# Patient Record
Sex: FEMALE | Race: BLACK OR AFRICAN AMERICAN | NOT HISPANIC OR LATINO | Employment: OTHER | URBAN - METROPOLITAN AREA
[De-identification: names, ages, dates, MRNs, and addresses within clinical notes are randomized per-mention and may not be internally consistent; named-entity substitution may affect disease eponyms.]

---

## 2018-04-06 ENCOUNTER — TELEPHONE (OUTPATIENT)
Dept: OTOLARYNGOLOGY | Facility: CLINIC | Age: 68
End: 2018-04-06

## 2018-04-06 NOTE — TELEPHONE ENCOUNTER
----- Message from Zev Gregg sent at 4/5/2018  2:34 PM CDT -----  Please contact the pt regarding scheduling a PET scan or the body and CT scan of the brain prior to appt with provider on 4/16. Pt is currently set to be seen for adenocarcinoma of the lung, sent from [facility]. Orders for the scans and the records are being faxed to office this week.

## 2018-04-11 ENCOUNTER — INITIAL CONSULT (OUTPATIENT)
Dept: RADIATION ONCOLOGY | Facility: CLINIC | Age: 68
End: 2018-04-11
Payer: MEDICARE

## 2018-04-11 ENCOUNTER — TELEPHONE (OUTPATIENT)
Dept: HEMATOLOGY/ONCOLOGY | Facility: CLINIC | Age: 68
End: 2018-04-11

## 2018-04-11 VITALS
WEIGHT: 132.69 LBS | BODY MASS INDEX: 22.11 KG/M2 | HEART RATE: 101 BPM | HEIGHT: 65 IN | TEMPERATURE: 98 F | DIASTOLIC BLOOD PRESSURE: 91 MMHG | SYSTOLIC BLOOD PRESSURE: 165 MMHG | RESPIRATION RATE: 18 BRPM

## 2018-04-11 DIAGNOSIS — C34.12 PRIMARY ADENOCARCINOMA OF UPPER LOBE OF LEFT LUNG: ICD-10-CM

## 2018-04-11 PROCEDURE — 99205 OFFICE O/P NEW HI 60 MIN: CPT | Mod: S$GLB,,, | Performed by: RADIOLOGY

## 2018-04-11 PROCEDURE — 99999 PR PBB SHADOW E&M-EST. PATIENT-LVL III: CPT | Mod: PBBFAC,,, | Performed by: RADIOLOGY

## 2018-04-11 RX ORDER — AMLODIPINE BESYLATE 10 MG/1
10 TABLET ORAL DAILY
COMMUNITY
End: 2019-01-10

## 2018-04-11 RX ORDER — SPIRONOLACTONE 25 MG/1
25 TABLET ORAL DAILY
COMMUNITY

## 2018-04-11 RX ORDER — LANOLIN ALCOHOL/MO/W.PET/CERES
400 CREAM (GRAM) TOPICAL DAILY
COMMUNITY
End: 2018-11-26

## 2018-04-11 RX ORDER — SODIUM BICARBONATE 650 MG/1
650 TABLET ORAL 2 TIMES DAILY
COMMUNITY
Start: 2018-03-06 | End: 2018-09-27

## 2018-04-11 NOTE — LETTER
April 11, 2018      Daysi Murcia MD  [address] [place] [address]           [facility] - Radiation Oncology  [address]  [place] [address]  Phone: [phone]          Patient: Verónica Baker   MR Number: [id number]   YOB: 1950   Date of Visit: 4/11/2018       Dear Dr. Daysi Murcia:    Thank you for referring Verónica Baker to me for evaluation. Attached you will find relevant portions of my assessment and plan of care.    If you have questions, please do not hesitate to call me. I look forward to following Verónica Baker along with you.    Sincerely,    Tuan Hopson MD    Enclosure  CC:  No Recipients    If you would like to receive this communication electronically, please contact [email or web address] or [phone] to request more information on [employer] access.    For providers and/or their staff who would like to refer a patient to [facility], please contact us through our one-stop-shop provider referral line, [facility], at [phone].    If you feel you have received this communication in error or would no longer like to receive these types of communications, please e-mail [email or web address]

## 2018-04-11 NOTE — TELEPHONE ENCOUNTER
----- Message from Yuko Lloyd RN sent at 4/11/2018 11:01 AM CDT -----  Regarding: Consult with [facility]  Dr. Hopson put in a consult for this pt to [facility]. Dr. Hopson ordered a MRI and PET scan that is scheduled for Friday.

## 2018-04-11 NOTE — PROGRESS NOTES
REFERRING PHYSICIAN: Dr. Murcia     DIAGNOSIS: Lung adenocarcinoma, staging in progress    HISTORY OF PRESENT ILLNESS:   Ms. Baker is a 67 yr old female nonsmoker who presented to Dr. Murcia in pulmonology in [place] for new onset hemoptysis (blood tinged sputum for about a week).  noncontrast CT chest (due to stage 4 CKD) reportedly showed a 4cm spiculated medial LUPIS mass and possible mediastinal LAD.  She has hepatomegaly due to polycystic liver (and kidneys).  There were small lucent areas in the sternum and T9 vertebral bodies concerning for mets.  She underwent biopsy of the lesion and pathology reveals adenocarcinoma.  I do not have any evidence that molecular studies were performed.  She wanted to come to [facility] for treatment as she has family she can stay with here.  She feels well.  She has not coughed up any blood in at least 3 days.  No SOB, except when she lies on her right side.  Energy, appetite are good and weight is stable.  She was diagnosed with polycystic kidney/liver at 18 years old.  She is followed by Dr. Nagel in [place].  She has chronic infrequent headaches that are not worsening in severity or frequency.  She has chronic abdominal distension that is not worsening.   She has chronic LBP that is unchanged.  She has not had any scans other than the initial CT C/A/P. She does not have an appt with med onc.    ECO  ECOG SCORE         REVIEW OF SYSTEMS:   As above.  In addition, patient denies visual problems, dizziness, chest pain, nausea, vomiting, diarrhea, or any new bony pains.  Patient also denies easy bruising, skin rashes, or numbness or tingling.    PAST MEDICAL HISTORY:  Polycystic kidney/liver  CKD 4  HTN    PAST SURGICAL HISTORY:  No past surgical history on file.    ALLERGIES:   Review of patient's allergies indicates:  Codeine causes nausea    MEDICATIONS:  Current Outpatient Prescriptions   Medication Sig    furosemide (LASIX) 20 MG tablet      No current  "facility-administered medications for this visit.        SOCIAL HISTORY:  Social History     Social History    Marital status: Unknown     Spouse name: N/A    Number of children: N/A    Years of education: N/A     Occupational History    Not on file.     Social History Main Topics    Smoking status: Not on file    Smokeless tobacco: Not on file    Alcohol use Not on file    Drug use: Unknown    Sexual activity: Not on file     Other Topics Concern    Not on file     Social History Narrative    No narrative on file   Single. [place] the country outside of [place], [place].  Staying with sister in [facility] while here.  3 grown children, 8 grandchildren.  Smoked marijuana socially in her 20s and 30s.    FAMILY HISTORY:  Mom had leukemia.  Sister had myeloma.    PHYSICAL EXAMINATION:  BP (!) 165/91 (BP Location: Right arm, Patient Position: Sitting)   Pulse 101   Temp 97.7 °F (36.5 °C) (Oral)   Resp 18   Ht 5' 5" (1.651 m)   Wt 60.2 kg (132 lb 11.2 oz)   BMI 22.08 kg/m²   GENERAL: Patient is alert and oriented, in no acute distress.  HEENT:Extraocular muscles are intact.  Oropharynx is clear without lesions.    LYMPH: There is no cervical or supraclavicular lymphadenopathy palpated.  No axillary LAD.  HEART: Regular rate and rhythm.  LUNGS: Clear to auscultation bilaterally.  ABDOMEN:Soft, nontender, nondistended, without hepatosplenomegaly.  Normoactive bowel sounds.  EXTREMITIES: No clubbing, cyanosis, or edema.  MSK: spine is nontender.  No sternal tenderness.  NEUROLOGICAL: Cranial nerve II through XII grossly intact.  Sensation is intact.  Strength is 5 out of 5 in the upper and lower extremities bilaterally.  Gait is normal.    ASSESSMENT:  68 yo female never smoker with adenocarcinoma of the left upper lung, possible mediastinal LAD.  Staging incomplete.    PLAN:  I will order a brain MRI (without contrast unfortunately as her GFR <20) and a PET/CT for staging.  Once these studies are available I will " present her case at thoracic conference.  Will refer to med onc as well.  Initially she was not interested in surgery but when I pressed her on it she says she would consider it should she be a candidate.  Depending on PET results she may need an EBUS with biopsies.    I will see her back in follow up in about a week to discuss test results and further recommendations.     I spent approximately 60 minutes reviewing the available records and evaluating the patient, out of which over 50% of the time was spent face to face with the patient in counseling and coordinating this patient's care.  Distress Screening Results: Psychosocial Distress screening score of Distress Score: 0 noted and reviewed. No intervention indicated.

## 2018-04-13 ENCOUNTER — HOSPITAL ENCOUNTER (OUTPATIENT)
Dept: RADIOLOGY | Facility: HOSPITAL | Age: 68
Discharge: HOME | End: 2018-04-13
Attending: RADIOLOGY
Payer: MEDICARE

## 2018-04-13 DIAGNOSIS — C34.12 PRIMARY ADENOCARCINOMA OF UPPER LOBE OF LEFT LUNG: ICD-10-CM

## 2018-04-13 LAB — POCT GLUCOSE: 82 MG/DL (ref 70–110)

## 2018-04-13 PROCEDURE — 70551 MRI BRAIN STEM W/O DYE: CPT | Mod: 26,,, | Performed by: RADIOLOGY

## 2018-04-13 PROCEDURE — 78815 PET IMAGE W/CT SKULL-THIGH: CPT | Mod: 26,PI,, | Performed by: RADIOLOGY

## 2018-04-13 PROCEDURE — 78815 PET IMAGE W/CT SKULL-THIGH: CPT | Mod: TC

## 2018-04-13 PROCEDURE — A9552 F18 FDG: HCPCS

## 2018-04-13 PROCEDURE — 70551 MRI BRAIN STEM W/O DYE: CPT | Mod: TC

## 2018-04-16 NOTE — PROGRESS NOTES
CC: Lung cancer, initial visit    HPI:Ms. Baker is a 67 yr old lady here for oncology consultation.  She had a noncontrast CT chest (due to stage 4 CKD) for hemoptysis, which showed a 4cm spiculated medial LUPIS mass and possible mediastinal LAD.  She has hepatomegaly due to polycystic liver (and kidneys).  There were small lucent areas in the sternum and T9 vertebral bodies concerning for mets.  She underwent biopsy of the lesion on 4/4/18 in [place] and pathology reveals adenocarcinoma. She wanted to come to [facility] for treatment as she has family she can stay with here.  She feels well.  She has not coughed up any blood in at least 3 days.  No SOB, except when she lies on her right side.  She was diagnosed with polycystic kidney/liver when she was 18.  She is followed by Dr. Nagel in [place].  She has chronic infrequent headaches that are not worsening in severity or frequency.  She was evaluated by radiation oncology here ( ). She had PET CT here and non-contrast brain MRI     Medical Problems:    1. Polycystic kidney/liver disease  2. CKD stage 4  3. Hypertension  4. Uterine fibroids    Surgeries:    1. Biopsy  2. Hysterectomy    Family History:    History reviewed. No pertinent family history. Her mother had leukemia at age 77. Her sister has multiple myeloma,diagnosed at age 62  .        Social History:    Social History     Social History    Marital status: Unknown     Spouse name: N/A    Number of children: N/A    Years of education: N/A     Occupational History    Not on file.     Social History Main Topics    Smoking status: Never Smoker    Smokeless tobacco: Never Used    Alcohol use No    Drug use: Unknown    Sexual activity: Not on file     Other Topics Concern    Not on file     Social History Narrative    No narrative on file     Review of Systems   Constitutional: Positive for malaise/fatigue. Negative for chills, fever and weight loss.   HENT: Negative.    Eyes: Negative.     Respiratory: Positive for shortness of breath.    Cardiovascular: Positive for leg swelling.   Gastrointestinal: Negative for abdominal pain, diarrhea, heartburn, nausea and vomiting.   Genitourinary: Negative for dysuria, flank pain, frequency, hematuria and urgency.   Musculoskeletal: Negative for back pain, myalgias and neck pain.   Skin: Negative.    Neurological: Negative for dizziness, tremors, sensory change, speech change, weakness and headaches.   Psychiatric/Behavioral: Negative for depression.          Current Outpatient Prescriptions   Medication Sig    amLODIPine (NORVASC) 10 MG tablet Take 10 mg by mouth once daily.    magnesium oxide (MAG-OX) 400 mg tablet Take 400 mg by mouth once daily.    sodium bicarbonate 650 MG tablet Take 650 mg by mouth 2 (two) times daily.    spironolactone (ALDACTONE) 25 MG tablet Take 25 mg by mouth.    ferrous sulfate 325 mg (65 mg iron) Tab tablet Take 325 mg by mouth.    furosemide (LASIX) 20 MG tablet      No current facility-administered medications for this visit.          4/13/18 PET CT      EXAMINATION:  NM PET CT ROUTINE    CLINICAL HISTORY:  Malignant neoplasm of upper lobe, left bronchus or lung.    Outside imaging demonstrated 4 cm spiculated medial left upper lobe mass (biopsy proven adenocarcinoma) with possible mediastinal lymphadenopathy as well as lucent areas involving the sternum and T9.    TECHNIQUE:  13.12 mCi of F18-FDG was administered intravenously in the right antecubital fossa.  After an approximately 60 min distribution time, PET/CT images were acquired from the skull base to the mid thigh. Transmission images were acquired to correct for attenuation using a whole body low-dose CT scan without contrast with the arms positioned above the head. Glycemia at the time of injection was 82 mg/dL.    COMPARISON:  CT abdomen pelvis 07/21/2017, MRI brain 04/13/2018    FINDINGS:  Quality of the study: Adequate.    Abnormal foci of increased uptake  are present within the left upper lobe as well as a few prevascular mediastinal lymph nodes.  Lung mass measures approximately 4.6 x 2.9 cm.    No appreciable lucent lesion in the sternum or T9 vertebral body.  Mild degenerative metabolic activity is present at the sternomanubrial junction.    Index lesions:    - Left upper lobe mass: SUV max 11.1    - Lateral pre-vascular lymph node: SUV max 5.8    Physiologic uptake of the tracer is present within the brain, salivary glands, myocardium, GI and  tracts.    Incidental CT findings: Liver and kidneys are enlarged with numerous intraparenchymal cyst and associated mass effect on the adjacent abdominal organs, unchanged.  Colonic diverticulosis without diverticulitis.  Bandlike opacification within the right lower lobe likely represents subsegmental atelectasis.  Calcific atherosclerosis involves the lower extremity vasculature bilaterally.   Impression       Known malignancy of the left upper lobe with mediastinal trini metastases.    No appreciable lucent lesion in the sternum or T9 vertebral body.  Correlation with prior outside imaging is recommended.         4/13/18 MRI brain w/o cont      FINDINGS:  Intracranial compartment:    Ventricles and sulci are normal in size for age without evidence of hydrocephalus. No extra-axial blood or fluid collections.    Nonspecific small foci of T2/FLAIR high signal intensity in the supratentorial white matter, favored to represent chronic microvascular ischemic changes.  Remote lacunar type infarct in the left putamen.  Small punctate focus of susceptibility signal artifact in the left occipital lobe, suggestive of an old microhemorrhage or calcification.  No mass lesion, acute hemorrhage, edema or acute infarct.    Normal vascular flow voids are preserved.    Skull/extracranial contents (limited evaluation): Bone marrow signal intensity is normal.   Impression       No evidence of intracranial metastases within limitation in  the absence of IV contrast which decrease the sensitivity of this exam.    Nonspecific foci of T2/FLAIR high signal intensity in the supratentorial white matter, likely representing chronic microvascular ischemic changes.     Vitals:    04/17/18 0854   BP: (!) 159/89   Pulse: 87   Resp: 18   Temp: 98.4 °F (36.9 °C)     Physical Exam   Constitutional: She is oriented to person, place, and time. She appears well-developed. No distress.   HENT:   Head: Normocephalic and atraumatic.   Mouth/Throat: No oropharyngeal exudate.   Eyes: Pupils are equal, round, and reactive to light. No scleral icterus.   Neck: Normal range of motion.   Cardiovascular: Normal rate, regular rhythm and normal heart sounds.    No murmur heard.  Pulmonary/Chest: Effort normal. No respiratory distress. She has no wheezes.   Abdominal: She exhibits distension. There is no tenderness. There is no rebound.   She has tense ascites   Musculoskeletal: She exhibits edema.   Lymphadenopathy:     She has no cervical adenopathy.   Neurological: She is alert and oriented to person, place, and time. No cranial nerve deficit.   Skin: Skin is warm.   Psychiatric: She has a normal mood and affect.       Assessment:    1. Adenocarcinoma involving lung  2. Polycystic kidney disease  3. Polycystic liver disease  4. Ascites  5. Hypertension,essential  6. Cough  7. Dyspnea on exertion  8. CKD stage IV    Plan:    1. She has a left upper lung mass that was biopsied, as well as hyper metabolic, prevascular mediastinal lymph nodes on PET CT. The diagnosis was based purely on morphology, with no IHC details provided. No other hypermetabolic lesions on PET CT. I personally reviewed the images today. MRI brain (non-contrast) does not show any metastatic lesion. She has polycystic kidney disease and she is certainly at higher risk for renal malignancy, colon CA as well as liver CA.  Slides and block will be requested from [facility] and reviwed by pathology here,  to ensure that this is a primary lung malignancy and not metastatic renal/hepatic malignancy. This case will be discussed at this week's thoracic tumor conference.    4. Chronic, attributed to CKD/ polycystic liver       5. On multiple anti-HT medications    Distress Screening Results: Psychosocial Distress screening score of Distress Score: 0 noted and reviewed. No intervention indicated.

## 2018-04-17 ENCOUNTER — INITIAL CONSULT (OUTPATIENT)
Dept: HEMATOLOGY/ONCOLOGY | Facility: CLINIC | Age: 68
End: 2018-04-17
Payer: MEDICARE

## 2018-04-17 ENCOUNTER — LAB VISIT (OUTPATIENT)
Dept: LAB | Facility: HOSPITAL | Age: 68
End: 2018-04-17
Attending: INTERNAL MEDICINE
Payer: MEDICARE

## 2018-04-17 VITALS
RESPIRATION RATE: 18 BRPM | HEIGHT: 65 IN | SYSTOLIC BLOOD PRESSURE: 159 MMHG | OXYGEN SATURATION: 99 % | DIASTOLIC BLOOD PRESSURE: 89 MMHG | BODY MASS INDEX: 21.89 KG/M2 | HEART RATE: 87 BPM | WEIGHT: 131.38 LBS | TEMPERATURE: 98 F

## 2018-04-17 DIAGNOSIS — C34.12 PRIMARY ADENOCARCINOMA OF UPPER LOBE OF LEFT LUNG: ICD-10-CM

## 2018-04-17 DIAGNOSIS — Q61.3 POLYCYSTIC KIDNEY DISEASE: ICD-10-CM

## 2018-04-17 DIAGNOSIS — C34.12 PRIMARY ADENOCARCINOMA OF UPPER LOBE OF LEFT LUNG: Primary | ICD-10-CM

## 2018-04-17 DIAGNOSIS — T14.8XXA BRUISING: ICD-10-CM

## 2018-04-17 DIAGNOSIS — R18.8 OTHER ASCITES: ICD-10-CM

## 2018-04-17 LAB
ALBUMIN SERPL BCP-MCNC: 4 G/DL
ALP SERPL-CCNC: 153 U/L
ALT SERPL W/O P-5'-P-CCNC: 20 U/L
ANION GAP SERPL CALC-SCNC: 12 MMOL/L
AST SERPL-CCNC: 22 U/L
BASOPHILS # BLD AUTO: 0.06 K/UL
BASOPHILS NFR BLD: 1.4 %
BILIRUB SERPL-MCNC: 0.6 MG/DL
BUN SERPL-MCNC: 34 MG/DL
CALCIUM SERPL-MCNC: 10 MG/DL
CHLORIDE SERPL-SCNC: 106 MMOL/L
CO2 SERPL-SCNC: 22 MMOL/L
CREAT SERPL-MCNC: 3.2 MG/DL
DIFFERENTIAL METHOD: ABNORMAL
EOSINOPHIL # BLD AUTO: 0.1 K/UL
EOSINOPHIL NFR BLD: 1.2 %
ERYTHROCYTE [DISTWIDTH] IN BLOOD BY AUTOMATED COUNT: 14.4 %
EST. GFR  (AFRICAN AMERICAN): 16.6 ML/MIN/1.73 M^2
EST. GFR  (NON AFRICAN AMERICAN): 14.4 ML/MIN/1.73 M^2
GLUCOSE SERPL-MCNC: 80 MG/DL
HCT VFR BLD AUTO: 35.9 %
HGB BLD-MCNC: 11.5 G/DL
IMM GRANULOCYTES # BLD AUTO: 0.01 K/UL
IMM GRANULOCYTES NFR BLD AUTO: 0.2 %
INR PPP: 1.2
LDH SERPL L TO P-CCNC: 217 U/L
LYMPHOCYTES # BLD AUTO: 1 K/UL
LYMPHOCYTES NFR BLD: 23.1 %
MCH RBC QN AUTO: 26.6 PG
MCHC RBC AUTO-ENTMCNC: 32 G/DL
MCV RBC AUTO: 83 FL
MONOCYTES # BLD AUTO: 0.2 K/UL
MONOCYTES NFR BLD: 5.6 %
NEUTROPHILS # BLD AUTO: 3 K/UL
NEUTROPHILS NFR BLD: 68.5 %
NRBC BLD-RTO: 0 /100 WBC
PLATELET # BLD AUTO: 233 K/UL
PMV BLD AUTO: 12.2 FL
POTASSIUM SERPL-SCNC: 4.4 MMOL/L
PROT SERPL-MCNC: 7.9 G/DL
PROTHROMBIN TIME: 12 SEC
RBC # BLD AUTO: 4.33 M/UL
SODIUM SERPL-SCNC: 140 MMOL/L
WBC # BLD AUTO: 4.32 K/UL

## 2018-04-17 PROCEDURE — 83615 LACTATE (LD) (LDH) ENZYME: CPT

## 2018-04-17 PROCEDURE — 99999 PR PBB SHADOW E&M-EST. PATIENT-LVL III: CPT | Mod: PBBFAC,,, | Performed by: INTERNAL MEDICINE

## 2018-04-17 PROCEDURE — 85025 COMPLETE CBC W/AUTO DIFF WBC: CPT

## 2018-04-17 PROCEDURE — 80053 COMPREHEN METABOLIC PANEL: CPT

## 2018-04-17 PROCEDURE — 85610 PROTHROMBIN TIME: CPT

## 2018-04-17 PROCEDURE — 36415 COLL VENOUS BLD VENIPUNCTURE: CPT

## 2018-04-17 PROCEDURE — 99205 OFFICE O/P NEW HI 60 MIN: CPT | Mod: S$GLB,,, | Performed by: INTERNAL MEDICINE

## 2018-04-17 RX ORDER — FERROUS SULFATE 325(65) MG
325 TABLET ORAL
COMMUNITY
End: 2018-05-07

## 2018-04-17 NOTE — Clinical Note
-Labs today -Release of tissue block and slides from [facility] ( form signed by pt)

## 2018-04-17 NOTE — LETTER
April 17, 2018      Tuan Hopson MD  [address] [facility] [address]           [facility] - Hematology Oncology  [address] [facility] [address] [address]  [facility] [address]  Phone: [phone]          Patient: Verónica Baker   MR Number: [id number]   YOB: 1950   Date of Visit: 4/17/2018       Dear Dr. Tuan Hopson:    Thank you for referring Verónica Baker to me for evaluation. Attached you will find relevant portions of my assessment and plan of care.    If you have questions, please do not hesitate to call me. I look forward to following Verónica Baker along with you.    Sincerely,    Patricia Pires MD    Enclosure  CC:  No Recipients    If you would like to receive this communication electronically, please contact [email or web address] or [phone] to request more information on [employer] Link access.    For providers and/or their staff who would like to refer a patient to [facility], please contact us through our one-stop-shop provider referral line, [facility], at [phone].    If you feel you have received this communication in error or would no longer like to receive these types of communications, please e-mail [email or web address]

## 2018-04-18 ENCOUNTER — DOCUMENTATION ONLY (OUTPATIENT)
Dept: CARDIOTHORACIC SURGERY | Facility: CLINIC | Age: 68
End: 2018-04-18

## 2018-04-18 NOTE — PATIENT CARE CONFERENCE
[facility]      THORACIC MULTIDISCIPLINARY TUMOR BOARD  PATIENT REVIEW FORM  ________________________________________________________________________    CLINIC #: [id number]  DATE: 04/18/2018    Diagnosis:   NSCLC    PRESENTER:   Dr. Hopson    PATIENT SUMMARY:   66 yo female nonsmoker with CKD 4, polycystic kidney/liver disease and lung adenocarcinoma diagnosed on w/u for hemoptysis. 4.6 cm LUPIS spiculated mass and possible mediastinal adenopathy. PET with LUPIS avidity and uptake in left prevascular LN. Outside pathology.    BOARD RECOMMENDATIONS:   Recommend definitive chemoRT.     CONSULT NEEDED:     [] Surgery    [] Hem/Onc    [] Rad/Onc    [] Dietary                 [] Social Service    [] Psychology       [] Pulmonology    Clinical Stage: Tumor 2b Node(s) 2        GROUP STAGE:     [] O    [] 1A    [] IB    [] IIA    [] IIB     [x] IIIA     [] IIIB     [] IIIC    [] IV                               [] Local recurrence     [] Regional recurrence     [] Distant recurrence                   [] NSCLC     [] SCLC     Tumor type adenocarcinoma     Unstageable:      [] Yes     [] No  Metastatic site(s):          [x] Ora'l Treatment Guidelines reviewed and care planned is consistent with guidelines.         (i.e., NCCN, NCI, PD, ACO, AUA, etc.)    PRESENTATION AT CANCER CONFERENCE:         [] Prospective    [] Retrospective     [] Follow-Up          [] Eligible for clinical trial

## 2018-04-20 ENCOUNTER — OFFICE VISIT (OUTPATIENT)
Dept: RADIATION ONCOLOGY | Facility: CLINIC | Age: 68
End: 2018-04-20
Payer: MEDICARE

## 2018-04-20 VITALS
RESPIRATION RATE: 18 BRPM | DIASTOLIC BLOOD PRESSURE: 80 MMHG | HEART RATE: 92 BPM | WEIGHT: 132.19 LBS | SYSTOLIC BLOOD PRESSURE: 156 MMHG | TEMPERATURE: 98 F | BODY MASS INDEX: 22 KG/M2

## 2018-04-20 DIAGNOSIS — C34.12 PRIMARY ADENOCARCINOMA OF UPPER LOBE OF LEFT LUNG: Primary | ICD-10-CM

## 2018-04-20 PROCEDURE — 99999 PR PBB SHADOW E&M-EST. PATIENT-LVL III: CPT | Mod: PBBFAC,,, | Performed by: RADIOLOGY

## 2018-04-20 PROCEDURE — 99213 OFFICE O/P EST LOW 20 MIN: CPT | Mod: S$GLB,,, | Performed by: RADIOLOGY

## 2018-04-20 NOTE — PROGRESS NOTES
REFERRING PHYSICIAN: Dr. Murcia      DIAGNOSIS: Lung adenocarcinoma, [id number], stage IIIA    INTERVAL HISTORY: Ms. Baker returns in followup.  Since I saw her she saw Dr. Pires who has requested her path for review and possibly molecular studies to confirm lung primary.  She had a noncontrast brain MRI which was negative for metastases, and a PET/CT which showed a hypermetabolic 4.6cm LUPIS mass and a few hypermetabolic prevascular LNs, without evidence of contralateral nodes or distant mets.  She is therefore stage IIIA.  Her case was discussed at multidisciplinary thoracic conference and concurrent chemoradiotherapy was recommended for her stage IIIA disease.  Mediastinal sampling was not felt to be necessary.  She has been eating well with stable weight.  No pain.    PHYSICAL EXAMINATION:  VITAL SIGNS: BP (!) 156/80 (BP Location: Left arm, Patient Position: Sitting)   Pulse 92   Temp 98 °F (36.7 °C) (Oral)   Resp 18   Wt 60 kg (132 lb 3.2 oz)   BMI 22.00 kg/m²   GENERAL: Patient is alert and oriented, in no acute distress.  HEENT: Extraocular muscles are intact.  Oropharynx is clear without lesions.    LYMPH: There is no cervical or supraclavicular adenopathy palpated.  No axillary LAD.  CHEST: Breath sounds clear bilaterally.  No rales.  No rhonchi.  Unlabored respirations.  CARDIOVASCULAR: Normal S1, S2.  Normal rate.  Regular rhythm.  ABDOMEN: Bowel sounds normal.  No tenderness.  No abdominal distention.  No hepatomegaly.  No splenomegaly.  EXTREMITIES: No clubbing, cyanosis, edema  NEUROLOGIC: Cranial nerves II through XII are grossly intact.  Sensation is intact.  Strength is 5 out of 5 in the upper and lower extremities bilaterally.  Gait is normal.    ASSESSMENT:   68 yo female nonsmoker with a stage IIIA adenocarcinoma of the left upper lobe.      PLAN:   Concurrent chemoradiotherapy.   We discussed the rationale for treatment, logistics, risks vs benefits, side effects and potential complications of  treatment.  Both acute and late side effects were discussed, including but not limited to esophagitis, cough, pneumonitis and dermatitis.  All of her questions were answered and she wishes to proceed as recommended.  Consent form was signed and simulation scheduled.  I will schedule the simulation for late next week to allow sometime for pathology slide review.  I anticipate her chemoradiation beginning around 5/7/18.  20 minutes was spent in follow up, of which >50% was spent in face to face counseling.

## 2018-04-26 DIAGNOSIS — C34.12 PRIMARY ADENOCARCINOMA OF UPPER LOBE OF LEFT LUNG: Primary | ICD-10-CM

## 2018-04-27 ENCOUNTER — HOSPITAL ENCOUNTER (OUTPATIENT)
Dept: RADIATION THERAPY | Facility: HOSPITAL | Age: 68
Discharge: HOME | End: 2018-04-27
Attending: RADIOLOGY
Payer: MEDICARE

## 2018-04-27 PROCEDURE — 77263 THER RADIOLOGY TX PLNG CPLX: CPT | Mod: ,,, | Performed by: RADIOLOGY

## 2018-04-27 PROCEDURE — 77334 RADIATION TREATMENT AID(S): CPT | Mod: 26,,, | Performed by: RADIOLOGY

## 2018-04-27 PROCEDURE — 77014 HC CT GUIDANCE RADIATION THERAPY FLDS PLACEMENT: CPT | Mod: TC | Performed by: RADIOLOGY

## 2018-04-27 PROCEDURE — 77334 RADIATION TREATMENT AID(S): CPT | Mod: TC | Performed by: RADIOLOGY

## 2018-04-27 PROCEDURE — 77290 THER RAD SIMULAJ FIELD CPLX: CPT | Mod: TC | Performed by: RADIOLOGY

## 2018-04-27 PROCEDURE — 77290 THER RAD SIMULAJ FIELD CPLX: CPT | Mod: 26,,, | Performed by: RADIOLOGY

## 2018-04-27 RX ORDER — FAMOTIDINE 10 MG/ML
20 INJECTION INTRAVENOUS
Status: CANCELLED | OUTPATIENT
Start: 2018-05-03

## 2018-04-27 RX ORDER — SODIUM CHLORIDE 0.9 % (FLUSH) 0.9 %
10 SYRINGE (ML) INJECTION
Status: CANCELLED | OUTPATIENT
Start: 2018-05-03

## 2018-04-27 RX ORDER — DIPHENHYDRAMINE HYDROCHLORIDE 50 MG/ML
50 INJECTION INTRAMUSCULAR; INTRAVENOUS ONCE AS NEEDED
Status: CANCELLED | OUTPATIENT
Start: 2018-05-03 | End: 2018-05-03

## 2018-04-27 RX ORDER — EPINEPHRINE 0.3 MG/.3ML
0.3 INJECTION SUBCUTANEOUS ONCE AS NEEDED
Status: CANCELLED | OUTPATIENT
Start: 2018-05-03 | End: 2018-05-03

## 2018-04-27 RX ORDER — HEPARIN 100 UNIT/ML
500 SYRINGE INTRAVENOUS
Status: CANCELLED | OUTPATIENT
Start: 2018-05-03

## 2018-04-30 DIAGNOSIS — C34.12 PRIMARY ADENOCARCINOMA OF UPPER LOBE OF LEFT LUNG: Primary | ICD-10-CM

## 2018-04-30 PROCEDURE — 88342 IMHCHEM/IMCYTCHM 1ST ANTB: CPT | Mod: 59 | Performed by: PATHOLOGY

## 2018-04-30 PROCEDURE — 88341 IMHCHEM/IMCYTCHM EA ADD ANTB: CPT | Mod: 26,59,, | Performed by: PATHOLOGY

## 2018-04-30 PROCEDURE — 88323 CONSLTJ&REPRT MATRL PREP SLD: CPT | Mod: 26,,, | Performed by: PATHOLOGY

## 2018-04-30 PROCEDURE — 88342 IMHCHEM/IMCYTCHM 1ST ANTB: CPT | Mod: 26,59,, | Performed by: PATHOLOGY

## 2018-04-30 PROCEDURE — 88341 IMHCHEM/IMCYTCHM EA ADD ANTB: CPT | Mod: 59 | Performed by: PATHOLOGY

## 2018-05-01 ENCOUNTER — TELEPHONE (OUTPATIENT)
Dept: RADIATION ONCOLOGY | Facility: CLINIC | Age: 68
End: 2018-05-01

## 2018-05-01 ENCOUNTER — HOSPITAL ENCOUNTER (OUTPATIENT)
Dept: RADIATION THERAPY | Facility: HOSPITAL | Age: 68
Discharge: HOME | End: 2018-05-01
Attending: RADIOLOGY
Payer: MEDICARE

## 2018-05-01 NOTE — TELEPHONE ENCOUNTER
Follow up call to patient she will be starting radiation/chemo on 5/7/18  Instructed to come over to radiation after her chemo is finished.  She verbalized understanding

## 2018-05-01 NOTE — TELEPHONE ENCOUNTER
[facility] and explained the rules of the [facility] to her. She expressed understanding and agreement. Completed and faxed the reservation to the [facility] for arrival on 5/7/18 and departure on 6/20/2018. Spoke to Jenny at the [facility] and she confirmed availability - she discussed it with the patient. Notified the patient's nurse in Radiation Oncology, Yuko Lloyd RN.

## 2018-05-03 PROCEDURE — 77295 3-D RADIOTHERAPY PLAN: CPT | Mod: 26,,, | Performed by: RADIOLOGY

## 2018-05-03 PROCEDURE — 77334 RADIATION TREATMENT AID(S): CPT | Mod: TC | Performed by: RADIOLOGY

## 2018-05-03 PROCEDURE — 77334 RADIATION TREATMENT AID(S): CPT | Mod: 26,,, | Performed by: RADIOLOGY

## 2018-05-03 PROCEDURE — 77300 RADIATION THERAPY DOSE PLAN: CPT | Mod: 26,,, | Performed by: RADIOLOGY

## 2018-05-03 PROCEDURE — 77300 RADIATION THERAPY DOSE PLAN: CPT | Mod: TC | Performed by: RADIOLOGY

## 2018-05-03 PROCEDURE — 77295 3-D RADIOTHERAPY PLAN: CPT | Mod: TC | Performed by: RADIOLOGY

## 2018-05-04 PROCEDURE — 77470 SPECIAL RADIATION TREATMENT: CPT | Mod: 59,TC | Performed by: RADIOLOGY

## 2018-05-04 PROCEDURE — 77470 SPECIAL RADIATION TREATMENT: CPT | Mod: 26,59,, | Performed by: RADIOLOGY

## 2018-05-07 ENCOUNTER — INFUSION (OUTPATIENT)
Dept: INFUSION THERAPY | Facility: HOSPITAL | Age: 68
End: 2018-05-07
Attending: INTERNAL MEDICINE
Payer: MEDICARE

## 2018-05-07 ENCOUNTER — OFFICE VISIT (OUTPATIENT)
Dept: HEMATOLOGY/ONCOLOGY | Facility: CLINIC | Age: 68
End: 2018-05-07
Payer: MEDICARE

## 2018-05-07 ENCOUNTER — DOCUMENTATION ONLY (OUTPATIENT)
Dept: RADIATION ONCOLOGY | Facility: CLINIC | Age: 68
End: 2018-05-07

## 2018-05-07 VITALS
RESPIRATION RATE: 18 BRPM | TEMPERATURE: 98 F | DIASTOLIC BLOOD PRESSURE: 74 MMHG | BODY MASS INDEX: 21.67 KG/M2 | HEART RATE: 78 BPM | WEIGHT: 130.06 LBS | SYSTOLIC BLOOD PRESSURE: 165 MMHG | HEIGHT: 65 IN

## 2018-05-07 VITALS
TEMPERATURE: 98 F | WEIGHT: 130.06 LBS | DIASTOLIC BLOOD PRESSURE: 91 MMHG | HEART RATE: 98 BPM | RESPIRATION RATE: 17 BRPM | BODY MASS INDEX: 21.67 KG/M2 | HEIGHT: 65 IN | OXYGEN SATURATION: 99 % | SYSTOLIC BLOOD PRESSURE: 161 MMHG

## 2018-05-07 DIAGNOSIS — C34.12 PRIMARY ADENOCARCINOMA OF UPPER LOBE OF LEFT LUNG: Primary | ICD-10-CM

## 2018-05-07 DIAGNOSIS — R18.8 OTHER ASCITES: ICD-10-CM

## 2018-05-07 DIAGNOSIS — Q61.3 POLYCYSTIC KIDNEY DISEASE: ICD-10-CM

## 2018-05-07 PROCEDURE — 77387 GUIDANCE FOR RADJ TX DLVR: CPT | Mod: TC | Performed by: RADIOLOGY

## 2018-05-07 PROCEDURE — 99215 OFFICE O/P EST HI 40 MIN: CPT | Mod: S$GLB,,, | Performed by: INTERNAL MEDICINE

## 2018-05-07 PROCEDURE — S0028 INJECTION, FAMOTIDINE, 20 MG: HCPCS | Performed by: INTERNAL MEDICINE

## 2018-05-07 PROCEDURE — 63600175 PHARM REV CODE 636 W HCPCS: Performed by: INTERNAL MEDICINE

## 2018-05-07 PROCEDURE — 96413 CHEMO IV INFUSION 1 HR: CPT

## 2018-05-07 PROCEDURE — 25000003 PHARM REV CODE 250: Performed by: INTERNAL MEDICINE

## 2018-05-07 PROCEDURE — G6002 STEREOSCOPIC X-RAY GUIDANCE: HCPCS | Mod: 26,,, | Performed by: RADIOLOGY

## 2018-05-07 PROCEDURE — 96417 CHEMO IV INFUS EACH ADDL SEQ: CPT

## 2018-05-07 PROCEDURE — 96367 TX/PROPH/DG ADDL SEQ IV INF: CPT

## 2018-05-07 PROCEDURE — 99999 PR PBB SHADOW E&M-EST. PATIENT-LVL III: CPT | Mod: PBBFAC,,, | Performed by: INTERNAL MEDICINE

## 2018-05-07 PROCEDURE — 77417 THER RADIOLOGY PORT IMAGE(S): CPT | Performed by: RADIOLOGY

## 2018-05-07 PROCEDURE — 77412 RADIATION TX DELIVERY LVL 3: CPT | Performed by: RADIOLOGY

## 2018-05-07 PROCEDURE — 96375 TX/PRO/DX INJ NEW DRUG ADDON: CPT

## 2018-05-07 RX ORDER — EPINEPHRINE 0.3 MG/.3ML
0.3 INJECTION SUBCUTANEOUS ONCE AS NEEDED
Status: DISCONTINUED | OUTPATIENT
Start: 2018-05-07 | End: 2018-05-07 | Stop reason: HOSPADM

## 2018-05-07 RX ORDER — SODIUM CHLORIDE 0.9 % (FLUSH) 0.9 %
10 SYRINGE (ML) INJECTION
Status: DISCONTINUED | OUTPATIENT
Start: 2018-05-07 | End: 2018-05-07 | Stop reason: HOSPADM

## 2018-05-07 RX ORDER — FAMOTIDINE 10 MG/ML
20 INJECTION INTRAVENOUS
Status: COMPLETED | OUTPATIENT
Start: 2018-05-07 | End: 2018-05-07

## 2018-05-07 RX ORDER — DIPHENHYDRAMINE HYDROCHLORIDE 50 MG/ML
50 INJECTION INTRAMUSCULAR; INTRAVENOUS ONCE AS NEEDED
Status: DISCONTINUED | OUTPATIENT
Start: 2018-05-07 | End: 2018-05-07 | Stop reason: HOSPADM

## 2018-05-07 RX ORDER — HEPARIN 100 UNIT/ML
500 SYRINGE INTRAVENOUS
Status: DISCONTINUED | OUTPATIENT
Start: 2018-05-07 | End: 2018-05-07 | Stop reason: HOSPADM

## 2018-05-07 RX ORDER — ONDANSETRON 4 MG/1
4 TABLET, FILM COATED ORAL EVERY 8 HOURS PRN
Qty: 30 TABLET | Refills: 1 | Status: SHIPPED | OUTPATIENT
Start: 2018-05-07 | End: 2018-08-03

## 2018-05-07 RX ADMIN — CARBOPLATIN 80 MG: 10 INJECTION, SOLUTION INTRAVENOUS at 12:05

## 2018-05-07 RX ADMIN — DIPHENHYDRAMINE HYDROCHLORIDE 50 MG: 50 INJECTION, SOLUTION INTRAMUSCULAR; INTRAVENOUS at 11:05

## 2018-05-07 RX ADMIN — DEXAMETHASONE SODIUM PHOSPHATE 20 MG: 4 INJECTION, SOLUTION INTRAMUSCULAR; INTRAVENOUS at 10:05

## 2018-05-07 RX ADMIN — SODIUM CHLORIDE: 9 INJECTION, SOLUTION INTRAVENOUS at 11:05

## 2018-05-07 RX ADMIN — FAMOTIDINE 20 MG: 10 INJECTION INTRAVENOUS at 11:05

## 2018-05-07 RX ADMIN — PACLITAXEL 72 MG: 30 INJECTION, SOLUTION INTRAVENOUS at 11:05

## 2018-05-07 NOTE — PROGRESS NOTES
CC: Lung cancer, initial visit    HPI:Ms. Baker is a 67 yr old lady here for oncology consultation.  She had a noncontrast CT chest (due to stage 4 CKD) for hemoptysis, which showed a 4cm spiculated medial LUPIS mass and possible mediastinal LAD.  She has hepatomegaly due to polycystic liver (and kidneys).  There were small lucent areas in the sternum and T9 vertebral bodies concerning for mets.  She underwent biopsy of the lesion on 4/4/18 in [place] and pathology reveals adenocarcinoma. She wanted to come to [facility] for treatment as she has family she can stay with here.  She feels well.  She has not coughed up any blood in at least 3 days.  No SOB, except when she lies on her right side.  She was diagnosed with polycystic kidney/liver when she was 18.  She is followed by Dr. Nagel in [place].  She has chronic infrequent headaches that are not worsening in severity or frequency.  She was evaluated by radiation oncology here ( ). She had PET CT here and non-contrast brain MRI     Interval History: Ms. Baker and a relative are here today to begin therapy. She is doing well, with the exception of frequent coughs when she tries to take a deep breath.    Medical Problems:    1. Polycystic kidney/liver disease  2. CKD stage 4  3. Hypertension  4. Uterine fibroids    Surgeries:    1. Biopsy  2. Hysterectomy    Family History:    Family History   Problem Relation Age of Onset    Cancer Mother     Heart attack Father     Polycystic kidney disease Sister     Hypertension Sister     Cancer Sister     Diabetes type II Sister     Hypertension Sister     Her mother had leukemia at age 77. Her sister has multiple myeloma,diagnosed at age 62  .    Social History:    Social History     Social History    Marital status: Unknown     Spouse name: N/A    Number of children: N/A    Years of education: N/A     Occupational History    Not on file.     Social History Main Topics    Smoking status: Never Smoker     Smokeless tobacco: Never Used    Alcohol use No    Drug use: No    Sexual activity: Not Currently     Other Topics Concern    Not on file     Social History Narrative    No narrative on file     Review of Systems   Constitutional: Positive for malaise/fatigue. Negative for chills, fever and weight loss.   HENT: Negative.    Eyes: Negative.    Respiratory: Positive for shortness of breath. Negative for cough.    Cardiovascular: Positive for leg swelling. Negative for chest pain.   Gastrointestinal: Negative for abdominal pain, diarrhea, heartburn, nausea and vomiting.   Genitourinary: Negative for dysuria, flank pain, frequency, hematuria and urgency.   Musculoskeletal: Negative for back pain, myalgias and neck pain.   Skin: Negative.  Negative for rash.   Neurological: Negative for dizziness, tremors, sensory change, speech change, weakness and headaches.   Psychiatric/Behavioral: Negative for depression. The patient is not nervous/anxious.           Current Outpatient Prescriptions   Medication Sig    amLODIPine (NORVASC) 10 MG tablet Take 10 mg by mouth once daily.    magnesium oxide (MAG-OX) 400 mg tablet Take 400 mg by mouth once daily.    sodium bicarbonate 650 MG tablet Take 650 mg by mouth 2 (two) times daily.    spironolactone (ALDACTONE) 25 MG tablet Take 25 mg by mouth.    UNABLE TO FIND Apply topically. medication name: Antifungal cream apply topically to toes tid prn    ferrous sulfate 325 mg (65 mg iron) Tab tablet Take 325 mg by mouth.    furosemide (LASIX) 20 MG tablet      No current facility-administered medications for this visit.          4/13/18 PET CT    Impression       Known malignancy of the left upper lobe with mediastinal trini metastases.    No appreciable lucent lesion in the sternum or T9 vertebral body.  Correlation with prior outside imaging is recommended.       4/13/18 MRI brain w/o cont      FINDINGS:  Intracranial compartment:    Ventricles and sulci are normal in size  for age without evidence of hydrocephalus. No extra-axial blood or fluid collections.  Nonspecific small foci of T2/FLAIR high signal intensity in the supratentorial white matter, favored to represent chronic microvascular ischemic changes.  Remote lacunar type infarct in the left putamen.  Small punctate focus of susceptibility signal artifact in the left occipital lobe, suggestive of an old microhemorrhage or calcification.  No mass lesion, acute hemorrhage, edema or acute infarct.    Normal vascular flow voids are preserved.    Skull/extracranial contents (limited evaluation): Bone marrow signal intensity is normal.       Vitals:    05/07/18 0907   BP: (!) 161/91   Pulse: 98   Resp: 17   Temp: 97.7 °F (36.5 °C)     Physical Exam   Constitutional: She is oriented to person, place, and time. She appears well-developed. No distress.   HENT:   Head: Normocephalic and atraumatic.   Mouth/Throat: No oropharyngeal exudate.   Eyes: Pupils are equal, round, and reactive to light. No scleral icterus.   Neck: Normal range of motion.   Cardiovascular: Normal rate, regular rhythm and normal heart sounds.    No murmur heard.  Pulmonary/Chest: Effort normal. No respiratory distress. She has no wheezes.   Abdominal: She exhibits distension. There is no tenderness. There is no rebound.   She has tense ascites   Musculoskeletal: She exhibits edema.   Lymphadenopathy:     She has no cervical adenopathy.   Neurological: She is alert and oriented to person, place, and time. No cranial nerve deficit.   Skin: Skin is warm.   Psychiatric: She has a normal mood and affect.     Lab Results   Component Value Date    WBC 4.33 05/07/2018    HGB 11.2 (L) 05/07/2018    HCT 36.2 (L) 05/07/2018     05/07/2018    ALT 20 04/17/2018    AST 22 04/17/2018     04/17/2018    K 4.4 04/17/2018     04/17/2018    CREATININE 3.2 (H) 04/17/2018    BUN 34 (H) 04/17/2018    CO2 22 (L) 04/17/2018    INR 1.2 04/17/2018       Assessment:    1.  Adenocarcinoma involving lung  2. Polycystic kidney disease  3. Polycystic liver disease  4. Ascites  5. Hypertension,essential  6. Cough  7. Dyspnea on exertion  8. CKD stage IV    Plan:    1. She has a left upper lung mass that was biopsied, as well as hyper metabolic, prevascular mediastinal lymph nodes on PET CT. The diagnosis was based purely on morphology, with no IHC details provided. No other hypermetabolic lesions on PET CT. I personally reviewed the images today. MRI brain (non-contrast) does not show any metastatic lesion. She has polycystic kidney disease and she is certainly at higher risk for renal malignancy, colon CA as well as liver CA.  Slides and block will be requested from [facility] and reviwed by pathology here, to ensure that this is a primary lung malignancy and not metastatic renal/hepatic malignancy.     --Plan to start Paclitaxel + Carboplatin weekly with radiation today  --Consent has been signed    4. Chronic, attributed to CKD/ polycystic liver       5. On multiple anti-HT medications    Distress Screening Results: Psychosocial Distress screening score of Distress Score: 0 noted and reviewed. No intervention indicated.     30 minutes were spent face to face with the patient to discuss the disease, natural history, treatment options and survival statistics. I have provided the patient with an opportunity to ask questions and have all questions answered to her satisfaction.     She return to clinic in 1 week with Dr. Pires, but knows to call in the interim if symptoms change or should a problem arise.       Valerie Marroquin MD  Hematology and Medical Oncology  Bone Marrow Transplant  [facility]

## 2018-05-07 NOTE — PLAN OF CARE
Problem: Patient Care Overview  Goal: Plan of Care Review  Outcome: Ongoing (interventions implemented as appropriate)  Pt. Tolerated infusion. VSS. PIV Flushed and removed. No questions at this time. AVS Given. Reviewed side effects and management.

## 2018-05-07 NOTE — PLAN OF CARE
Problem: Chemotherapy Effects (Adult)  Goal: Signs and Symptoms of Listed Potential Problems Will be Absent, Minimized or Managed (Chemotherapy Effects)  Signs and symptoms of listed potential problems will be absent, minimized or managed by discharge/transition of care (reference Chemotherapy Effects (Adult) CPG).  Outcome: Ongoing (interventions implemented as appropriate)  Pt here today for day1 cycle 1 taxol/carbo. Dr. Marroquin approved carboplatin with pts kidney functions. PIV started without difficulty. Pt watched new pt chemo class video. Navigator is at chairside reviewed medications to be received. Side effects, reporting and management reviewed. Handouts and new pt binder given. Oriented to unit and infusion center protocols. Plan of care reviewed.

## 2018-05-08 PROCEDURE — 77387 GUIDANCE FOR RADJ TX DLVR: CPT | Mod: TC | Performed by: RADIOLOGY

## 2018-05-08 PROCEDURE — 77412 RADIATION TX DELIVERY LVL 3: CPT | Performed by: RADIOLOGY

## 2018-05-08 PROCEDURE — G6002 STEREOSCOPIC X-RAY GUIDANCE: HCPCS | Mod: 26,,, | Performed by: RADIOLOGY

## 2018-05-09 PROCEDURE — G6002 STEREOSCOPIC X-RAY GUIDANCE: HCPCS | Mod: 26,,, | Performed by: RADIOLOGY

## 2018-05-09 PROCEDURE — 77412 RADIATION TX DELIVERY LVL 3: CPT | Performed by: RADIOLOGY

## 2018-05-09 PROCEDURE — 77387 GUIDANCE FOR RADJ TX DLVR: CPT | Mod: TC | Performed by: RADIOLOGY

## 2018-05-10 DIAGNOSIS — Q61.3 POLYCYSTIC KIDNEY DISEASE: Primary | ICD-10-CM

## 2018-05-10 PROCEDURE — 77387 GUIDANCE FOR RADJ TX DLVR: CPT | Mod: TC | Performed by: RADIOLOGY

## 2018-05-10 PROCEDURE — 77412 RADIATION TX DELIVERY LVL 3: CPT | Performed by: RADIOLOGY

## 2018-05-10 PROCEDURE — G6002 STEREOSCOPIC X-RAY GUIDANCE: HCPCS | Mod: 26,,, | Performed by: RADIOLOGY

## 2018-05-11 PROCEDURE — G6002 STEREOSCOPIC X-RAY GUIDANCE: HCPCS | Mod: 26,,, | Performed by: RADIOLOGY

## 2018-05-11 PROCEDURE — 77387 GUIDANCE FOR RADJ TX DLVR: CPT | Mod: TC | Performed by: RADIOLOGY

## 2018-05-11 PROCEDURE — 77412 RADIATION TX DELIVERY LVL 3: CPT | Performed by: RADIOLOGY

## 2018-05-11 PROCEDURE — 77336 RADIATION PHYSICS CONSULT: CPT | Performed by: RADIOLOGY

## 2018-05-14 ENCOUNTER — OFFICE VISIT (OUTPATIENT)
Dept: HEMATOLOGY/ONCOLOGY | Facility: CLINIC | Age: 68
End: 2018-05-14
Payer: MEDICARE

## 2018-05-14 ENCOUNTER — INFUSION (OUTPATIENT)
Dept: INFUSION THERAPY | Facility: HOSPITAL | Age: 68
End: 2018-05-14
Attending: INTERNAL MEDICINE
Payer: MEDICARE

## 2018-05-14 ENCOUNTER — PATIENT MESSAGE (OUTPATIENT)
Dept: HEMATOLOGY/ONCOLOGY | Facility: CLINIC | Age: 68
End: 2018-05-14

## 2018-05-14 VITALS
BODY MASS INDEX: 21.71 KG/M2 | WEIGHT: 130.31 LBS | HEART RATE: 80 BPM | OXYGEN SATURATION: 99 % | DIASTOLIC BLOOD PRESSURE: 72 MMHG | HEIGHT: 65 IN | SYSTOLIC BLOOD PRESSURE: 146 MMHG | TEMPERATURE: 98 F | RESPIRATION RATE: 18 BRPM

## 2018-05-14 VITALS
BODY MASS INDEX: 21.71 KG/M2 | DIASTOLIC BLOOD PRESSURE: 87 MMHG | SYSTOLIC BLOOD PRESSURE: 155 MMHG | HEIGHT: 65 IN | WEIGHT: 130.31 LBS | OXYGEN SATURATION: 100 % | RESPIRATION RATE: 18 BRPM | TEMPERATURE: 98 F | HEART RATE: 73 BPM

## 2018-05-14 DIAGNOSIS — T45.1X5A CHEMOTHERAPY-INDUCED NEUTROPENIA: ICD-10-CM

## 2018-05-14 DIAGNOSIS — R53.0 NEOPLASTIC MALIGNANT RELATED FATIGUE: ICD-10-CM

## 2018-05-14 DIAGNOSIS — R18.8 OTHER ASCITES: ICD-10-CM

## 2018-05-14 DIAGNOSIS — C34.12 PRIMARY ADENOCARCINOMA OF UPPER LOBE OF LEFT LUNG: Primary | ICD-10-CM

## 2018-05-14 DIAGNOSIS — N18.4 STAGE 4 CHRONIC KIDNEY DISEASE: ICD-10-CM

## 2018-05-14 DIAGNOSIS — D70.1 CHEMOTHERAPY-INDUCED NEUTROPENIA: ICD-10-CM

## 2018-05-14 DIAGNOSIS — Q61.3 POLYCYSTIC KIDNEY DISEASE: ICD-10-CM

## 2018-05-14 DIAGNOSIS — D63.0 ANEMIA IN NEOPLASTIC DISEASE: ICD-10-CM

## 2018-05-14 PROCEDURE — 77387 GUIDANCE FOR RADJ TX DLVR: CPT | Mod: TC | Performed by: RADIOLOGY

## 2018-05-14 PROCEDURE — 77412 RADIATION TX DELIVERY LVL 3: CPT | Performed by: RADIOLOGY

## 2018-05-14 PROCEDURE — 96375 TX/PRO/DX INJ NEW DRUG ADDON: CPT

## 2018-05-14 PROCEDURE — 96417 CHEMO IV INFUS EACH ADDL SEQ: CPT

## 2018-05-14 PROCEDURE — 99214 OFFICE O/P EST MOD 30 MIN: CPT | Mod: S$GLB,,, | Performed by: INTERNAL MEDICINE

## 2018-05-14 PROCEDURE — 99999 PR PBB SHADOW E&M-EST. PATIENT-LVL III: CPT | Mod: PBBFAC,,, | Performed by: INTERNAL MEDICINE

## 2018-05-14 PROCEDURE — 63600175 PHARM REV CODE 636 W HCPCS: Performed by: INTERNAL MEDICINE

## 2018-05-14 PROCEDURE — G6002 STEREOSCOPIC X-RAY GUIDANCE: HCPCS | Mod: 26,,, | Performed by: RADIOLOGY

## 2018-05-14 PROCEDURE — 77417 THER RADIOLOGY PORT IMAGE(S): CPT | Performed by: RADIOLOGY

## 2018-05-14 PROCEDURE — 25000003 PHARM REV CODE 250: Performed by: INTERNAL MEDICINE

## 2018-05-14 PROCEDURE — 96413 CHEMO IV INFUSION 1 HR: CPT

## 2018-05-14 PROCEDURE — S0028 INJECTION, FAMOTIDINE, 20 MG: HCPCS | Performed by: INTERNAL MEDICINE

## 2018-05-14 PROCEDURE — 96367 TX/PROPH/DG ADDL SEQ IV INF: CPT

## 2018-05-14 RX ORDER — EPINEPHRINE 0.3 MG/.3ML
0.3 INJECTION SUBCUTANEOUS ONCE AS NEEDED
Status: DISCONTINUED | OUTPATIENT
Start: 2018-05-14 | End: 2018-05-14 | Stop reason: HOSPADM

## 2018-05-14 RX ORDER — EPINEPHRINE 0.3 MG/.3ML
0.3 INJECTION SUBCUTANEOUS ONCE AS NEEDED
Status: CANCELLED | OUTPATIENT
Start: 2018-05-14 | End: 2018-05-14

## 2018-05-14 RX ORDER — SODIUM CHLORIDE 0.9 % (FLUSH) 0.9 %
10 SYRINGE (ML) INJECTION
Status: CANCELLED | OUTPATIENT
Start: 2018-05-14

## 2018-05-14 RX ORDER — HEPARIN 100 UNIT/ML
500 SYRINGE INTRAVENOUS
Status: CANCELLED | OUTPATIENT
Start: 2018-05-14

## 2018-05-14 RX ORDER — FAMOTIDINE 10 MG/ML
20 INJECTION INTRAVENOUS
Status: CANCELLED | OUTPATIENT
Start: 2018-05-14

## 2018-05-14 RX ORDER — FAMOTIDINE 10 MG/ML
20 INJECTION INTRAVENOUS
Status: COMPLETED | OUTPATIENT
Start: 2018-05-14 | End: 2018-05-14

## 2018-05-14 RX ORDER — DIPHENHYDRAMINE HYDROCHLORIDE 50 MG/ML
50 INJECTION INTRAMUSCULAR; INTRAVENOUS ONCE AS NEEDED
Status: DISCONTINUED | OUTPATIENT
Start: 2018-05-14 | End: 2018-05-14 | Stop reason: HOSPADM

## 2018-05-14 RX ORDER — SODIUM CHLORIDE 0.9 % (FLUSH) 0.9 %
10 SYRINGE (ML) INJECTION
Status: DISCONTINUED | OUTPATIENT
Start: 2018-05-14 | End: 2018-05-14 | Stop reason: HOSPADM

## 2018-05-14 RX ORDER — DIPHENHYDRAMINE HYDROCHLORIDE 50 MG/ML
50 INJECTION INTRAMUSCULAR; INTRAVENOUS ONCE AS NEEDED
Status: CANCELLED | OUTPATIENT
Start: 2018-05-14 | End: 2018-05-14

## 2018-05-14 RX ADMIN — PACLITAXEL 72 MG: 6 INJECTION, SOLUTION INTRAVENOUS at 01:05

## 2018-05-14 RX ADMIN — SODIUM CHLORIDE: 0.9 INJECTION, SOLUTION INTRAVENOUS at 12:05

## 2018-05-14 RX ADMIN — DIPHENHYDRAMINE HYDROCHLORIDE 50 MG: 50 INJECTION, SOLUTION INTRAMUSCULAR; INTRAVENOUS at 12:05

## 2018-05-14 RX ADMIN — DEXAMETHASONE SODIUM PHOSPHATE 20 MG: 4 INJECTION, SOLUTION INTRAMUSCULAR; INTRAVENOUS at 12:05

## 2018-05-14 RX ADMIN — CARBOPLATIN 80 MG: 10 INJECTION, SOLUTION INTRAVENOUS at 02:05

## 2018-05-14 RX ADMIN — FAMOTIDINE 20 MG: 10 INJECTION, SOLUTION INTRAVENOUS at 12:05

## 2018-05-14 NOTE — PLAN OF CARE
Problem: Patient Care Overview  Goal: Plan of Care Review  Outcome: Ongoing (interventions implemented as appropriate)  Plan of care reviewed  with patient. Pt instructed to contact MD with further questions or concerns. She verbalized understanding. Pt discharged home, ambulated off unit unassisted, AVS given to Patient.

## 2018-05-14 NOTE — PROGRESS NOTES
CC: Lung cancer,follow up visit     HPI:Ms. Baker is a 67 yr old lady here for follow up.  She had a noncontrast CT chest (due to stage 4 CKD) for hemoptysis, which showed a 4cm spiculated medial LUPIS mass and possible mediastinal LAD.  She has hepatomegaly due to polycystic liver (and kidneys).  There were small lucent areas in the sternum and T9 vertebral bodies concerning for mets.  She underwent biopsy of the lesion on 4/4/18 in [place] and pathology reveals adenocarcinoma. She wanted to come to [facility] for treatment as she has family she can stay with here.  She feels well.  No SOB, except when she lies on her right side.  She was diagnosed with polycystic kidney/liver when she was 18.  She is followed by Dr. Nagel in [place].  She has chronic infrequent headaches that are not worsening in severity or frequency.  She was evaluated by radiation oncology here ( ).       Review of Systems   Constitutional: Negative for fever.   HENT: Negative.    Eyes: Negative.    Respiratory: Positive for cough, sputum production and shortness of breath.    Cardiovascular: Negative.    Gastrointestinal: Negative.  Negative for abdominal pain, constipation, heartburn and nausea.   Genitourinary: Negative.    Musculoskeletal: Negative.    Skin: Negative.    Neurological: Negative.    Endo/Heme/Allergies: Negative.    Psychiatric/Behavioral: Negative.        Current Outpatient Prescriptions   Medication Sig    amLODIPine (NORVASC) 10 MG tablet Take 10 mg by mouth once daily.    magnesium oxide (MAG-OX) 400 mg tablet Take 400 mg by mouth once daily.    ondansetron (ZOFRAN) 4 MG tablet Take 1 tablet (4 mg total) by mouth every 8 (eight) hours as needed for Nausea.    sodium bicarbonate 650 MG tablet Take 650 mg by mouth 2 (two) times daily.    spironolactone (ALDACTONE) 25 MG tablet Take 25 mg by mouth.    UNABLE TO FIND Apply topically. medication name: Antifungal cream apply topically to toes tid prn     No current  facility-administered medications for this visit.            Vitals:    05/14/18 1038   BP: (!) 155/87   Pulse:    Resp:    Temp:        Physical Exam   Constitutional: She is oriented to person, place, and time. She appears well-developed. No distress.   HENT:   Head: Normocephalic and atraumatic.   Mouth/Throat: No oropharyngeal exudate.   Eyes: Pupils are equal, round, and reactive to light. No scleral icterus.   Neck: Normal range of motion.   Cardiovascular: Normal rate and regular rhythm.    No murmur heard.  Pulmonary/Chest: Effort normal and breath sounds normal. No respiratory distress. She has no wheezes.   Abdominal: She exhibits distension. There is no tenderness. There is no rebound and no guarding.   Musculoskeletal: She exhibits edema.   Lymphadenopathy:     She has no cervical adenopathy.   Neurological: She is alert and oriented to person, place, and time. No cranial nerve deficit.   Skin: Skin is warm. No erythema.   Psychiatric: She has a normal mood and affect.     Component      Latest Ref Rng & Units 5/14/2018   WBC      3.90 - 12.70 K/uL 2.42 (L)   RBC      4.00 - 5.40 M/uL 4.03   Hemoglobin      12.0 - 16.0 g/dL 10.6 (L)   Hematocrit      37.0 - 48.5 % 33.4 (L)   MCV      82 - 98 fL 83   MCH      27.0 - 31.0 pg 26.3 (L)   MCHC      32.0 - 36.0 g/dL 31.7 (L)   RDW      11.5 - 14.5 % 14.7 (H)   Platelets      150 - 350 K/uL 203   MPV      9.2 - 12.9 fL 12.7   Immature Granulocytes      0.0 - 0.5 % 1.7 (H)   Gran # (ANC)      1.8 - 7.7 K/uL 1.6 (L)   Immature Grans (Abs)      0.00 - 0.04 K/uL 0.04   Lymph #      1.0 - 4.8 K/uL 0.6 (L)   Mono #      0.3 - 1.0 K/uL 0.1 (L)   Eos #      0.0 - 0.5 K/uL 0.1   Baso #      0.00 - 0.20 K/uL 0.03   nRBC      0 /100 WBC 0   Gran%      38.0 - 73.0 % 64.9   Lymph%      18.0 - 48.0 % 22.7   Mono%      4.0 - 15.0 % 5.8   Eosinophil%      0.0 - 8.0 % 3.7   Basophil%      0.0 - 1.9 % 1.2   Differential Method       Automated   Sodium      136 - 145 mmol/L 140    Potassium      3.5 - 5.1 mmol/L 4.3   Chloride      95 - 110 mmol/L 111 (H)   CO2      23 - 29 mmol/L 20 (L)   Glucose      70 - 110 mg/dL 120 (H)   BUN, Bld      8 - 23 mg/dL 41 (H)   Creatinine      0.5 - 1.4 mg/dL 3.4 (H)   Calcium      8.7 - 10.5 mg/dL 9.7   Total Protein      6.0 - 8.4 g/dL 7.2   Albumin      3.5 - 5.2 g/dL 3.6   Total Bilirubin      0.1 - 1.0 mg/dL 0.5   Alkaline Phosphatase      55 - 135 U/L 153 (H)   AST      10 - 40 U/L 26   ALT      10 - 44 U/L 27   Anion Gap      8 - 16 mmol/L 9   eGFR if African American      >60 mL/min/1.73 m:2 15.4 (A)   eGFR if non African American      >60 mL/min/1.73 m:2 13.4 (A)       4/13/18 PET CT        EXAMINATION:  NM PET CT ROUTINE    CLINICAL HISTORY:  Malignant neoplasm of upper lobe, left bronchus or lung.    Outside imaging demonstrated 4 cm spiculated medial left upper lobe mass (biopsy proven adenocarcinoma) with possible mediastinal lymphadenopathy as well as lucent areas involving the sternum and T9.    TECHNIQUE:  13.12 mCi of F18-FDG was administered intravenously in the right antecubital fossa.  After an approximately 60 min distribution time, PET/CT images were acquired from the skull base to the mid thigh. Transmission images were acquired to correct for attenuation using a whole body low-dose CT scan without contrast with the arms positioned above the head. Glycemia at the time of injection was 82 mg/dL.    COMPARISON:  CT abdomen pelvis 07/21/2017, MRI brain 04/13/2018    FINDINGS:  Quality of the study: Adequate.    Abnormal foci of increased uptake are present within the left upper lobe as well as a few prevascular mediastinal lymph nodes.  Lung mass measures approximately 4.6 x 2.9 cm.    No appreciable lucent lesion in the sternum or T9 vertebral body.  Mild degenerative metabolic activity is present at the sternomanubrial junction.    Index lesions:    - Left upper lobe mass: SUV max 11.1    - Lateral pre-vascular lymph node: SUV max  5.8    Physiologic uptake of the tracer is present within the brain, salivary glands, myocardium, GI and  tracts.    Incidental CT findings: Liver and kidneys are enlarged with numerous intraparenchymal cyst and associated mass effect on the adjacent abdominal organs, unchanged.  Colonic diverticulosis without diverticulitis.  Bandlike opacification within the right lower lobe likely represents subsegmental atelectasis.  Calcific atherosclerosis involves the lower extremity vasculature bilaterally.   Impression        Known malignancy of the left upper lobe with mediastinal trini metastases.    No appreciable lucent lesion in the sternum or T9 vertebral body.  Correlation with prior outside imaging is recommended.            4/13/18 MRI brain w/o cont        FINDINGS:  Intracranial compartment:    Ventricles and sulci are normal in size for age without evidence of hydrocephalus. No extra-axial blood or fluid collections.    Nonspecific small foci of T2/FLAIR high signal intensity in the supratentorial white matter, favored to represent chronic microvascular ischemic changes.  Remote lacunar type infarct in the left putamen.  Small punctate focus of susceptibility signal artifact in the left occipital lobe, suggestive of an old microhemorrhage or calcification.  No mass lesion, acute hemorrhage, edema or acute infarct.    Normal vascular flow voids are preserved.    Skull/extracranial contents (limited evaluation): Bone marrow signal intensity is normal.   Impression        No evidence of intracranial metastases within limitation in the absence of IV contrast which decrease the sensitivity of this exam.    Nonspecific foci of T2/FLAIR high signal intensity in the supratentorial white matter, likely representing chronic microvascular ischemic changes.     Assessment:     1. Adenocarcinoma lung  2. Polycystic kidney disease  3. Polycystic liver disease  4. Ascites  5. Hypertension,essential  6. Cough  7. Dyspnea on  exertion  8. CKD stage IV  9. Leukopenia  10. Anemia, normocytic, hypochrmomic     Plan:     1. She has a left upper lung mass that was biopsied, as well as hyper metabolic, prevascular mediastinal lymph nodes on PET CT. No other hypermetabolic lesions on PET CT. MRI brain (non-contrast) does not show any metastatic lesion. She has polycystic kidney disease and she is certainly at higher risk for renal malignancy, colon CA as well as liver CA. Slides and block were requested from [facility] and were reviewed by pathology here. It was confirmed that she has adenocarcinoma originating in the lungs.   She has started concurrent chemotherapy with carboplatin/Paclitaxcel ( renally adjusted) as Pemetrexed was not appropriate with the reduced renal function.      4. Chronic, attributed to CKD/ polycystic liver        5. On multiple anti-HT medications    9. Secondary to chemotherapy    10. Secondary to CKD and chemotherapy. Will check iron, TIBC, ferritin       Distress Screening Results: Psychosocial Distress screening score of Distress Score: 0 noted and reviewed. No intervention indicated.  Answers for HPI/ROS submitted by the patient on 5/11/2018   appetite change : No  unexpected weight change: No  visual disturbance: No  cough: No  shortness of breath: No  chest pain: No  abdominal pain: No  diarrhea: No  frequency: No  back pain: No  rash: No  headaches: Yes  adenopathy: No  nervous/ anxious: No

## 2018-05-14 NOTE — PLAN OF CARE
Problem: Chemotherapy Effects (Adult)  Goal: Signs and Symptoms of Listed Potential Problems Will be Absent, Minimized or Managed (Chemotherapy Effects)  Signs and symptoms of listed potential problems will be absent, minimized or managed by discharge/transition of care (reference Chemotherapy Effects (Adult) CPG).   Outcome: Ongoing (interventions implemented as appropriate)  Pt arrived on unit, ambulated unassisted. Side effects of Chemo reveiwed, Pt verbalized understanding

## 2018-05-15 PROCEDURE — 77412 RADIATION TX DELIVERY LVL 3: CPT | Performed by: RADIOLOGY

## 2018-05-15 PROCEDURE — G6002 STEREOSCOPIC X-RAY GUIDANCE: HCPCS | Mod: 26,,, | Performed by: RADIOLOGY

## 2018-05-15 PROCEDURE — 77387 GUIDANCE FOR RADJ TX DLVR: CPT | Mod: TC | Performed by: RADIOLOGY

## 2018-05-16 PROCEDURE — 77387 GUIDANCE FOR RADJ TX DLVR: CPT | Mod: TC | Performed by: RADIOLOGY

## 2018-05-16 PROCEDURE — G6002 STEREOSCOPIC X-RAY GUIDANCE: HCPCS | Mod: 26,,, | Performed by: RADIOLOGY

## 2018-05-16 PROCEDURE — 77412 RADIATION TX DELIVERY LVL 3: CPT | Performed by: RADIOLOGY

## 2018-05-17 ENCOUNTER — DOCUMENTATION ONLY (OUTPATIENT)
Dept: RADIATION ONCOLOGY | Facility: CLINIC | Age: 68
End: 2018-05-17

## 2018-05-17 DIAGNOSIS — C34.12 PRIMARY ADENOCARCINOMA OF UPPER LOBE OF LEFT LUNG: Primary | ICD-10-CM

## 2018-05-17 PROCEDURE — 77412 RADIATION TX DELIVERY LVL 3: CPT | Performed by: RADIOLOGY

## 2018-05-17 PROCEDURE — 77387 GUIDANCE FOR RADJ TX DLVR: CPT | Mod: TC | Performed by: RADIOLOGY

## 2018-05-17 PROCEDURE — G6002 STEREOSCOPIC X-RAY GUIDANCE: HCPCS | Mod: 26,,, | Performed by: RADIOLOGY

## 2018-05-17 RX ORDER — BENZONATATE 100 MG/1
100 CAPSULE ORAL 3 TIMES DAILY PRN
Qty: 90 CAPSULE | Refills: 0 | Status: SHIPPED | OUTPATIENT
Start: 2018-05-17 | End: 2018-06-17

## 2018-05-18 PROCEDURE — 77412 RADIATION TX DELIVERY LVL 3: CPT | Performed by: RADIOLOGY

## 2018-05-18 PROCEDURE — 77336 RADIATION PHYSICS CONSULT: CPT | Performed by: RADIOLOGY

## 2018-05-18 PROCEDURE — 77387 GUIDANCE FOR RADJ TX DLVR: CPT | Mod: TC | Performed by: RADIOLOGY

## 2018-05-18 PROCEDURE — G6002 STEREOSCOPIC X-RAY GUIDANCE: HCPCS | Mod: 26,,, | Performed by: RADIOLOGY

## 2018-05-21 ENCOUNTER — INFUSION (OUTPATIENT)
Dept: INFUSION THERAPY | Facility: HOSPITAL | Age: 68
End: 2018-05-21
Attending: INTERNAL MEDICINE
Payer: MEDICARE

## 2018-05-21 ENCOUNTER — OFFICE VISIT (OUTPATIENT)
Dept: HEMATOLOGY/ONCOLOGY | Facility: CLINIC | Age: 68
End: 2018-05-21
Payer: MEDICARE

## 2018-05-21 VITALS
RESPIRATION RATE: 18 BRPM | SYSTOLIC BLOOD PRESSURE: 138 MMHG | HEART RATE: 82 BPM | DIASTOLIC BLOOD PRESSURE: 81 MMHG | TEMPERATURE: 98 F

## 2018-05-21 VITALS
WEIGHT: 128.31 LBS | TEMPERATURE: 98 F | BODY MASS INDEX: 21.38 KG/M2 | OXYGEN SATURATION: 100 % | SYSTOLIC BLOOD PRESSURE: 147 MMHG | HEIGHT: 65 IN | DIASTOLIC BLOOD PRESSURE: 89 MMHG | RESPIRATION RATE: 18 BRPM | HEART RATE: 82 BPM

## 2018-05-21 DIAGNOSIS — K21.9 GASTROESOPHAGEAL REFLUX DISEASE WITHOUT ESOPHAGITIS: ICD-10-CM

## 2018-05-21 DIAGNOSIS — N18.4 STAGE 4 CHRONIC KIDNEY DISEASE: ICD-10-CM

## 2018-05-21 DIAGNOSIS — C34.12 PRIMARY ADENOCARCINOMA OF UPPER LOBE OF LEFT LUNG: Primary | ICD-10-CM

## 2018-05-21 DIAGNOSIS — D63.0 ANEMIA IN NEOPLASTIC DISEASE: ICD-10-CM

## 2018-05-21 DIAGNOSIS — R18.8 OTHER ASCITES: ICD-10-CM

## 2018-05-21 PROCEDURE — 25000003 PHARM REV CODE 250: Performed by: INTERNAL MEDICINE

## 2018-05-21 PROCEDURE — 96417 CHEMO IV INFUS EACH ADDL SEQ: CPT

## 2018-05-21 PROCEDURE — 96413 CHEMO IV INFUSION 1 HR: CPT

## 2018-05-21 PROCEDURE — 77387 GUIDANCE FOR RADJ TX DLVR: CPT | Mod: TC | Performed by: RADIOLOGY

## 2018-05-21 PROCEDURE — 77417 THER RADIOLOGY PORT IMAGE(S): CPT | Performed by: RADIOLOGY

## 2018-05-21 PROCEDURE — S0028 INJECTION, FAMOTIDINE, 20 MG: HCPCS | Performed by: INTERNAL MEDICINE

## 2018-05-21 PROCEDURE — 63600175 PHARM REV CODE 636 W HCPCS: Performed by: INTERNAL MEDICINE

## 2018-05-21 PROCEDURE — 96375 TX/PRO/DX INJ NEW DRUG ADDON: CPT

## 2018-05-21 PROCEDURE — 96367 TX/PROPH/DG ADDL SEQ IV INF: CPT

## 2018-05-21 PROCEDURE — G6002 STEREOSCOPIC X-RAY GUIDANCE: HCPCS | Mod: 26,,, | Performed by: RADIOLOGY

## 2018-05-21 PROCEDURE — 99214 OFFICE O/P EST MOD 30 MIN: CPT | Mod: S$GLB,,, | Performed by: INTERNAL MEDICINE

## 2018-05-21 PROCEDURE — 77412 RADIATION TX DELIVERY LVL 3: CPT | Performed by: RADIOLOGY

## 2018-05-21 PROCEDURE — 99999 PR PBB SHADOW E&M-EST. PATIENT-LVL III: CPT | Mod: PBBFAC,,, | Performed by: INTERNAL MEDICINE

## 2018-05-21 RX ORDER — FAMOTIDINE 10 MG/ML
20 INJECTION INTRAVENOUS
Status: CANCELLED | OUTPATIENT
Start: 2018-05-21

## 2018-05-21 RX ORDER — DIPHENHYDRAMINE HYDROCHLORIDE 50 MG/ML
50 INJECTION INTRAMUSCULAR; INTRAVENOUS ONCE AS NEEDED
Status: DISCONTINUED | OUTPATIENT
Start: 2018-05-21 | End: 2018-05-21 | Stop reason: HOSPADM

## 2018-05-21 RX ORDER — SODIUM CHLORIDE 0.9 % (FLUSH) 0.9 %
10 SYRINGE (ML) INJECTION
Status: CANCELLED | OUTPATIENT
Start: 2018-05-21

## 2018-05-21 RX ORDER — HEPARIN 100 UNIT/ML
500 SYRINGE INTRAVENOUS
Status: DISCONTINUED | OUTPATIENT
Start: 2018-05-21 | End: 2018-05-21 | Stop reason: HOSPADM

## 2018-05-21 RX ORDER — EPINEPHRINE 0.3 MG/.3ML
0.3 INJECTION SUBCUTANEOUS ONCE AS NEEDED
Status: CANCELLED | OUTPATIENT
Start: 2018-05-21 | End: 2018-05-21

## 2018-05-21 RX ORDER — DIPHENHYDRAMINE HYDROCHLORIDE 50 MG/ML
50 INJECTION INTRAMUSCULAR; INTRAVENOUS ONCE AS NEEDED
Status: CANCELLED | OUTPATIENT
Start: 2018-05-21 | End: 2018-05-21

## 2018-05-21 RX ORDER — SODIUM CHLORIDE 0.9 % (FLUSH) 0.9 %
10 SYRINGE (ML) INJECTION
Status: DISCONTINUED | OUTPATIENT
Start: 2018-05-21 | End: 2018-05-21 | Stop reason: HOSPADM

## 2018-05-21 RX ORDER — FAMOTIDINE 10 MG/ML
20 INJECTION INTRAVENOUS
Status: COMPLETED | OUTPATIENT
Start: 2018-05-21 | End: 2018-05-21

## 2018-05-21 RX ORDER — HEPARIN 100 UNIT/ML
500 SYRINGE INTRAVENOUS
Status: CANCELLED | OUTPATIENT
Start: 2018-05-21

## 2018-05-21 RX ORDER — EPINEPHRINE 0.3 MG/.3ML
0.3 INJECTION SUBCUTANEOUS ONCE AS NEEDED
Status: DISCONTINUED | OUTPATIENT
Start: 2018-05-21 | End: 2018-05-21 | Stop reason: HOSPADM

## 2018-05-21 RX ADMIN — PACLITAXEL 72 MG: 6 INJECTION, SOLUTION INTRAVENOUS at 12:05

## 2018-05-21 RX ADMIN — FAMOTIDINE 20 MG: 10 INJECTION, SOLUTION INTRAVENOUS at 11:05

## 2018-05-21 RX ADMIN — DIPHENHYDRAMINE HYDROCHLORIDE 50 MG: 50 INJECTION, SOLUTION INTRAMUSCULAR; INTRAVENOUS at 11:05

## 2018-05-21 RX ADMIN — DEXAMETHASONE SODIUM PHOSPHATE 20 MG: 4 INJECTION, SOLUTION INTRA-ARTICULAR; INTRALESIONAL; INTRAMUSCULAR; INTRAVENOUS; SOFT TISSUE at 11:05

## 2018-05-21 RX ADMIN — CARBOPLATIN 80 MG: 10 INJECTION, SOLUTION INTRAVENOUS at 02:05

## 2018-05-21 NOTE — PLAN OF CARE
Problem: Patient Care Overview  Goal: Discharge Needs Assessment  Outcome: Ongoing (interventions implemented as appropriate)  Pt tolerated taxol/carbo well, vitals remain stable taxol titrated to max dose, PIV d/c'd cath tip intact, site covered with dry dressing, avs given, leaves clinic ambulatory NAD noted no questions or concerns at this time, instructed to contact MD office with any future concerns.

## 2018-05-21 NOTE — PROGRESS NOTES
CC: Lung cancer,follow up visit     HPI:Ms. Baker is a 67 yr old lady here for follow up.  She had a noncontrast CT chest (due to stage 4 CKD) for hemoptysis, which showed a 4cm spiculated medial LUPIS mass and possible mediastinal LAD.  She has hepatomegaly due to polycystic liver (and kidneys).  There were small lucent areas in the sternum and T9 vertebral bodies concerning for mets.  She underwent biopsy of the lesion on 4/4/18 in [place] and pathology reveals adenocarcinoma. She wanted to come to [facility] for treatment as she has family she can stay with here. No SOB, except when she lies on her right side.  She was diagnosed with polycystic kidney/liver when she was 18.  She is followed by Dr. Nagel in [place].  She has chronic infrequent headaches that are not worsening in severity or frequency.  She was evaluated by radiation oncology here ( ).       Interval history: She is here for a follow up visit. She is on concurrent carboplatin/paclitaxel for stage III adenocarcinoma lung. She has finished 2 weekly treatments.    Review of Systems   Constitutional: Positive for malaise/fatigue. Negative for fever.   HENT: Negative.    Eyes: Negative for blurred vision, double vision and photophobia.   Respiratory: Positive for cough and sputum production. Negative for hemoptysis, shortness of breath and wheezing.    Cardiovascular: Negative for chest pain, palpitations, orthopnea and claudication.   Gastrointestinal: Negative for abdominal pain, blood in stool, constipation, diarrhea, heartburn, nausea and vomiting.        She has dysphagia , mostly to solids   Genitourinary: Negative.    Skin: Negative.    Neurological: Negative for dizziness, tremors, sensory change, speech change and weakness.   Endo/Heme/Allergies: Does not bruise/bleed easily.        Current Outpatient Prescriptions   Medication Sig    amLODIPine (NORVASC) 10 MG tablet Take 10 mg by mouth once daily.    benzonatate (TESSALON) 100 MG  capsule Take 1 capsule (100 mg total) by mouth 3 (three) times daily as needed for Cough.    magnesium oxide (MAG-OX) 400 mg tablet Take 400 mg by mouth once daily.    ondansetron (ZOFRAN) 4 MG tablet Take 1 tablet (4 mg total) by mouth every 8 (eight) hours as needed for Nausea.    sodium bicarbonate 650 MG tablet Take 650 mg by mouth 2 (two) times daily.    spironolactone (ALDACTONE) 25 MG tablet Take 25 mg by mouth.    UNABLE TO FIND Apply topically. medication name: Antifungal cream apply topically to toes tid prn     No current facility-administered medications for this visit.            Vitals:    05/21/18 0946   BP: (!) 147/89   Pulse: 82   Resp: 18   Temp: 98.2 °F (36.8 °C)     Physical Exam   Constitutional: She is oriented to person, place, and time. She appears well-developed. No distress.   HENT:   Head: Normocephalic.   Mouth/Throat: No oropharyngeal exudate.   Eyes: Pupils are equal, round, and reactive to light. No scleral icterus.   Neck: Normal range of motion.   Cardiovascular: Normal rate and regular rhythm.    Murmur heard.  Pulmonary/Chest: Effort normal. No respiratory distress. She has no wheezes. She has no rales.   Abdominal: She exhibits distension. There is no tenderness. There is no rebound and no guarding.   Musculoskeletal: She exhibits edema.   Lymphadenopathy:     She has no cervical adenopathy.   Neurological: She is alert and oriented to person, place, and time. No cranial nerve deficit.   Skin: Skin is warm.   Psychiatric: She has a normal mood and affect.         4/13/18 PET CT        EXAMINATION:  NM PET CT ROUTINE    CLINICAL HISTORY:  Malignant neoplasm of upper lobe, left bronchus or lung.    Outside imaging demonstrated 4 cm spiculated medial left upper lobe mass (biopsy proven adenocarcinoma) with possible mediastinal lymphadenopathy as well as lucent areas involving the sternum and T9.    TECHNIQUE:  13.12 mCi of F18-FDG was administered intravenously in the right  antecubital fossa.  After an approximately 60 min distribution time, PET/CT images were acquired from the skull base to the mid thigh. Transmission images were acquired to correct for attenuation using a whole body low-dose CT scan without contrast with the arms positioned above the head. Glycemia at the time of injection was 82 mg/dL.    COMPARISON:  CT abdomen pelvis 07/21/2017, MRI brain 04/13/2018    FINDINGS:  Quality of the study: Adequate.    Abnormal foci of increased uptake are present within the left upper lobe as well as a few prevascular mediastinal lymph nodes.  Lung mass measures approximately 4.6 x 2.9 cm.    No appreciable lucent lesion in the sternum or T9 vertebral body.  Mild degenerative metabolic activity is present at the sternomanubrial junction.    Index lesions:    - Left upper lobe mass: SUV max 11.1    - Lateral pre-vascular lymph node: SUV max 5.8    Physiologic uptake of the tracer is present within the brain, salivary glands, myocardium, GI and  tracts.    Incidental CT findings: Liver and kidneys are enlarged with numerous intraparenchymal cyst and associated mass effect on the adjacent abdominal organs, unchanged.  Colonic diverticulosis without diverticulitis.  Bandlike opacification within the right lower lobe likely represents subsegmental atelectasis.  Calcific atherosclerosis involves the lower extremity vasculature bilaterally.   Impression        Known malignancy of the left upper lobe with mediastinal trini metastases.    No appreciable lucent lesion in the sternum or T9 vertebral body.  Correlation with prior outside imaging is recommended.            4/13/18 MRI brain w/o cont        FINDINGS:  Intracranial compartment:    Ventricles and sulci are normal in size for age without evidence of hydrocephalus. No extra-axial blood or fluid collections.    Nonspecific small foci of T2/FLAIR high signal intensity in the supratentorial white matter, favored to represent chronic  microvascular ischemic changes.  Remote lacunar type infarct in the left putamen.  Small punctate focus of susceptibility signal artifact in the left occipital lobe, suggestive of an old microhemorrhage or calcification.  No mass lesion, acute hemorrhage, edema or acute infarct.    Normal vascular flow voids are preserved.    Skull/extracranial contents (limited evaluation): Bone marrow signal intensity is normal.   Impression        No evidence of intracranial metastases within limitation in the absence of IV contrast which decrease the sensitivity of this exam.    Nonspecific foci of T2/FLAIR high signal intensity in the supratentorial white matter, likely representing chronic microvascular ischemic changes.        Component      Latest Ref Rng & Units 5/21/2018   WBC      3.90 - 12.70 K/uL 2.62 (L)   RBC      4.00 - 5.40 M/uL 3.79 (L)   Hemoglobin      12.0 - 16.0 g/dL 10.1 (L)   Hematocrit      37.0 - 48.5 % 31.3 (L)   MCV      82 - 98 fL 83   MCH      27.0 - 31.0 pg 26.6 (L)   MCHC      32.0 - 36.0 g/dL 32.3   RDW      11.5 - 14.5 % 14.7 (H)   Platelets      150 - 350 K/uL 211   MPV      9.2 - 12.9 fL 12.2   Immature Granulocytes      0.0 - 0.5 % 0.8 (H)   Gran # (ANC)      1.8 - 7.7 K/uL 1.8   Immature Grans (Abs)      0.00 - 0.04 K/uL 0.02   Lymph #      1.0 - 4.8 K/uL 0.6 (L)   Mono #      0.3 - 1.0 K/uL 0.1 (L)   Eos #      0.0 - 0.5 K/uL 0.0   Baso #      0.00 - 0.20 K/uL 0.03   nRBC      0 /100 WBC 0   Gran%      38.0 - 73.0 % 69.5   Lymph%      18.0 - 48.0 % 22.1   Mono%      4.0 - 15.0 % 5.0   Eosinophil%      0.0 - 8.0 % 1.5   Basophil%      0.0 - 1.9 % 1.1   Differential Method       Automated   Sodium      136 - 145 mmol/L 140   Potassium      3.5 - 5.1 mmol/L 3.8   Chloride      95 - 110 mmol/L 111 (H)   CO2      23 - 29 mmol/L 20 (L)   Glucose      70 - 110 mg/dL 122 (H)   BUN, Bld      8 - 23 mg/dL 45 (H)   Creatinine      0.5 - 1.4 mg/dL 3.4 (H)   Calcium      8.7 - 10.5 mg/dL 9.5   Total  Protein      6.0 - 8.4 g/dL 7.3   Albumin      3.5 - 5.2 g/dL 3.7   Total Bilirubin      0.1 - 1.0 mg/dL 0.6   Alkaline Phosphatase      55 - 135 U/L 187 (H)   AST      10 - 40 U/L 42 (H)   ALT      10 - 44 U/L 41   Anion Gap      8 - 16 mmol/L 9   eGFR if African American      >60 mL/min/1.73 m:2 15.4 (A)   eGFR if non African American      >60 mL/min/1.73 m:2 13.4 (A)   Iron      30 - 160 ug/dL 93   Transferrin      200 - 375 mg/dL 189 (L)   TIBC      250 - 450 ug/dL 280   Saturated Iron      20 - 50 % 33   Retic      0.5 - 2.5 % 0.6   Magnesium      1.6 - 2.6 mg/dL 1.4 (L)       Assessment:     1. Adenocarcinoma lung  2. Polycystic kidney disease  3. Polycystic liver disease  4. Ascites  5. Hypertension,essential  6. Cough  7. Dyspnea on exertion  8. CKD stage IV  9. Leukopenia  10. Anemia, normocytic, hypochromic  11. Dysphagia     Plan:     1. She has a left upper lung mass that was biopsied, as well as hyper metabolic, prevascular mediastinal lymph nodes on PET CT. No other hypermetabolic lesions on PET CT. MRI brain (non-contrast) does not show any metastatic lesion. She has polycystic kidney disease and she is certainly at higher risk for renal malignancy, colon CA as well as liver CA. Slides and block were requested from [facility] and were reviewed by pathology here. It was confirmed that she has adenocarcinoma originating in the lungs.   She has started concurrent chemotherapy with carboplatin/Paclitaxcel ( renally adjusted) as Pemetrexed was not appropriate with the reduced renal function.      4. Chronic, attributed to CKD/ polycystic liver        5. On multiple anti-HT medications    6. Possibly related to malignancy in her lungs. She is on Benzonatate     9. Secondary to chemotherapy     10. Secondary to CKD and chemotherapy. Serum iron saturation 33% on 5/21/18.    11. Possibly secondary to radiation. Suggested eating/drinking more liquid/semi-solid foods. Prescribed Ranitidine once daily.           Answers for HPI/ROS submitted by the patient on 5/15/2018   appetite change : No  unexpected weight change: No  visual disturbance: No  cough: No  shortness of breath: No  chest pain: No  abdominal pain: No  diarrhea: Yes  frequency: Yes  back pain: No  rash: No  headaches: Yes  adenopathy: No  nervous/ anxious: No

## 2018-05-22 PROCEDURE — 77412 RADIATION TX DELIVERY LVL 3: CPT | Performed by: RADIOLOGY

## 2018-05-22 PROCEDURE — 77387 GUIDANCE FOR RADJ TX DLVR: CPT | Mod: TC | Performed by: RADIOLOGY

## 2018-05-22 PROCEDURE — G6002 STEREOSCOPIC X-RAY GUIDANCE: HCPCS | Mod: 26,,, | Performed by: RADIOLOGY

## 2018-05-23 ENCOUNTER — OFFICE VISIT (OUTPATIENT)
Dept: INTERNAL MEDICINE | Facility: CLINIC | Age: 68
End: 2018-05-23
Payer: MEDICARE

## 2018-05-23 ENCOUNTER — DOCUMENTATION ONLY (OUTPATIENT)
Dept: RADIATION ONCOLOGY | Facility: CLINIC | Age: 68
End: 2018-05-23

## 2018-05-23 VITALS
SYSTOLIC BLOOD PRESSURE: 140 MMHG | OXYGEN SATURATION: 98 % | WEIGHT: 132.25 LBS | HEART RATE: 86 BPM | BODY MASS INDEX: 22.03 KG/M2 | DIASTOLIC BLOOD PRESSURE: 86 MMHG | HEIGHT: 65 IN

## 2018-05-23 DIAGNOSIS — D64.9 ANEMIA, UNSPECIFIED TYPE: ICD-10-CM

## 2018-05-23 DIAGNOSIS — C34.12 PRIMARY ADENOCARCINOMA OF UPPER LOBE OF LEFT LUNG: ICD-10-CM

## 2018-05-23 DIAGNOSIS — Z76.89 ESTABLISHING CARE WITH NEW DOCTOR, ENCOUNTER FOR: ICD-10-CM

## 2018-05-23 DIAGNOSIS — I10 HYPERTENSION, UNSPECIFIED TYPE: ICD-10-CM

## 2018-05-23 DIAGNOSIS — Z01.419 WOMEN'S ANNUAL ROUTINE GYNECOLOGICAL EXAMINATION: ICD-10-CM

## 2018-05-23 DIAGNOSIS — R09.A2 GLOBUS SENSATION: ICD-10-CM

## 2018-05-23 DIAGNOSIS — Q61.3 POLYCYSTIC KIDNEY DISEASE: ICD-10-CM

## 2018-05-23 DIAGNOSIS — N18.4 STAGE 4 CHRONIC KIDNEY DISEASE: ICD-10-CM

## 2018-05-23 DIAGNOSIS — Z00.00 INITIAL MEDICARE ANNUAL WELLNESS VISIT: Primary | ICD-10-CM

## 2018-05-23 DIAGNOSIS — Z12.39 SCREENING FOR BREAST CANCER: ICD-10-CM

## 2018-05-23 PROCEDURE — 3077F SYST BP >= 140 MM HG: CPT | Mod: CPTII,GC,S$GLB, | Performed by: INTERNAL MEDICINE

## 2018-05-23 PROCEDURE — 99999 PR PBB SHADOW E&M-EST. PATIENT-LVL III: CPT | Mod: PBBFAC,GC,, | Performed by: INTERNAL MEDICINE

## 2018-05-23 PROCEDURE — 99203 OFFICE O/P NEW LOW 30 MIN: CPT | Mod: GC,S$GLB,, | Performed by: INTERNAL MEDICINE

## 2018-05-23 PROCEDURE — 77387 GUIDANCE FOR RADJ TX DLVR: CPT | Mod: TC | Performed by: RADIOLOGY

## 2018-05-23 PROCEDURE — 77412 RADIATION TX DELIVERY LVL 3: CPT | Performed by: RADIOLOGY

## 2018-05-23 PROCEDURE — 3079F DIAST BP 80-89 MM HG: CPT | Mod: CPTII,GC,S$GLB, | Performed by: INTERNAL MEDICINE

## 2018-05-23 PROCEDURE — G6002 STEREOSCOPIC X-RAY GUIDANCE: HCPCS | Mod: 26,,, | Performed by: RADIOLOGY

## 2018-05-23 NOTE — PLAN OF CARE
Problem: Patient Care Overview  Goal: Plan of Care Review  Outcome: Ongoing (interventions implemented as appropriate)  Day 13 of radiation to the lung tolerating well w/o complaints

## 2018-05-23 NOTE — PROGRESS NOTES
"Subjective:       Patient ID: Verónica Baker is a 67 y.o. female.    Chief Complaint: Annual Exam    Ms. Baker is a 68 y/o F pt with PMHx of polycystic kidney and liver disease (diagnosed at age 18y), CKD 4, HTN and recently diagnosed adenocarcinoma of the Lt lung, currently undergoing chemo/radiation tx, here to establish care. Pt reports tolerating cancer treatment well. Reports intermittent chest discomfort described as a "lump" or globus sensation in the center of her chest that occurs at rest, lasts for less than 1 min, self-resolves and sometimes goes away after drinking water. Denies pain or burning, no diaphoresis, sob, or lightheadedness. Las episode was this AM. Her oncologist prescribed a trial of zantac for possible GERD. No hx of [place].  Also has a hx of HTN for which she takes spironolactone 12.5mg PO daily and norvasc 10mg PO daily. Says BP tends to run high. Used to follow with a nephrologist in [place] who managed her antihypertensives. Is scheduled to see nephrology on 6/6.    Social hx: Single mother of 3. Retired; used to work as a  for the [employer]. Does not smoke cigarettes or drink alcohol. Residing at the [facility].      Health Maintenance: Last mammo 2016 (normal), colonoscopy 2016 (revealed polyps, repeat in 3 yrs- 2019), Pap smear >3 yrs ago, DEXA 2017 (normal per pt), had pneumonia vaccine this yr and screening for Hep C in the past.           Review of Systems   Constitutional: Positive for unexpected weight change. Negative for activity change.   HENT: Negative for hearing loss, rhinorrhea and trouble swallowing.    Eyes: Negative for discharge and visual disturbance.   Respiratory: Negative for chest tightness and wheezing.    Cardiovascular: Positive for chest pain. Negative for palpitations.   Gastrointestinal: Positive for diarrhea and vomiting. Negative for blood in stool and constipation.   Endocrine: Negative for polydipsia and polyuria.   Genitourinary: Negative for " difficulty urinating, dysuria, hematuria and menstrual problem.   Musculoskeletal: Positive for arthralgias and neck pain. Negative for joint swelling.   Neurological: Positive for weakness and headaches.   Psychiatric/Behavioral: Negative for confusion and dysphoric mood.       Objective:      Physical Exam   Constitutional: She is oriented to person, place, and time. She appears well-developed. No distress.   cachectic   HENT:   Head: Normocephalic and atraumatic.   Mouth/Throat: Oropharynx is clear and moist.   Eyes: Conjunctivae and EOM are normal. Pupils are equal, round, and reactive to light.   Neck: Normal range of motion. Neck supple.   Cardiovascular: Normal rate, regular rhythm and normal heart sounds.    Pulmonary/Chest: Effort normal and breath sounds normal. No respiratory distress.   Abdominal: Bowel sounds are normal.   Distended somewhat tense abdomen, non tender.   Musculoskeletal: She exhibits edema (2+ pitting on lower ext bilaterally).   Neurological: She is oriented to person, place, and time. No cranial nerve deficit.   Skin: Skin is warm and dry.   Psychiatric: She has a normal mood and affect. Her behavior is normal.       Assessment:       1. Initial Medicare annual wellness visit    2. Screening for breast cancer    3. Women's annual routine gynecological examination    4. Establishing care with new doctor, encounter for    5. Globus sensation    6. Primary adenocarcinoma of upper lobe of left lung    7. Polycystic kidney disease    8. Stage 4 chronic kidney disease        Plan:     Initial Medicare annual wellness visit  Establishing care with new doctor, encounter for    Screening for breast cancer  -     Mammo Digital Screening Bilateral With CAD; Future; Expected date: 05/23/2018    Women's annual routine gynecological examination  -     Ambulatory Referral to Gynecology    Globus sensation  -not suspecting cardiac etiology  -just started taking zantac for possible  GERD  -monitor    Primary adenocarcinoma of upper lobe of left lung  -undergoing chemo/radiation therapy  -continue to follow-up with Dr. Pires    HTN  BP not at goal today  -continue norvasc 10mg PO daily and spironolactone 12.5mg PO daily  -med changes per nephrology    Polycystic kidney disease  Stage 4 chronic kidney disease  -keep appointment with nephrology    Anemia  -possibly multifactorial 2/2 to chemotherapy and renal disease    RTC in 1 yr or PRN.  Case discussed with Dr. Mary.  Matilde Trinh PGY-2  Internal Medicine

## 2018-05-24 PROCEDURE — 77412 RADIATION TX DELIVERY LVL 3: CPT | Performed by: RADIOLOGY

## 2018-05-24 PROCEDURE — 77387 GUIDANCE FOR RADJ TX DLVR: CPT | Mod: TC | Performed by: RADIOLOGY

## 2018-05-24 PROCEDURE — G6002 STEREOSCOPIC X-RAY GUIDANCE: HCPCS | Mod: 26,,, | Performed by: RADIOLOGY

## 2018-05-25 PROCEDURE — 77387 GUIDANCE FOR RADJ TX DLVR: CPT | Mod: TC | Performed by: RADIOLOGY

## 2018-05-25 PROCEDURE — G6002 STEREOSCOPIC X-RAY GUIDANCE: HCPCS | Mod: 26,,, | Performed by: RADIOLOGY

## 2018-05-25 PROCEDURE — 77336 RADIATION PHYSICS CONSULT: CPT | Performed by: RADIOLOGY

## 2018-05-25 PROCEDURE — 77412 RADIATION TX DELIVERY LVL 3: CPT | Performed by: RADIOLOGY

## 2018-05-28 ENCOUNTER — OFFICE VISIT (OUTPATIENT)
Dept: HEMATOLOGY/ONCOLOGY | Facility: CLINIC | Age: 68
End: 2018-05-28
Payer: MEDICARE

## 2018-05-28 ENCOUNTER — INFUSION (OUTPATIENT)
Dept: INFUSION THERAPY | Facility: HOSPITAL | Age: 68
End: 2018-05-28
Attending: INTERNAL MEDICINE
Payer: MEDICARE

## 2018-05-28 VITALS
RESPIRATION RATE: 18 BRPM | DIASTOLIC BLOOD PRESSURE: 81 MMHG | HEART RATE: 86 BPM | TEMPERATURE: 98 F | SYSTOLIC BLOOD PRESSURE: 157 MMHG | WEIGHT: 129 LBS | HEIGHT: 65 IN | OXYGEN SATURATION: 99 % | BODY MASS INDEX: 21.49 KG/M2

## 2018-05-28 VITALS
DIASTOLIC BLOOD PRESSURE: 79 MMHG | HEART RATE: 84 BPM | TEMPERATURE: 98 F | SYSTOLIC BLOOD PRESSURE: 136 MMHG | RESPIRATION RATE: 16 BRPM

## 2018-05-28 DIAGNOSIS — C34.12 PRIMARY ADENOCARCINOMA OF UPPER LOBE OF LEFT LUNG: Primary | ICD-10-CM

## 2018-05-28 DIAGNOSIS — D63.0 ANEMIA IN NEOPLASTIC DISEASE: ICD-10-CM

## 2018-05-28 DIAGNOSIS — Q61.3 POLYCYSTIC KIDNEY DISEASE: ICD-10-CM

## 2018-05-28 DIAGNOSIS — N18.4 STAGE 4 CHRONIC KIDNEY DISEASE: ICD-10-CM

## 2018-05-28 DIAGNOSIS — K21.9 GASTROESOPHAGEAL REFLUX DISEASE WITHOUT ESOPHAGITIS: ICD-10-CM

## 2018-05-28 DIAGNOSIS — T45.1X5A CHEMOTHERAPY-INDUCED NEUTROPENIA: ICD-10-CM

## 2018-05-28 DIAGNOSIS — R18.8 OTHER ASCITES: ICD-10-CM

## 2018-05-28 DIAGNOSIS — D70.1 CHEMOTHERAPY-INDUCED NEUTROPENIA: ICD-10-CM

## 2018-05-28 DIAGNOSIS — I10 HYPERTENSION, UNSPECIFIED TYPE: ICD-10-CM

## 2018-05-28 PROCEDURE — 99214 OFFICE O/P EST MOD 30 MIN: CPT | Mod: S$GLB,,, | Performed by: INTERNAL MEDICINE

## 2018-05-28 PROCEDURE — 96367 TX/PROPH/DG ADDL SEQ IV INF: CPT

## 2018-05-28 PROCEDURE — 77387 GUIDANCE FOR RADJ TX DLVR: CPT | Mod: TC | Performed by: RADIOLOGY

## 2018-05-28 PROCEDURE — S0028 INJECTION, FAMOTIDINE, 20 MG: HCPCS | Performed by: INTERNAL MEDICINE

## 2018-05-28 PROCEDURE — 63600175 PHARM REV CODE 636 W HCPCS: Performed by: INTERNAL MEDICINE

## 2018-05-28 PROCEDURE — 96417 CHEMO IV INFUS EACH ADDL SEQ: CPT

## 2018-05-28 PROCEDURE — 96375 TX/PRO/DX INJ NEW DRUG ADDON: CPT

## 2018-05-28 PROCEDURE — 3079F DIAST BP 80-89 MM HG: CPT | Mod: CPTII,S$GLB,, | Performed by: INTERNAL MEDICINE

## 2018-05-28 PROCEDURE — 99999 PR PBB SHADOW E&M-EST. PATIENT-LVL IV: CPT | Mod: PBBFAC,,, | Performed by: INTERNAL MEDICINE

## 2018-05-28 PROCEDURE — 25000003 PHARM REV CODE 250: Performed by: INTERNAL MEDICINE

## 2018-05-28 PROCEDURE — 77412 RADIATION TX DELIVERY LVL 3: CPT | Performed by: RADIOLOGY

## 2018-05-28 PROCEDURE — G6002 STEREOSCOPIC X-RAY GUIDANCE: HCPCS | Mod: 26,,, | Performed by: RADIOLOGY

## 2018-05-28 PROCEDURE — 3077F SYST BP >= 140 MM HG: CPT | Mod: CPTII,S$GLB,, | Performed by: INTERNAL MEDICINE

## 2018-05-28 PROCEDURE — 96413 CHEMO IV INFUSION 1 HR: CPT

## 2018-05-28 PROCEDURE — 77417 THER RADIOLOGY PORT IMAGE(S): CPT | Performed by: RADIOLOGY

## 2018-05-28 RX ORDER — FAMOTIDINE 10 MG/ML
20 INJECTION INTRAVENOUS
Status: COMPLETED | OUTPATIENT
Start: 2018-05-28 | End: 2018-05-28

## 2018-05-28 RX ORDER — MAGNESIUM SULFATE HEPTAHYDRATE 40 MG/ML
2 INJECTION, SOLUTION INTRAVENOUS
Status: COMPLETED | OUTPATIENT
Start: 2018-05-28 | End: 2018-05-28

## 2018-05-28 RX ORDER — DIPHENHYDRAMINE HYDROCHLORIDE 50 MG/ML
50 INJECTION INTRAMUSCULAR; INTRAVENOUS ONCE AS NEEDED
Status: DISCONTINUED | OUTPATIENT
Start: 2018-05-28 | End: 2018-05-28 | Stop reason: HOSPADM

## 2018-05-28 RX ORDER — SODIUM CHLORIDE 0.9 % (FLUSH) 0.9 %
10 SYRINGE (ML) INJECTION
Status: DISCONTINUED | OUTPATIENT
Start: 2018-05-28 | End: 2018-05-28 | Stop reason: HOSPADM

## 2018-05-28 RX ORDER — EPINEPHRINE 0.3 MG/.3ML
0.3 INJECTION SUBCUTANEOUS ONCE AS NEEDED
Status: DISCONTINUED | OUTPATIENT
Start: 2018-05-28 | End: 2018-05-28 | Stop reason: HOSPADM

## 2018-05-28 RX ORDER — FAMOTIDINE 10 MG/ML
20 INJECTION INTRAVENOUS
Status: CANCELLED | OUTPATIENT
Start: 2018-05-28

## 2018-05-28 RX ORDER — SODIUM CHLORIDE 0.9 % (FLUSH) 0.9 %
10 SYRINGE (ML) INJECTION
Status: CANCELLED | OUTPATIENT
Start: 2018-05-28

## 2018-05-28 RX ORDER — HEPARIN 100 UNIT/ML
500 SYRINGE INTRAVENOUS
Status: CANCELLED | OUTPATIENT
Start: 2018-05-28

## 2018-05-28 RX ORDER — EPINEPHRINE 0.3 MG/.3ML
0.3 INJECTION SUBCUTANEOUS ONCE AS NEEDED
Status: CANCELLED | OUTPATIENT
Start: 2018-05-28 | End: 2018-05-28

## 2018-05-28 RX ORDER — HEPARIN 100 UNIT/ML
500 SYRINGE INTRAVENOUS
Status: DISCONTINUED | OUTPATIENT
Start: 2018-05-28 | End: 2018-05-28 | Stop reason: HOSPADM

## 2018-05-28 RX ORDER — DIPHENHYDRAMINE HYDROCHLORIDE 50 MG/ML
50 INJECTION INTRAMUSCULAR; INTRAVENOUS ONCE AS NEEDED
Status: CANCELLED | OUTPATIENT
Start: 2018-05-28 | End: 2018-05-28

## 2018-05-28 RX ADMIN — MAGNESIUM SULFATE IN WATER 2 G: 40 INJECTION, SOLUTION INTRAVENOUS at 10:05

## 2018-05-28 RX ADMIN — DEXAMETHASONE SODIUM PHOSPHATE 20 MG: 4 INJECTION, SOLUTION INTRA-ARTICULAR; INTRALESIONAL; INTRAMUSCULAR; INTRAVENOUS; SOFT TISSUE at 11:05

## 2018-05-28 RX ADMIN — DIPHENHYDRAMINE HYDROCHLORIDE 50 MG: 50 INJECTION, SOLUTION INTRAMUSCULAR; INTRAVENOUS at 12:05

## 2018-05-28 RX ADMIN — PACLITAXEL 72 MG: 6 INJECTION, SOLUTION INTRAVENOUS at 12:05

## 2018-05-28 RX ADMIN — FAMOTIDINE 20 MG: 10 INJECTION, SOLUTION INTRAVENOUS at 12:05

## 2018-05-28 RX ADMIN — CARBOPLATIN 80 MG: 10 INJECTION, SOLUTION INTRAVENOUS at 01:05

## 2018-05-28 RX ADMIN — SODIUM CHLORIDE: 9 INJECTION, SOLUTION INTRAVENOUS at 10:05

## 2018-05-28 NOTE — PLAN OF CARE
Problem: Patient Care Overview  Goal: Plan of Care Review  Outcome: Ongoing (interventions implemented as appropriate)  Tolerated Taxol/Carbo treatment well.  Advised to call MD for any problems or concerns.  AVS given.  RTC in 1 week.  NAD noted upon discharge.

## 2018-05-28 NOTE — PLAN OF CARE
Problem: Chemotherapy Effects (Adult)  Goal: Signs and Symptoms of Listed Potential Problems Will be Absent, Minimized or Managed (Chemotherapy Effects)  Signs and symptoms of listed potential problems will be absent, minimized or managed by discharge/transition of care (reference Chemotherapy Effects (Adult) CPG).   Outcome: Ongoing (interventions implemented as appropriate)  Here for Taxol, Carbo, and Magnesium.

## 2018-05-28 NOTE — PROGRESS NOTES
CC: Lung cancer,follow up visit     HPI:Ms. Baker is a 67 yr old lady here for follow up.  She had a noncontrast CT chest (due to stage 4 CKD) for hemoptysis, which showed a 4cm spiculated medial LUPIS mass and possible mediastinal LAD.  She has hepatomegaly due to polycystic liver (and kidneys).  There were small lucent areas in the sternum and T9 vertebral bodies concerning for mets.  She underwent biopsy of the lesion on 4/4/18 in [place] and pathology reveals adenocarcinoma. She wanted to come to [facility] for treatment as she has family she can stay with here. No SOB, except when she lies on her right side.  She was diagnosed with polycystic kidney/liver when she was 18.  She is followed by Dr. Nagel in [place].  She has chronic infrequent headaches that are not worsening in severity or frequency.  She was evaluated by radiation oncology here ( ).         Interval history: She is here for a follow up visit. She is on concurrent carboplatin/paclitaxel for stage III adenocarcinoma lung. She has finished 3 weekly treatments. She is c/o discomfort in her mid-chest, like something stuck in my throat'. Her appetite is good.       Review of Systems   Constitutional: Positive for malaise/fatigue and weight loss. Negative for chills and fever.   HENT: Negative for congestion, hearing loss and nosebleeds.    Eyes: Negative for blurred vision, double vision, photophobia, pain and discharge.   Respiratory: Negative for cough, sputum production and shortness of breath.    Cardiovascular: Positive for chest pain. Negative for claudication, leg swelling and PND.   Gastrointestinal: Negative for abdominal pain, diarrhea, heartburn and nausea.   Genitourinary: Negative for dysuria, frequency and urgency.   Musculoskeletal: Negative for back pain, myalgias and neck pain.   Skin: Negative for rash.   Neurological: Positive for headaches. Negative for sensory change, speech change, focal weakness and weakness.    Psychiatric/Behavioral: Negative for depression. The patient is not nervous/anxious.        Vitals:    05/28/18 0936   BP: (!) 157/81   Pulse: 86   Resp: 18   Temp: 97.8 °F (36.6 °C)     Physical Exam   Constitutional: She is oriented to person, place, and time. No distress.   HENT:   Head: Normocephalic and atraumatic.   Mouth/Throat: No oropharyngeal exudate.   Eyes: Pupils are equal, round, and reactive to light. No scleral icterus.   Neck: Normal range of motion.   Cardiovascular: Normal rate and regular rhythm.    No murmur heard.  Pulmonary/Chest: Effort normal and breath sounds normal. No respiratory distress. She has no wheezes.   Abdominal: She exhibits distension. There is no tenderness. There is no rebound.   Musculoskeletal: She exhibits edema.   Lymphadenopathy:     She has no cervical adenopathy.   Neurological: She is alert and oriented to person, place, and time. No cranial nerve deficit.   Skin: Skin is warm. She is not diaphoretic.   Psychiatric: She has a normal mood and affect.       Component      Latest Ref Rng & Units 5/28/2018   WBC      3.90 - 12.70 K/uL 2.42 (L)   RBC      4.00 - 5.40 M/uL 3.64 (L)   Hemoglobin      12.0 - 16.0 g/dL 9.7 (L)   Hematocrit      37.0 - 48.5 % 30.0 (L)   MCV      82 - 98 fL 82   MCH      27.0 - 31.0 pg 26.6 (L)   MCHC      32.0 - 36.0 g/dL 32.3   RDW      11.5 - 14.5 % 15.4 (H)   Platelets      150 - 350 K/uL 206   MPV      9.2 - 12.9 fL 12.4   Immature Granulocytes      0.0 - 0.5 % 0.8 (H)   Gran # (ANC)      1.8 - 7.7 K/uL 1.6 (L)   Immature Grans (Abs)      0.00 - 0.04 K/uL 0.02   Lymph #      1.0 - 4.8 K/uL 0.5 (L)   Mono #      0.3 - 1.0 K/uL 0.3   Eos #      0.0 - 0.5 K/uL 0.1   Baso #      0.00 - 0.20 K/uL 0.03   nRBC      0 /100 WBC 0   Gran%      38.0 - 73.0 % 64.1   Lymph%      18.0 - 48.0 % 20.7   Mono%      4.0 - 15.0 % 10.7   Eosinophil%      0.0 - 8.0 % 2.5   Basophil%      0.0 - 1.9 % 1.2   Differential Method       Automated   Sodium      136 -  145 mmol/L 143   Potassium      3.5 - 5.1 mmol/L 4.1   Chloride      95 - 110 mmol/L 113 (H)   CO2      23 - 29 mmol/L 19 (L)   Glucose      70 - 110 mg/dL 104   BUN, Bld      8 - 23 mg/dL 43 (H)   Creatinine      0.5 - 1.4 mg/dL 3.4 (H)   Calcium          8.7 - 10.5 mg/dL 9.1   Total Protein      6.0 - 8.4 g/dL 7.2   Albumin      3.5 - 5.2 g/dL 3.5   Total Bilirubin      0.1 - 1.0 mg/dL 0.6   Alkaline Phosphatase      55 - 135 U/L 150 (H)   AST      10 - 40 U/L 16   ALT      10 - 44 U/L 17   Anion Gap      8 - 16 mmol/L 11   eGFR if African American      >60 mL/min/1.73 m:2 15.4 (A)   eGFR if non African American      >60 mL/min/1.73 m:2 13.4 (A)   Magnesium      1.6 - 2.6 mg/dL 1.4 (L)       4/13/18 PET CT        EXAMINATION:  NM PET CT ROUTINE    CLINICAL HISTORY:  Malignant neoplasm of upper lobe, left bronchus or lung.    Outside imaging demonstrated 4 cm spiculated medial left upper lobe mass (biopsy proven adenocarcinoma) with possible mediastinal lymphadenopathy as well as lucent areas involving the sternum and T9.    TECHNIQUE:  13.12 mCi of F18-FDG was administered intravenously in the right antecubital fossa.  After an approximately 60 min distribution time, PET/CT images were acquired from the skull base to the mid thigh. Transmission images were acquired to correct for attenuation using a whole body low-dose CT scan without contrast with the arms positioned above the head. Glycemia at the time of injection was 82 mg/dL.    COMPARISON:  CT abdomen pelvis 07/21/2017, MRI brain 04/13/2018    FINDINGS:  Quality of the study: Adequate.    Abnormal foci of increased uptake are present within the left upper lobe as well as a few prevascular mediastinal lymph nodes.  Lung mass measures approximately 4.6 x 2.9 cm.    No appreciable lucent lesion in the sternum or T9 vertebral body.  Mild degenerative metabolic activity is present at the sternomanubrial junction.    Index lesions:    - Left upper lobe mass: SUV  max 11.1    - Lateral pre-vascular lymph node: SUV max 5.8    Physiologic uptake of the tracer is present within the brain, salivary glands, myocardium, GI and  tracts.    Incidental CT findings: Liver and kidneys are enlarged with numerous intraparenchymal cyst and associated mass effect on the adjacent abdominal organs, unchanged.  Colonic diverticulosis without diverticulitis.  Bandlike opacification within the right lower lobe likely represents subsegmental atelectasis.  Calcific atherosclerosis involves the lower extremity vasculature bilaterally.   Impression        Known malignancy of the left upper lobe with mediastinal trini metastases.    No appreciable lucent lesion in the sternum or T9 vertebral body.  Correlation with prior outside imaging is recommended.            4/13/18 MRI brain w/o cont        FINDINGS:  Intracranial compartment:    Ventricles and sulci are normal in size for age without evidence of hydrocephalus. No extra-axial blood or fluid collections.    Nonspecific small foci of T2/FLAIR high signal intensity in the supratentorial white matter, favored to represent chronic microvascular ischemic changes.  Remote lacunar type infarct in the left putamen.  Small punctate focus of susceptibility signal artifact in the left occipital lobe, suggestive of an old microhemorrhage or calcification.  No mass lesion, acute hemorrhage, edema or acute infarct.    Normal vascular flow voids are preserved.    Skull/extracranial contents (limited evaluation): Bone marrow signal intensity is normal.   Impression        No evidence of intracranial metastases within limitation in the absence of IV contrast which decrease the sensitivity of this exam.    Nonspecific foci of T2/FLAIR high signal intensity in the supratentorial white matter, likely representing chronic microvascular ischemic changes.      Assessment:     1. Adenocarcinoma lung  2. Polycystic kidney disease  3. Polycystic liver disease  4.  Ascites  5. Hypertension,essential  6. Cough  7. Dyspnea on exertion  8. CKD stage IV  9. Leukopenia  10. Anemia, normocytic, hypochromic  11. Dysphagia  12. Hypomagnesemia     Plan:     1. She has a left upper lung mass that was biopsied, as well as hyper metabolic, prevascular mediastinal lymph nodes on PET CT. No other hypermetabolic lesions on PET CT. MRI brain (non-contrast) does not show any metastatic lesion. She has polycystic kidney disease and she is certainly at higher risk for renal malignancy, colon CA as well as liver CA. Slides and block were requested from [facility] and were reviewed by pathology here. It was confirmed that she has adenocarcinoma originating in the lungs.   She has started concurrent chemotherapy with carboplatin/Paclitaxcel ( renally adjusted) as Pemetrexed was not appropriate with the reduced renal function.      4. Chronic, attributed to CKD/ polycystic liver        5. On multiple anti-HT medications     6. Possibly related to malignancy in her lungs. She is on Benzonatate     9. Secondary to chemotherapy     10. Secondary to CKD and chemotherapy. Serum iron saturation 33% on 5/21/18.     11. Possibly secondary to radiation. Suggested eating/drinking more liquid/semi-solid foods. She is on Ranitidine once daily.     12. Will add iv Magnesium today        Answers for HPI/ROS submitted by the patient on 5/22/2018   appetite change : No  unexpected weight change: Yes  visual disturbance: No  adenopathy: No

## 2018-05-29 PROCEDURE — 77334 RADIATION TREATMENT AID(S): CPT | Mod: TC | Performed by: RADIOLOGY

## 2018-05-29 PROCEDURE — 77307 TELETHX ISODOSE PLAN CPLX: CPT | Mod: 26,,, | Performed by: RADIOLOGY

## 2018-05-29 PROCEDURE — 77412 RADIATION TX DELIVERY LVL 3: CPT | Performed by: RADIOLOGY

## 2018-05-29 PROCEDURE — 77334 RADIATION TREATMENT AID(S): CPT | Mod: 26,,, | Performed by: RADIOLOGY

## 2018-05-29 PROCEDURE — 77307 TELETHX ISODOSE PLAN CPLX: CPT | Mod: TC | Performed by: RADIOLOGY

## 2018-05-29 PROCEDURE — 77387 GUIDANCE FOR RADJ TX DLVR: CPT | Mod: TC | Performed by: RADIOLOGY

## 2018-05-29 PROCEDURE — G6002 STEREOSCOPIC X-RAY GUIDANCE: HCPCS | Mod: 26,,, | Performed by: RADIOLOGY

## 2018-05-30 PROCEDURE — G6002 STEREOSCOPIC X-RAY GUIDANCE: HCPCS | Mod: 26,,, | Performed by: RADIOLOGY

## 2018-05-30 PROCEDURE — 77387 GUIDANCE FOR RADJ TX DLVR: CPT | Mod: TC | Performed by: RADIOLOGY

## 2018-05-30 PROCEDURE — 77412 RADIATION TX DELIVERY LVL 3: CPT | Performed by: RADIOLOGY

## 2018-05-31 ENCOUNTER — DOCUMENTATION ONLY (OUTPATIENT)
Dept: RADIATION ONCOLOGY | Facility: CLINIC | Age: 68
End: 2018-05-31

## 2018-05-31 PROCEDURE — G6002 STEREOSCOPIC X-RAY GUIDANCE: HCPCS | Mod: 26,,, | Performed by: RADIOLOGY

## 2018-05-31 PROCEDURE — 77387 GUIDANCE FOR RADJ TX DLVR: CPT | Mod: TC | Performed by: RADIOLOGY

## 2018-05-31 PROCEDURE — 77412 RADIATION TX DELIVERY LVL 3: CPT | Performed by: RADIOLOGY

## 2018-05-31 NOTE — PLAN OF CARE
Problem: Patient Care Overview  Goal: Plan of Care Review  Outcome: Ongoing (interventions implemented as appropriate)  Day 17 of XRT to Left lung/4D. C/o diarrhea, but eating good. Losing some hair but feels good overall. Some pressure to chest all after XRT treatment.

## 2018-06-01 ENCOUNTER — HOSPITAL ENCOUNTER (OUTPATIENT)
Dept: RADIATION THERAPY | Facility: HOSPITAL | Age: 68
Discharge: HOME | End: 2018-06-01
Attending: RADIOLOGY
Payer: MEDICARE

## 2018-06-01 PROCEDURE — 77336 RADIATION PHYSICS CONSULT: CPT | Performed by: RADIOLOGY

## 2018-06-01 PROCEDURE — 77387 GUIDANCE FOR RADJ TX DLVR: CPT | Mod: TC | Performed by: RADIOLOGY

## 2018-06-01 PROCEDURE — G6002 STEREOSCOPIC X-RAY GUIDANCE: HCPCS | Mod: 26,,, | Performed by: RADIOLOGY

## 2018-06-04 ENCOUNTER — OFFICE VISIT (OUTPATIENT)
Dept: HEMATOLOGY/ONCOLOGY | Facility: CLINIC | Age: 68
End: 2018-06-04
Payer: MEDICARE

## 2018-06-04 ENCOUNTER — INFUSION (OUTPATIENT)
Dept: INFUSION THERAPY | Facility: HOSPITAL | Age: 68
End: 2018-06-04
Attending: INTERNAL MEDICINE
Payer: MEDICARE

## 2018-06-04 VITALS
RESPIRATION RATE: 18 BRPM | TEMPERATURE: 98 F | DIASTOLIC BLOOD PRESSURE: 87 MMHG | SYSTOLIC BLOOD PRESSURE: 137 MMHG | HEART RATE: 91 BPM

## 2018-06-04 VITALS
WEIGHT: 129.44 LBS | OXYGEN SATURATION: 100 % | HEIGHT: 65 IN | RESPIRATION RATE: 18 BRPM | SYSTOLIC BLOOD PRESSURE: 164 MMHG | HEART RATE: 97 BPM | BODY MASS INDEX: 21.56 KG/M2 | DIASTOLIC BLOOD PRESSURE: 91 MMHG | TEMPERATURE: 98 F

## 2018-06-04 DIAGNOSIS — K21.9 GASTROESOPHAGEAL REFLUX DISEASE WITHOUT ESOPHAGITIS: ICD-10-CM

## 2018-06-04 DIAGNOSIS — R18.8 OTHER ASCITES: ICD-10-CM

## 2018-06-04 DIAGNOSIS — C34.12 PRIMARY ADENOCARCINOMA OF UPPER LOBE OF LEFT LUNG: Primary | ICD-10-CM

## 2018-06-04 DIAGNOSIS — D63.0 ANEMIA IN NEOPLASTIC DISEASE: ICD-10-CM

## 2018-06-04 DIAGNOSIS — T45.1X5A CHEMOTHERAPY-INDUCED NEUTROPENIA: Primary | ICD-10-CM

## 2018-06-04 DIAGNOSIS — D70.1 CHEMOTHERAPY-INDUCED NEUTROPENIA: Primary | ICD-10-CM

## 2018-06-04 DIAGNOSIS — R53.0 NEOPLASTIC MALIGNANT RELATED FATIGUE: ICD-10-CM

## 2018-06-04 DIAGNOSIS — C34.12 PRIMARY ADENOCARCINOMA OF UPPER LOBE OF LEFT LUNG: ICD-10-CM

## 2018-06-04 DIAGNOSIS — R63.0 ANOREXIA: ICD-10-CM

## 2018-06-04 PROCEDURE — 96417 CHEMO IV INFUS EACH ADDL SEQ: CPT

## 2018-06-04 PROCEDURE — 96367 TX/PROPH/DG ADDL SEQ IV INF: CPT

## 2018-06-04 PROCEDURE — 99999 PR PBB SHADOW E&M-EST. PATIENT-LVL III: CPT | Mod: PBBFAC,,, | Performed by: INTERNAL MEDICINE

## 2018-06-04 PROCEDURE — 96375 TX/PRO/DX INJ NEW DRUG ADDON: CPT

## 2018-06-04 PROCEDURE — 96413 CHEMO IV INFUSION 1 HR: CPT

## 2018-06-04 PROCEDURE — 3080F DIAST BP >= 90 MM HG: CPT | Mod: CPTII,S$GLB,, | Performed by: INTERNAL MEDICINE

## 2018-06-04 PROCEDURE — 99214 OFFICE O/P EST MOD 30 MIN: CPT | Mod: S$GLB,,, | Performed by: INTERNAL MEDICINE

## 2018-06-04 PROCEDURE — 3077F SYST BP >= 140 MM HG: CPT | Mod: CPTII,S$GLB,, | Performed by: INTERNAL MEDICINE

## 2018-06-04 PROCEDURE — 77412 RADIATION TX DELIVERY LVL 3: CPT | Performed by: RADIOLOGY

## 2018-06-04 PROCEDURE — G6002 STEREOSCOPIC X-RAY GUIDANCE: HCPCS | Mod: 26,,, | Performed by: RADIOLOGY

## 2018-06-04 PROCEDURE — 77387 GUIDANCE FOR RADJ TX DLVR: CPT | Mod: TC | Performed by: RADIOLOGY

## 2018-06-04 PROCEDURE — 63600175 PHARM REV CODE 636 W HCPCS: Performed by: INTERNAL MEDICINE

## 2018-06-04 PROCEDURE — 77417 THER RADIOLOGY PORT IMAGE(S): CPT | Performed by: RADIOLOGY

## 2018-06-04 PROCEDURE — S0028 INJECTION, FAMOTIDINE, 20 MG: HCPCS | Performed by: INTERNAL MEDICINE

## 2018-06-04 PROCEDURE — 25000003 PHARM REV CODE 250: Performed by: INTERNAL MEDICINE

## 2018-06-04 RX ORDER — DRONABINOL 2.5 MG/1
2.5 CAPSULE ORAL
Qty: 60 CAPSULE | Refills: 2 | Status: SHIPPED | OUTPATIENT
Start: 2018-06-04 | End: 2018-08-03

## 2018-06-04 RX ORDER — EPINEPHRINE 0.3 MG/.3ML
0.3 INJECTION SUBCUTANEOUS ONCE AS NEEDED
Status: DISCONTINUED | OUTPATIENT
Start: 2018-06-04 | End: 2018-06-04 | Stop reason: HOSPADM

## 2018-06-04 RX ORDER — EPINEPHRINE 0.3 MG/.3ML
0.3 INJECTION SUBCUTANEOUS ONCE AS NEEDED
Status: CANCELLED | OUTPATIENT
Start: 2018-06-04 | End: 2018-06-04

## 2018-06-04 RX ORDER — HEPARIN 100 UNIT/ML
500 SYRINGE INTRAVENOUS
Status: CANCELLED | OUTPATIENT
Start: 2018-06-04

## 2018-06-04 RX ORDER — FAMOTIDINE 10 MG/ML
20 INJECTION INTRAVENOUS
Status: CANCELLED | OUTPATIENT
Start: 2018-06-04

## 2018-06-04 RX ORDER — FAMOTIDINE 10 MG/ML
20 INJECTION INTRAVENOUS
Status: COMPLETED | OUTPATIENT
Start: 2018-06-04 | End: 2018-06-04

## 2018-06-04 RX ORDER — DIPHENHYDRAMINE HYDROCHLORIDE 50 MG/ML
50 INJECTION INTRAMUSCULAR; INTRAVENOUS ONCE AS NEEDED
Status: CANCELLED | OUTPATIENT
Start: 2018-06-04 | End: 2018-06-04

## 2018-06-04 RX ORDER — DIPHENHYDRAMINE HYDROCHLORIDE 50 MG/ML
50 INJECTION INTRAMUSCULAR; INTRAVENOUS ONCE AS NEEDED
Status: DISCONTINUED | OUTPATIENT
Start: 2018-06-04 | End: 2018-06-04 | Stop reason: HOSPADM

## 2018-06-04 RX ORDER — SODIUM CHLORIDE 0.9 % (FLUSH) 0.9 %
10 SYRINGE (ML) INJECTION
Status: CANCELLED | OUTPATIENT
Start: 2018-06-04

## 2018-06-04 RX ADMIN — CARBOPLATIN 80 MG: 10 INJECTION, SOLUTION INTRAVENOUS at 01:06

## 2018-06-04 RX ADMIN — DEXAMETHASONE SODIUM PHOSPHATE 20 MG: 4 INJECTION, SOLUTION INTRA-ARTICULAR; INTRALESIONAL; INTRAMUSCULAR; INTRAVENOUS; SOFT TISSUE at 11:06

## 2018-06-04 RX ADMIN — SODIUM CHLORIDE: 9 INJECTION, SOLUTION INTRAVENOUS at 11:06

## 2018-06-04 RX ADMIN — DIPHENHYDRAMINE HYDROCHLORIDE 50 MG: 50 INJECTION, SOLUTION INTRAMUSCULAR; INTRAVENOUS at 11:06

## 2018-06-04 RX ADMIN — PACLITAXEL 72 MG: 6 INJECTION, SOLUTION INTRAVENOUS at 12:06

## 2018-06-04 RX ADMIN — FAMOTIDINE 20 MG: 10 INJECTION INTRAVENOUS at 11:06

## 2018-06-04 NOTE — PLAN OF CARE
Problem: Patient Care Overview  Goal: Plan of Care Review  Outcome: Ongoing (interventions implemented as appropriate)  Pt tolerated Taxol and Carbo with no complications. VSS. Pt instructed to call MD with any problems. NAD. Pt discharged home independently.

## 2018-06-04 NOTE — PROGRESS NOTES
CC: Lung cancer, follow up            HPI:Ms. Baker is a 67 yr old lady here for follow up.  She had a noncontrast CT chest (due to stage 4 CKD) for hemoptysis, which showed a 4cm spiculated medial LUPIS mass and possible mediastinal LAD.  She has hepatomegaly due to polycystic liver (and kidneys).  There were small lucent areas in the sternum and T9 vertebral bodies concerning for mets.  However, PET CT did not reveal any hypermetabolic bone lesions. She underwent biopsy of the lesion on 4/4/18 in [place] and pathology reveals adenocarcinoma. She wanted to come to [facility] for treatment as she has family she can stay with here.She was diagnosed with polycystic kidney/liver when she was 18.  She is followed by Dr. Nagel in [place].  She has chronic infrequent headaches that are not worsening in severity or frequency.  She was evaluated by radiation oncology here ( ).         Interval history: She is here for a follow up visit. She is on concurrent carboplatin/paclitaxel for stage III adenocarcinoma lung. She has finished 4 weekly treatments. She is c/o discomfort in her mid-chest, like something stuck in my throat. Her appetite is good.     Review of Systems   Constitutional: Positive for malaise/fatigue. Negative for fever and weight loss.   HENT: Positive for nosebleeds.    Eyes: Negative.    Respiratory: Positive for cough and shortness of breath. Negative for hemoptysis and sputum production.    Cardiovascular: Positive for leg swelling. Negative for chest pain and palpitations.   Gastrointestinal: Negative for abdominal pain, constipation, diarrhea, heartburn, nausea and vomiting.   Genitourinary: Negative for dysuria and urgency.   Musculoskeletal: Negative for myalgias.   Skin: Negative for itching and rash.   Neurological: Positive for weakness. Negative for dizziness, tingling, tremors and sensory change.   Endo/Heme/Allergies: Does not bruise/bleed easily.   Psychiatric/Behavioral: Negative for  depression.          Current Outpatient Prescriptions   Medication Sig    amLODIPine (NORVASC) 10 MG tablet Take 10 mg by mouth once daily.    benzonatate (TESSALON) 100 MG capsule Take 1 capsule (100 mg total) by mouth 3 (three) times daily as needed for Cough.    magnesium oxide (MAG-OX) 400 mg tablet Take 400 mg by mouth once daily.    ondansetron (ZOFRAN) 4 MG tablet Take 1 tablet (4 mg total) by mouth every 8 (eight) hours as needed for Nausea.    ranitidine (ZANTAC) 150 MG tablet Take 1 tablet (150 mg total) by mouth once daily.    sodium bicarbonate 650 MG tablet Take 650 mg by mouth 2 (two) times daily.    spironolactone (ALDACTONE) 25 MG tablet Take 25 mg by mouth.    UNABLE TO FIND Apply topically. medication name: Antifungal cream apply topically to toes tid prn     No current facility-administered medications for this visit.            Vitals:    06/04/18 0952   BP: (!) 164/91   Pulse: 97   Resp: 18   Temp: 98.4 °F (36.9 °C)       Physical Exam   Constitutional: She appears well-developed. No distress.   HENT:   Head: Normocephalic and atraumatic.   Mouth/Throat: No oropharyngeal exudate.   Eyes: Pupils are equal, round, and reactive to light. No scleral icterus.   Neck: Normal range of motion.   Cardiovascular: Normal rate and regular rhythm.    No murmur heard.  Pulmonary/Chest: Effort normal. No respiratory distress. She has no wheezes. She has no rales.   Abdominal: She exhibits distension. There is no tenderness. There is no rebound.   Musculoskeletal: She exhibits edema.   Lymphadenopathy:     She has no cervical adenopathy.   Neurological: She is alert.   Skin: Skin is warm.   Psychiatric: She has a normal mood and affect.       Component      Latest Ref Rng & Units 6/4/2018   WBC      3.90 - 12.70 K/uL 2.47 (L)   RBC      4.00 - 5.40 M/uL 3.57 (L)   Hemoglobin      12.0 - 16.0 g/dL 9.4 (L)   Hematocrit      37.0 - 48.5 % 29.4 (L)   MCV      82 - 98 fL 82   MCH      27.0 - 31.0 pg 26.3 (L)    MCHC      32.0 - 36.0 g/dL 32.0   RDW      11.5 - 14.5 % 15.7 (H)   Platelets      150 - 350 K/uL 148 (L)   MPV      9.2 - 12.9 fL 11.6   Immature Granulocytes      0.0 - 0.5 % 0.8 (H)   Gran # (ANC)      1.8 - 7.7 K/uL 1.9   Immature Grans (Abs)      0.00 - 0.04 K/uL 0.02   Lymph #      1.0 - 4.8 K/uL 0.3 (L)   Mono #      0.3 - 1.0 K/uL 0.2 (L)   Eos #      0.0 - 0.5 K/uL 0.0   Baso #      0.00 - 0.20 K/uL 0.02   nRBC      0 /100 WBC 0   Gran%      38.0 - 73.0 % 74.9 (H)   Lymph%      18.0 - 48.0 % 13.8 (L)   Mono%      4.0 - 15.0 % 8.5   Eosinophil%      0.0 - 8.0 % 1.2   Basophil%      0.0 - 1.9 % 0.8   Differential Method       Automated   Sodium      136 - 145 mmol/L 139   Potassium      3.5 - 5.1 mmol/L 3.8   Chloride      95 - 110 mmol/L 111 (H)   CO2      23 - 29 mmol/L 18 (L)   Glucose      70 - 110 mg/dL 133 (H)   BUN, Bld      8 - 23 mg/dL 34 (H)   Creatinine      0.5 - 1.4 mg/dL 3.4 (H)   Calcium      8.7 - 10.5 mg/dL 9.4   Total Protein      6.0 - 8.4 g/dL 7.1   Albumin      3.5 - 5.2 g/dL 3.4 (L)   Total Bilirubin      0.1 - 1.0 mg/dL 0.6   Alkaline Phosphatase      55 - 135 U/L 175 (H)   AST      10 - 40 U/L 24   ALT      10 - 44 U/L 22   Anion Gap      8 - 16 mmol/L 10   eGFR if African American      >60 mL/min/1.73 m:2 15.4 (A)   eGFR if non African American      >60 mL/min/1.73 m:2 13.4 (A)   Magnesium      1.6 - 2.6 mg/dL 1.6     4/13/18 PET CT        EXAMINATION:  NM PET CT ROUTINE    CLINICAL HISTORY:  Malignant neoplasm of upper lobe, left bronchus or lung.    Outside imaging demonstrated 4 cm spiculated medial left upper lobe mass (biopsy proven adenocarcinoma) with possible mediastinal lymphadenopathy as well as lucent areas involving the sternum and T9.    TECHNIQUE:  13.12 mCi of F18-FDG was administered intravenously in the right antecubital fossa.  After an approximately 60 min distribution time, PET/CT images were acquired from the skull base to the mid thigh. Transmission images were  acquired to correct for attenuation using a whole body low-dose CT scan without contrast with the arms positioned above the head. Glycemia at the time of injection was 82 mg/dL.    COMPARISON:  CT abdomen pelvis 07/21/2017, MRI brain 04/13/2018    FINDINGS:  Quality of the study: Adequate.    Abnormal foci of increased uptake are present within the left upper lobe as well as a few prevascular mediastinal lymph nodes.  Lung mass measures approximately 4.6 x 2.9 cm.    No appreciable lucent lesion in the sternum or T9 vertebral body.  Mild degenerative metabolic activity is present at the sternomanubrial junction.    Index lesions:    - Left upper lobe mass: SUV max 11.1    - Lateral pre-vascular lymph node: SUV max 5.8    Physiologic uptake of the tracer is present within the brain, salivary glands, myocardium, GI and  tracts.    Incidental CT findings: Liver and kidneys are enlarged with numerous intraparenchymal cyst and associated mass effect on the adjacent abdominal organs, unchanged.  Colonic diverticulosis without diverticulitis.  Bandlike opacification within the right lower lobe likely represents subsegmental atelectasis.  Calcific atherosclerosis involves the lower extremity vasculature bilaterally.   Impression        Known malignancy of the left upper lobe with mediastinal trini metastases.    No appreciable lucent lesion in the sternum or T9 vertebral body.  Correlation with prior outside imaging is recommended.            4/13/18 MRI brain w/o cont        FINDINGS:  Intracranial compartment:    Ventricles and sulci are normal in size for age without evidence of hydrocephalus. No extra-axial blood or fluid collections.    Nonspecific small foci of T2/FLAIR high signal intensity in the supratentorial white matter, favored to represent chronic microvascular ischemic changes.  Remote lacunar type infarct in the left putamen.  Small punctate focus of susceptibility signal artifact in the left occipital  lobe, suggestive of an old microhemorrhage or calcification.  No mass lesion, acute hemorrhage, edema or acute infarct.    Normal vascular flow voids are preserved.    Skull/extracranial contents (limited evaluation): Bone marrow signal intensity is normal.   Impression        No evidence of intracranial metastases within limitation in the absence of IV contrast which decrease the sensitivity of this exam.    Nonspecific foci of T2/FLAIR high signal intensity in the supratentorial white matter, likely representing chronic microvascular ischemic changes.      Assessment:     1. Adenocarcinoma lung  2. Polycystic kidney disease  3. Polycystic liver disease  4. Ascites  5. Hypertension,essential  6. Cough  7. Dyspnea on exertion  8. CKD stage IV  9. Leukopenia  10. Anemia, normocytic, hypochromic  11. Dysphagia  12. Hypomagnesemia  13. Epistaxis  14. Weight loss  15. ANOREXIA     Plan:     1. She has a left upper lung mass that was biopsied, as well as hyper metabolic, prevascular mediastinal lymph nodes on PET CT. No other hypermetabolic lesions on PET CT. MRI brain (non-contrast) does not show any metastatic lesion. She has polycystic kidney disease and she is certainly at higher risk for renal malignancy, colon CA as well as liver CA. Slides and block were requested from [facility] and were reviewed by pathology here. It was confirmed that she has adenocarcinoma originating in the lungs.   She has started concurrent chemotherapy with Carboplatin/Paclitaxcel ( renally adjusted) as Pemetrexed was not appropriate with the reduced renal function.      4. Chronic, attributed to CKD/ polycystic liver        5. On multiple anti-HT medications     6. Possibly related to malignancy in her lungs. She is on Benzonatate     9. Secondary to chemotherapy     10. Secondary to CKD and chemotherapy. Serum iron saturation 33% on 5/21/18.     11. Possibly secondary to radiation. Suggested eating/drinking more liquid/semi-solid  foods. She is on Ranitidine once daily.      12. Magnesium 1.6 today    13. Spontaneous, self limited. She does cough up blood occasionally. Hemoglobin 9.4 today. Platelets 148k.     14,15. Prescribed Marinol 2.5MG bid. Megace, Periactin not safe in the setting of renal insufficiency              Distress Screening Results: Psychosocial Distress screening score of Distress Score: 0 noted and reviewed. No intervention indicated.  Answers for HPI/ROS submitted by the patient on 5/29/2018   appetite change : No  unexpected weight change: Yes  visual disturbance: No  cough: Yes  shortness of breath: Yes  chest pain: Yes  abdominal pain: Yes  diarrhea: Yes  frequency: No  back pain: No  rash: No  headaches: No  adenopathy: No  nervous/ anxious: No

## 2018-06-05 ENCOUNTER — DOCUMENTATION ONLY (OUTPATIENT)
Dept: RADIATION ONCOLOGY | Facility: CLINIC | Age: 68
End: 2018-06-05

## 2018-06-05 DIAGNOSIS — C34.12 PRIMARY ADENOCARCINOMA OF UPPER LOBE OF LEFT LUNG: Primary | ICD-10-CM

## 2018-06-05 PROCEDURE — 77387 GUIDANCE FOR RADJ TX DLVR: CPT | Mod: TC | Performed by: RADIOLOGY

## 2018-06-05 PROCEDURE — 77412 RADIATION TX DELIVERY LVL 3: CPT | Performed by: RADIOLOGY

## 2018-06-05 PROCEDURE — G6002 STEREOSCOPIC X-RAY GUIDANCE: HCPCS | Mod: 26,,, | Performed by: RADIOLOGY

## 2018-06-05 NOTE — PLAN OF CARE
Problem: Patient Care Overview  Goal: Plan of Care Review  Outcome: Ongoing (interventions implemented as appropriate)  Day 22 of XRT to left lung. Chemo on Monday. Cough with little redish tinge when cough or sneeze. States she has headaches. Nauseated and vomits at times but not constant.

## 2018-06-06 ENCOUNTER — OFFICE VISIT (OUTPATIENT)
Dept: NEPHROLOGY | Facility: CLINIC | Age: 68
End: 2018-06-06
Payer: MEDICARE

## 2018-06-06 VITALS
SYSTOLIC BLOOD PRESSURE: 124 MMHG | OXYGEN SATURATION: 99 % | HEART RATE: 110 BPM | WEIGHT: 129.88 LBS | DIASTOLIC BLOOD PRESSURE: 78 MMHG | BODY MASS INDEX: 21.64 KG/M2 | HEIGHT: 65 IN

## 2018-06-06 DIAGNOSIS — D63.1 ANEMIA OF CHRONIC RENAL FAILURE, UNSPECIFIED CKD STAGE: Primary | ICD-10-CM

## 2018-06-06 DIAGNOSIS — N18.4 STAGE 4 CHRONIC KIDNEY DISEASE: ICD-10-CM

## 2018-06-06 DIAGNOSIS — N18.9 ANEMIA OF CHRONIC RENAL FAILURE, UNSPECIFIED CKD STAGE: Primary | ICD-10-CM

## 2018-06-06 PROCEDURE — G6002 STEREOSCOPIC X-RAY GUIDANCE: HCPCS | Mod: 26,,, | Performed by: RADIOLOGY

## 2018-06-06 PROCEDURE — 3078F DIAST BP <80 MM HG: CPT | Mod: CPTII,GC,S$GLB, | Performed by: INTERNAL MEDICINE

## 2018-06-06 PROCEDURE — 99999 PR PBB SHADOW E&M-EST. PATIENT-LVL IV: CPT | Mod: PBBFAC,GC,, | Performed by: INTERNAL MEDICINE

## 2018-06-06 PROCEDURE — 77412 RADIATION TX DELIVERY LVL 3: CPT | Performed by: RADIOLOGY

## 2018-06-06 PROCEDURE — 77387 GUIDANCE FOR RADJ TX DLVR: CPT | Mod: TC | Performed by: RADIOLOGY

## 2018-06-06 PROCEDURE — 99205 OFFICE O/P NEW HI 60 MIN: CPT | Mod: GC,S$GLB,, | Performed by: INTERNAL MEDICINE

## 2018-06-06 PROCEDURE — 3074F SYST BP LT 130 MM HG: CPT | Mod: CPTII,GC,S$GLB, | Performed by: INTERNAL MEDICINE

## 2018-06-06 NOTE — PROGRESS NOTES
Subjective:       Patient ID: Verónica Baker is a 67 y.o. Black or  female who presents for follow-up evaluation of Chronic Kidney Disease    Patient is here to establish care for CKD, presumptively all secondary polycystic kidney disease.  She was recently diagnosed with lung cancer and undergoing treatment with chemo and radiation, not deemed to be surgical candidate, closely followed by heme/onc, used to be seen by a nephrologist in [place] prior to moving to [place], up until about a year ago she says she used to be a stage III, but then sCr started to abruptly increase over the past year, her ACE-I was stopped.  Creatinine has been 3.2-3.4 since coming to [place] and followed at [facility] (early May).  She has no complaints on today's visit.      PMH  HTN, GERD, PKD, CKD    PSH  Hysterectomy,     FH  Diabetes, CAD (MI in father)    SH  Non-smoker, no significant ETOH    Review of Systems   Constitutional: Negative for chills, fatigue and fever.   Respiratory: Negative for chest tightness and shortness of breath.    Cardiovascular: Negative for chest pain and leg swelling.   Gastrointestinal: Positive for abdominal pain. Negative for abdominal distention, diarrhea and nausea.   Genitourinary: Negative for decreased urine volume, difficulty urinating, flank pain, frequency, hematuria and urgency.   Skin: Negative for rash.   Neurological: Negative for tremors, speech difficulty and weakness.       Objective:      Physical Exam   Constitutional: She is oriented to person, place, and time.   HENT:   Head: Normocephalic and atraumatic.   Eyes: EOM are normal.   Neck: No JVD present.   Cardiovascular: Normal rate and regular rhythm.    Pulmonary/Chest: Effort normal and breath sounds normal.   Abdominal: Soft. She exhibits distension and mass.   Musculoskeletal: She exhibits edema. She exhibits no tenderness.   Neurological: She is alert and oriented to person, place, and time.   Skin:  Skin is warm.   Psychiatric: She has a normal mood and affect. Her behavior is normal.       Assessment & Plan:       Stage 4 chronic kidney disease  Versus early stage V and secondary polycystic kidney disease, she would not be a transplant candidate due to active malignancy and apparently declined to be considered even before current cancer diagnosis, she is not a candidate for PD due to the tremendous organomegaly and +/- nutritional status, will send for TOPS education and vein mapping and in the meantime will continue treating her for CKD, getting baseline labs, we do not have old labs available.    Plan:  1) refer for dialysis education and vein mapping  2) repeat CBC, RFP  3) iron studies  4) PTH, vitamin D  5) UA and UPC ratio  6) follow up on labs and see back in clinic in a month or two

## 2018-06-06 NOTE — PATIENT INSTRUCTIONS
1) no NSAIDs  2) repeat labs and urine studies within the weak  3) referral for dialysis eduaction  4) vein mapping

## 2018-06-06 NOTE — ASSESSMENT & PLAN NOTE
Versus early stage V and secondary polycystic kidney disease, she would not be a transplant candidate due to active malignancy and apparently declined to be considered even before current cancer diagnosis, she is not a candidate for PD due to the tremendous organomegaly and +/- nutritional status, will send for TOPS education and vein mapping and in the meantime will continue treating her for CKD, getting baseline labs, we do not have old labs available.    Plan:  1) refer for dialysis education and vein mapping  2) repeat CBC, RFP  3) iron studies  4) PTH, vitamin D  5) UA and UPC ratio  6) follow up on labs and see back in clinic in a month or two

## 2018-06-07 ENCOUNTER — RESEARCH ENCOUNTER (OUTPATIENT)
Dept: RESEARCH | Facility: HOSPITAL | Age: 68
End: 2018-06-07

## 2018-06-07 PROCEDURE — G6002 STEREOSCOPIC X-RAY GUIDANCE: HCPCS | Mod: 26,,, | Performed by: RADIOLOGY

## 2018-06-07 PROCEDURE — 77412 RADIATION TX DELIVERY LVL 3: CPT | Performed by: RADIOLOGY

## 2018-06-07 PROCEDURE — 77387 GUIDANCE FOR RADJ TX DLVR: CPT | Mod: TC | Performed by: RADIOLOGY

## 2018-06-07 NOTE — PROGRESS NOTES
"Protocol: [employer]/ "an observational study profiling biospecimens from cancer patients to screen for molecular alterations"  Protocol # [id number]  Amendment 3 Date approved May 1, 2018  PI: Dr. Bill Rutherford  Pt: Verónica Baker         Consent waiver and Eligibility Note      Patient meets eligibility this week for study based on the following criteria. Consent waiver in place.       4.1.1 Patient is ? 19 years of age. YOB: 1950, and she is 67 years old     4.1.2 Patient has histologically documented adenocarcinoma per path report on 4/4/2018.      4.1.3.4. Patient has Stage III Lung cancer per clinic note on 5/28/2018.     4.1.4 Pending receipt of adequate tissue from Pathology.     All other eligibility criteria does not apply to this patient.   Specimen requested.  "

## 2018-06-08 ENCOUNTER — OFFICE VISIT (OUTPATIENT)
Dept: OBSTETRICS AND GYNECOLOGY | Facility: CLINIC | Age: 68
End: 2018-06-08
Payer: MEDICARE

## 2018-06-08 VITALS
DIASTOLIC BLOOD PRESSURE: 94 MMHG | HEIGHT: 65 IN | SYSTOLIC BLOOD PRESSURE: 122 MMHG | BODY MASS INDEX: 21.16 KG/M2 | WEIGHT: 127 LBS

## 2018-06-08 DIAGNOSIS — N90.89 VULVAR LESION: ICD-10-CM

## 2018-06-08 DIAGNOSIS — Z12.31 SCREENING MAMMOGRAM, ENCOUNTER FOR: ICD-10-CM

## 2018-06-08 DIAGNOSIS — N76.0 VULVOVAGINITIS: ICD-10-CM

## 2018-06-08 DIAGNOSIS — Z01.411 ENCOUNTER FOR GYNECOLOGICAL EXAMINATION WITH ABNORMAL FINDING: Primary | ICD-10-CM

## 2018-06-08 PROCEDURE — 77336 RADIATION PHYSICS CONSULT: CPT | Performed by: RADIOLOGY

## 2018-06-08 PROCEDURE — 99999 PR PBB SHADOW E&M-EST. PATIENT-LVL III: CPT | Mod: PBBFAC,,, | Performed by: OBSTETRICS & GYNECOLOGY

## 2018-06-08 PROCEDURE — 3080F DIAST BP >= 90 MM HG: CPT | Mod: CPTII,S$GLB,, | Performed by: OBSTETRICS & GYNECOLOGY

## 2018-06-08 PROCEDURE — G6002 STEREOSCOPIC X-RAY GUIDANCE: HCPCS | Mod: 26,,, | Performed by: RADIOLOGY

## 2018-06-08 PROCEDURE — 77412 RADIATION TX DELIVERY LVL 3: CPT | Performed by: RADIOLOGY

## 2018-06-08 PROCEDURE — 99204 OFFICE O/P NEW MOD 45 MIN: CPT | Mod: S$GLB,,, | Performed by: OBSTETRICS & GYNECOLOGY

## 2018-06-08 PROCEDURE — 3074F SYST BP LT 130 MM HG: CPT | Mod: CPTII,S$GLB,, | Performed by: OBSTETRICS & GYNECOLOGY

## 2018-06-08 PROCEDURE — 77387 GUIDANCE FOR RADJ TX DLVR: CPT | Mod: TC | Performed by: RADIOLOGY

## 2018-06-08 RX ORDER — NYSTATIN 100000 [USP'U]/ML
SUSPENSION ORAL
COMMUNITY
Start: 2018-06-05 | End: 2018-08-03

## 2018-06-08 RX ORDER — TRIAMCINOLONE ACETONIDE 0.25 MG/G
OINTMENT TOPICAL 2 TIMES DAILY
Qty: 15 G | Refills: 2 | Status: SHIPPED | OUTPATIENT
Start: 2018-06-08 | End: 2018-08-02

## 2018-06-08 NOTE — PROGRESS NOTES
Subjective:       Patient ID: Verónica Baker is a 67 y.o. female.    Chief Complaint:  Gynecologic Exam (bump on labia)      History of Present Illness  HPI  Verónica Baker is a 67 y.o. female  NEW TO ME here for her annual GYN exam. She was recently diagnosed with lung cancer and is undergoing treatment. She is also concerned about a bump which itches and which grew after the time of her diagnosis of lung cancer. She reports that the left labia was itching for the past 3 months.   reports vaginal itching or irritation.  Denies vaginal discharge.  She is not sexually active.    History of abnormal pap: No  Last Pap: was normal  Last MMG: several years ago  Last Colonoscopy:  colonoscopy 2 years ago with abnormalities.(DIverticulosis and Polyps)  denies domestic violence. She does feel safe at home.     Past Medical History:   Diagnosis Date    Broken wrist     20 years ago    Diverticulitis     Fractured hip     Left hip in     Gout     Hypertension     Lung cancer 2018    Polycystic kidney      Past Surgical History:   Procedure Laterality Date     SECTION      Fibroids removed      2 times    TOTAL ABDOMINAL HYSTERECTOMY      VAGINAL DELIVERY      1 time     Social History     Social History    Marital status: Unknown     Spouse name: N/A    Number of children: N/A    Years of education: N/A     Occupational History    Not on file.     Social History Main Topics    Smoking status: Never Smoker    Smokeless tobacco: Never Used    Alcohol use No    Drug use: No    Sexual activity: Not Currently     Other Topics Concern    Not on file     Social History Narrative    No narrative on file     Family History   Problem Relation Age of Onset    Cancer Mother     Diabetes Mother     Heart attack Father     Diabetes Father     Polycystic kidney disease Sister     Hypertension Sister     Diabetes Sister     Cancer Sister     Diabetes type II Sister     Hypertension Sister  "    Breast cancer Neg Hx     Colon cancer Neg Hx     Ovarian cancer Neg Hx      OB History      Para Term  AB Living    3 3 3     3    SAB TAB Ectopic Multiple Live Births            3          BP (!) 122/94   Ht 5' 5" (1.651 m)   Wt 57.6 kg (126 lb 15.8 oz)   BMI 21.13 kg/m²         GYN & OB History    Date of Last Pap: No result found    OB History    Para Term  AB Living   3 3 3     3   SAB TAB Ectopic Multiple Live Births           3      # Outcome Date GA Lbr Alex/2nd Weight Sex Delivery Anes PTL Lv   3 Term      CS-LTranv   ELMIRA   2 Term      CS-LTranv   ELMIRA   1 Term      Vag-Spont   ELMIRA          Review of Systems  Review of Systems   Constitutional: Positive for activity change and appetite change. Negative for fatigue and unexpected weight change.   HENT: Negative.    Eyes: Negative for visual disturbance.   Respiratory: Negative for shortness of breath and wheezing.    Cardiovascular: Positive for chest pain, palpitations and leg swelling.   Gastrointestinal: Positive for abdominal pain, bloating, diarrhea and nausea. Negative for blood in stool.   Endocrine: Negative for diabetes, hair loss and hot flashes.   Genitourinary: Negative for decreased libido and dyspareunia.   Musculoskeletal: Negative for back pain and joint swelling.   Skin:  Negative for no acne and hair changes.   Neurological: Positive for headaches.   Hematological: Does not bruise/bleed easily.   Psychiatric/Behavioral: Negative for depression and sleep disturbance. The patient is not nervous/anxious.    Breast: Negative for breast pain and nipple discharge          Objective:    Physical Exam:        Pulmonary/Chest:   BREASTS:  no mass, no tenderness, no deformity and no retraction. Right breast exhibits no inverted nipple, no mass, no nipple discharge, no skin change, no tenderness, no bleeding and no swelling. Left breast exhibits no inverted nipple, no mass, no nipple discharge, no skin change, no " tenderness, no bleeding and no swelling. Breasts are symmetrical.                Genitourinary:       Pelvic exam was performed with patient supine.   Genitourinary Comments: PELVIC: Normal external atrophic female genitalia with 2 raised nodules with surrounding condylomatous change on left labia minora; entire abnormal area is approximately 3 cm x 3 cm, no other  lesions. Normal hair distribution. Adequate perineal body, normal urethral meatus. Vagina atrophic without lesions or discharge. No significant cystocele or rectocele. Bimanual exam shows uterus and cervix to be surgically absent. Adnexa without Palpable masses or tenderness.(unable to delineate clearly due to abdominal distention.)  RECTAL: Deferred                          Assessment:        1. Encounter for gynecological examination with abnormal finding    2. Screening mammogram, encounter for    3. Vulvovaginitis    4. Vulvar lesion               Plan:        1. Encounter for gynecological examination with abnormal finding      2. Screening mammogram, encounter for    - Mammo Digital Screening Bilat with Tomosynthesis CAD; Future    3. Vulvovaginitis    - triamcinolone acetonide 0.025% (KENALOG) 0.025 % Oint; Apply topically 2 (two) times daily.  Dispense: 15 g; Refill: 2    4. Vulvar lesion  Will bring back for scheduled vulvar biopsy       Follow-up in about 2 years (around 6/8/2020).

## 2018-06-08 NOTE — LETTER
June 8, 2018      Matilde Trinh MD  [address] [facility] [address]           [facility]  [address] [address]  [facility] [address]  Phone: [phone]          Patient: Verónica Baker   MR Number: [id number]   YOB: 1950   Date of Visit: 6/8/2018       Dear Dr. Matilde Trinh:    Thank you for referring Verónica Baker to me for evaluation. Attached you will find relevant portions of my assessment and plan of care.    If you have questions, please do not hesitate to call me. I look forward to following Verónica Baker along with you.    Sincerely,    Mala Ontiveros MD    Enclosure  CC:  No Recipients    If you would like to receive this communication electronically, please contact [email or web address] or [phone] to request more information on [facility] access.    For providers and/or their staff who would like to refer a patient to [facility], please contact us through our one-stop-shop provider referral line, [facility], at [phone].    If you feel you have received this communication in error or would no longer like to receive these types of communications, please e-mail [email or web address]

## 2018-06-10 NOTE — PROGRESS NOTES
ATTENDING PHYSICIAN ATTESTATION  I have personally interviewed and examined the patient. I thoroughly reviewed the demographic, clinical, laboratorial and imaging information available in medical records. I agree with the assessment and recommendations provided by the subspecialty resident. Dr. Harris was under my supervision.

## 2018-06-11 ENCOUNTER — TELEPHONE (OUTPATIENT)
Dept: HEMATOLOGY/ONCOLOGY | Facility: CLINIC | Age: 68
End: 2018-06-11

## 2018-06-11 ENCOUNTER — OFFICE VISIT (OUTPATIENT)
Dept: HEMATOLOGY/ONCOLOGY | Facility: CLINIC | Age: 68
End: 2018-06-11
Payer: MEDICARE

## 2018-06-11 VITALS
SYSTOLIC BLOOD PRESSURE: 145 MMHG | BODY MASS INDEX: 20.97 KG/M2 | HEART RATE: 96 BPM | HEIGHT: 65 IN | OXYGEN SATURATION: 99 % | RESPIRATION RATE: 18 BRPM | WEIGHT: 125.88 LBS | DIASTOLIC BLOOD PRESSURE: 83 MMHG | TEMPERATURE: 98 F

## 2018-06-11 DIAGNOSIS — C34.12 PRIMARY ADENOCARCINOMA OF UPPER LOBE OF LEFT LUNG: ICD-10-CM

## 2018-06-11 DIAGNOSIS — R18.8 OTHER ASCITES: ICD-10-CM

## 2018-06-11 DIAGNOSIS — D70.1 CHEMOTHERAPY-INDUCED NEUTROPENIA: ICD-10-CM

## 2018-06-11 DIAGNOSIS — T45.1X5A CHEMOTHERAPY-INDUCED NEUTROPENIA: ICD-10-CM

## 2018-06-11 DIAGNOSIS — Q61.3 POLYCYSTIC KIDNEY DISEASE: ICD-10-CM

## 2018-06-11 DIAGNOSIS — N18.4 STAGE 4 CHRONIC KIDNEY DISEASE: ICD-10-CM

## 2018-06-11 DIAGNOSIS — D63.0 ANEMIA IN NEOPLASTIC DISEASE: ICD-10-CM

## 2018-06-11 DIAGNOSIS — R53.0 NEOPLASTIC MALIGNANT RELATED FATIGUE: Primary | ICD-10-CM

## 2018-06-11 PROCEDURE — G6002 STEREOSCOPIC X-RAY GUIDANCE: HCPCS | Mod: 26,,, | Performed by: RADIOLOGY

## 2018-06-11 PROCEDURE — 3077F SYST BP >= 140 MM HG: CPT | Mod: CPTII,,, | Performed by: INTERNAL MEDICINE

## 2018-06-11 PROCEDURE — 99214 OFFICE O/P EST MOD 30 MIN: CPT | Mod: S$PBB,,, | Performed by: INTERNAL MEDICINE

## 2018-06-11 PROCEDURE — 77387 GUIDANCE FOR RADJ TX DLVR: CPT | Mod: TC | Performed by: RADIOLOGY

## 2018-06-11 PROCEDURE — 77417 THER RADIOLOGY PORT IMAGE(S): CPT | Performed by: RADIOLOGY

## 2018-06-11 PROCEDURE — 77412 RADIATION TX DELIVERY LVL 3: CPT | Performed by: RADIOLOGY

## 2018-06-11 PROCEDURE — 99999 PR PBB SHADOW E&M-EST. PATIENT-LVL IV: CPT | Mod: PBBFAC,,, | Performed by: INTERNAL MEDICINE

## 2018-06-11 PROCEDURE — 3079F DIAST BP 80-89 MM HG: CPT | Mod: CPTII,,, | Performed by: INTERNAL MEDICINE

## 2018-06-11 NOTE — PROGRESS NOTES
CC: Lung cancer, follow up          HPI:Ms. Baker is a 67 yr old lady here for follow up.  She had a noncontrast CT chest (due to stage 4 CKD) for hemoptysis, which showed a 4cm spiculated medial LUPIS mass and possible mediastinal LAD.  She has hepatomegaly due to polycystic liver (and kidneys).  There were small lucent areas in the sternum and T9 vertebral bodies concerning for mets.  However, PET CT did not reveal any hypermetabolic bone lesions. She underwent biopsy of the lesion on 4/4/18 in [place] and pathology reveals adenocarcinoma. She wanted to come to [facility] for treatment as she has family she can stay with here.She was diagnosed with polycystic kidney/liver when she was 18.  She is followed by Dr. Nagel in [place].  She has chronic infrequent headaches that are not worsening in severity or frequency.  She was evaluated by radiation oncology here ( ).         Interval history: She is here for a follow up visit. She is on concurrent carboplatin/paclitaxel for stage III adenocarcinoma lung. She has finished 5 weekly treatments. She is c/o discomfort in her mid-chest, like something stuck in my throat. Her appetite is good.   She has occasional epistaxis.          Review of Systems   Constitutional: Positive for malaise/fatigue. Negative for fever and weight loss.   HENT: Negative.    Eyes: Negative.    Respiratory: Positive for shortness of breath. Negative for cough.    Cardiovascular: Negative.  Negative for chest pain.   Gastrointestinal: Positive for heartburn. Negative for abdominal pain and diarrhea.   Genitourinary: Negative for frequency.   Musculoskeletal: Negative for back pain.   Skin: Negative for rash.   Neurological: Positive for weakness and headaches. Negative for sensory change, speech change and focal weakness.   Psychiatric/Behavioral: Negative for depression. The patient is not nervous/anxious.      Vitals:    06/11/18 1009   BP: (!) 145/83   Pulse: 96   Resp: 18   Temp: 98.3  °F (36.8 °C)       Physical Exam   Constitutional: She is oriented to person, place, and time. She appears well-developed. No distress.   HENT:   Head: Normocephalic and atraumatic.   Mouth/Throat: No oropharyngeal exudate.   Eyes: Pupils are equal, round, and reactive to light.   Neck: Normal range of motion.   Cardiovascular: Normal rate and regular rhythm.    Pulmonary/Chest: Effort normal and breath sounds normal. No respiratory distress.   Abdominal: She exhibits distension. She exhibits no mass.   Lymphadenopathy:     She has no cervical adenopathy.   Neurological: She is oriented to person, place, and time. No cranial nerve deficit.   Skin: Skin is warm.   Psychiatric: She has a normal mood and affect.     Component      Latest Ref Rng & Units 6/11/2018   Sodium      136 - 145 mmol/L 140   Potassium      3.5 - 5.1 mmol/L 3.8   Chloride      95 - 110 mmol/L 113 (H)   CO2      23 - 29 mmol/L 16 (L)   Glucose      70 - 110 mg/dL 121 (H)   BUN, Bld      8 - 23 mg/dL 43 (H)   Creatinine      0.5 - 1.4 mg/dL 4.0 (H)   Calcium      8.7 - 10.5 mg/dL 9.3   Total Protein      6.0 - 8.4 g/dL 7.0   Albumin      3.5 - 5.2 g/dL 3.5   Total Bilirubin      0.1 - 1.0 mg/dL 0.6   Alkaline Phosphatase      55 - 135 U/L 234 (H)   AST      10 - 40 U/L 25   ALT      10 - 44 U/L 28   Anion Gap      8 - 16 mmol/L 11   eGFR if African American      >60 mL/min/1.73 m:2 12.7 (A)   eGFR if non African American      >60 mL/min/1.73 m:2 11.0 (A)   WBC      3.90 - 12.70 K/uL 0.94 (LL)   RBC      4.00 - 5.40 M/uL 3.38 (L)   Hemoglobin      12.0 - 16.0 g/dL 9.0 (L)   Hematocrit      37.0 - 48.5 % 28.3 (L)   MCV      82 - 98 fL 84   MCH      27.0 - 31.0 pg 26.6 (L)   MCHC      32.0 - 36.0 g/dL 31.8 (L)   RDW      11.5 - 14.5 % 16.2 (H)   Platelets      150 - 350 K/uL 134 (L)   MPV      9.2 - 12.9 fL 12.3   Magnesium      1.6 - 2.6 mg/dL 1.7 /13/18 PET CT        EXAMINATION:  NM PET CT ROUTINE    CLINICAL HISTORY:  Malignant neoplasm of  upper lobe, left bronchus or lung.    Outside imaging demonstrated 4 cm spiculated medial left upper lobe mass (biopsy proven adenocarcinoma) with possible mediastinal lymphadenopathy as well as lucent areas involving the sternum and T9.    TECHNIQUE:  13.12 mCi of F18-FDG was administered intravenously in the right antecubital fossa.  After an approximately 60 min distribution time, PET/CT images were acquired from the skull base to the mid thigh. Transmission images were acquired to correct for attenuation using a whole body low-dose CT scan without contrast with the arms positioned above the head. Glycemia at the time of injection was 82 mg/dL.    COMPARISON:  CT abdomen pelvis 07/21/2017, MRI brain 04/13/2018    FINDINGS:  Quality of the study: Adequate.    Abnormal foci of increased uptake are present within the left upper lobe as well as a few prevascular mediastinal lymph nodes.  Lung mass measures approximately 4.6 x 2.9 cm.    No appreciable lucent lesion in the sternum or T9 vertebral body.  Mild degenerative metabolic activity is present at the sternomanubrial junction.    Index lesions:    - Left upper lobe mass: SUV max 11.1    - Lateral pre-vascular lymph node: SUV max 5.8    Physiologic uptake of the tracer is present within the brain, salivary glands, myocardium, GI and  tracts.    Incidental CT findings: Liver and kidneys are enlarged with numerous intraparenchymal cyst and associated mass effect on the adjacent abdominal organs, unchanged.  Colonic diverticulosis without diverticulitis.  Bandlike opacification within the right lower lobe likely represents subsegmental atelectasis.  Calcific atherosclerosis involves the lower extremity vasculature bilaterally.   Impression        Known malignancy of the left upper lobe with mediastinal trini metastases.    No appreciable lucent lesion in the sternum or T9 vertebral body.  Correlation with prior outside imaging is recommended.            4/13/18  MRI brain w/o cont        FINDINGS:  Intracranial compartment:    Ventricles and sulci are normal in size for age without evidence of hydrocephalus. No extra-axial blood or fluid collections.    Nonspecific small foci of T2/FLAIR high signal intensity in the supratentorial white matter, favored to represent chronic microvascular ischemic changes.  Remote lacunar type infarct in the left putamen.  Small punctate focus of susceptibility signal artifact in the left occipital lobe, suggestive of an old microhemorrhage or calcification.  No mass lesion, acute hemorrhage, edema or acute infarct.    Normal vascular flow voids are preserved.    Skull/extracranial contents (limited evaluation): Bone marrow signal intensity is normal.   Impression        No evidence of intracranial metastases within limitation in the absence of IV contrast which decrease the sensitivity of this exam.    Nonspecific foci of T2/FLAIR high signal intensity in the supratentorial white matter, likely representing chronic microvascular ischemic changes.      Assessment:     1. Adenocarcinoma lung  2. Polycystic kidney disease  3. Polycystic liver disease  4. Ascites  5. Hypertension,essential  6. Cough  7. Dyspnea on exertion  8. CKD stage IV  9. Leukopenia  10. Anemia, normocytic, hypochromic  11. Dysphagia  12. Hypomagnesemia  13. Epistaxis  14. Weight loss  15. ANOREXIA  16. thrombocytopenia     Plan:     1. She has a left upper lung mass that was biopsied, as well as hyper metabolic, prevascular mediastinal lymph nodes on PET CT. No other hypermetabolic lesions on PET CT. MRI brain (non-contrast) does not show any metastatic lesion. She has polycystic kidney disease and she is certainly at higher risk for renal malignancy, colon CA as well as liver CA. Slides and block were requ[facility] from [facility] and were reviewed by pathology here. It was confirmed that she has adenocarcinoma originating in the lungs.   She has started concurrent  chemotherapy with Carboplatin/Paclitaxcel ( renally adjusted) as Pemetrexed was not appropriate with the reduced renal function.   Chemotherapy on hold today as her Cr is 4 and WBC is 0.94.      4. Chronic, attributed to CKD/ polycystic liver        5. On multiple anti-HT medications     6. Possibly related to malignancy in her lungs. She is on Benzonatate    8. Serum cr 4 today. She follows with nephrology     9. Secondary to chemotherapy. No chemotherapy today     10. Secondary to CKD and chemotherapy. Serum iron saturation 33% on 5/21/18.     11. Possibly secondary to radiation. Suggested eating/drinking more liquid/semi-solid foods. She is on Ranitidine once daily.      12. Magnesium 1.7 today     13. Spontaneous, self limited. She does cough up blood occasionally. Hemoglobin 9.4 today. Platelets 148k.      14,15. Prescribed Marinol 2.5MG bid. Megace, Periactin not safe in the setting of renal insufficiency. She has no started Marinol yet    16. Mild, asymptomatic           Distress Screening Results: Psychosocial Distress screening score of Distress Score: 0 noted and reviewed. No intervention indicated.  Answers for HPI/ROS submitted by the patient on 6/8/2018   appetite change : No  unexpected weight change: No  visual disturbance: No  adenopathy: No

## 2018-06-12 PROCEDURE — G6002 STEREOSCOPIC X-RAY GUIDANCE: HCPCS | Mod: 26,,, | Performed by: RADIOLOGY

## 2018-06-12 PROCEDURE — 77412 RADIATION TX DELIVERY LVL 3: CPT | Performed by: RADIOLOGY

## 2018-06-12 PROCEDURE — 77387 GUIDANCE FOR RADJ TX DLVR: CPT | Mod: TC | Performed by: RADIOLOGY

## 2018-06-13 ENCOUNTER — PROCEDURE VISIT (OUTPATIENT)
Dept: OBSTETRICS AND GYNECOLOGY | Facility: CLINIC | Age: 68
End: 2018-06-13
Payer: MEDICARE

## 2018-06-13 ENCOUNTER — DOCUMENTATION ONLY (OUTPATIENT)
Dept: RADIATION ONCOLOGY | Facility: CLINIC | Age: 68
End: 2018-06-13

## 2018-06-13 VITALS
SYSTOLIC BLOOD PRESSURE: 138 MMHG | WEIGHT: 127.19 LBS | HEIGHT: 65 IN | BODY MASS INDEX: 21.19 KG/M2 | DIASTOLIC BLOOD PRESSURE: 90 MMHG

## 2018-06-13 DIAGNOSIS — K64.9 HEMORRHOIDS, UNSPECIFIED HEMORRHOID TYPE: ICD-10-CM

## 2018-06-13 DIAGNOSIS — N90.89 VULVAR LESION: Primary | ICD-10-CM

## 2018-06-13 PROCEDURE — 77412 RADIATION TX DELIVERY LVL 3: CPT | Performed by: RADIOLOGY

## 2018-06-13 PROCEDURE — G6002 STEREOSCOPIC X-RAY GUIDANCE: HCPCS | Mod: 26,,, | Performed by: RADIOLOGY

## 2018-06-13 PROCEDURE — 88342 IMHCHEM/IMCYTCHM 1ST ANTB: CPT | Mod: 26,,, | Performed by: PATHOLOGY

## 2018-06-13 PROCEDURE — 88305 TISSUE EXAM BY PATHOLOGIST: CPT | Performed by: PATHOLOGY

## 2018-06-13 PROCEDURE — 56605 BIOPSY OF VULVA/PERINEUM: CPT | Mod: S$GLB,,, | Performed by: OBSTETRICS & GYNECOLOGY

## 2018-06-13 PROCEDURE — 77387 GUIDANCE FOR RADJ TX DLVR: CPT | Mod: TC | Performed by: RADIOLOGY

## 2018-06-13 NOTE — PLAN OF CARE
Problem: Patient Care Overview  Goal: Plan of Care Review  Outcome: Ongoing (interventions implemented as appropriate)  Day 28 of XRT to Left lung. Nausea 3 days this week and coughing up phlegm. Eating soft foods with no problems. Denies trouble breathing.

## 2018-06-13 NOTE — PROCEDURES
Biopsy  Date/Time: 2018 9:45 AM  Performed by: ANA CRISTINA PALOMARES  Authorized by: ANA CRISTINA PALOMARES   Preparation: Patient was prepped and draped in the usual sterile fashion.  Local anesthesia used: yes  Anesthesia: local infiltration    Anesthesia:  Local anesthesia used: yes  Local Anesthetic: lidocaine 1% without epinephrine    Sedation:  Patient sedated: no  Patient tolerance: Patient tolerated the procedure well with no immediate complications  Comments:   VULVAR BIOPSY:    Verónica Baker is a 67 y.o. female , presents for a vulva biopsy due to vulvar lesions.    PRE VULVA BIOPSY COUNSELING:  The patient was informed of the risk of bleeding, pain and infection. She was counseled on the alternatives to vulva biopsy and agrees to proceed.     EXAM:  There are several of raised  oval irregular  lesions of the Left labia minora.   PROCEDURE:  A time out was performed  The lesion and surrounding area were prepped with betadine swabs times three.  The base of the lesion was injected with 1 cc of 1% Xylocaine with epinephrine.        Punch Biopsy;  4 mm punch biopsy was done at 2 areas of the raised lesions on the left labia minora  Sterile scissors were used to shave the specimen at the base of the lesion and completely remove it.  The specimen was placed in formalyn and sent to Pathology for Histopathologic diagnosis.    ASSESSMENT:  1. Vulva Lesion / Biopsy.    POST VULVA BIOPSY COUNSELING:  Manage post biopsy pain with NSAIDs, Tylenol or Rx per [employer].  Expect minimal spotting and black discharge from silver nitrite and/or Monsels solution.  Take warm sitz baths TID; dry with hair dryer on cool setting 12 inches away until  healed.  Report foul smelling drainage, heavy bleeding or redness at biopsy site, fever > 101.0 F, worsening pain.    FOLLOW-UP: pending biopsy results.  Discussed with patient the concern that this could be metastatic disease from her cancer,or could be a primary lesion of the  vulva.

## 2018-06-13 NOTE — PATIENT INSTRUCTIONS
POST VULVA BIOPSY COUNSELING:  Manage post biopsy pain with NSAIDs, Tylenol or Rx per [employer].  Expect minimal spotting and black discharge from silver nitrite and/or Monsels solution.  Take warm sitz baths TID; dry with hair dryer on cool setting 12 inches away until  healed.  Report foul smelling drainage, heavy bleeding or redness at biopsy site, fever > 101.0 F, worsening pain.

## 2018-06-14 ENCOUNTER — HOSPITAL ENCOUNTER (OUTPATIENT)
Dept: RADIOLOGY | Facility: HOSPITAL | Age: 68
Discharge: HOME | End: 2018-06-14
Attending: OBSTETRICS & GYNECOLOGY
Payer: MEDICARE

## 2018-06-14 DIAGNOSIS — Z12.31 SCREENING MAMMOGRAM, ENCOUNTER FOR: ICD-10-CM

## 2018-06-14 PROCEDURE — 77412 RADIATION TX DELIVERY LVL 3: CPT | Performed by: RADIOLOGY

## 2018-06-14 PROCEDURE — 77387 GUIDANCE FOR RADJ TX DLVR: CPT | Mod: TC | Performed by: RADIOLOGY

## 2018-06-14 PROCEDURE — G6002 STEREOSCOPIC X-RAY GUIDANCE: HCPCS | Mod: 26,,, | Performed by: RADIOLOGY

## 2018-06-14 PROCEDURE — 77067 SCR MAMMO BI INCL CAD: CPT | Mod: 26,,, | Performed by: RADIOLOGY

## 2018-06-14 PROCEDURE — 77067 SCR MAMMO BI INCL CAD: CPT | Mod: TC

## 2018-06-15 ENCOUNTER — DOCUMENTATION ONLY (OUTPATIENT)
Dept: RADIATION ONCOLOGY | Facility: CLINIC | Age: 68
End: 2018-06-15

## 2018-06-15 PROCEDURE — 77336 RADIATION PHYSICS CONSULT: CPT | Performed by: RADIOLOGY

## 2018-06-15 PROCEDURE — G6002 STEREOSCOPIC X-RAY GUIDANCE: HCPCS | Mod: 26,,, | Performed by: RADIOLOGY

## 2018-06-15 PROCEDURE — 77387 GUIDANCE FOR RADJ TX DLVR: CPT | Mod: TC | Performed by: RADIOLOGY

## 2018-06-15 PROCEDURE — 77412 RADIATION TX DELIVERY LVL 3: CPT | Performed by: RADIOLOGY

## 2018-06-15 NOTE — PLAN OF CARE
Problem: Patient Care Overview  Goal: Plan of Care Review  Outcome: Outcome(s) achieved Date Met: 06/15/18  Last Day of XRT to Lung. Follow up apt to be made. And follow up call in 1 week.

## 2018-06-18 ENCOUNTER — CLINICAL SUPPORT (OUTPATIENT)
Dept: HEMATOLOGY/ONCOLOGY | Facility: CLINIC | Age: 68
End: 2018-06-18
Payer: MEDICARE

## 2018-06-18 VITALS — HEIGHT: 65 IN | WEIGHT: 126.56 LBS | BODY MASS INDEX: 21.09 KG/M2

## 2018-06-18 DIAGNOSIS — C34.12 PRIMARY ADENOCARCINOMA OF UPPER LOBE OF LEFT LUNG: ICD-10-CM

## 2018-06-18 DIAGNOSIS — Z71.3 NUTRITIONAL COUNSELING: Primary | ICD-10-CM

## 2018-06-18 DIAGNOSIS — N18.4 STAGE 4 CHRONIC KIDNEY DISEASE: ICD-10-CM

## 2018-06-18 PROCEDURE — 97802 MEDICAL NUTRITION INDIV IN: CPT | Mod: S$GLB,,, | Performed by: DIETITIAN, REGISTERED

## 2018-06-18 PROCEDURE — 99999 PR PBB SHADOW E&M-EST. PATIENT-LVL II: CPT | Mod: PBBFAC,,,

## 2018-06-18 NOTE — PROGRESS NOTES
"Medical Nutrition Therapy Oncology Progress Note    Name: Verónica Baker MRN: [id number]  : 1950    Age: 67 y.o.  Ethnicity: /Black Language: English    Diagnosis: No diagnosis found.    Chemo Regimen: Carbo/taxol   Referring MD: Dr. Pires Frequency: weekly Radiation: Yes; completed           Goal of Cancer treatment n/a         Nutrition Assessment     Chief Complaint:   Chief Complaint   Patient presents with    Nutrition Counseling    Lung Cancer        Anthropometric Measurements:  Height: 5' 5" (1.651 m)  Current Weight: 57.4 kg (126 lb 8.7 oz)  Ideal Body Weight: 125#  Percent Ideal Body Weight:: 100%  BMI: Body mass index is 21.06 kg/m².     Weight History:   Wt Readings from Last 8 Encounters:   18 57.4 kg (126 lb 8.7 oz)   18 57.7 kg (127 lb 3.3 oz)   18 57.1 kg (125 lb 14.1 oz)   18 57.6 kg (126 lb 15.8 oz)   18 58.9 kg (129 lb 13.6 oz)   18 58.7 kg (129 lb 6.6 oz)   18 58.5 kg (128 lb 15.5 oz)   18 60 kg (132 lb 4.4 oz)       Medical Health History:  Feeding tube placed: No  Pre-treatment: No    Past Surgical History:   Procedure Laterality Date     SECTION      Fibroids removed      2 times    TOTAL ABDOMINAL HYSTERECTOMY  1983    VAGINAL DELIVERY      1 time        Medications:   Current Outpatient Prescriptions:     amLODIPine (NORVASC) 10 MG tablet, Take 10 mg by mouth once daily., Disp: , Rfl:     dronabinol (MARINOL) 2.5 MG capsule, Take 1 capsule (2.5 mg total) by mouth 2 (two) times daily before meals., Disp: 60 capsule, Rfl: 2    magic mouthwash diphen/antac/lidoc/nysta, Take 10ml's by mouth 5 minutes before meals and every night at bedtime, Disp: 480 mL, Rfl: 1    magnesium oxide (MAG-OX) 400 mg tablet, Take 400 mg by mouth once daily., Disp: , Rfl:     nystatin (MYCOSTATIN) 100,000 unit/mL suspension, , Disp: , Rfl:     ondansetron (ZOFRAN) 4 MG tablet, Take 1 tablet (4 mg total) by mouth every 8 (eight) " hours as needed for Nausea., Disp: 30 tablet, Rfl: 1    ranitidine (ZANTAC) 150 MG tablet, Take 1 tablet (150 mg total) by mouth once daily., Disp: 60 tablet, Rfl: 1    sodium bicarbonate 650 MG tablet, Take 650 mg by mouth 2 (two) times daily., Disp: , Rfl:     spironolactone (ALDACTONE) 25 MG tablet, Take 25 mg by mouth., Disp: , Rfl:     triamcinolone acetonide 0.025% (KENALOG) 0.025 % Oint, Apply topically 2 (two) times daily., Disp: 15 g, Rfl: 2    UNABLE TO FIND, Apply topically. medication name: Antifungal cream apply topically to toes tid prn, Disp: , Rfl:     Last Labs:  Last Labs:  Glucose   Date Value Ref Range Status   06/11/2018 121 (H) 70 - 110 mg/dL Final   06/04/2018 133 (H) 70 - 110 mg/dL Final     BUN, Bld   Date Value Ref Range Status   06/11/2018 43 (H) 8 - 23 mg/dL Final   06/04/2018 34 (H) 8 - 23 mg/dL Final     Creatinine   Date Value Ref Range Status   06/11/2018 4.0 (H) 0.5 - 1.4 mg/dL Final   06/04/2018 3.4 (H) 0.5 - 1.4 mg/dL Final     Sodium   Date Value Ref Range Status   06/11/2018 140 136 - 145 mmol/L Final   06/04/2018 139 136 - 145 mmol/L Final     Potassium   Date Value Ref Range Status   06/11/2018 3.8 3.5 - 5.1 mmol/L Final   06/04/2018 3.8 3.5 - 5.1 mmol/L Final     No results found for: PHOS  Calcium   Date Value Ref Range Status   06/11/2018 9.3 8.7 - 10.5 mg/dL Final   06/04/2018 9.4 8.7 - 10.5 mg/dL Final     No results found for: PREALBUMIN  Total Protein   Date Value Ref Range Status   06/11/2018 7.0 6.0 - 8.4 g/dL Final   06/04/2018 7.1 6.0 - 8.4 g/dL Final     No results found for: CHOL  No results found for: HGBA1C  Hemoglobin   Date Value Ref Range Status   06/14/2018 9.0 (L) 12.0 - 16.0 g/dL Final   06/11/2018 9.0 (L) 12.0 - 16.0 g/dL Final     Hematocrit   Date Value Ref Range Status   06/14/2018 27.4 (L) 37.0 - 48.5 % Final   06/11/2018 28.3 (L) 37.0 - 48.5 % Final     Iron   Date Value Ref Range Status   05/21/2018 93 30 - 160 ug/dL Final     No components  "found for: FROLATE  No results found for: [id number]  WBC   Date Value Ref Range Status   06/14/2018 1.76 (LL) 3.90 - 12.70 K/uL Final     Comment:     wbc  Previously reported results for this analyte were similarly   abnormal.  Call not needed.  Documented, 06/14/2018 09:24     06/11/2018 0.94 (LL) 3.90 - 12.70 K/uL Final     Comment:     WBC   critical result(s) called and verbal readback obtained from   Bina Irvin RN, 06/11/2018 09:17           Client History/Food Access:    Living Situation: Lives alone   Who: Shops for Groceries? Patient   Who : Prepares meals? Patient   Who: Fills prescriptions? Patient   Are there financial difficulties purchasing food? No   Personal History (occupation, physical activity level, exercise):      Baseline for Outcomes Monitoring  Food and Nutrition History: Pt here for nutrition counseling. Pt with current weight of 126# which is fairly stable. Of note, pt has ascites. Pt reports good appetite, but goes days without eating due to "feeling full" and feeling like food is "getting stuck". Pt admits that swallowing has been a little more difficult since completing radiation. Encouraged small, frequent meals to help with fullness. Pt eats grits with olive oil and cream cheese, but doesn't eat much else. Pt states she had rice and gravy and lemon pie yesterday and did not feel she tolerated it well. Pt had about 1/4 of an Ensure Plus today, but was told she shouldn't drink those. Explained that pt isn't over-eating anything and it is important that she eats/drinks something with calories. Explained that pt should drink 1 Ensure original daily (preferably vanilla). Also provided pt with list of other soft foods that pt can consume (pudding made with soy milk, soy yogurt, sorbet, cream cheese, croissants, applesauce, blueberries with honey, etc). Stressed importance of reading nutrition labels and practiced looking at potassium, phosphorus, and sodium on nutrition labels. Pt is " not even close to eating too much protein, potassium, phosphorus, or sodium. Liberalized diet as pt not able to eat throughout the day and is not getting adequate nutrients or calories. Pt to eat at least 3 times daily. Pt complains of loose stools 4-6 times/day. Pt taking Imodium, but only 2 pills every other day. Encouraged pt to increase Imodium as directed on label. Note, pt not taking Marinol because she has a good appetite, but unable to eat due to fullness.  24-hour recall:  Breakfast: grits  Lunch: skips  Dinner: skips    Nutritional Needs:  Estimated Needs Method Use    1700 kcal/day [] Predictive Equation: [place]   [x]  30 kcal/kg   Protein 60 g 1.0 gm/kg/day   Fluid 1400 ml 25 ml/kg/day     Food/Nutrient Intake (oral, enteral or parenteral) and Patient Behaviors     Calorie intake: Inadequate   Protein intake: Inadequate     Yes/No    No Uses medical food supplements   Yes Cooking techniques to minimize fatigue   Yes Currently modifying food textures   No Able to maintain usual physical actiivty     Nutrition Diagnosis     Nutrition Diagnosis Related to (Etiology) As Evidenced By (Signs/Symptoms)   Inadequate energy intake Physiological causes Estimated intake less than estimated needs; pt not eating for days at a time                 Nutritional Intervention and Prescription        Nutrition Intervention Schedule of food/fluids   Goals/Expected Outcomes Pt to eat at least 3 times/day, breaking meal into small snacks as tolerated.   Progress Initial     Nutrition Intervention Specific foods/beverages or groups   Goals/Expected Outcomes Pt to liberalize diet and eat food discussed today to help meet estimated needs as well as protect kidneys.   Progress Initial     Nutrition Intervention Medical food supplement: Commercial beverage   Goals/Expected Outcomes Pt to consume 1 Ensure original daily for additional calories.   Progress Initial     Pt needs education? yes (see  intervention)    Coordination of Nutrition Care: Comments:   Collaboration with other providers MD         Monitoring and Evaluation     Monitor: weight    Next Visit: PRN    Materials Provided:  1. RD contact information 2. List of foods appropriate for current diet             Total time: 60 minutes    Nutrition Score:      ©2010 [employer] Oncology Toolkit   Answers for HPI/ROS submitted by the patient on 6/12/2018   appetite change : No  unexpected weight change: No  visual disturbance: No  cough: No  shortness of breath: Yes  chest pain: Yes  abdominal pain: Yes  diarrhea: Yes  frequency: Yes  back pain: No  rash: No  headaches: Yes  adenopathy: No  nervous/ anxious: No

## 2018-07-02 ENCOUNTER — LAB VISIT (OUTPATIENT)
Dept: LAB | Facility: HOSPITAL | Age: 68
End: 2018-07-02
Attending: INTERNAL MEDICINE
Payer: MEDICARE

## 2018-07-02 ENCOUNTER — OFFICE VISIT (OUTPATIENT)
Dept: HEMATOLOGY/ONCOLOGY | Facility: CLINIC | Age: 68
End: 2018-07-02
Payer: MEDICARE

## 2018-07-02 VITALS
BODY MASS INDEX: 22.19 KG/M2 | RESPIRATION RATE: 16 BRPM | HEIGHT: 65 IN | TEMPERATURE: 98 F | SYSTOLIC BLOOD PRESSURE: 181 MMHG | OXYGEN SATURATION: 99 % | WEIGHT: 133.19 LBS | DIASTOLIC BLOOD PRESSURE: 105 MMHG | HEART RATE: 83 BPM

## 2018-07-02 DIAGNOSIS — T45.1X5A CHEMOTHERAPY-INDUCED NEUTROPENIA: ICD-10-CM

## 2018-07-02 DIAGNOSIS — R53.0 NEOPLASTIC MALIGNANT RELATED FATIGUE: Primary | ICD-10-CM

## 2018-07-02 DIAGNOSIS — D63.0 ANEMIA IN NEOPLASTIC DISEASE: ICD-10-CM

## 2018-07-02 DIAGNOSIS — C34.12 PRIMARY ADENOCARCINOMA OF UPPER LOBE OF LEFT LUNG: ICD-10-CM

## 2018-07-02 DIAGNOSIS — D70.1 CHEMOTHERAPY-INDUCED NEUTROPENIA: ICD-10-CM

## 2018-07-02 DIAGNOSIS — R12 HEART BURN: ICD-10-CM

## 2018-07-02 LAB
ALBUMIN SERPL BCP-MCNC: 3.1 G/DL
ALP SERPL-CCNC: 262 U/L
ALT SERPL W/O P-5'-P-CCNC: 20 U/L
ANION GAP SERPL CALC-SCNC: 10 MMOL/L
AST SERPL-CCNC: 22 U/L
BASOPHILS # BLD AUTO: 0.02 K/UL
BASOPHILS NFR BLD: 0.8 %
BILIRUB SERPL-MCNC: 0.4 MG/DL
BUN SERPL-MCNC: 35 MG/DL
CALCIUM SERPL-MCNC: 8.8 MG/DL
CHLORIDE SERPL-SCNC: 114 MMOL/L
CO2 SERPL-SCNC: 20 MMOL/L
CREAT SERPL-MCNC: 2.8 MG/DL
DIFFERENTIAL METHOD: ABNORMAL
EOSINOPHIL # BLD AUTO: 0 K/UL
EOSINOPHIL NFR BLD: 0.4 %
ERYTHROCYTE [DISTWIDTH] IN BLOOD BY AUTOMATED COUNT: 18.8 %
EST. GFR  (AFRICAN AMERICAN): 19.4 ML/MIN/1.73 M^2
EST. GFR  (NON AFRICAN AMERICAN): 16.8 ML/MIN/1.73 M^2
GLUCOSE SERPL-MCNC: 80 MG/DL
HCT VFR BLD AUTO: 27.8 %
HGB BLD-MCNC: 8.7 G/DL
IMM GRANULOCYTES # BLD AUTO: 0.02 K/UL
IMM GRANULOCYTES NFR BLD AUTO: 0.8 %
LYMPHOCYTES # BLD AUTO: 0.6 K/UL
LYMPHOCYTES NFR BLD: 23.4 %
MCH RBC QN AUTO: 27.3 PG
MCHC RBC AUTO-ENTMCNC: 31.3 G/DL
MCV RBC AUTO: 87 FL
MONOCYTES # BLD AUTO: 0.4 K/UL
MONOCYTES NFR BLD: 14.1 %
NEUTROPHILS # BLD AUTO: 1.5 K/UL
NEUTROPHILS NFR BLD: 60.5 %
NRBC BLD-RTO: 0 /100 WBC
PLATELET # BLD AUTO: 227 K/UL
PMV BLD AUTO: 12.5 FL
POTASSIUM SERPL-SCNC: 3.7 MMOL/L
PROT SERPL-MCNC: 6.6 G/DL
RBC # BLD AUTO: 3.19 M/UL
SODIUM SERPL-SCNC: 144 MMOL/L
WBC # BLD AUTO: 2.48 K/UL

## 2018-07-02 PROCEDURE — 80053 COMPREHEN METABOLIC PANEL: CPT

## 2018-07-02 PROCEDURE — 99214 OFFICE O/P EST MOD 30 MIN: CPT | Mod: S$PBB,,, | Performed by: INTERNAL MEDICINE

## 2018-07-02 PROCEDURE — 85025 COMPLETE CBC W/AUTO DIFF WBC: CPT

## 2018-07-02 PROCEDURE — 99999 PR PBB SHADOW E&M-EST. PATIENT-LVL III: CPT | Mod: PBBFAC,,, | Performed by: INTERNAL MEDICINE

## 2018-07-02 PROCEDURE — 3080F DIAST BP >= 90 MM HG: CPT | Mod: CPTII,,, | Performed by: INTERNAL MEDICINE

## 2018-07-02 PROCEDURE — 36415 COLL VENOUS BLD VENIPUNCTURE: CPT

## 2018-07-02 PROCEDURE — 3077F SYST BP >= 140 MM HG: CPT | Mod: CPTII,,, | Performed by: INTERNAL MEDICINE

## 2018-07-02 NOTE — PROGRESS NOTES
CC: Lung cancer, follow up          HPI:Ms. Baker is a 67 yr old lady here for follow up.  She had a noncontrast CT chest (due to stage 4 CKD) for hemoptysis, which showed a 4cm spiculated medial LUPIS mass and possible mediastinal LAD.  She has hepatomegaly due to polycystic liver (and kidneys).  There were small lucent areas in the sternum and T9 vertebral bodies concerning for mets.  However, PET CT did not reveal any hypermetabolic bone lesions. She underwent biopsy of the lesion on 4/4/18 in [place] and pathology reveals adenocarcinoma. She wanted to come to [facility] for treatment as she has family she can stay with here.She was diagnosed with polycystic kidney/liver when she was 18.  She is followed by Dr. Nagel in [place].  She has chronic infrequent headaches that are not worsening in severity or frequency.  She was evaluated by radiation oncology here ( ).         Interval history: She is here for a follow up visit. She has completed concurrent carboplatin/paclitaxel for stage III adenocarcinoma lung. She finished 5 out of the planned 6 weekly treatments.     Review of Systems   Constitutional: Positive for malaise/fatigue. Negative for chills, fever and weight loss.   HENT: Negative.    Eyes: Negative.    Respiratory: Negative.    Cardiovascular: Negative.    Gastrointestinal: Negative for abdominal pain, diarrhea and heartburn.   Genitourinary: Negative.    Musculoskeletal: Negative.    Skin: Negative.    Neurological: Negative for dizziness.   Endo/Heme/Allergies: Does not bruise/bleed easily.   Psychiatric/Behavioral: Negative for depression, hallucinations and suicidal ideas.       Current Outpatient Prescriptions   Medication Sig    ranitidine (ZANTAC) 150 MG tablet Take 1 tablet (150 mg total) by mouth once daily.    amLODIPine (NORVASC) 10 MG tablet Take 10 mg by mouth once daily.    dronabinol (MARINOL) 2.5 MG capsule Take 1 capsule (2.5 mg total) by mouth 2 (two) times daily before  meals.    magic mouthwash diphen/antac/lidoc/nysta Take 10ml's by mouth 5 minutes before meals and every night at bedtime    magnesium oxide (MAG-OX) 400 mg tablet Take 400 mg by mouth once daily.    nystatin (MYCOSTATIN) 100,000 unit/mL suspension     ondansetron (ZOFRAN) 4 MG tablet Take 1 tablet (4 mg total) by mouth every 8 (eight) hours as needed for Nausea.    sodium bicarbonate 650 MG tablet Take 650 mg by mouth 2 (two) times daily.    spironolactone (ALDACTONE) 25 MG tablet Take 25 mg by mouth.    triamcinolone acetonide 0.025% (KENALOG) 0.025 % Oint Apply topically 2 (two) times daily.    UNABLE TO FIND Apply topically. medication name: Antifungal cream apply topically to toes tid prn     No current facility-administered medications for this visit.          Vitals:    07/02/18 1324   BP: (!) 181/105   Pulse: 83   Resp: 16   Temp: 98.1 °F (36.7 °C)     Physical Exam   Constitutional: She is oriented to person, place, and time. She appears well-developed. No distress.   HENT:   Head: Normocephalic.   Mouth/Throat: No oropharyngeal exudate.   Eyes: Pupils are equal, round, and reactive to light. No scleral icterus.   Cardiovascular: Normal rate and regular rhythm.    Pulmonary/Chest: Effort normal and breath sounds normal. No respiratory distress. She has no wheezes.   Abdominal: Soft. She exhibits distension. There is no tenderness. There is no rebound.   Musculoskeletal: She exhibits edema.   Lymphadenopathy:     She has no cervical adenopathy.   Neurological: She is alert and oriented to person, place, and time. No cranial nerve deficit.   Skin: Skin is warm.     4/13/18 PET CT        EXAMINATION:  NM PET CT ROUTINE    CLINICAL HISTORY:  Malignant neoplasm of upper lobe, left bronchus or lung.    Outside imaging demonstrated 4 cm spiculated medial left upper lobe mass (biopsy proven adenocarcinoma) with possible mediastinal lymphadenopathy as well as lucent areas involving the sternum and  T9.    TECHNIQUE:  13.12 mCi of F18-FDG was administered intravenously in the right antecubital fossa.  After an approximately 60 min distribution time, PET/CT images were acquired from the skull base to the mid thigh. Transmission images were acquired to correct for attenuation using a whole body low-dose CT scan without contrast with the arms positioned above the head. Glycemia at the time of injection was 82 mg/dL.    COMPARISON:  CT abdomen pelvis 07/21/2017, MRI brain 04/13/2018    FINDINGS:  Quality of the study: Adequate.    Abnormal foci of increased uptake are present within the left upper lobe as well as a few prevascular mediastinal lymph nodes.  Lung mass measures approximately 4.6 x 2.9 cm.    No appreciable lucent lesion in the sternum or T9 vertebral body.  Mild degenerative metabolic activity is present at the sternomanubrial junction.    Index lesions:    - Left upper lobe mass: SUV max 11.1    - Lateral pre-vascular lymph node: SUV max 5.8    Physiologic uptake of the tracer is present within the brain, salivary glands, myocardium, GI and  tracts.    Incidental CT findings: Liver and kidneys are enlarged with numerous intraparenchymal cyst and associated mass effect on the adjacent abdominal organs, unchanged.  Colonic diverticulosis without diverticulitis.  Bandlike opacification within the right lower lobe likely represents subsegmental atelectasis.  Calcific atherosclerosis involves the lower extremity vasculature bilaterally.   Impression        Known malignancy of the left upper lobe with mediastinal trini metastases.    No appreciable lucent lesion in the sternum or T9 vertebral body.  Correlation with prior outside imaging is recommended.            4/13/18 MRI brain w/o cont        FINDINGS:  Intracranial compartment:    Ventricles and sulci are normal in size for age without evidence of hydrocephalus. No extra-axial blood or fluid collections.    Nonspecific small foci of T2/FLAIR high  signal intensity in the supratentorial white matter, favored to represent chronic microvascular ischemic changes.  Remote lacunar type infarct in the left putamen.  Small punctate focus of susceptibility signal artifact in the left occipital lobe, suggestive of an old microhemorrhage or calcification.  No mass lesion, acute hemorrhage, edema or acute infarct.    Normal vascular flow voids are preserved.    Skull/extracranial contents (limited evaluation): Bone marrow signal intensity is normal.   Impression        No evidence of intracranial metastases within limitation in the absence of IV contrast which decrease the sensitivity of this exam.    Nonspecific foci of T2/FLAIR high signal intensity in the supratentorial white matter, likely representing chronic microvascular ischemic changes.      Component      Latest Ref Rng & Units 7/2/2018   WBC      3.90 - 12.70 K/uL 2.48 (L)   RBC      4.00 - 5.40 M/uL 3.19 (L)   Hemoglobin      12.0 - 16.0 g/dL 8.7 (L)   Hematocrit      37.0 - 48.5 % 27.8 (L)   MCV      82 - 98 fL 87   MCH      27.0 - 31.0 pg 27.3   MCHC      32.0 - 36.0 g/dL 31.3 (L)   RDW      11.5 - 14.5 % 18.8 (H)   Platelets      150 - 350 K/uL 227   MPV      9.2 - 12.9 fL 12.5   Immature Granulocytes      0.0 - 0.5 % 0.8 (H)   Gran # (ANC)      1.8 - 7.7 K/uL 1.5 (L)   Immature Grans (Abs)      0.00 - 0.04 K/uL 0.02   Lymph #      1.0 - 4.8 K/uL 0.6 (L)   Mono #      0.3 - 1.0 K/uL 0.4   Eos #      0.0 - 0.5 K/uL 0.0   Baso #      0.00 - 0.20 K/uL 0.02   nRBC      0 /100 WBC 0   Gran%      38.0 - 73.0 % 60.5   Lymph%      18.0 - 48.0 % 23.4   Mono%      4.0 - 15.0 % 14.1   Eosinophil%      0.0 - 8.0 % 0.4   Basophil%      0.0 - 1.9 % 0.8   Differential Method       Automated   Sodium      136 - 145 mmol/L 144   Potassium      3.5 - 5.1 mmol/L 3.7   Chloride      95 - 110 mmol/L 114 (H)   CO2      23 - 29 mmol/L 20 (L)   Glucose      70 - 110 mg/dL 80   BUN, Bld      8 - 23 mg/dL 35 (H)   Creatinine       0.5 - 1.4 mg/dL 2.8 (H)   Calcium      8.7 - 10.5 mg/dL 8.8   Total Protein      6.0 - 8.4 g/dL 6.6   Albumin      3.5 - 5.2 g/dL 3.1 (L)   Total Bilirubin      0.1 - 1.0 mg/dL 0.4   Alkaline Phosphatase      55 - 135 U/L 262 (H)   AST      10 - 40 U/L 22   ALT      10 - 44 U/L 20   Anion Gap      8 - 16 mmol/L 10   eGFR if African American      >60 mL/min/1.73 m:2 19.4 (A)   eGFR if non African American      >60 mL/min/1.73 m:2 16.8 (A)       Assessment:     1. Adenocarcinoma lung  2. Polycystic kidney disease  3. Polycystic liver disease  4. Ascites  5. Hypertension,essential  6. Cough  7. Dyspnea on exertion  8. CKD stage IV  9. Leukopenia  10. Anemia, normocytic, hypochromic  11. Dysphagia  12. Epistaxis  13. Weight loss  14. ANOREXIA    Plan:     1. She has a left upper lung mass that was biopsied, as well as hyper metabolic, prevascular mediastinal lymph nodes on PET CT. No other hypermetabolic lesions on PET CT. MRI brain (non-contrast) does not show any metastatic lesion. She has polycystic kidney disease and she is certainly at higher risk for renal malignancy, colon CA as well as liver CA. Slides and block were requested from [facility] and were reviewed by pathology here. It was confirmed that she has adenocarcinoma originating in the lungs.   She started concurrent chemotherapy with Carboplatin/Paclitaxcel ( renally adjusted) as Pemetrexed was not appropriate with the reduced renal function.   Chemotherapy cycle 6 was held due to worsening renal function and leukopenia.      4. Chronic, attributed to CKD/ polycystic liver        5. On multiple anti-HT medications. BP very high today. She feels nauseous when she takes her medications.      6. Possibly related to malignancy in her lungs. She is on Benzonatate     8. Serum cr 2.8 today. She follows with nephrology    9. ANC 1.5 today     10. Secondary to CKD and chemotherapy. Serum iron saturation 33% on 5/21/18.     11. Possibly secondary to  radiation. Suggested eating/drinking more liquid/semi-solid foods. She is on Ranitidine once daily. I will recommend EGD as her symptoms are persistent.         12. Spontaneous, self limited. Her BP is consistently high. She does cough up blood occasionally. Hemoglobin 9.4 today. Platelets 148k.     13,14. Prescribed Marinol 2.5MG bid. Megace, Periactin not safe in the setting of renal insufficiency. She has no started Marinol yet       Distress Screening Results: Psychosocial Distress screening score of Distress Score: 0 noted and reviewed. No intervention indicated.  Answers for HPI/ROS submitted by the patient on 6/27/2018   appetite change : No  unexpected weight change: No  visual disturbance: No  cough: No  shortness of breath: No  chest pain: No  abdominal pain: No  diarrhea: No  frequency: No  back pain: No  rash: No  headaches: No  adenopathy: No  nervous/ anxious: No

## 2018-07-06 ENCOUNTER — OFFICE VISIT (OUTPATIENT)
Dept: SURGERY | Facility: CLINIC | Age: 68
End: 2018-07-06
Payer: MEDICARE

## 2018-07-06 VITALS
BODY MASS INDEX: 22.89 KG/M2 | DIASTOLIC BLOOD PRESSURE: 87 MMHG | HEIGHT: 65 IN | HEART RATE: 97 BPM | WEIGHT: 137.38 LBS | SYSTOLIC BLOOD PRESSURE: 153 MMHG

## 2018-07-06 DIAGNOSIS — K64.1 PROLAPSED INTERNAL HEMORRHOIDS, GRADE 2: Primary | ICD-10-CM

## 2018-07-06 PROCEDURE — 3079F DIAST BP 80-89 MM HG: CPT | Mod: CPTII,S$GLB,, | Performed by: SURGERY

## 2018-07-06 PROCEDURE — 99204 OFFICE O/P NEW MOD 45 MIN: CPT | Mod: 25,S$GLB,, | Performed by: SURGERY

## 2018-07-06 PROCEDURE — 3077F SYST BP >= 140 MM HG: CPT | Mod: CPTII,S$GLB,, | Performed by: SURGERY

## 2018-07-06 PROCEDURE — 46600 DIAGNOSTIC ANOSCOPY SPX: CPT | Mod: S$GLB,,, | Performed by: SURGERY

## 2018-07-06 PROCEDURE — 99999 PR PBB SHADOW E&M-EST. PATIENT-LVL III: CPT | Mod: PBBFAC,,, | Performed by: SURGERY

## 2018-07-06 NOTE — LETTER
July 6, 2018      Larissa Andino MD  [address]  [address]  [place] [facility] [address]           [facility]  [address]  [facility] [address]  Phone: [phone]          Patient: Verónica Baker   MR Number: [id number]   YOB: 1950   Date of Visit: 7/6/2018       Dear Dr. Larissa Andino:    Thank you for referring Verónica Baker to me for evaluation. Attached you will find relevant portions of my assessment and plan of care.    If you have questions, please do not hesitate to call me. I look forward to following Verónica Baker along with you.    Sincerely,    Marcos Rey MD    Enclosure  CC:  No Recipients    If you would like to receive this communication electronically, please contact [email or web address] or [phone] to request more information on [facility] Link access.    For providers and/or their staff who would like to refer a patient to [facility], please contact us through our one-stop-shop provider referral line, [facility], at [phone].    If you feel you have received this communication in error or would no longer like to receive these types of communications, please e-mail [email or web address]

## 2018-07-06 NOTE — PROGRESS NOTES
Subjective:       Patient ID: Verónica Baker is a 68 y.o. female.    Chief Complaint: Consult (hemorrhoids)    Referred by Dr. Andino with Hemorrhoids    Patient presents for evaluation of possible hemorrhoids. Onset of symptoms was gradual about six months ago after diarrhea from chemotherapy. Symptoms have been unchanged since that time. Symptoms include: bleeding which only occurs with bowel movements and painless perianal swelling. Patient denies family hx of colorectal CA, history of previous STDs, known or suspected STD exposure, maroon colored stools, melena and receptive anal intercourse. Treatment to date has been preparation H and imodium.    She had a colonoscopy 2016.  Just finished radiation and chemotherapy for lung cancer, and was told her WBC was low but improving.      Review of Systems   Constitutional: Negative for appetite change, chills, diaphoresis, fatigue, fever and unexpected weight change.   HENT: Negative for hearing loss, sore throat, trouble swallowing and voice change.    Eyes: Negative for visual disturbance.   Respiratory: Negative for cough, shortness of breath and wheezing.    Cardiovascular: Negative for chest pain, palpitations and leg swelling.   Gastrointestinal: Positive for blood in stool. Negative for abdominal distention, abdominal pain, anal bleeding, constipation, diarrhea, nausea, rectal pain and vomiting.   Genitourinary: Negative for difficulty urinating, dysuria, flank pain, frequency, hematuria, menstrual problem and urgency.   Musculoskeletal: Negative for arthralgias, back pain, joint swelling, myalgias and neck pain.   Skin: Negative for pallor and rash.   Neurological: Negative for dizziness, syncope, weakness and headaches.   Hematological: Negative for adenopathy. Does not bruise/bleed easily.   Psychiatric/Behavioral: Negative for suicidal ideas. The patient is not nervous/anxious.        Objective:      Physical Exam   Constitutional: She is oriented to person,  place, and time. She appears well-developed and well-nourished. No distress.   HENT:   Head: Normocephalic and atraumatic.   Right Ear: External ear normal.   Left Ear: External ear normal.   Eyes: Conjunctivae are normal. Pupils are equal, round, and reactive to light. Right eye exhibits no discharge. Left eye exhibits no discharge.   Neck: No tracheal deviation present. No thyromegaly present.   Cardiovascular: Normal rate and regular rhythm.    Pulmonary/Chest: Effort normal. No respiratory distress.   Abdominal: Soft. She exhibits no distension. There is no guarding.   Genitourinary: Rectal exam shows external hemorrhoid and internal hemorrhoid (grade 2 prolapseseen on anoscopy). Rectal exam shows no fissure, no mass, no tenderness and anal tone normal.   Musculoskeletal: She exhibits no edema or tenderness.   Lymphadenopathy:     She has no cervical adenopathy.   Neurological: She is alert and oriented to person, place, and time. No cranial nerve deficit.   Skin: Skin is warm and dry. No rash noted. She is not diaphoretic. No pallor.   Psychiatric: She has a normal mood and affect. Her behavior is normal. Judgment and thought content normal.   Nursing note and vitals reviewed.      Assessment:       1. Prolapsed internal hemorrhoids, grade 2        Plan:       PPH discussed with patient, but she will need to be cleared by heme-onc before any procedure considered.  Has had unsuccessful rubber banding in past.  Advised on Sitz with epsom salt in the meantime.  Call with any issues.

## 2018-07-06 NOTE — PATIENT INSTRUCTIONS
Hemorrhoids    Hemorrhoids are swollen and inflamed veins inside the rectum and near the anus. The rectum is the last several inches of the colon. The anus is the passage between the rectum and the outside of the body.  Causes  The veins can become swollen due to increased pressure in them. This is most often caused by:  · Chronic constipation or diarrhea  · Straining when having a bowel movement  · Sitting too long on the toilet  · A low-fiber diet  · Pregnancy  Symptoms  · Bleeding from the rectum (this may be noticeable after bowel movements)  · Lump near the anus  · Itching around the anus  · Pain around the anus  There are different types of hemorrhoids. Depending on the type you have and the severity, you may be able to treat yourself at home. In some cases, a procedure may be the best treatment option. Your healthcare provider can tell you more about this, if needed.  Home care  General care  · To get relief from pain or itching, try:  ¨ Topical products. Your healthcare provider may prescribe or recommend creams, ointments, or pads that can be applied to the hemorrhoid. Use these exactly as directed.  ¨ Medicines. Your healthcare provider may recommend stool softeners, suppositories, or laxatives to help manage constipation. Use these exactly as directed.  ¨ Sitz baths. A sitz bath involves sitting in a few inches of warm bath water. Be careful not to make the water so hot that you burn yourself--test it before sitting in it. Soak for about 10 to 15 minutes a few times a day. This may help relieve pain.  Tips to help prevent hemorrhoids  · Eat more fiber. Fiber adds bulk to stool and absorbs water as it moves through your colon. This makes stool softer and easier to pass.  ¨ Increase the fiber in your diet with more fiber-rich foods. These include fresh fruit, vegetables, and whole grains.  ¨ Take a fiber supplement or bulking agent, if advised to by your provider. These include products such as psyllium  or methylcellulose.  · Drink plenty of water, if directed to by your provider. This can help keep stool soft.  · Be more active. Frequent exercise aids digestion and helps prevent constipation. It may also help make bowel movements more regular.  · Dont strain during bowel movements. This can make hemorrhoids more likely. Also, dont sit on the toilet for long periods of time.  Follow-up care  Follow up with your healthcare provider, or as advised. If a culture or imaging tests were done, you will be notified of the results when they are ready. This may take a few days or longer.  When to seek medical advice  Call your healthcare provider right away if any of these occur:  · Increased bleeding from the rectum  · Increased pain around the rectum or anus  · Weakness or dizziness  Call 911  Call 911 or return to the emergency department right away if any of these occur:  · Trouble breathing or swallowing  · Fainting or loss of consciousness  · Unusually fast heart rate  · Vomiting blood  · Large amounts of blood in stool  Date Last Reviewed: 6/22/2015 © 2000-2017 [employer]. [address] [place] [address]. All rights reserved. This information is not intended as a substitute for professional medical care. Always follow your healthcare professional's instructions.        Treating Hemorrhoids: Self-Care    Follow your healthcare providers advice about caring for your hemorrhoids at home. Some treatments help relieve symptoms right away. Others involve making changes in your diet and exercise habits. These can help ease constipation and prevent hemorrhoid symptoms from coming back.  Relieving symptoms  Your healthcare provider may prescribe anti-inflammatory medicine to help ease your symptoms. The following tips will also help relieve pain and swelling.  · Take sitz baths. Taking a sitz bath means sitting in a few inches of warm bath water. Soaking for 10 minutes twice a day can provide  welcome relief from painful hemorrhoids. It can also help the area stay clean.  · Develop good bowel habits. Use the bathroom when you need to. Dont ignore the urge to move your bowels. This can lead to constipation, hard stools, and straining. Also, dont read while on the toilet. Sit only as long as needed. Wipe gently with soft, unscented toilet tissue or baby wipes.  · Use ice packs. Placing an ice pack on a thrombosed external hemorrhoid can help relieve pain right away. It will also help reduce the blood clot. Use the ice for 15 to 20 minutes at a time. Keep a cloth between the ice and your skin to prevent skin damage.  · Use other measures. Laxatives and enemas can help ease constipation. But use them only on your healthcare providers advice. For symptom relief, try using cotton pads soaked in witch hazel. These are available at most drugstores. Over-the-counter hemorrhoid ointments and petroleum jelly can also provide relief.  Add fiber to your diet  Adding fiber to your diet can help relieve constipation by making stools softer and easier to pass. To increase your fiber intake, your healthcare provider may recommend a bulking agent, such as psyllium. This is a high-fiber supplement available at most grocery stores and drugstores. Eating more fiber-rich foods will also help. There are two types of fiber:  · Insoluble fiber is the main ingredient in bulking agents. Its also found in foods such as wheat bran, whole-grain breads, fresh fruits, and vegetables.  · Soluble fiber is found in foods such as oat bran. Although soluble fiber is good for you, it may not ease constipation as much as foods high in insoluble fiber.  Drink more water  Along with a high-fiber diet, drinking more water can help ease constipation. This is because insoluble fiber absorbs water, making stools soft and bulky. Be sure to drink plenty of water throughout the day. Drinking fruit juices, such as prune juice or apple juice, can  also help prevent constipation.  Get more exercise  Regular exercise aids digestion and helps prevent constipation. Its also great for your health. So talk with your healthcare provider about starting an exercise program. Low-impact activities, such as swimming or walking, are good places to start. Take it easy at first. And remember to drink plenty of water when you exercise.  High-fiber foods  High-fiber foods offer many benefits. By making your stools softer, they help heal and prevent swollen hemorrhoids. They may also help reduce the risk of colon and rectal cancer. Best of all, theyre usually low in calories and taste great. Here are some examples of fiber-rich foods.  · Whole grains, such as wheat bran, corn bran, and brown rice.  · Vegetables, especially carrots, broccoli, cabbage, and peas.  · Fruits, such as apples, bananas, raisins, peaches, and pears.  · Nuts and legumes, especially peanuts, lentils, and kidney beans.  Easy ways to add fiber  The tips below offer some simple ways to add more high-fiber foods to your meals.  · Start your day with a high-fiber breakfast. Eat a wheat bran cereal along with a sliced banana. Or, try peanut butter on whole-wheat toast.  · Eat carrot sticks for snacks. Theyre easy to prepare, taste great, and are low in calories.  · Use whole-grain breads instead of white bread for sandwiches.  · Eat fruits for treats. Try an apple and some raisins instead of a candy bar.   Date Last Reviewed: 7/1/2016  © 1629-5756 [employer]. [address] [place] [address], [place] [address]. All rights reserved. This information is not intended as a substitute for professional medical care. Always follow your healthcare professional's instructions.

## 2018-07-10 ENCOUNTER — PATIENT MESSAGE (OUTPATIENT)
Dept: OBSTETRICS AND GYNECOLOGY | Facility: CLINIC | Age: 68
End: 2018-07-10

## 2018-07-10 NOTE — TELEPHONE ENCOUNTER
Notified patient of atypical nevus, advised to watch for growth or changes, but no treatment needed at this time.

## 2018-08-02 ENCOUNTER — OFFICE VISIT (OUTPATIENT)
Dept: INTERNAL MEDICINE | Facility: CLINIC | Age: 68
End: 2018-08-02
Payer: MEDICARE

## 2018-08-02 VITALS
SYSTOLIC BLOOD PRESSURE: 142 MMHG | HEIGHT: 65 IN | OXYGEN SATURATION: 99 % | HEART RATE: 86 BPM | BODY MASS INDEX: 22.51 KG/M2 | DIASTOLIC BLOOD PRESSURE: 92 MMHG | WEIGHT: 135.13 LBS

## 2018-08-02 DIAGNOSIS — Q61.3 POLYCYSTIC KIDNEY DISEASE: ICD-10-CM

## 2018-08-02 DIAGNOSIS — I10 HYPERTENSION, UNSPECIFIED TYPE: ICD-10-CM

## 2018-08-02 DIAGNOSIS — G62.9 NEUROPATHY: ICD-10-CM

## 2018-08-02 DIAGNOSIS — R53.0 NEOPLASTIC MALIGNANT RELATED FATIGUE: ICD-10-CM

## 2018-08-02 DIAGNOSIS — N18.4 STAGE 4 CHRONIC KIDNEY DISEASE: Primary | ICD-10-CM

## 2018-08-02 DIAGNOSIS — C34.12 PRIMARY ADENOCARCINOMA OF UPPER LOBE OF LEFT LUNG: ICD-10-CM

## 2018-08-02 PROCEDURE — 3080F DIAST BP >= 90 MM HG: CPT | Mod: CPTII,S$GLB,, | Performed by: NURSE PRACTITIONER

## 2018-08-02 PROCEDURE — 99999 PR PBB SHADOW E&M-EST. PATIENT-LVL IV: CPT | Mod: PBBFAC,,, | Performed by: NURSE PRACTITIONER

## 2018-08-02 PROCEDURE — 99214 OFFICE O/P EST MOD 30 MIN: CPT | Mod: S$GLB,,, | Performed by: NURSE PRACTITIONER

## 2018-08-02 PROCEDURE — 3077F SYST BP >= 140 MM HG: CPT | Mod: CPTII,S$GLB,, | Performed by: NURSE PRACTITIONER

## 2018-08-02 RX ORDER — GABAPENTIN 100 MG/1
100 CAPSULE ORAL NIGHTLY
Qty: 90 CAPSULE | Refills: 2 | Status: SHIPPED | OUTPATIENT
Start: 2018-08-02 | End: 2018-08-03

## 2018-08-02 NOTE — PROGRESS NOTES
INTERNAL MEDICINE PROGRESS NOTE    CHIEF COMPLAINT     Chief Complaint   Patient presents with    Establish Care       HPI     Verónica Baker is a 68 y.o. female wwith polycystic kidney and liver disease, CKD stage 4, HTN and adenocarcinoma of the left lung (undergoing chemo/radiationtx) who presents for a follow up visit today.  Established care with Dr Trinh 5/2018.     HTN- taking aldactone 12.5mg daily and amlodipine 10mg daily.     Polycystic kidney disease and CKD stage 4- seen by Dr Harris 6/6/2018  -all secondary polycystic kidney disease  -used to be seen by a nephrologist in [place] prior to moving to [place], up until about a year ago she says she used to be a stage III, but then sCr started to abruptly increase over the past year, her ACE-I was stopped.  Creatinine has been 3.2-3.4 since coming to [place] and followed at [facility] (early May).    -not a transplant candidate 2/2 active malignancy   -referred for vein mapping and dialysis education   - advised to f/u in 2 month     Adenocarcinoma of the lung- followed by Dr Pires last visit 7/2/2018  -noncontrast CT chest (due to stage 4 CKD) for hemoptysis, which showed a 4cm spiculated medial LUPIS mass and possible mediastinal LAD.  -PET CT did not reveal any hypermetabolic bone lesions. She underwent biopsy of the lesion on 4/4/18 in [place] and pathology reveals adenocarcinoma.   -completed concurrent carboplatin/paclitaxel for stage III adenocarcinoma lung. She finished 5 out of the planned 6 weekly treatments.     Here with c/o lower ext swelling and pain. Feels tight.   +feels burning and tingling to the legs.   Denies feeling cold and hot.   Pain with walking.   Treating with icy-hot and alcohol rubs.     Past Medical History:  Past Medical History:   Diagnosis Date    Broken wrist     20 years ago    Diverticulitis     Fractured hip     Left hip in 1998    Gout     Hypertension     Lung cancer 04/2018     Polycystic kidney        Home Medications:  Prior to Admission medications    Medication Sig Start Date End Date Taking? Authorizing Provider   amLODIPine (NORVASC) 10 MG tablet Take 10 mg by mouth once daily.   Yes Historical Provider, MD   magnesium oxide (MAG-OX) 400 mg tablet Take 400 mg by mouth once daily.   Yes Historical Provider, MD   sodium bicarbonate 650 MG tablet Take 650 mg by mouth 2 (two) times daily. 3/6/18  Yes Historical Provider, MD   spironolactone (ALDACTONE) 25 MG tablet Take 25 mg by mouth.   Yes Historical Provider, MD   dronabinol (MARINOL) 2.5 MG capsule Take 1 capsule (2.5 mg total) by mouth 2 (two) times daily before meals. 6/4/18   Patricia Pires MD   magic mouthwash diphen/antac/lidoc/nysta Take 10ml's by mouth 5 minutes before meals and every night at bedtime 6/5/18   Tuan Hopson MD   nystatin (MYCOSTATIN) 100,000 unit/mL suspension  6/5/18   Historical Provider, MD   ondansetron (ZOFRAN) 4 MG tablet Take 1 tablet (4 mg total) by mouth every 8 (eight) hours as needed for Nausea. 5/7/18 5/7/19  Patricia Pires MD   ranitidine (ZANTAC) 150 MG tablet Take 1 tablet (150 mg total) by mouth once daily. 5/21/18 5/21/19  Patricia Pires MD   triamcinolone acetonide 0.025% (KENALOG) 0.025 % Oint Apply topically 2 (two) times daily. 6/8/18 8/2/18  Mala Ontiveros MD   UNABLE TO FIND Apply topically. medication name: Antifungal cream apply topically to toes tid prn  8/2/18  Historical Provider, MD       Review of Systems:  Review of Systems   Constitutional: Positive for activity change. Negative for chills, fatigue, fever and unexpected weight change.   HENT: Negative for hearing loss, rhinorrhea and trouble swallowing.    Eyes: Negative for discharge and visual disturbance.   Respiratory: Negative for chest tightness and wheezing.    Cardiovascular: Positive for leg swelling. Negative for chest pain and palpitations.   Gastrointestinal: Positive for diarrhea. Negative for blood in stool,  "constipation and vomiting.   Endocrine: Negative for polydipsia and polyuria.   Genitourinary: Negative for difficulty urinating, dysuria, hematuria and menstrual problem.   Musculoskeletal: Positive for neck pain. Negative for arthralgias and joint swelling.   Skin: Negative for rash.   Neurological: Positive for weakness and numbness. Negative for dizziness, light-headedness and headaches.   Psychiatric/Behavioral: Negative for confusion and dysphoric mood.       Health Maintainence:   Immunizations:  Health Maintenance       Date Due Completion Date    Lipid Panel 1950 ---    TETANUS VACCINE 06/25/1969 ---    Zoster Vaccine 06/25/2011 ---    Pneumococcal (65+) (1 of 2 - PCV13) 06/25/2016 ---    Influenza Vaccine 08/01/2018 ---    Mammogram 06/14/2019 6/14/2018    Override on 4/4/2016: Done    DEXA SCAN 04/03/2020 4/3/2017 (Done)    Override on 4/3/2017: Done    Colonoscopy 01/11/2026 1/11/2016 (Done)    Override on 1/11/2016: Done           PHYSICAL EXAM     BP (!) 142/92 (BP Location: Left arm, Patient Position: Sitting, BP Method: Small (Manual))   Pulse 86   Ht 5' 5" (1.651 m)   Wt 61.3 kg (135 lb 2.3 oz)   SpO2 99%   BMI 22.49 kg/m²     Physical Exam   Constitutional: She is oriented to person, place, and time. She appears well-developed and well-nourished.   HENT:   Head: Normocephalic.   Right Ear: External ear normal.   Left Ear: External ear normal.   Nose: Nose normal.   Mouth/Throat: Oropharynx is clear and moist. No oropharyngeal exudate.   Eyes: Pupils are equal, round, and reactive to light.   Neck: Neck supple. No JVD present. No tracheal deviation present. No thyromegaly present.   Cardiovascular: Normal rate, regular rhythm, normal heart sounds and intact distal pulses.  Exam reveals no gallop and no friction rub.    No murmur heard.  Pulmonary/Chest: Effort normal and breath sounds normal. No respiratory distress. She has no wheezes. She has no rales.   Abdominal: Soft. Bowel sounds " are normal. She exhibits no distension. There is no tenderness.   Musculoskeletal: Normal range of motion. She exhibits edema (2+ lower ext ). She exhibits no tenderness.   Lymphadenopathy:     She has no cervical adenopathy.   Neurological: She is alert and oriented to person, place, and time. A sensory deficit is present.   Skin: Skin is warm and dry. No rash noted.   Psychiatric: She has a normal mood and affect. Her behavior is normal.   Vitals reviewed.      LABS     No results found for: LABA1C, HGBA1C  CMP  Sodium   Date Value Ref Range Status   07/02/2018 144 136 - 145 mmol/L Final     Potassium   Date Value Ref Range Status   07/02/2018 3.7 3.5 - 5.1 mmol/L Final     Chloride   Date Value Ref Range Status   07/02/2018 114 (H) 95 - 110 mmol/L Final     CO2   Date Value Ref Range Status   07/02/2018 20 (L) 23 - 29 mmol/L Final     Glucose   Date Value Ref Range Status   07/02/2018 80 70 - 110 mg/dL Final     BUN, Bld   Date Value Ref Range Status   07/02/2018 35 (H) 8 - 23 mg/dL Final     Creatinine   Date Value Ref Range Status   07/02/2018 2.8 (H) 0.5 - 1.4 mg/dL Final     Calcium   Date Value Ref Range Status   07/02/2018 8.8 8.7 - 10.5 mg/dL Final     Total Protein   Date Value Ref Range Status   07/02/2018 6.6 6.0 - 8.4 g/dL Final     Albumin   Date Value Ref Range Status   07/02/2018 3.1 (L) 3.5 - 5.2 g/dL Final     Total Bilirubin   Date Value Ref Range Status   07/02/2018 0.4 0.1 - 1.0 mg/dL Final     Comment:     For infants and newborns, interpretation of results should be based  on gestational age, weight and in agreement with clinical  observations.  Premature Infant recommended reference ranges:  Up to 24 hours.............<8.0 mg/dL  Up to 48 hours............<12.0 mg/dL  3-5 days..................<15.0 mg/dL  6-29 days.................<15.0 mg/dL       Alkaline Phosphatase   Date Value Ref Range Status   07/02/2018 262 (H) 55 - 135 U/L Final     AST   Date Value Ref Range Status   07/02/2018 22  10 - 40 U/L Final     ALT   Date Value Ref Range Status   07/02/2018 20 10 - 44 U/L Final     Anion Gap   Date Value Ref Range Status   07/02/2018 10 8 - 16 mmol/L Final     eGFR if    Date Value Ref Range Status   07/02/2018 19.4 (A) >60 mL/min/1.73 m^2 Final     eGFR if non    Date Value Ref Range Status   07/02/2018 16.8 (A) >60 mL/min/1.73 m^2 Final     Comment:     Calculation used to obtain the estimated glomerular filtration  rate (eGFR) is the CKD-EPI equation.        Lab Results   Component Value Date    WBC 2.48 (L) 07/02/2018    HGB 8.7 (L) 07/02/2018    HCT 27.8 (L) 07/02/2018    MCV 87 07/02/2018     07/02/2018     No results found for: CHOL  No results found for: HDL  No results found for: LDLCALC  No results found for: TRIG  No results found for: CHOLHDL  No results found for: TSH, H6GTVJT, R0XKHMK, THYROIDAB    ASSESSMENT/PLAN     Verónica Baker is a 68 y.o. female with  Past Medical History:   Diagnosis Date    Broken wrist     20 years ago    Diverticulitis     Fractured hip     Left hip in 1998    Gout     Hypertension     Lung cancer 04/2018    Polycystic kidney      Stage 4 chronic kidney disease- attended dialysis class. Will cont f/u with nephrology. likely cause of lower ext swelling. Advised elevate legs and use compression stockings.     Polycystic kidney disease- cause of CKD stage 4     Neuropathy- will send to gabapentin 100mg nightly. Will monitor   -     gabapentin (NEURONTIN) 100 MG capsule; Take 1 capsule (100 mg total) by mouth every evening.  Dispense: 90 capsule; Refill: 2    Primary adenocarcinoma of upper lobe of left lung- completed radiation and chemo. F/u with Heme/Onc      Hypertension, unspecified type    Neoplastic malignant related fatigue    Follow up with PCP in November 2018    Patient education provided from [facility]. Patient was counseled on when and how to seek emergent care.       Yuko HOLLY, APRN, FNP-c    Department of Internal Medicine - [facility]  3:21 PM

## 2018-08-03 ENCOUNTER — TELEPHONE (OUTPATIENT)
Dept: NEPHROLOGY | Facility: CLINIC | Age: 68
End: 2018-08-03

## 2018-08-03 ENCOUNTER — LAB VISIT (OUTPATIENT)
Dept: LAB | Facility: HOSPITAL | Age: 68
End: 2018-08-03
Attending: INTERNAL MEDICINE
Payer: MEDICARE

## 2018-08-03 ENCOUNTER — OFFICE VISIT (OUTPATIENT)
Dept: HEMATOLOGY/ONCOLOGY | Facility: CLINIC | Age: 68
End: 2018-08-03
Payer: MEDICARE

## 2018-08-03 VITALS
OXYGEN SATURATION: 98 % | TEMPERATURE: 99 F | SYSTOLIC BLOOD PRESSURE: 157 MMHG | RESPIRATION RATE: 18 BRPM | WEIGHT: 136.88 LBS | BODY MASS INDEX: 22.81 KG/M2 | HEIGHT: 65 IN | HEART RATE: 101 BPM | DIASTOLIC BLOOD PRESSURE: 80 MMHG

## 2018-08-03 DIAGNOSIS — R53.0 NEOPLASTIC MALIGNANT RELATED FATIGUE: ICD-10-CM

## 2018-08-03 DIAGNOSIS — C34.12 PRIMARY ADENOCARCINOMA OF UPPER LOBE OF LEFT LUNG: ICD-10-CM

## 2018-08-03 DIAGNOSIS — K21.9 GASTROESOPHAGEAL REFLUX DISEASE WITHOUT ESOPHAGITIS: Primary | ICD-10-CM

## 2018-08-03 DIAGNOSIS — N18.4 STAGE 4 CHRONIC KIDNEY DISEASE: ICD-10-CM

## 2018-08-03 DIAGNOSIS — I10 HYPERTENSION, UNSPECIFIED TYPE: ICD-10-CM

## 2018-08-03 DIAGNOSIS — Q61.3 POLYCYSTIC KIDNEY DISEASE: ICD-10-CM

## 2018-08-03 DIAGNOSIS — T45.1X5A CHEMOTHERAPY-INDUCED NEUTROPENIA: ICD-10-CM

## 2018-08-03 DIAGNOSIS — D70.1 CHEMOTHERAPY-INDUCED NEUTROPENIA: ICD-10-CM

## 2018-08-03 DIAGNOSIS — D63.0 ANEMIA IN NEOPLASTIC DISEASE: ICD-10-CM

## 2018-08-03 LAB
ALBUMIN SERPL BCP-MCNC: 3.6 G/DL
ALP SERPL-CCNC: 214 U/L
ALT SERPL W/O P-5'-P-CCNC: 22 U/L
ANION GAP SERPL CALC-SCNC: 11 MMOL/L
AST SERPL-CCNC: 23 U/L
BASOPHILS # BLD AUTO: 0.04 K/UL
BASOPHILS NFR BLD: 1.1 %
BILIRUB SERPL-MCNC: 0.4 MG/DL
BUN SERPL-MCNC: 43 MG/DL
CALCIUM SERPL-MCNC: 9.3 MG/DL
CHLORIDE SERPL-SCNC: 109 MMOL/L
CO2 SERPL-SCNC: 21 MMOL/L
CREAT SERPL-MCNC: 3.5 MG/DL
DIFFERENTIAL METHOD: ABNORMAL
EOSINOPHIL # BLD AUTO: 0.1 K/UL
EOSINOPHIL NFR BLD: 3.3 %
ERYTHROCYTE [DISTWIDTH] IN BLOOD BY AUTOMATED COUNT: 15.1 %
EST. GFR  (AFRICAN AMERICAN): 14.8 ML/MIN/1.73 M^2
EST. GFR  (NON AFRICAN AMERICAN): 12.9 ML/MIN/1.73 M^2
GLUCOSE SERPL-MCNC: 119 MG/DL
HCT VFR BLD AUTO: 29.7 %
HGB BLD-MCNC: 9.3 G/DL
IMM GRANULOCYTES # BLD AUTO: 0.01 K/UL
IMM GRANULOCYTES NFR BLD AUTO: 0.3 %
LYMPHOCYTES # BLD AUTO: 0.6 K/UL
LYMPHOCYTES NFR BLD: 15.9 %
MAGNESIUM SERPL-MCNC: 1.8 MG/DL
MCH RBC QN AUTO: 27.8 PG
MCHC RBC AUTO-ENTMCNC: 31.3 G/DL
MCV RBC AUTO: 89 FL
MONOCYTES # BLD AUTO: 0.4 K/UL
MONOCYTES NFR BLD: 11.8 %
NEUTROPHILS # BLD AUTO: 2.5 K/UL
NEUTROPHILS NFR BLD: 67.6 %
NRBC BLD-RTO: 0 /100 WBC
PLATELET # BLD AUTO: 241 K/UL
PMV BLD AUTO: 12 FL
POTASSIUM SERPL-SCNC: 4.3 MMOL/L
PROT SERPL-MCNC: 7.4 G/DL
RBC # BLD AUTO: 3.34 M/UL
SODIUM SERPL-SCNC: 141 MMOL/L
T4 FREE SERPL-MCNC: 0.95 NG/DL
TSH SERPL DL<=0.005 MIU/L-ACNC: 2.25 UIU/ML
WBC # BLD AUTO: 3.65 K/UL

## 2018-08-03 PROCEDURE — 3079F DIAST BP 80-89 MM HG: CPT | Mod: CPTII,S$GLB,, | Performed by: INTERNAL MEDICINE

## 2018-08-03 PROCEDURE — 99214 OFFICE O/P EST MOD 30 MIN: CPT | Mod: S$GLB,,, | Performed by: INTERNAL MEDICINE

## 2018-08-03 PROCEDURE — 85025 COMPLETE CBC W/AUTO DIFF WBC: CPT

## 2018-08-03 PROCEDURE — 99999 PR PBB SHADOW E&M-EST. PATIENT-LVL III: CPT | Mod: PBBFAC,,, | Performed by: INTERNAL MEDICINE

## 2018-08-03 PROCEDURE — 80053 COMPREHEN METABOLIC PANEL: CPT

## 2018-08-03 PROCEDURE — 36415 COLL VENOUS BLD VENIPUNCTURE: CPT

## 2018-08-03 PROCEDURE — 84439 ASSAY OF FREE THYROXINE: CPT

## 2018-08-03 PROCEDURE — 3077F SYST BP >= 140 MM HG: CPT | Mod: CPTII,S$GLB,, | Performed by: INTERNAL MEDICINE

## 2018-08-03 PROCEDURE — 84443 ASSAY THYROID STIM HORMONE: CPT

## 2018-08-03 PROCEDURE — 83735 ASSAY OF MAGNESIUM: CPT

## 2018-08-03 NOTE — PROGRESS NOTES
CC: Lung cancer, follow up          HPI:Ms. Baker is a 68 yr old lady here for follow up.  She had a noncontrast CT chest (due to stage 4 CKD) for hemoptysis, which showed a 4cm spiculated medial LUPIS mass and possible mediastinal LAD.  She has hepatomegaly due to polycystic liver (and kidneys).  There were small lucent areas in the sternum and T9 vertebral bodies concerning for mets.  However, PET CT did not reveal any hypermetabolic bone lesions. She underwent biopsy of the lesion on 4/4/18 in [place] and pathology reveals adenocarcinoma. She wanted to come to [facility] for treatment as she has family she can stay with here.She was diagnosed with polycystic kidney/liver when she was 18.  She is followed by Dr. Nagel in [place].  She has chronic infrequent headaches that are not worsening in severity or frequency.  She was evaluated by radiation oncology here ( ).         Interval history: She is here for a follow up visit. She has completed concurrent carboplatin/paclitaxel for stage III adenocarcinoma lung. She finished 5 out of the planned 6 weekly treatments.    Review of Systems   Constitutional: Positive for malaise/fatigue. Negative for chills, fever and weight loss.   HENT: Negative for congestion, nosebleeds and sinus pain.    Eyes: Negative for blurred vision, double vision and photophobia.   Respiratory: Positive for shortness of breath. Negative for cough, hemoptysis, sputum production and wheezing.    Cardiovascular: Negative for chest pain, palpitations and orthopnea.   Gastrointestinal: Negative for heartburn, nausea and vomiting.   Genitourinary: Negative for dysuria, frequency and urgency.   Musculoskeletal: Negative for back pain, myalgias and neck pain.   Skin: Negative for rash.   Neurological: Negative for dizziness, tingling, tremors, sensory change, weakness and headaches.   Endo/Heme/Allergies: Does not bruise/bleed easily.   Psychiatric/Behavioral: Negative for depression, substance  abuse and suicidal ideas.        Current Outpatient Prescriptions   Medication Sig    amLODIPine (NORVASC) 10 MG tablet Take 10 mg by mouth once daily.    magnesium oxide (MAG-OX) 400 mg tablet Take 400 mg by mouth once daily.    ranitidine (ZANTAC) 150 MG tablet Take 1 tablet (150 mg total) by mouth once daily.    sodium bicarbonate 650 MG tablet Take 650 mg by mouth 2 (two) times daily.    spironolactone (ALDACTONE) 25 MG tablet Take 25 mg by mouth.     No current facility-administered medications for this visit.          Vitals:    08/03/18 1523   BP: (!) 157/80   Pulse: 101   Resp: 18   Temp: 98.7 °F (37.1 °C)       Physical Exam   Constitutional: She is oriented to person, place, and time. She appears well-developed. No distress.   HENT:   Head: Normocephalic and atraumatic.   Mouth/Throat: No oropharyngeal exudate.   Eyes: Pupils are equal, round, and reactive to light. No scleral icterus.   Neck: Normal range of motion.   Cardiovascular: Normal rate, regular rhythm and normal heart sounds.    No murmur heard.  Pulmonary/Chest: Effort normal and breath sounds normal. No respiratory distress. She has no wheezes. She has no rales.   Abdominal: She exhibits distension. There is no tenderness. There is no rebound and no guarding.   Musculoskeletal: She exhibits edema.   Lymphadenopathy:     She has no cervical adenopathy.   Neurological: She is alert and oriented to person, place, and time. No cranial nerve deficit.   Skin: Skin is warm.   Psychiatric: She has a normal mood and affect.       Component      Latest Ref Rng & Units 8/3/2018   WBC      3.90 - 12.70 K/uL 3.65 (L)   RBC      4.00 - 5.40 M/uL 3.34 (L)   Hemoglobin      12.0 - 16.0 g/dL 9.3 (L)   Hematocrit      37.0 - 48.5 % 29.7 (L)   MCV      82 - 98 fL 89   MCH      27.0 - 31.0 pg 27.8   MCHC      32.0 - 36.0 g/dL 31.3 (L)   RDW      11.5 - 14.5 % 15.1 (H)   Platelets      150 - 350 K/uL 241   MPV      9.2 - 12.9 fL 12.0   Immature Granulocytes       0.0 - 0.5 % 0.3   Gran # (ANC)      1.8 - 7.7 K/uL 2.5   Immature Grans (Abs)      0.00 - 0.04 K/uL 0.01   Lymph #      1.0 - 4.8 K/uL 0.6 (L)   Mono #      0.3 - 1.0 K/uL 0.4   Eos #      0.0 - 0.5 K/uL 0.1   Baso #      0.00 - 0.20 K/uL 0.04   nRBC      0 /100 WBC 0   Gran%      38.0 - 73.0 % 67.6   Lymph%      18.0 - 48.0 % 15.9 (L)   Mono%      4.0 - 15.0 % 11.8   Eosinophil%      0.0 - 8.0 % 3.3   Basophil%      0.0 - 1.9 % 1.1   Differential Method       Automated   Sodium      136 - 145 mmol/L 141   Potassium      3.5 - 5.1 mmol/L 4.3   Chloride      95 - 110 mmol/L 109   CO2      23 - 29 mmol/L 21 (L)   Glucose      70 - 110 mg/dL 119 (H)   BUN, Bld      8 - 23 mg/dL 43 (H)   Creatinine      0.5 - 1.4 mg/dL 3.5 (H)   Calcium      8.7 - 10.5 mg/dL 9.3   Total Protein      6.0 - 8.4 g/dL 7.4   Albumin      3.5 - 5.2 g/dL 3.6   Total Bilirubin      0.1 - 1.0 mg/dL 0.4   Alkaline Phosphatase      55 - 135 U/L 214 (H)   AST      10 - 40 U/L 23   ALT      10 - 44 U/L 22   Anion Gap      8 - 16 mmol/L 11   eGFR if African American      >60 mL/min/1.73 m:2 14.8 (A)   eGFR if non African American      >60 mL/min/1.73 m:2 12.9 (A)   Magnesium      1.6 - 2.6 mg/dL 1.8   TSH      0.400 - 4.000 uIU/mL 2.254   Free T4      0.71 - 1.51 ng/dL 0.95     4/13/18 PET CT        EXAMINATION:  NM PET CT ROUTINE    CLINICAL HISTORY:  Malignant neoplasm of upper lobe, left bronchus or lung.    Outside imaging demonstrated 4 cm spiculated medial left upper lobe mass (biopsy proven adenocarcinoma) with possible mediastinal lymphadenopathy as well as lucent areas involving the sternum and T9.    TECHNIQUE:  13.12 mCi of F18-FDG was administered intravenously in the right antecubital fossa.  After an approximately 60 min distribution time, PET/CT images were acquired from the skull base to the mid thigh. Transmission images were acquired to correct for attenuation using a whole body low-dose CT scan without contrast with the arms  positioned above the head. Glycemia at the time of injection was 82 mg/dL.    COMPARISON:  CT abdomen pelvis 07/21/2017, MRI brain 04/13/2018    FINDINGS:  Quality of the study: Adequate.    Abnormal foci of increased uptake are present within the left upper lobe as well as a few prevascular mediastinal lymph nodes.  Lung mass measures approximately 4.6 x 2.9 cm.    No appreciable lucent lesion in the sternum or T9 vertebral body.  Mild degenerative metabolic activity is present at the sternomanubrial junction.    Index lesions:    - Left upper lobe mass: SUV max 11.1    - Lateral pre-vascular lymph node: SUV max 5.8    Physiologic uptake of the tracer is present within the brain, salivary glands, myocardium, GI and  tracts.    Incidental CT findings: Liver and kidneys are enlarged with numerous intraparenchymal cyst and associated mass effect on the adjacent abdominal organs, unchanged.  Colonic diverticulosis without diverticulitis.  Bandlike opacification within the right lower lobe likely represents subsegmental atelectasis.  Calcific atherosclerosis involves the lower extremity vasculature bilaterally.   Impression        Known malignancy of the left upper lobe with mediastinal trini metastases.    No appreciable lucent lesion in the sternum or T9 vertebral body.  Correlation with prior outside imaging is recommended.            4/13/18 MRI brain w/o cont        FINDINGS:  Intracranial compartment:    Ventricles and sulci are normal in size for age without evidence of hydrocephalus. No extra-axial blood or fluid collections.    Nonspecific small foci of T2/FLAIR high signal intensity in the supratentorial white matter, favored to represent chronic microvascular ischemic changes.  Remote lacunar type infarct in the left putamen.  Small punctate focus of susceptibility signal artifact in the left occipital lobe, suggestive of an old microhemorrhage or calcification.  No mass lesion, acute hemorrhage, edema or  acute infarct.    Normal vascular flow voids are preserved.    Skull/extracranial contents (limited evaluation): Bone marrow signal intensity is normal.   Impression        No evidence of intracranial metastases within limitation in the absence of IV contrast which decrease the sensitivity of this exam.    Nonspecific foci of T2/FLAIR high signal intensity in the supratentorial white matter, likely representing chronic microvascular ischemic changes.     Assessment:     1. Adenocarcinoma lung  2. Polycystic kidney disease  3. Polycystic liver disease  4. Ascites  5. Hypertension,essential  6. Cough  7. Dyspnea on exertion  8. CKD stage IV  9. Leukopenia  10. Anemia, normocytic, hypochromic  11. Dysphagia  12. Epistaxis  13. Weight loss  14. ANOREXIA     Plan:     1. She has a left upper lung mass that was biopsied, as well as hyper metabolic, prevascular mediastinal lymph nodes on PET CT. No other hypermetabolic lesions on PET CT. MRI brain (non-contrast) does not show any metastatic lesion. She has polycystic kidney disease and she is certainly at higher risk for renal malignancy, colon CA as well as liver CA. Slides and block were requ[facility] from [facility] and were reviewed by pathology here. It was confirmed that she has adenocarcinoma originating in the lungs.   She started concurrent chemotherapy with Carboplatin/Paclitaxcel ( renally adjusted) as Pemetrexed was not appropriate with the reduced renal function.   Chemotherapy cycle 6 was held due to worsening renal function and leukopenia. Last treatment was on 6/4/18.  She is doing better now. She will  have repeat imaging done in 4 weeks and follow here.      4. Chronic, attributed to CKD/ polycystic liver        5. On multiple anti-HT medications. BP very high today. She feels nauseous when she takes her medications.      6. Possibly related to malignancy in her lungs. It is better now.      8. Serum Cr 3.5 today. She follows with nephrology     9.  ANC 2.5 today     10. Secondary to CKD and chemotherapy. Serum iron saturation 33% on 5/21/18.     11. Possibly secondary to radiation. Suggested eating/drinking more liquid/semi-solid foods. She is on Ranitidine once daily. I will recommend EGD as her symptoms are persistent.       13. Now stable.       Distress Screening Results: Psychosocial Distress screening score of Distress Score: 0 noted and reviewed. No intervention indicated.  Answers for HPI/ROS submitted by the patient on 8/2/2018   appetite change : No  unexpected weight change: No  visual disturbance: No  cough: No  shortness of breath: No  chest pain: No  abdominal pain: No  diarrhea: Yes  frequency: No  back pain: No  rash: No  headaches: No  adenopathy: No  nervous/ anxious: No

## 2018-08-03 NOTE — TELEPHONE ENCOUNTER
----- Message from Ange Negron sent at 8/3/2018  4:11 PM CDT -----  Contact: self [phone]  ..Needs Advice    Reason for call:      Communication Preference:Phone   Additional Information: pt would like a script for something to replace sodium bicarbonate

## 2018-08-14 ENCOUNTER — TELEPHONE (OUTPATIENT)
Dept: SURGERY | Facility: CLINIC | Age: 68
End: 2018-08-14

## 2018-08-14 ENCOUNTER — PATIENT MESSAGE (OUTPATIENT)
Dept: SURGERY | Facility: CLINIC | Age: 68
End: 2018-08-14

## 2018-08-14 DIAGNOSIS — K64.1 GRADE II HEMORRHOIDS: Primary | ICD-10-CM

## 2018-08-14 DIAGNOSIS — K64.1 GRADE II INTERNAL HEMORRHOIDS: ICD-10-CM

## 2018-08-14 RX ORDER — LIDOCAINE HYDROCHLORIDE 10 MG/ML
1 INJECTION, SOLUTION EPIDURAL; INFILTRATION; INTRACAUDAL; PERINEURAL ONCE
Status: CANCELLED | OUTPATIENT
Start: 2018-08-21

## 2018-08-14 RX ORDER — SODIUM CHLORIDE 9 MG/ML
INJECTION, SOLUTION INTRAVENOUS CONTINUOUS
Status: CANCELLED | OUTPATIENT
Start: 2018-08-21

## 2018-08-14 NOTE — TELEPHONE ENCOUNTER
Patient states that she would like to schedule her PPH procedure and her hem-oc doctor cleared her.  I do not see a not in the record but I have sent a message to him and I will book the surgery 8-21-18

## 2018-08-14 NOTE — TELEPHONE ENCOUNTER
----- Message from Nehal Jacome sent at 8/14/2018 10:30 AM CDT -----  Schedule a procedure.  Please call patient at [phone]

## 2018-08-20 ENCOUNTER — TELEPHONE (OUTPATIENT)
Dept: SURGERY | Facility: CLINIC | Age: 68
End: 2018-08-20

## 2018-08-20 ENCOUNTER — ANESTHESIA EVENT (OUTPATIENT)
Dept: SURGERY | Facility: HOSPITAL | Age: 68
End: 2018-08-20
Payer: MEDICARE

## 2018-08-20 RX ORDER — OXYCODONE HYDROCHLORIDE 5 MG/1
5 TABLET ORAL
Status: CANCELLED | OUTPATIENT
Start: 2018-08-20

## 2018-08-20 RX ORDER — ONDANSETRON 2 MG/ML
4 INJECTION INTRAMUSCULAR; INTRAVENOUS ONCE
Status: CANCELLED | OUTPATIENT
Start: 2018-08-20 | End: 2018-08-20

## 2018-08-20 RX ORDER — HYDROMORPHONE HYDROCHLORIDE 1 MG/ML
0.2 INJECTION, SOLUTION INTRAMUSCULAR; INTRAVENOUS; SUBCUTANEOUS EVERY 5 MIN PRN
Status: CANCELLED | OUTPATIENT
Start: 2018-08-20

## 2018-08-20 RX ORDER — SODIUM CHLORIDE 0.9 % (FLUSH) 0.9 %
3 SYRINGE (ML) INJECTION
Status: CANCELLED | OUTPATIENT
Start: 2018-08-20

## 2018-08-20 RX ORDER — FENTANYL CITRATE 50 UG/ML
25 INJECTION, SOLUTION INTRAMUSCULAR; INTRAVENOUS EVERY 5 MIN PRN
Status: CANCELLED | OUTPATIENT
Start: 2018-08-20

## 2018-08-20 RX ORDER — MEPERIDINE HYDROCHLORIDE 50 MG/ML
12.5 INJECTION INTRAMUSCULAR; INTRAVENOUS; SUBCUTANEOUS ONCE AS NEEDED
Status: CANCELLED | OUTPATIENT
Start: 2018-08-20 | End: 2018-08-20

## 2018-08-20 RX ORDER — SODIUM CHLORIDE, SODIUM LACTATE, POTASSIUM CHLORIDE, CALCIUM CHLORIDE 600; 310; 30; 20 MG/100ML; MG/100ML; MG/100ML; MG/100ML
75 INJECTION, SOLUTION INTRAVENOUS CONTINUOUS
Status: CANCELLED | OUTPATIENT
Start: 2018-08-20

## 2018-08-20 RX ORDER — DIPHENHYDRAMINE HYDROCHLORIDE 50 MG/ML
25 INJECTION INTRAMUSCULAR; INTRAVENOUS EVERY 6 HOURS PRN
Status: CANCELLED | OUTPATIENT
Start: 2018-08-20

## 2018-08-21 ENCOUNTER — HOSPITAL ENCOUNTER (OUTPATIENT)
Facility: HOSPITAL | Age: 68
Discharge: HOME | End: 2018-08-21
Attending: SURGERY | Admitting: SURGERY
Payer: MEDICARE

## 2018-08-21 ENCOUNTER — ANESTHESIA (OUTPATIENT)
Dept: SURGERY | Facility: HOSPITAL | Age: 68
End: 2018-08-21
Payer: MEDICARE

## 2018-08-21 DIAGNOSIS — K64.1 GRADE II HEMORRHOIDS: ICD-10-CM

## 2018-08-21 DIAGNOSIS — Z01.818 PRE-OP EXAM: ICD-10-CM

## 2018-08-21 DIAGNOSIS — K64.1 GRADE II INTERNAL HEMORRHOIDS: Primary | ICD-10-CM

## 2018-08-21 DIAGNOSIS — K64.9 HEMORRHOIDS, UNSPECIFIED HEMORRHOID TYPE: ICD-10-CM

## 2018-08-21 PROCEDURE — 63600175 PHARM REV CODE 636 W HCPCS: Performed by: ANESTHESIOLOGY

## 2018-08-21 PROCEDURE — 37000009 HC ANESTHESIA EA ADD 15 MINS: Performed by: SURGERY

## 2018-08-21 PROCEDURE — 36000707: Performed by: SURGERY

## 2018-08-21 PROCEDURE — 25000003 PHARM REV CODE 250: Performed by: ANESTHESIOLOGY

## 2018-08-21 PROCEDURE — 63600175 PHARM REV CODE 636 W HCPCS: Performed by: NURSE ANESTHETIST, CERTIFIED REGISTERED

## 2018-08-21 PROCEDURE — 71000033 HC RECOVERY, INTIAL HOUR: Performed by: SURGERY

## 2018-08-21 PROCEDURE — 99900103 DSU ONLY-NO CHARGE-INITIAL HR (STAT): Performed by: SURGERY

## 2018-08-21 PROCEDURE — 88304 TISSUE EXAM BY PATHOLOGIST: CPT | Performed by: PATHOLOGY

## 2018-08-21 PROCEDURE — 63600175 PHARM REV CODE 636 W HCPCS: Performed by: SURGERY

## 2018-08-21 PROCEDURE — 93010 ELECTROCARDIOGRAM REPORT: CPT | Mod: ,,, | Performed by: INTERNAL MEDICINE

## 2018-08-21 PROCEDURE — 71000039 HC RECOVERY, EACH ADD'L HOUR: Performed by: SURGERY

## 2018-08-21 PROCEDURE — 27201423 OPTIME MED/SURG SUP & DEVICES STERILE SUPPLY: Performed by: SURGERY

## 2018-08-21 PROCEDURE — 99900104 DSU ONLY-NO CHARGE-EA ADD'L HR (STAT): Performed by: SURGERY

## 2018-08-21 PROCEDURE — 37000008 HC ANESTHESIA 1ST 15 MINUTES: Performed by: SURGERY

## 2018-08-21 PROCEDURE — 36000706: Performed by: SURGERY

## 2018-08-21 PROCEDURE — 25000003 PHARM REV CODE 250: Performed by: NURSE ANESTHETIST, CERTIFIED REGISTERED

## 2018-08-21 PROCEDURE — 46947 HEMORRHOIDOPEXY BY STAPLING: CPT | Mod: ,,, | Performed by: SURGERY

## 2018-08-21 PROCEDURE — D9220A PRA ANESTHESIA: Mod: ANES,,, | Performed by: ANESTHESIOLOGY

## 2018-08-21 PROCEDURE — 71000015 HC POSTOP RECOV 1ST HR: Performed by: SURGERY

## 2018-08-21 PROCEDURE — 25000003 PHARM REV CODE 250: Performed by: SURGERY

## 2018-08-21 PROCEDURE — 93005 ELECTROCARDIOGRAM TRACING: CPT | Mod: 59

## 2018-08-21 PROCEDURE — D9220A PRA ANESTHESIA: Mod: CRNA,,, | Performed by: NURSE ANESTHETIST, CERTIFIED REGISTERED

## 2018-08-21 RX ORDER — PROPOFOL 10 MG/ML
VIAL (ML) INTRAVENOUS
Status: DISCONTINUED | OUTPATIENT
Start: 2018-08-21 | End: 2018-08-21

## 2018-08-21 RX ORDER — OXYCODONE HYDROCHLORIDE 5 MG/1
5 TABLET ORAL
Status: DISCONTINUED | OUTPATIENT
Start: 2018-08-21 | End: 2018-08-21 | Stop reason: HOSPADM

## 2018-08-21 RX ORDER — HYDRALAZINE HYDROCHLORIDE 20 MG/ML
10 INJECTION INTRAMUSCULAR; INTRAVENOUS ONCE
Status: COMPLETED | OUTPATIENT
Start: 2018-08-21 | End: 2018-08-21

## 2018-08-21 RX ORDER — LIDOCAINE HYDROCHLORIDE 10 MG/ML
1 INJECTION, SOLUTION EPIDURAL; INFILTRATION; INTRACAUDAL; PERINEURAL ONCE
Status: COMPLETED | OUTPATIENT
Start: 2018-08-21 | End: 2018-08-21

## 2018-08-21 RX ORDER — SODIUM CHLORIDE 9 MG/ML
INJECTION, SOLUTION INTRAVENOUS CONTINUOUS
Status: CANCELLED | OUTPATIENT
Start: 2018-08-21

## 2018-08-21 RX ORDER — SODIUM CHLORIDE 9 MG/ML
INJECTION, SOLUTION INTRAVENOUS CONTINUOUS
Status: DISCONTINUED | OUTPATIENT
Start: 2018-08-21 | End: 2018-08-21 | Stop reason: HOSPADM

## 2018-08-21 RX ORDER — MIDAZOLAM HYDROCHLORIDE 1 MG/ML
INJECTION, SOLUTION INTRAMUSCULAR; INTRAVENOUS
Status: DISCONTINUED | OUTPATIENT
Start: 2018-08-21 | End: 2018-08-21

## 2018-08-21 RX ORDER — DIPHENHYDRAMINE HYDROCHLORIDE 50 MG/ML
25 INJECTION INTRAMUSCULAR; INTRAVENOUS EVERY 6 HOURS PRN
Status: DISCONTINUED | OUTPATIENT
Start: 2018-08-21 | End: 2018-08-21 | Stop reason: HOSPADM

## 2018-08-21 RX ORDER — FENTANYL CITRATE 50 UG/ML
INJECTION, SOLUTION INTRAMUSCULAR; INTRAVENOUS
Status: DISCONTINUED | OUTPATIENT
Start: 2018-08-21 | End: 2018-08-21

## 2018-08-21 RX ORDER — ONDANSETRON 2 MG/ML
4 INJECTION INTRAMUSCULAR; INTRAVENOUS DAILY PRN
Status: DISCONTINUED | OUTPATIENT
Start: 2018-08-21 | End: 2018-08-21 | Stop reason: HOSPADM

## 2018-08-21 RX ORDER — ROCURONIUM BROMIDE 10 MG/ML
INJECTION, SOLUTION INTRAVENOUS
Status: DISCONTINUED | OUTPATIENT
Start: 2018-08-21 | End: 2018-08-21

## 2018-08-21 RX ORDER — SODIUM CHLORIDE 0.9 % (FLUSH) 0.9 %
3 SYRINGE (ML) INJECTION EVERY 8 HOURS
Status: DISCONTINUED | OUTPATIENT
Start: 2018-08-21 | End: 2018-08-21 | Stop reason: HOSPADM

## 2018-08-21 RX ORDER — SODIUM CHLORIDE 0.9 % (FLUSH) 0.9 %
3 SYRINGE (ML) INJECTION
Status: DISCONTINUED | OUTPATIENT
Start: 2018-08-21 | End: 2018-08-21 | Stop reason: HOSPADM

## 2018-08-21 RX ORDER — ONDANSETRON HYDROCHLORIDE 2 MG/ML
INJECTION, SOLUTION INTRAMUSCULAR; INTRAVENOUS
Status: DISCONTINUED | OUTPATIENT
Start: 2018-08-21 | End: 2018-08-21

## 2018-08-21 RX ORDER — LIDOCAINE HCL/PF 100 MG/5ML
SYRINGE (ML) INTRAVENOUS
Status: DISCONTINUED | OUTPATIENT
Start: 2018-08-21 | End: 2018-08-21

## 2018-08-21 RX ORDER — TRAMADOL HYDROCHLORIDE 50 MG/1
50 TABLET ORAL EVERY 6 HOURS PRN
Qty: 15 TABLET | Refills: 0 | Status: SHIPPED | OUTPATIENT
Start: 2018-08-21 | End: 2018-08-31

## 2018-08-21 RX ORDER — MEPERIDINE HYDROCHLORIDE 50 MG/ML
12.5 INJECTION INTRAMUSCULAR; INTRAVENOUS; SUBCUTANEOUS ONCE AS NEEDED
Status: DISCONTINUED | OUTPATIENT
Start: 2018-08-21 | End: 2018-08-21 | Stop reason: HOSPADM

## 2018-08-21 RX ORDER — SUCCINYLCHOLINE CHLORIDE 20 MG/ML
INJECTION INTRAMUSCULAR; INTRAVENOUS
Status: DISCONTINUED | OUTPATIENT
Start: 2018-08-21 | End: 2018-08-21

## 2018-08-21 RX ORDER — HYDROMORPHONE HYDROCHLORIDE 1 MG/ML
1 INJECTION, SOLUTION INTRAMUSCULAR; INTRAVENOUS; SUBCUTANEOUS EVERY 4 HOURS PRN
Status: CANCELLED | OUTPATIENT
Start: 2018-08-21

## 2018-08-21 RX ORDER — LIDOCAINE HYDROCHLORIDE 10 MG/ML
1 INJECTION, SOLUTION EPIDURAL; INFILTRATION; INTRACAUDAL; PERINEURAL ONCE
Status: DISCONTINUED | OUTPATIENT
Start: 2018-08-21 | End: 2018-08-21 | Stop reason: HOSPADM

## 2018-08-21 RX ORDER — BUPIVACAINE HYDROCHLORIDE AND EPINEPHRINE 2.5; 5 MG/ML; UG/ML
INJECTION, SOLUTION EPIDURAL; INFILTRATION; INTRACAUDAL; PERINEURAL
Status: DISCONTINUED | OUTPATIENT
Start: 2018-08-21 | End: 2018-08-21 | Stop reason: HOSPADM

## 2018-08-21 RX ORDER — SODIUM CHLORIDE, SODIUM LACTATE, POTASSIUM CHLORIDE, CALCIUM CHLORIDE 600; 310; 30; 20 MG/100ML; MG/100ML; MG/100ML; MG/100ML
INJECTION, SOLUTION INTRAVENOUS CONTINUOUS
Status: DISCONTINUED | OUTPATIENT
Start: 2018-08-21 | End: 2018-08-21 | Stop reason: HOSPADM

## 2018-08-21 RX ORDER — FENTANYL CITRATE 50 UG/ML
25 INJECTION, SOLUTION INTRAMUSCULAR; INTRAVENOUS EVERY 5 MIN PRN
Status: DISCONTINUED | OUTPATIENT
Start: 2018-08-21 | End: 2018-08-21 | Stop reason: HOSPADM

## 2018-08-21 RX ADMIN — FENTANYL CITRATE 100 MCG: 50 INJECTION, SOLUTION INTRAMUSCULAR; INTRAVENOUS at 10:08

## 2018-08-21 RX ADMIN — MIDAZOLAM 1 MG: 1 INJECTION INTRAMUSCULAR; INTRAVENOUS at 10:08

## 2018-08-21 RX ADMIN — OXYCODONE HYDROCHLORIDE 5 MG: 5 TABLET ORAL at 12:08

## 2018-08-21 RX ADMIN — SODIUM CHLORIDE, SODIUM LACTATE, POTASSIUM CHLORIDE, AND CALCIUM CHLORIDE: .6; .31; .03; .02 INJECTION, SOLUTION INTRAVENOUS at 10:08

## 2018-08-21 RX ADMIN — ONDANSETRON 4 MG: 2 INJECTION, SOLUTION INTRAMUSCULAR; INTRAVENOUS at 10:08

## 2018-08-21 RX ADMIN — PROPOFOL 120 MG: 10 INJECTION, EMULSION INTRAVENOUS at 10:08

## 2018-08-21 RX ADMIN — SODIUM CHLORIDE 3 G: 9 INJECTION, SOLUTION INTRAVENOUS at 10:08

## 2018-08-21 RX ADMIN — SUCCINYLCHOLINE CHLORIDE 100 MG: 20 INJECTION, SOLUTION INTRAMUSCULAR; INTRAVENOUS at 10:08

## 2018-08-21 RX ADMIN — HYDRALAZINE HYDROCHLORIDE 10 MG: 20 INJECTION INTRAMUSCULAR; INTRAVENOUS at 12:08

## 2018-08-21 RX ADMIN — FENTANYL CITRATE 25 MCG: 50 INJECTION INTRAMUSCULAR; INTRAVENOUS at 12:08

## 2018-08-21 RX ADMIN — SODIUM CHLORIDE, SODIUM LACTATE, POTASSIUM CHLORIDE, AND CALCIUM CHLORIDE: .6; .31; .03; .02 INJECTION, SOLUTION INTRAVENOUS at 09:08

## 2018-08-21 RX ADMIN — ESMOLOL HYDROCHLORIDE 30 MG: 20 INJECTION INTRAVENOUS at 11:08

## 2018-08-21 RX ADMIN — LIDOCAINE HYDROCHLORIDE 50 MG: 20 INJECTION, SOLUTION INTRAVENOUS at 10:08

## 2018-08-21 RX ADMIN — LIDOCAINE HYDROCHLORIDE 10 MG: 10 INJECTION, SOLUTION EPIDURAL; INFILTRATION; INTRACAUDAL; PERINEURAL at 09:08

## 2018-08-21 RX ADMIN — ROCURONIUM BROMIDE 5 MG: 10 INJECTION, SOLUTION INTRAVENOUS at 10:08

## 2018-08-21 NOTE — H&P
Subjective:       Patient ID: Verónica Baker is a 68 y.o. female.     Chief Complaint: Consult (hemorrhoids)     Referred by Dr. Andino with Hemorrhoids     Patient presents for evaluation of possible hemorrhoids. Onset of symptoms was gradual about six months ago after diarrhea from chemotherapy. Symptoms have been unchanged since that time. Symptoms include: bleeding which only occurs with bowel movements and painless perianal swelling. Patient denies family hx of colorectal CA, history of previous STDs, known or suspected STD exposure, maroon colored stools, melena and receptive anal intercourse. Treatment to date has been preparation H and imodium.     She had a colonoscopy 2016.  Just finished radiation and chemotherapy for lung cancer, and was told her WBC was low but improving.        Review of Systems   Constitutional: Negative for appetite change, chills, diaphoresis, fatigue, fever and unexpected weight change.   HENT: Negative for hearing loss, sore throat, trouble swallowing and voice change.    Eyes: Negative for visual disturbance.   Respiratory: Negative for cough, shortness of breath and wheezing.    Cardiovascular: Negative for chest pain, palpitations and leg swelling.   Gastrointestinal: Positive for blood in stool. Negative for abdominal distention, abdominal pain, anal bleeding, constipation, diarrhea, nausea, rectal pain and vomiting.   Genitourinary: Negative for difficulty urinating, dysuria, flank pain, frequency, hematuria, menstrual problem and urgency.   Musculoskeletal: Negative for arthralgias, back pain, joint swelling, myalgias and neck pain.   Skin: Negative for pallor and rash.   Neurological: Negative for dizziness, syncope, weakness and headaches.   Hematological: Negative for adenopathy. Does not bruise/bleed easily.   Psychiatric/Behavioral: Negative for suicidal ideas. The patient is not nervous/anxious.        Objective:   Physical Exam   Constitutional: She is oriented to  person, place, and time. She appears well-developed and well-nourished. No distress.   HENT:   Head: Normocephalic and atraumatic.   Right Ear: External ear normal.   Left Ear: External ear normal.   Eyes: Conjunctivae are normal. Pupils are equal, round, and reactive to light. Right eye exhibits no discharge. Left eye exhibits no discharge.   Neck: No tracheal deviation present. No thyromegaly present.   Cardiovascular: Normal rate and regular rhythm.    Pulmonary/Chest: Effort normal. No respiratory distress.   Abdominal: Soft. She exhibits no distension. There is no guarding.   Genitourinary: Rectal exam shows external hemorrhoid and internal hemorrhoid (grade 2 prolapseseen on anoscopy). Rectal exam shows no fissure, no mass, no tenderness and anal tone normal.   Musculoskeletal: She exhibits no edema or tenderness.   Lymphadenopathy:     She has no cervical adenopathy.   Neurological: She is alert and oriented to person, place, and time. No cranial nerve deficit.   Skin: Skin is warm and dry. No rash noted. She is not diaphoretic. No pallor.   Psychiatric: She has a normal mood and affect. Her behavior is normal. Judgment and thought content normal.   Nursing note and vitals reviewed.      Assessment:       1. Prolapsed internal hemorrhoids, grade 2        Plan:       PPH.  All aspects of procedure including risks and possible complications were discussed in detail with the patient who agrees to proceed with procedure.  All questions were answered and consent was obtained. Preop orders were written.

## 2018-08-21 NOTE — DISCHARGE SUMMARY
Discharge Summary  General Surgery      Admit Date: 8/21/2018    Discharge Date :8/21/2018    Attending Physician: Marcos Rey     Discharge Physician: Marcos Rey    Discharged Condition: good    Discharge Diagnosis: Grade II hemorrhoids [K64.1]    Treatments/Procedures: Procedure(s) (LRB):  HEMORRHOIDOPEXY, INTERNAL PROLAPSING, STAPLING (N/A)    Hospital Course: Uneventful; Discharged home from [facility]    Significant Diagnostic Studies: none    Disposition: Home or Self Care    Diet: Regular    Follow up: Office 10-14 days    Activity: No heavy lifting till seen in office.    Patient Instructions:   Current Discharge Medication List      START taking these medications    Details   traMADol (ULTRAM) 50 mg tablet Take 1 tablet (50 mg total) by mouth every 6 (six) hours as needed for Pain.  Qty: 15 tablet, Refills: 0         CONTINUE these medications which have NOT CHANGED    Details   amLODIPine (NORVASC) 10 MG tablet Take 10 mg by mouth once daily.      magnesium oxide (MAG-OX) 400 mg tablet Take 400 mg by mouth once daily.      sodium bicarbonate 650 MG tablet Take 650 mg by mouth 2 (two) times daily.      spironolactone (ALDACTONE) 25 MG tablet Take 25 mg by mouth.             Discharge Procedure Orders   Diet general     Lifting restrictions     Call MD for:  temperature >100.4     Call MD for:  persistent nausea and vomiting     Call MD for:  severe uncontrolled pain     Call MD for:  difficulty breathing, headache or visual disturbances     Call MD for:  redness, tenderness, or signs of infection (pain, swelling, redness, odor or green/yellow discharge around incision site)     Call MD for:  hives     Call MD for:  persistent dizziness or light-headedness     Call MD for:  extreme fatigue     Remove dressing in 48 hours     Shower on day dressing removed (No bath)

## 2018-08-21 NOTE — ANESTHESIA PREPROCEDURE EVALUATION
08/21/2018  Verónica Baker is a 68 y.o., female.    Anesthesia Evaluation    I have reviewed the Patient Summary Reports.    I have reviewed the Nursing Notes.   I have reviewed the Medications.     Review of Systems  Anesthesia Hx:  No problems with previous Anesthesia    Social:  Non-Smoker    Hematology/Oncology:         -- Anemia: Current/Recent Cancer. (Lung CA Left Upper Lung s/p chemo and radiation over the summer) chemotherapy and radiation   Cardiovascular:   Hypertension    Pulmonary:  Pulmonary Normal    Renal/:   Chronic Renal Disease (stage 4, Polycystic Kidney Dz), CRI    Hepatic/GI:   GERD, well controlled    Neurological:  Neurology Normal    Endocrine:  Endocrine Normal        Physical Exam  General:  Well nourished    Airway/Jaw/Neck:  Airway Findings: Mouth Opening: Normal Tongue: Normal  General Airway Assessment: Adult  Oropharynx Findings:  Mallampati: I  TM Distance: Normal, at least 6 cm  Jaw/Neck Findings:  Neck ROM: Normal ROM     Eyes/Ears/Nose:  Eyes/Ears/Nose Findings:    Dental:  Dental Findings: In tact   Chest/Lungs:  Chest/Lungs Findings: Normal Respiratory Rate     Heart/Vascular:  Heart Findings: Rate: Normal  Rhythm: Regular Rhythm        Mental Status:  Mental Status Findings:  Cooperative, Alert and Oriented         Anesthesia Plan  Type of Anesthesia, risks & benefits discussed:  Anesthesia Type:  general  Patient's Preference:   Intra-op Monitoring Plan: standard ASA monitors  Intra-op Monitoring Plan Comments:   Post Op Pain Control Plan: multimodal analgesia  Post Op Pain Control Plan Comments:   Induction:   IV  Beta Blocker:  Patient is not currently on a Beta-Blocker (No further documentation required).       Informed Consent: Patient understands risks and agrees with Anesthesia plan.  Questions answered. Anesthesia consent signed with patient.  ASA Score: 3     Day  of Surgery Review of History & Physical:  There are no significant changes.   H&P completed by Anesthesiologist.       Ready For Surgery From Anesthesia Perspective.

## 2018-08-21 NOTE — DISCHARGE INSTRUCTIONS
Treating Hemorrhoids: Surgery    Your healthcare provider may recommend surgery to remove hemorrhoids that cause severe symptoms. Your healthcare provider can explain the procedure that will be used. Youll also be told how to get ready for surgery, and what to expect while you recover.  Getting ready for surgery  Your surgery will be done at a hospital or outpatient surgical center. Be sure to follow all your healthcare providers guidelines to prepare for surgery.  · Tell your healthcare provider what medicines you take. This includes blood thinners, aspirin, and ibuprofen. Also mention if you take any herbal remedies or supplements. In some cases, you may need to stop taking them a week before surgery.  · Stop smoking.  · Arrange for an adult family member or friend to give you a ride home after the procedure.  · Stop eating and drinking before midnight, the night before your surgery.        Risks and complications  The possible risks and complications of the treatments described on these pages include:  · Infection  · Bleeding  · Trouble urinating  · Narrowing of the anal canal (very rare)  When to call your healthcare provider  After surgery, call your healthcare provider immediately if you have any of the following:  · Increasing pain  · Persistent bleeding  · Fever or chills  · Inability to move your bowels  · Trouble urinating   The day of surgery  Arrive at the hospital or surgery center on time. You will be asked to sign some forms and change into a patient gown. Youll then be given an IV (intravenous line), which supplies fluids and medicine. You may also be given a laxative or enema to clean stool from your rectum. Just before surgery, youll talk with an anesthesiologist. He or she can explain the type of medicine used to prevent pain during surgery.  · Local anesthesia numbs just the surgical area.  · Monitored sedation makes you relaxed and drowsy.  · Regional anesthesia numbs certain areas of your  body.  · General anesthesia lets you sleep during the procedure.  During surgery  Your healthcare provider will insert an anoscope to view the anal canal. Using surgical tools, the swollen hemorrhoids are then removed. In some cases, the incision is closed with sutures. In other cases, you may have a procedure that closes the incision with staples.  Hemorrhoidectomy with sutures  The hemorrhoids are removed using surgical tools, such as a scalpel or cautery (sealing) device. The incision is then closed with sutures. In some cases, the incision may be left partially open. This allows fluid to drain and helps the healing process.  Stapled hemorrhoidopexy  This procedure uses a special device to remove a ring of tissue from the anal canal. Removing the tissue cuts off blood supply to the hemorrhoids, causing them to shrink. The tissue ring is then secured with staples. This helps hold the tissue in place.  After surgery  Youll be taken to a recovery area to rest for a while. You can usually go home the same day. But in some cases you may need to remain in the hospital overnight. For a short time after surgery you may have nausea, minor bleeding, and discharge. Youll also likely have some pain. To help you feel better, your healthcare provider will prescribe pain medicine. You may also be prescribed medicines to help make bowel movements easier.  Date Last Reviewed: 7/1/2016  © 4049-6534 [employer]. [address] [place] [address]. All rights reserved. This information is not intended as a substitute for professional medical care. Always follow your healthcare professional's instructions.        General Information:    1.  Do not drink alcoholic beverages including beer for 24 hours or as long as you are on pain medication..  2.  Do not drive a motor vehicle, operate machinery or power tools, or signs legal papers for 24 hours or as long as you are on pain medication.   3.  You may experience  light-headedness, dizziness, and sleepiness following surgery. Please do not stay alone. A responsible adult should be with you for this 24 hour period.  4.  Go home and rest.    5. Progress slowly to a normal diet unless instructed.  Otherwise, begin with liquids such as soft drinks, then soup and crackers working up to solid foods. Drink plenty of nonalcoholic fluids.  6.  Certain anesthetics and pain medications produce nausea and vomiting in certain       individuals. If nausea becomes a problem at home, call you doctor.    7. A nurse will be calling you sometime after surgery. Do not be alarmed. This is our way of finding out how you are doing.    8. Several times every hour while you are awake, take 2-3 deep breaths and cough. If you had stomach surgery hold a pillow or rolled towel firmly against your stomach before you cough. This will help with any pain the cough might cause.  9. Several times every hour while you are awake, pump and flex your feet 5-6 times and do foot circles. This will help prevent blood clots.    10.Call your doctor for severe pain, bleeding, fever, or signs or symptoms of infection (pain, swelling, redness, foul odor, drainage).    Post op instructions for prevention of DVT  What is deep vein thrombosis?  Deep vein thrombosis (DVT) is the medical term for blood clots in the deep veins of the leg.  These blood clots can be dangerous.  A DVT can block a blood vessel and keep blood from getting where it needs to go.  Another problem is that the clot can travel to other parts of the body such as the lungs.  A clot that travels to the lungs is called a pulmonary embolus (PE) and can cause serious problems with breathing which can lead to death.  Am I at risk for DVT/PE?  If you are not very active, you are at risk of DVT.  Anyone confined to bed, sitting for long periods of time, recovering from surgery, etc. increases the risk of DVT.  Other risk factors are cancer diagnosis, certain  medications, estrogen replacement in any form,older age, obesity, pregnancy, smoking, history of clotting disorders, and dehydration.  How will I know if I have a DVT?   Swelling in the lower leg   Pain   Warmth, redness, hardness or bulging of the vein  If you have any of these symptoms, call your doctors office right away.  Some people will not have any symptoms until the clot moves to the lungs.  What are the symptoms of a PE?   Panting, shortness of breath, or trouble breathing   Sharp, knife-like chest pain when you breathe   Coughing or coughing up blood   Rapid heartbeat  If you have any of these symptoms or get worse quickly, call 911 for emergency treatment.  How can I prevent a DVT?   Avoid long periods of inactivity and dont cross your legs--get up and walk around every hour or so.   Stay active--walking after surgery is highly encouraged.  This means you should get out of the house and walk in the neighborhood.  Going up and down stairs will not impair healing (unless advised against such activity by your doctor).     Drink plenty of noncaffeinated, nonalcoholic fluids each day to prevent dehydration.   Wear special support stockings, if they have been advised by your doctor.   If you travel, stop at least once an hour and walk around.   Avoid smoking (assistance with stopping is available through your healthcare provider)  Always notify your doctor if you are not able to follow the post operative instructions that are given to you at the time of discharge.  It may be necessary to prescribe one of the medications available to prevent DVT.    Discharge Instructions: After Your Surgery/Procedure  Youve just had surgery. During surgery you were given medicine called anesthesia to keep you relaxed and free of pain. After surgery you may have some pain or nausea. This is common. Here are some tips for feeling better and getting well after surgery.     Stay on schedule with your medication.  "  Going home  Your doctor or nurse will show you how to take care of yourself when you go home. He or she will also answer your questions. Have an adult family member or friend drive you home.      For your safety we recommend these precaution for the first 24 hours after your procedure:  · Do not drive or use heavy equipment.  · Do not make important decisions or sign legal papers.  · Do not drink alcohol.  · Have someone stay with you, if needed. He or she can watch for problems and help keep you safe.  · Your concentration, balance, coordination, and judgement may be impaired for many hours after anesthesia.  Use caution when ambulating or standing up.     · You may feel weak and "washed out" after anesthesia and surgery.      Subtle residual effects of general anesthesia or sedation with regional / local anesthesia can last more than 24 hours.  Rest for the remainder of the day or longer if your Doctor/Surgeon has advised you to do so.  Although you may feel normal within the first 24 hours, your reflexes and mental ability may be impaired without you realizing it.  You may feel dizzy, lightheaded or sleepy for 24 hours or longer.      Be sure to go to all follow-up visits with your doctor. And rest after your surgery for as long as your doctor tells you to.  Coping with pain  If you have pain after surgery, pain medicine will help you feel better. Take it as told, before pain becomes severe. Also, ask your doctor or pharmacist about other ways to control pain. This might be with heat, ice, or relaxation. And follow any other instructions your surgeon or nurse gives you.  Tips for taking pain medicine  To get the best relief possible, remember these points:  · Pain medicines can upset your stomach. Taking them with a little food may help.  · Most pain relievers taken by mouth need at least 20 to 30 minutes to start to work.  · Taking medicine on a schedule can help you remember to take it. Try to time your " medicine so that you can take it before starting an activity. This might be before you get dressed, go for a walk, or sit down for dinner.  · Constipation is a common side effect of pain medicines. Call your doctor before taking any medicines such as laxatives or stool softeners to help ease constipation. Also ask if you should skip any foods. Drinking lots of fluids and eating foods such as fruits and vegetables that are high in fiber can also help. Remember, do not take laxatives unless your surgeon has prescribed them.  · Drinking alcohol and taking pain medicine can cause dizziness and slow your breathing. It can even be deadly. Do not drink alcohol while taking pain medicine.  · Pain medicine can make you react more slowly to things. Do not drive or run machinery while taking pain medicine.  Your health care provider may tell you to take acetaminophen to help ease your pain. Ask him or her how much you are supposed to take each day. Acetaminophen or other pain relievers may interact with your prescription medicines or other over-the-counter (OTC) drugs. Some prescription medicines have acetaminophen and other ingredients. Using both prescription and OTC acetaminophen for pain can cause you to overdose. Read the labels on your OTC medicines with care. This will help you to clearly know the list of ingredients, how much to take, and any warnings. It may also help you not take too much acetaminophen. If you have questions or do not understand the information, ask your pharmacist or health care provider to explain it to you before you take the OTC medicine.  Managing nausea  Some people have an upset stomach after surgery. This is often because of anesthesia, pain, or pain medicine, or the stress of surgery. These tips will help you handle nausea and eat healthy foods as you get better. If you were on a special food plan before surgery, ask your doctor if you should follow it while you get better. These tips may  help:  · Do not push yourself to eat. Your body will tell you when to eat and how much.  · Start off with clear liquids and soup. They are easier to digest.  · Next try semi-solid foods, such as mashed potatoes, applesauce, and gelatin, as you feel ready.  · Slowly move to solid foods. Dont eat fatty, rich, or spicy foods at first.  · Do not force yourself to have 3 large meals a day. Instead eat smaller amounts more often.  · Take pain medicines with a small amount of solid food, such as crackers or toast, to avoid nausea.     Call your surgeon if  · You still have pain an hour after taking medicine. The medicine may not be strong enough.  · You feel too sleepy, dizzy, or groggy. The medicine may be too strong.  · You have side effects like nausea, vomiting, or skin changes, such as rash, itching, or hives.       If you have obstructive sleep apnea  You were given anesthesia medicine during surgery to keep you comfortable and free of pain. After surgery, you may have more apnea spells because of this medicine and other medicines you were given. The spells may last longer than usual.   At home:  · Keep using the continuous positive airway pressure (CPAP) device when you sleep. Unless your health care provider tells you not to, use it when you sleep, day or night. CPAP is a common device used to treat obstructive sleep apnea.  · Talk with your provider before taking any pain medicine, muscle relaxants, or sedatives. Your provider will tell you about the possible dangers of taking these medicines.  © 6005-9301 [employer]. [address] [place] [address], [place] [address]. All rights reserved. This information is not intended as a substitute for professional medical care. Always follow your healthcare professional's instructions.    Using Opioids for Pain Management     Your doctor has given instructions for you to take an opioid.  This is a drug for bad pain.  It helps control pain without causing bleeding  and kidney problems.  Common opioid names are morphine, hydromorphone, oxycodone, and methadone. These drugs are called narcotics.    There are several safety concerns you need to know.     · It is against the law to give or sell this drug to another person.  You must keep this medicine safely locked.    · You may have side effects from taking this medication.  These include nausea, itching, sweating, sleepiness, a change in your ability to breathe, and depression.  · Do not take alcohol or sleeping pills opioids.    · Long-term opoid use may no longer giver you relief from pain.  It can cause you stomach pain, mental anxiety, and headaches.  Long-term opoid use can potentially lead to unlawful street drug abuse and reduce your ability to stay employed.    · Your body may become opioid tolerant if you need to take more to get relief.    · You must stop taking opioids if you begin having more pain as a result of the medicine.    · Opioid withdrawal occurs when you have to stop taking the drug.  It can cause you to have nausea, vomiting, diarrhea, stomach pain, anxiety, and dilated pupils in your eyes. This condition means you are opioid dependent.    · Addiction is a drug induced brain disease. It means there are changes in how your brain is working.  Children, teens, and young adults under 25 years old are more likely to get addicted to opioids.      · Addiction can happen with repeated opioid use.  It does not happen with short-term use of two weeks or less.       For more information, please speak with your doctor or pharmacist.

## 2018-08-21 NOTE — TRANSFER OF CARE
"Anesthesia Transfer of Care Note    Patient: Verónica Baker    Procedure(s) Performed: Procedure(s) (LRB):  HEMORRHOIDOPEXY, INTERNAL PROLAPSING, STAPLING (N/A)    Patient location: PACU    Anesthesia Type: general    Transport from OR: Transported from OR on 2-3 L/min O2 by NC with adequate spontaneous ventilation    Post pain: adequate analgesia    Post assessment: tolerated procedure well and no apparent anesthetic complications    Post vital signs: stable    Level of consciousness: responds to stimulation and sedated    Nausea/Vomiting: no nausea/vomiting    Complications: none    Transfer of care protocol was followed      Last vitals:   Visit Vitals  BP (!) 162/98 (BP Location: Right arm, Patient Position: Lying)   Pulse 75   Temp 36.5 °C (97.7 °F) (Temporal)   Resp 16   Ht 5' 5" (1.651 m)   Wt 59 kg (130 lb)   SpO2 100%   Breastfeeding? No   BMI 21.63 kg/m²     "

## 2018-08-21 NOTE — OP NOTE
DATE OF PROCEDURE: 8/21/2018     PREOPERATIVE DIAGNOSIS: Bleeding internal hemorrhoids.     POSTOPERATIVE DIAGNOSIS: Bleeding internal hemorrhoids.     OPERATION: Procedure for prolapse and hemorrhoids.     SURGEON: Marcos Rey M.D.     ANESTHESIA: General.     PROCEDURE AND FINDINGS: With the patient in the prone jackknife position under   satisfactory general endotracheal anesthesia, the perianal area was prepped and   draped in the usual manner for surgery. Digital examination was performed as   well as anoscopic examination. The patient had grade II internal   hemorrhoids with no evidence of active bleeding at this time. They were not   prolapsed. There was no evidence of any fissures or fistulas. There were no   masses. Local anesthetic was performed in a perineal block with 0.25% Marcaine   with epinephrine for postoperative pain relief. The Ethicon  PPH   stapling device was used. The operating anoscope was inserted into the anus and   a 0 Prolene pursestring suture was placed approximately 4 cm above the dentate   line in a circumferential manner. This was incorporating the mucosa and   submucosa. The PPH stapling device was then inserted with the anvil above the   pursestring, which was then tied down and brought out through the openings in   the device. Using gentle traction on the pursestring, the device was then   closed and held in position for 3 minutes, fired and held in position for   another 3 minutes. The device was then removed with the PPH specimen. Staple   rim was hemostatic and intact. There was some retraction of the external   hemorrhoidal tissue into the anal canal. A Surgicel packing was placed. It was   felt that no additional procedures were necessary and anesthesia was   terminated. The patient tolerated the procedure well and was sent to Recovery   in stable condition.     ESTIMATED BLOOD LOSS: Minimal.     COMPLICATIONS: None.     DRAINS: None.     SPECIMEN: PPH ring to  Pathology.

## 2018-08-21 NOTE — PLAN OF CARE
Meets criteria for discharge. Discharged to home with family. Denies nausea. Tolerating fluids. Mild pain. Steady gait. Voiding without difficulty. Discharge instructions reviewed and printed handout given. Verbalized understanding.

## 2018-08-21 NOTE — PLAN OF CARE
Pt states she is going to stay with her daughter in [facility] after the surgery

## 2018-08-21 NOTE — ANESTHESIA POSTPROCEDURE EVALUATION
"Anesthesia Post Evaluation    Patient: Verónica Baker    Procedure(s) Performed: Procedure(s) (LRB):  HEMORRHOIDOPEXY, INTERNAL PROLAPSING, STAPLING (N/A)    Final Anesthesia Type: general  Patient location during evaluation: PACU  Patient participation: Yes- Able to Participate  Level of consciousness: awake and alert and oriented  Post-procedure vital signs: reviewed and stable  Pain management: adequate  Airway patency: patent  PONV status at discharge: No PONV  Anesthetic complications: no      Cardiovascular status: blood pressure returned to baseline and stable  Respiratory status: unassisted and spontaneous ventilation  Hydration status: euvolemic  Follow-up not needed.        Visit Vitals  BP (!) 141/90   Pulse 84   Temp 36.7 °C (98 °F) (Temporal)   Resp (!) 7   Ht 5' 5" (1.651 m)   Wt 59 kg (130 lb)   SpO2 100%   Breastfeeding? No   BMI 21.63 kg/m²       Pain/Billy Score: Pain Assessment Performed: Yes (8/21/2018 12:40 PM)  Presence of Pain: complains of pain/discomfort (8/21/2018 12:40 PM)  Pain Rating Prior to Med Admin: 5 (8/21/2018 12:35 PM)  Billy Score: 10 (8/21/2018 12:40 PM)        "

## 2018-08-21 NOTE — PLAN OF CARE
Labs abnormal Creatinine 3.5   BUN  43    K + 4.3    Pt voided when she came into preop   EKG completed   She will be going to [facility] after the surgery

## 2018-08-22 VITALS
HEART RATE: 79 BPM | HEIGHT: 65 IN | RESPIRATION RATE: 18 BRPM | BODY MASS INDEX: 21.66 KG/M2 | DIASTOLIC BLOOD PRESSURE: 89 MMHG | OXYGEN SATURATION: 99 % | TEMPERATURE: 98 F | SYSTOLIC BLOOD PRESSURE: 147 MMHG | WEIGHT: 130 LBS

## 2018-08-30 ENCOUNTER — LAB VISIT (OUTPATIENT)
Dept: LAB | Facility: HOSPITAL | Age: 68
End: 2018-08-30
Attending: INTERNAL MEDICINE
Payer: MEDICARE

## 2018-08-30 ENCOUNTER — HOSPITAL ENCOUNTER (OUTPATIENT)
Dept: RADIOLOGY | Facility: HOSPITAL | Age: 68
Discharge: HOME | End: 2018-08-30
Attending: INTERNAL MEDICINE
Payer: MEDICARE

## 2018-08-30 DIAGNOSIS — D63.1 ANEMIA OF CHRONIC RENAL FAILURE, UNSPECIFIED CKD STAGE: ICD-10-CM

## 2018-08-30 DIAGNOSIS — N18.9 ANEMIA OF CHRONIC RENAL FAILURE, UNSPECIFIED CKD STAGE: ICD-10-CM

## 2018-08-30 DIAGNOSIS — C34.12 PRIMARY ADENOCARCINOMA OF UPPER LOBE OF LEFT LUNG: ICD-10-CM

## 2018-08-30 DIAGNOSIS — N18.4 STAGE 4 CHRONIC KIDNEY DISEASE: ICD-10-CM

## 2018-08-30 LAB
25(OH)D3+25(OH)D2 SERPL-MCNC: 17 NG/ML
ALBUMIN SERPL BCP-MCNC: 3.5 G/DL
ANION GAP SERPL CALC-SCNC: 11 MMOL/L
BASOPHILS # BLD AUTO: 0.02 K/UL
BASOPHILS NFR BLD: 0.6 %
BUN SERPL-MCNC: 39 MG/DL
CALCIUM SERPL-MCNC: 9.7 MG/DL
CHLORIDE SERPL-SCNC: 112 MMOL/L
CO2 SERPL-SCNC: 18 MMOL/L
CREAT SERPL-MCNC: 3.5 MG/DL
DIFFERENTIAL METHOD: ABNORMAL
EOSINOPHIL # BLD AUTO: 0.1 K/UL
EOSINOPHIL NFR BLD: 2.8 %
ERYTHROCYTE [DISTWIDTH] IN BLOOD BY AUTOMATED COUNT: 13.5 %
EST. GFR  (AFRICAN AMERICAN): 14.8 ML/MIN/1.73 M^2
EST. GFR  (NON AFRICAN AMERICAN): 12.9 ML/MIN/1.73 M^2
FERRITIN SERPL-MCNC: 1131 NG/ML
GLUCOSE SERPL-MCNC: 85 MG/DL
HCT VFR BLD AUTO: 27.6 %
HGB BLD-MCNC: 8.6 G/DL
IMM GRANULOCYTES # BLD AUTO: 0.01 K/UL
IMM GRANULOCYTES NFR BLD AUTO: 0.3 %
IRON SERPL-MCNC: 38 UG/DL
LYMPHOCYTES # BLD AUTO: 0.5 K/UL
LYMPHOCYTES NFR BLD: 14.2 %
MCH RBC QN AUTO: 27.7 PG
MCHC RBC AUTO-ENTMCNC: 31.2 G/DL
MCV RBC AUTO: 89 FL
MONOCYTES # BLD AUTO: 0.4 K/UL
MONOCYTES NFR BLD: 9.9 %
NEUTROPHILS # BLD AUTO: 2.5 K/UL
NEUTROPHILS NFR BLD: 72.2 %
NRBC BLD-RTO: 0 /100 WBC
PHOSPHATE SERPL-MCNC: 4.3 MG/DL
PLATELET # BLD AUTO: 188 K/UL
PMV BLD AUTO: 11.4 FL
POCT GLUCOSE: 83 MG/DL (ref 70–110)
POTASSIUM SERPL-SCNC: 5 MMOL/L
PTH-INTACT SERPL-MCNC: 993 PG/ML
RBC # BLD AUTO: 3.1 M/UL
SATURATED IRON: 13 %
SODIUM SERPL-SCNC: 141 MMOL/L
TOTAL IRON BINDING CAPACITY: 292 UG/DL
TRANSFERRIN SERPL-MCNC: 197 MG/DL
WBC # BLD AUTO: 3.52 K/UL

## 2018-08-30 PROCEDURE — 78815 PET IMAGE W/CT SKULL-THIGH: CPT | Mod: 26,PI,, | Performed by: RADIOLOGY

## 2018-08-30 PROCEDURE — 82728 ASSAY OF FERRITIN: CPT

## 2018-08-30 PROCEDURE — 85025 COMPLETE CBC W/AUTO DIFF WBC: CPT

## 2018-08-30 PROCEDURE — 82306 VITAMIN D 25 HYDROXY: CPT

## 2018-08-30 PROCEDURE — 83540 ASSAY OF IRON: CPT

## 2018-08-30 PROCEDURE — 83970 ASSAY OF PARATHORMONE: CPT

## 2018-08-30 PROCEDURE — A9552 F18 FDG: HCPCS

## 2018-08-30 PROCEDURE — 78815 PET IMAGE W/CT SKULL-THIGH: CPT | Mod: TC,PS

## 2018-08-30 PROCEDURE — 80069 RENAL FUNCTION PANEL: CPT

## 2018-08-30 PROCEDURE — 36415 COLL VENOUS BLD VENIPUNCTURE: CPT

## 2018-08-31 ENCOUNTER — OFFICE VISIT (OUTPATIENT)
Dept: HEMATOLOGY/ONCOLOGY | Facility: CLINIC | Age: 68
End: 2018-08-31
Payer: MEDICARE

## 2018-08-31 VITALS
BODY MASS INDEX: 22.99 KG/M2 | OXYGEN SATURATION: 99 % | DIASTOLIC BLOOD PRESSURE: 79 MMHG | HEIGHT: 65 IN | WEIGHT: 138 LBS | TEMPERATURE: 98 F | HEART RATE: 83 BPM | SYSTOLIC BLOOD PRESSURE: 147 MMHG | RESPIRATION RATE: 18 BRPM

## 2018-08-31 DIAGNOSIS — D70.1 CHEMOTHERAPY-INDUCED NEUTROPENIA: ICD-10-CM

## 2018-08-31 DIAGNOSIS — D63.0 ANEMIA IN NEOPLASTIC DISEASE: ICD-10-CM

## 2018-08-31 DIAGNOSIS — N18.4 STAGE 4 CHRONIC KIDNEY DISEASE: ICD-10-CM

## 2018-08-31 DIAGNOSIS — T45.1X5A CHEMOTHERAPY-INDUCED NEUTROPENIA: ICD-10-CM

## 2018-08-31 DIAGNOSIS — K21.9 GASTROESOPHAGEAL REFLUX DISEASE WITHOUT ESOPHAGITIS: ICD-10-CM

## 2018-08-31 DIAGNOSIS — C34.12 PRIMARY ADENOCARCINOMA OF UPPER LOBE OF LEFT LUNG: Primary | ICD-10-CM

## 2018-08-31 DIAGNOSIS — R53.82 CHRONIC FATIGUE: ICD-10-CM

## 2018-08-31 PROBLEM — R53.0 NEOPLASTIC MALIGNANT RELATED FATIGUE: Status: RESOLVED | Noted: 2018-05-14 | Resolved: 2018-08-31

## 2018-08-31 PROCEDURE — 3078F DIAST BP <80 MM HG: CPT | Mod: CPTII,S$GLB,, | Performed by: INTERNAL MEDICINE

## 2018-08-31 PROCEDURE — 99214 OFFICE O/P EST MOD 30 MIN: CPT | Mod: S$GLB,,, | Performed by: INTERNAL MEDICINE

## 2018-08-31 PROCEDURE — 99999 PR PBB SHADOW E&M-EST. PATIENT-LVL III: CPT | Mod: PBBFAC,,, | Performed by: INTERNAL MEDICINE

## 2018-08-31 PROCEDURE — 3077F SYST BP >= 140 MM HG: CPT | Mod: CPTII,S$GLB,, | Performed by: INTERNAL MEDICINE

## 2018-08-31 RX ORDER — SODIUM CHLORIDE 0.9 % (FLUSH) 0.9 %
10 SYRINGE (ML) INJECTION
Status: CANCELLED | OUTPATIENT
Start: 2018-09-06

## 2018-08-31 RX ORDER — HEPARIN 100 UNIT/ML
500 SYRINGE INTRAVENOUS
Status: CANCELLED | OUTPATIENT
Start: 2018-09-06

## 2018-08-31 RX ORDER — GABAPENTIN 100 MG/1
CAPSULE ORAL DAILY
COMMUNITY
Start: 2018-08-03 | End: 2018-10-26 | Stop reason: CLARIF

## 2018-08-31 NOTE — PROGRESS NOTES
CC: Lung cancer, follow up          HPI:Ms. Baker is a 68 yr old lady here for follow up.  She had a noncontrast CT chest (due to stage 4 CKD) for hemoptysis, which showed a 4cm spiculated medial LUPIS mass and possible mediastinal LAD.  She has hepatomegaly due to polycystic liver (and kidneys).  There were small lucent areas in the sternum and T9 vertebral bodies concerning for mets.  However, PET CT did not reveal any hypermetabolic bone lesions. She underwent biopsy of the lesion on 4/4/18 in [place] and pathology reveals adenocarcinoma. She wanted to come to [facility] for treatment as she has family she can stay with here.She was diagnosed with polycystic kidney/liver when she was 18.  She is followed by Dr. Nagel in [place].  She has chronic infrequent headaches that are not worsening in severity or frequency.  She was evaluated by radiation oncology here ( ).         Interval history: She is here for a follow up visit. She has completed concurrent carboplatin/paclitaxel for stage III adenocarcinoma lung. She finished 5 out of the planned 6 weekly treatments.  She feels better, eating better. Her cough is better. Shortness of breath improved. She had repeat PET CT.      Review of Systems   Constitutional: Positive for malaise/fatigue. Negative for chills and fever.   HENT: Negative for ear discharge, ear pain and nosebleeds.    Eyes: Negative for blurred vision, double vision and photophobia.   Respiratory: Positive for cough. Negative for shortness of breath.    Cardiovascular: Negative for chest pain, claudication and leg swelling.   Gastrointestinal: Positive for abdominal pain. Negative for diarrhea, heartburn and nausea.   Genitourinary: Negative for dysuria and frequency.   Musculoskeletal: Negative for back pain.   Skin: Negative for rash.   Neurological: Negative for tremors, sensory change, speech change, focal weakness, weakness and headaches.   Endo/Heme/Allergies: Does not bruise/bleed  easily.   Psychiatric/Behavioral: Negative for depression. The patient is not nervous/anxious.        Current Outpatient Medications   Medication Sig    amLODIPine (NORVASC) 10 MG tablet Take 10 mg by mouth once daily.    gabapentin (NEURONTIN) 100 MG capsule     magnesium oxide (MAG-OX) 400 mg tablet Take 400 mg by mouth once daily.    sodium bicarbonate 650 MG tablet Take 650 mg by mouth 2 (two) times daily.    spironolactone (ALDACTONE) 25 MG tablet Take 25 mg by mouth.     No current facility-administered medications for this visit.        Vitals:    08/31/18 1341   BP: (!) 147/79   Pulse: 83   Resp: 18   Temp: 98.4 °F (36.9 °C)         Physical Exam   Constitutional: She is oriented to person, place, and time. She appears well-developed. No distress.   HENT:   Head: Normocephalic and atraumatic.   Mouth/Throat: No oropharyngeal exudate.   Eyes: Pupils are equal, round, and reactive to light. No scleral icterus.   Neck: Normal range of motion.   Cardiovascular: Normal rate, regular rhythm and normal heart sounds.   No murmur heard.  Pulmonary/Chest: Effort normal and breath sounds normal. No respiratory distress.   Abdominal: Soft. Bowel sounds are normal. She exhibits distension. There is tenderness. There is no guarding.   Lymphadenopathy:     She has no cervical adenopathy.   Neurological: She is alert and oriented to person, place, and time. No cranial nerve deficit.   Skin: Skin is warm. No erythema.   Psychiatric: She has a normal mood and affect.          Component      Latest Ref Rng & Units 8/31/2018   WBC      3.90 - 12.70 K/uL 3.56 (L)   RBC      4.00 - 5.40 M/uL 3.14 (L)   Hemoglobin      12.0 - 16.0 g/dL 9.0 (L)   Hematocrit      37.0 - 48.5 % 27.8 (L)   MCV      82 - 98 fL 89   MCH      27.0 - 31.0 pg 28.7   MCHC      32.0 - 36.0 g/dL 32.4   RDW      11.5 - 14.5 % 13.5   Platelets      150 - 350 K/uL 215   MPV      9.2 - 12.9 fL 11.8   Immature Granulocytes      0.0 - 0.5 % 0.3   Gran #  (ANC)      1.8 - 7.7 K/uL 2.6   Immature Grans (Abs)      0.00 - 0.04 K/uL 0.01   Lymph #      1.0 - 4.8 K/uL 0.5 (L)   Mono #      0.3 - 1.0 K/uL 0.3   Eos #      0.0 - 0.5 K/uL 0.1   Baso #      0.00 - 0.20 K/uL 0.04   nRBC      0 /100 WBC 0   Gran%      38.0 - 73.0 % 73.3 (H)   Lymph%      18.0 - 48.0 % 13.2 (L)   Mono%      4.0 - 15.0 % 9.0   Eosinophil%      0.0 - 8.0 % 3.1   Basophil%      0.0 - 1.9 % 1.1   Differential Method       Automated   Sodium      136 - 145 mmol/L 141   Potassium      3.5 - 5.1 mmol/L 4.7   Chloride      95 - 110 mmol/L 112 (H)   CO2      23 - 29 mmol/L 20 (L)   Glucose      70 - 110 mg/dL 114 (H)   BUN, Bld      8 - 23 mg/dL 42 (H)   Creatinine      0.5 - 1.4 mg/dL 3.5 (H)   Calcium      8.7 - 10.5 mg/dL 9.4   Total Protein      6.0 - 8.4 g/dL 7.2   Albumin      3.5 - 5.2 g/dL 3.4 (L)   Total Bilirubin      0.1 - 1.0 mg/dL 0.4   Alkaline Phosphatase      55 - 135 U/L 178 (H)   AST      10 - 40 U/L 21   ALT      10 - 44 U/L 18   Anion Gap      8 - 16 mmol/L 9   eGFR if African American      >60 mL/min/1.73 m:2 14.8 (A)   eGFR if non African American      >60 mL/min/1.73 m:2 12.9 (A)       4/13/18 PET CT        EXAMINATION:  NM PET CT ROUTINE    CLINICAL HISTORY:  Malignant neoplasm of upper lobe, left bronchus or lung.    Outside imaging demonstrated 4 cm spiculated medial left upper lobe mass (biopsy proven adenocarcinoma) with possible mediastinal lymphadenopathy as well as lucent areas involving the sternum and T9.    TECHNIQUE:  13.12 mCi of F18-FDG was administered intravenously in the right antecubital fossa.  After an approximately 60 min distribution time, PET/CT images were acquired from the skull base to the mid thigh. Transmission images were acquired to correct for attenuation using a whole body low-dose CT scan without contrast with the arms positioned above the head. Glycemia at the time of injection was 82 mg/dL.    COMPARISON:  CT abdomen pelvis 07/21/2017, MRI brain  04/13/2018    FINDINGS:  Quality of the study: Adequate.    Abnormal foci of increased uptake are present within the left upper lobe as well as a few prevascular mediastinal lymph nodes.  Lung mass measures approximately 4.6 x 2.9 cm.    No appreciable lucent lesion in the sternum or T9 vertebral body.  Mild degenerative metabolic activity is present at the sternomanubrial junction.    Index lesions:    - Left upper lobe mass: SUV max 11.1    - Lateral pre-vascular lymph node: SUV max 5.8    Physiologic uptake of the tracer is present within the brain, salivary glands, myocardium, GI and  tracts.    Incidental CT findings: Liver and kidneys are enlarged with numerous intraparenchymal cyst and associated mass effect on the adjacent abdominal organs, unchanged.  Colonic diverticulosis without diverticulitis.  Bandlike opacification within the right lower lobe likely represents subsegmental atelectasis.  Calcific atherosclerosis involves the lower extremity vasculature bilaterally.   Impression        Known malignancy of the left upper lobe with mediastinal trini metastases.    No appreciable lucent lesion in the sternum or T9 vertebral body.  Correlation with prior outside imaging is recommended.            4/13/18 MRI brain w/o cont        FINDINGS:  Intracranial compartment:    Ventricles and sulci are normal in size for age without evidence of hydrocephalus. No extra-axial blood or fluid collections.    Nonspecific small foci of T2/FLAIR high signal intensity in the supratentorial white matter, favored to represent chronic microvascular ischemic changes.  Remote lacunar type infarct in the left putamen.  Small punctate focus of susceptibility signal artifact in the left occipital lobe, suggestive of an old microhemorrhage or calcification.  No mass lesion, acute hemorrhage, edema or acute infarct.    Normal vascular flow voids are preserved.    Skull/extracranial contents (limited evaluation): Bone marrow signal  intensity is normal.   Impression        No evidence of intracranial metastases within limitation in the absence of IV contrast which decrease the sensitivity of this exam.    Nonspecific foci of T2/FLAIR high signal intensity in the supratentorial white matter, likely representing chronic microvascular ischemic changes.                   8/30/18 PET CT      CLINICAL HISTORY:  lung cancer restaging;  Malignant neoplasm of upper lobe, left bronchus or lung    FINDINGS:  Patient was administered 11.5 millicuries of FDG intravenously.  Comparison 04/13/2018.    Left upper lung lesion SUV max 5.69, previously larger SUV max 11.13.  Mediastinal lymph nodes have returned to baseline.    There is physiologic intracranial, head and neck activity.  Heart mediastinum are normal.  There is polycystic liver and kidney disease.  There is physiologic GI and  activity.  There is diverticulosis.  No bone lesions are seen.  Right lung is clear.   Impression       See above    Improvement left upper lobe lung lesion SUV max 5.69, previously 11.13 and resolution of mediastinal metastatic lymph nodes.    Severe polycystic liver and kidney disease.     Assessment:     1. Adenocarcinoma lung  2. Polycystic kidney disease  3. Polycystic liver disease  4. Ascites  5. Hypertension,essential  6. Cough  7. Dyspnea on exertion  8. CKD stage IV  9. Leukopenia  10. Anemia, normocytic, hypochromic  11. Dysphagia  12. Epistaxis  13. Weight loss  14. ANOREXIA     Plan:     1. She has a left upper lung mass that was biopsied, as well as hyper metabolic, prevascular mediastinal lymph nodes on PET CT. No other hypermetabolic lesions on PET CT. MRI brain (non-contrast) does not show any metastatic lesion. She has polycystic kidney disease and she is certainly at higher risk for renal malignancy, colon CA as well as liver CA. Slides and block were requested from [facility] and were reviewed by pathology here. It was confirmed that she has  adenocarcinoma originating in the lungs.   She started concurrent chemotherapy with Carboplatin/Paclitaxcel ( renally adjusted) as Pemetrexed was not appropriate with the reduced renal function.   Chemotherapy cycle 6 was held due to worsening renal function and leukopenia. Last treatment was on 6/4/18.  She is doing better now. PET CT done on 8/30/18 showed improvement in the left upper lobe lung lesion. SUV max was 5.69, previously 11.13 and there was resolution of mediastinal metastatic lymph nodes. I discussed these findings with her in detail. I discussed starting consolidative immunotherapy with [facility] Duravalumab.  In the randomized trial, patients, in a 2:1 ratio, were assigned to receive durvalumab (at a dose of 10 mg/kg iv) or placebo every 2 weeks for up to 12 months.   The study drug was administered 1 to 42 days after the patients had received chemoradiotherapy. The coprimary end points were PFS  (as assessed by means of blinded independent central review) and OS (unplanned for the interim analysis). Secondary end points included 12-month and 18-month progression-free survival rates, the objective response rate, the duration of response, the time to death or distant metastasis, and safety.      Of 713 patients who underwent randomization, 709 received consolidation therapy (473 received durvalumab and 236 received placebo).   The median PFS from randomization was 16.8 months (95% confidence interval [CI], 13.0 to 18.1) with durvalumab versus 5.6 months (95% CI, 4.6 to 7.8) with placebo (stratified hazard ratio for disease progression or death, 0.52; 95% CI, 0.42 to 0.65; P<0.001); the 12-month PFS was 55.9% versus 35.3%, and the 18-month progression-free survival rate was 44.2% versus 27.0%. The response rate was higher with durvalumab than with placebo (28.4% vs. 16.0%; P<0.001), and the median duration of response was longer (72.8% vs. 46.8% of the patients had an ongoing response at 18 months).   The  median time to death or distant metastasis was longer with durvalumab than with placebo (23.2 months vs. 14.6 months; P<0.001).   Grade 3 or 4 adverse events occurred in 29.9% of the patients who received durvalumab and 26.1% of those who received placebo; the most common adverse event of grade 3 or 4 was pneumonia (4.4% and 3.8%, respectively).   A total of 15.4% of patients in the durvalumab group and 9.8% of those in the placebo group discontinued the study drug because of adverse events.\  Risks and benefits were discussed in detail, consent obtained.      4. Chronic, attributed to CKD/ polycystic liver        5. On multiple anti-HT medications. /79 m Hg today      6. Possibly related to malignancy in her lungs. It is better now.      8. Serum Cr 3.5 today. She follows with nephrology     9. ANC 2.6 today     10. Secondary to CKD and chemotherapy. Serum iron saturation 33% on 5/21/18.     11. Possibly secondary to radiation. Suggested eating/drinking more liquid/semi-solid foods. She is on Ranitidine once daily. I will recommend EGD as her symptoms are persistent.       13. Now stable.        Answers for HPI/ROS submitted by the patient on 8/30/2018   appetite change : No  unexpected weight change: No  visual disturbance: No  adenopathy: No    Distress Screening Results: Psychosocial Distress screening score of Distress Score: 0 noted and reviewed. No intervention indicated.

## 2018-09-06 ENCOUNTER — OFFICE VISIT (OUTPATIENT)
Dept: NEPHROLOGY | Facility: CLINIC | Age: 68
End: 2018-09-06
Payer: MEDICARE

## 2018-09-06 VITALS
HEART RATE: 85 BPM | DIASTOLIC BLOOD PRESSURE: 78 MMHG | SYSTOLIC BLOOD PRESSURE: 130 MMHG | BODY MASS INDEX: 23.18 KG/M2 | WEIGHT: 139.13 LBS | HEIGHT: 65 IN | OXYGEN SATURATION: 100 %

## 2018-09-06 DIAGNOSIS — N18.5 STAGE 5 CHRONIC KIDNEY DISEASE NOT ON CHRONIC DIALYSIS: Primary | ICD-10-CM

## 2018-09-06 DIAGNOSIS — N18.4 STAGE 4 CHRONIC KIDNEY DISEASE: ICD-10-CM

## 2018-09-06 PROCEDURE — 1101F PT FALLS ASSESS-DOCD LE1/YR: CPT | Mod: CPTII,GC,, | Performed by: INTERNAL MEDICINE

## 2018-09-06 PROCEDURE — 3075F SYST BP GE 130 - 139MM HG: CPT | Mod: CPTII,GC,, | Performed by: INTERNAL MEDICINE

## 2018-09-06 PROCEDURE — 99214 OFFICE O/P EST MOD 30 MIN: CPT | Mod: S$PBB,GC,, | Performed by: INTERNAL MEDICINE

## 2018-09-06 PROCEDURE — 99999 PR PBB SHADOW E&M-EST. PATIENT-LVL IV: CPT | Mod: PBBFAC,GC,, | Performed by: INTERNAL MEDICINE

## 2018-09-06 PROCEDURE — 3078F DIAST BP <80 MM HG: CPT | Mod: CPTII,GC,, | Performed by: INTERNAL MEDICINE

## 2018-09-06 PROCEDURE — 99214 OFFICE O/P EST MOD 30 MIN: CPT | Mod: PBBFAC | Performed by: INTERNAL MEDICINE

## 2018-09-06 RX ORDER — FERROUS SULFATE 324(65)MG
325 TABLET, DELAYED RELEASE (ENTERIC COATED) ORAL DAILY
Refills: 0 | COMMUNITY
Start: 2018-09-06 | End: 2019-01-16

## 2018-09-06 RX ORDER — ERGOCALCIFEROL 1.25 MG/1
50000 CAPSULE ORAL
Qty: 26 CAPSULE | Refills: 1 | Status: SHIPPED | OUTPATIENT
Start: 2018-09-06 | End: 2018-10-26 | Stop reason: CLARIF

## 2018-09-06 RX ORDER — FUROSEMIDE 40 MG/1
40 TABLET ORAL 2 TIMES DAILY
Qty: 60 TABLET | Refills: 11 | Status: SHIPPED | OUTPATIENT
Start: 2018-09-06 | End: 2019-09-06

## 2018-09-06 RX ORDER — CALCITRIOL 0.25 UG/1
0.25 CAPSULE ORAL DAILY
Qty: 30 CAPSULE | Refills: 6 | Status: SHIPPED | OUTPATIENT
Start: 2018-09-06 | End: 2019-01-14

## 2018-09-06 NOTE — PROGRESS NOTES
Subjective:       Patient ID: Verónica Baker is a 68 y.o. Black or  female who presents for follow-up evaluation of Chronic Renal Failure    Here for follow up for stage V CKD secondary ADPKD, her sCr has remained more or less stable since April of this year. Since we saw her last she is complaining of weight gain and increased leg swelling.  Otherwise no complaints.      Review of Systems   Constitutional: Negative for chills, fatigue and fever.   Respiratory: Negative for chest tightness and shortness of breath.    Cardiovascular: Positive for leg swelling. Negative for chest pain.   Gastrointestinal: Negative for abdominal distention, abdominal pain, diarrhea and nausea.   Genitourinary: Negative for decreased urine volume, difficulty urinating, flank pain, frequency, hematuria and urgency.   Skin: Negative for rash.   Neurological: Negative for tremors, speech difficulty and weakness.       Objective:      Physical Exam   Constitutional: She is oriented to person, place, and time.   HENT:   Head: Atraumatic.   Eyes: EOM are normal.   Neck: No JVD present.   Cardiovascular: Normal rate and regular rhythm.   Pulmonary/Chest: Effort normal and breath sounds normal.   Abdominal: Soft. She exhibits distension.   Musculoskeletal: She exhibits edema. She exhibits no tenderness.   Neurological: She is alert and oriented to person, place, and time.   Skin: Skin is warm.   Psychiatric: She has a normal mood and affect. Her behavior is normal.       Assessment & Plan:       Stage 4 chronic kidney disease  1. CKD:  Stage V, etiology ADPKD, has been stable over past few months     Lab Results   Component Value Date    CREATININE 3.5 (H) 08/31/2018     Protein Creatinine Ratios: spilling protein, but at this time we don't think an ACE-I would do much in terms of renal protection and the risks of taking likely would outweigh benefits    Prot/Creat Ratio, Ur   Date Value Ref Range Status   08/30/2018 2.35 (H) 0.00  - 0.20 Final     ·   ·   Acid-Base: could not afford sodium bicarb tablets, will recommend half a tea spoon of baking soda daily and closely monitor labs  Lab Results   Component Value Date     08/31/2018    K 4.7 08/31/2018    CO2 20 (L) 08/31/2018     2. HTN: Blood pressures controlled in the office today    3. Renal osteodystrophy: last PTH markedly elevated, vitamin D 17, will prescribe calcitriol and weekly ergocalciferol  Lab Results   Component Value Date    .0 (H) 08/30/2018    CALCIUM 9.4 08/31/2018    PHOS 4.3 08/30/2018       4. Anemia: noted with iron deficiency, recommend she goes back on iron tables, ferritin noted at 1100, but TSAT 18%, likely the former so elevated due to h/o lung CA and active tx  Lab Results   Component Value Date    HGB 9.0 (L) 08/31/2018        5. DM:  Patient not a diabetic    7. ESRD planing: patient has been to [facility] education, she likely will need within the next year or so, we're referring to vascular surgery for fistula placement    Follow up in 1-2 months with blood work as ordered    Patient seen with Dr Castorena

## 2018-09-06 NOTE — ASSESSMENT & PLAN NOTE
1. CKD:  Stage V, etiology ADPKD, has been stable over past few months     Lab Results   Component Value Date    CREATININE 3.5 (H) 08/31/2018     Protein Creatinine Ratios: spilling protein, but at this time we don't think an ACE-I would do much in terms of renal protection and the risks of taking likely would outweigh benefits    Prot/Creat Ratio, Ur   Date Value Ref Range Status   08/30/2018 2.35 (H) 0.00 - 0.20 Final     ·   ·   Acid-Base: could not afford sodium bicarb tablets, will recommend half a tea spoon of baking soda daily and closely monitor labs  Lab Results   Component Value Date     08/31/2018    K 4.7 08/31/2018    CO2 20 (L) 08/31/2018     2. HTN: Blood pressures controlled in the office today    3. Renal osteodystrophy: last PTH markedly elevated, vitamin D 17, will prescribe calcitriol and weekly ergocalciferol  Lab Results   Component Value Date    .0 (H) 08/30/2018    CALCIUM 9.4 08/31/2018    PHOS 4.3 08/30/2018       4. Anemia: noted with iron deficiency, recommend she goes back on iron tables, ferritin noted at 1100, but TSAT 18%, likely the former so elevated due to h/o lung CA and active tx  Lab Results   Component Value Date    HGB 9.0 (L) 08/31/2018        5. DM:  Patient not a diabetic    7. ESRD planing: patient has been to [facility] education, she likely will need within the next year or so, we're referring to vascular surgery for fistula placement    Follow up in 1-2 months with blood work as ordered    Patient seen with Dr Castorena

## 2018-09-07 DIAGNOSIS — Z01.818 PREOP EXAMINATION: ICD-10-CM

## 2018-09-07 DIAGNOSIS — N18.4 STAGE 4 CHRONIC KIDNEY DISEASE: Primary | ICD-10-CM

## 2018-09-09 ENCOUNTER — HOSPITAL ENCOUNTER (EMERGENCY)
Facility: HOSPITAL | Age: 68
Discharge: HOME | End: 2018-09-10
Attending: EMERGENCY MEDICINE
Payer: MEDICARE

## 2018-09-09 DIAGNOSIS — Q61.3 POLYCYSTIC KIDNEY DISEASE: ICD-10-CM

## 2018-09-09 DIAGNOSIS — Q44.6 CYSTIC DISEASE OF LIVER: Primary | ICD-10-CM

## 2018-09-09 PROCEDURE — 99284 EMERGENCY DEPT VISIT MOD MDM: CPT | Mod: ,,, | Performed by: EMERGENCY MEDICINE

## 2018-09-09 PROCEDURE — 99284 EMERGENCY DEPT VISIT MOD MDM: CPT | Mod: 25

## 2018-09-10 VITALS
TEMPERATURE: 98 F | HEIGHT: 65 IN | BODY MASS INDEX: 23.15 KG/M2 | OXYGEN SATURATION: 97 % | DIASTOLIC BLOOD PRESSURE: 87 MMHG | RESPIRATION RATE: 20 BRPM | HEART RATE: 61 BPM | SYSTOLIC BLOOD PRESSURE: 174 MMHG

## 2018-09-10 DIAGNOSIS — C51.9 VULVAR CANCER: Primary | ICD-10-CM

## 2018-09-10 LAB
ALBUMIN SERPL BCP-MCNC: 3.5 G/DL
ALP SERPL-CCNC: 165 U/L
ALT SERPL W/O P-5'-P-CCNC: 12 U/L
ANION GAP SERPL CALC-SCNC: 9 MMOL/L
AST SERPL-CCNC: 15 U/L
BACTERIA #/AREA URNS AUTO: ABNORMAL /HPF
BASOPHILS # BLD AUTO: 0.02 K/UL
BASOPHILS NFR BLD: 0.5 %
BILIRUB SERPL-MCNC: 0.4 MG/DL
BILIRUB UR QL STRIP: NEGATIVE
BUN SERPL-MCNC: 39 MG/DL (ref 6–30)
BUN SERPL-MCNC: 40 MG/DL
CALCIUM SERPL-MCNC: 9.7 MG/DL
CHLORIDE SERPL-SCNC: 110 MMOL/L (ref 95–110)
CHLORIDE SERPL-SCNC: 111 MMOL/L
CLARITY UR REFRACT.AUTO: CLEAR
CO2 SERPL-SCNC: 19 MMOL/L
COLOR UR AUTO: YELLOW
CREAT SERPL-MCNC: 3.6 MG/DL
CREAT SERPL-MCNC: 3.9 MG/DL (ref 0.5–1.4)
DIFFERENTIAL METHOD: ABNORMAL
EOSINOPHIL # BLD AUTO: 0 K/UL
EOSINOPHIL NFR BLD: 0.7 %
ERYTHROCYTE [DISTWIDTH] IN BLOOD BY AUTOMATED COUNT: 13.1 %
EST. GFR  (AFRICAN AMERICAN): 14.3 ML/MIN/1.73 M^2
EST. GFR  (NON AFRICAN AMERICAN): 12.4 ML/MIN/1.73 M^2
GLUCOSE SERPL-MCNC: 105 MG/DL (ref 70–110)
GLUCOSE SERPL-MCNC: 107 MG/DL
GLUCOSE UR QL STRIP: ABNORMAL
HCT VFR BLD AUTO: 28.6 %
HCT VFR BLD CALC: 27 %PCV (ref 36–54)
HGB BLD-MCNC: 8.7 G/DL
HGB UR QL STRIP: NEGATIVE
HYALINE CASTS UR QL AUTO: 0 /LPF
IMM GRANULOCYTES # BLD AUTO: 0.01 K/UL
IMM GRANULOCYTES NFR BLD AUTO: 0.2 %
KETONES UR QL STRIP: NEGATIVE
LEUKOCYTE ESTERASE UR QL STRIP: ABNORMAL
LIPASE SERPL-CCNC: 46 U/L
LYMPHOCYTES # BLD AUTO: 0.5 K/UL
LYMPHOCYTES NFR BLD: 10.3 %
MCH RBC QN AUTO: 27.4 PG
MCHC RBC AUTO-ENTMCNC: 30.4 G/DL
MCV RBC AUTO: 90 FL
MICROSCOPIC COMMENT: ABNORMAL
MONOCYTES # BLD AUTO: 0.4 K/UL
MONOCYTES NFR BLD: 8.2 %
NEUTROPHILS # BLD AUTO: 3.5 K/UL
NEUTROPHILS NFR BLD: 80.1 %
NITRITE UR QL STRIP: NEGATIVE
NRBC BLD-RTO: 0 /100 WBC
PH UR STRIP: 6 [PH] (ref 5–8)
PLATELET # BLD AUTO: 239 K/UL
PMV BLD AUTO: 11.1 FL
POC IONIZED CALCIUM: 1.28 MMOL/L (ref 1.06–1.42)
POC TCO2 (MEASURED): 23 MMOL/L (ref 23–29)
POTASSIUM BLD-SCNC: 4.7 MMOL/L (ref 3.5–5.1)
POTASSIUM SERPL-SCNC: 4.8 MMOL/L
PROT SERPL-MCNC: 7.2 G/DL
PROT UR QL STRIP: ABNORMAL
RBC # BLD AUTO: 3.18 M/UL
RBC #/AREA URNS AUTO: 0 /HPF (ref 0–4)
SAMPLE: ABNORMAL
SODIUM BLD-SCNC: 141 MMOL/L (ref 136–145)
SODIUM SERPL-SCNC: 139 MMOL/L
SP GR UR STRIP: 1.01 (ref 1–1.03)
SQUAMOUS #/AREA URNS AUTO: 3 /HPF
URN SPEC COLLECT METH UR: ABNORMAL
UROBILINOGEN UR STRIP-ACNC: NEGATIVE EU/DL
WBC # BLD AUTO: 4.38 K/UL
WBC #/AREA URNS AUTO: 8 /HPF (ref 0–5)

## 2018-09-10 PROCEDURE — 25000003 PHARM REV CODE 250: Performed by: STUDENT IN AN ORGANIZED HEALTH CARE EDUCATION/TRAINING PROGRAM

## 2018-09-10 PROCEDURE — 96375 TX/PRO/DX INJ NEW DRUG ADDON: CPT

## 2018-09-10 PROCEDURE — 83690 ASSAY OF LIPASE: CPT

## 2018-09-10 PROCEDURE — 96374 THER/PROPH/DIAG INJ IV PUSH: CPT

## 2018-09-10 PROCEDURE — 81001 URINALYSIS AUTO W/SCOPE: CPT

## 2018-09-10 PROCEDURE — 96376 TX/PRO/DX INJ SAME DRUG ADON: CPT

## 2018-09-10 PROCEDURE — 85025 COMPLETE CBC W/AUTO DIFF WBC: CPT

## 2018-09-10 PROCEDURE — 63600175 PHARM REV CODE 636 W HCPCS: Performed by: STUDENT IN AN ORGANIZED HEALTH CARE EDUCATION/TRAINING PROGRAM

## 2018-09-10 PROCEDURE — 80053 COMPREHEN METABOLIC PANEL: CPT

## 2018-09-10 RX ORDER — ONDANSETRON 4 MG/1
4 TABLET, FILM COATED ORAL EVERY 6 HOURS
Qty: 12 TABLET | Refills: 0 | Status: SHIPPED | OUTPATIENT
Start: 2018-09-10 | End: 2018-09-27

## 2018-09-10 RX ORDER — PROMETHAZINE HYDROCHLORIDE 25 MG/1
25 TABLET ORAL
Status: COMPLETED | OUTPATIENT
Start: 2018-09-10 | End: 2018-09-10

## 2018-09-10 RX ORDER — MORPHINE SULFATE 4 MG/ML
4 INJECTION, SOLUTION INTRAMUSCULAR; INTRAVENOUS
Status: COMPLETED | OUTPATIENT
Start: 2018-09-10 | End: 2018-09-10

## 2018-09-10 RX ORDER — ONDANSETRON 2 MG/ML
4 INJECTION INTRAMUSCULAR; INTRAVENOUS
Status: COMPLETED | OUTPATIENT
Start: 2018-09-10 | End: 2018-09-10

## 2018-09-10 RX ORDER — HYDROCODONE BITARTRATE AND ACETAMINOPHEN 10; 325 MG/1; MG/1
1 TABLET ORAL
Status: COMPLETED | OUTPATIENT
Start: 2018-09-10 | End: 2018-09-10

## 2018-09-10 RX ORDER — OXYCODONE HYDROCHLORIDE 5 MG/1
5 TABLET ORAL
Status: COMPLETED | OUTPATIENT
Start: 2018-09-10 | End: 2018-09-10

## 2018-09-10 RX ORDER — OXYCODONE HCL 10 MG/1
10 TABLET, FILM COATED, EXTENDED RELEASE ORAL EVERY 12 HOURS PRN
Qty: 14 TABLET | Refills: 0 | Status: SHIPPED | OUTPATIENT
Start: 2018-09-10 | End: 2018-09-27

## 2018-09-10 RX ADMIN — MORPHINE SULFATE 4 MG: 4 INJECTION, SOLUTION INTRAMUSCULAR; INTRAVENOUS at 04:09

## 2018-09-10 RX ADMIN — PROMETHAZINE HYDROCHLORIDE 25 MG: 25 TABLET ORAL at 07:09

## 2018-09-10 RX ADMIN — ONDANSETRON HYDROCHLORIDE 4 MG: 2 INJECTION, SOLUTION INTRAMUSCULAR; INTRAVENOUS at 12:09

## 2018-09-10 RX ADMIN — HYDROCODONE BITARTRATE AND ACETAMINOPHEN 1 TABLET: 10; 325 TABLET ORAL at 12:09

## 2018-09-10 RX ADMIN — OXYCODONE HYDROCHLORIDE 5 MG: 5 TABLET ORAL at 07:09

## 2018-09-10 RX ADMIN — ONDANSETRON HYDROCHLORIDE 4 MG: 2 INJECTION, SOLUTION INTRAMUSCULAR; INTRAVENOUS at 04:09

## 2018-09-10 NOTE — ED TRIAGE NOTES
Pt presents to the ED c/o of abd pain that started last night. Pt states that she awoke to finding a tender mass to her right upper quadrant  Pt states that its very hard for her to get up when she's sitting laying supine. Pt denies n/v/d. States she always has diarrhea. Noted ascites with no hx of paracentesis. Pt is AAox4. Name and  checked and verified.

## 2018-09-10 NOTE — DISCHARGE INSTRUCTIONS
Follow up with your doctors (hepatologist and nephrologist) as soon as possible  Keep your immunotherapy appointment

## 2018-09-10 NOTE — ED PROVIDER NOTES
Encounter Date: 2018    SCRIBE #1 NOTE: I, Lou Moran, am scribing for, and in the presence of,  Amanda Lentz MD. I have scribed the following portions of the note - the Resident attestation and the EKG reading.       History     Chief Complaint   Patient presents with    Abdominal Pain     RUQ stabbing abd pain that began last night. Pt reports she took gas pills with no releif. Pt reports hardened knot to the RUQ that she first noticed yesterday. Pt report hx of ca and polycystic kidney disease.     HPI     67 yo man woman with PMH of signifcant polycystic liver and kidney disease, lung ca s/p chemoradiation who presents with RUQ mass. Reports that she went to bed yesterday night in her usual state of health and woke up this morning with a painful mass. Denies nausea, vomiting, diarrhea, constipation. Pain is exacerbated by moving. No worsened pain with eating. Denies recent weight loss. Cancer was in remission per her oncologist.     Review of patient's allergies indicates:   Allergen Reactions    Codeine Nausea Only    Sulfa (sulfonamide antibiotics)      Past Medical History:   Diagnosis Date    Broken wrist     20 years ago    Diverticulitis     Fractured hip     Left hip in     Gout     Hypertension     Lung cancer 2018    Neoplastic malignant related fatigue 2018    Polycystic kidney     Stage 4 chronic kidney disease 2018     Past Surgical History:   Procedure Laterality Date     SECTION      Fibroids removed      2 times    HEMORRHOIDOPEXY FOR PROLAPSING INTERNAL HEMORRHOIDS BY STAPLING N/A 2018    Procedure: HEMORRHOIDOPEXY, INTERNAL PROLAPSING, STAPLING;  Surgeon: Marcos Rey MD;  Location: [facility] OR;  Service: General;  Laterality: N/A;    HEMORRHOIDOPEXY, INTERNAL PROLAPSING, STAPLING N/A 2018    Performed by Marcos Rey MD at [facility] OR    TOTAL ABDOMINAL HYSTERECTOMY      VAGINAL DELIVERY      1 time     Family History   Problem  Relation Age of Onset    Cancer Mother     Diabetes Mother     Heart attack Father     Diabetes Father     Polycystic kidney disease Sister     Hypertension Sister     Diabetes Sister     Cancer Sister     Diabetes type II Sister     Hypertension Sister     Breast cancer Neg Hx     Colon cancer Neg Hx     Ovarian cancer Neg Hx      Social History     Tobacco Use    Smoking status: Never Smoker    Smokeless tobacco: Never Used   Substance Use Topics    Alcohol use: No    Drug use: No     Review of Systems   Constitutional: Negative for chills and fever.   HENT: Negative for sore throat.    Eyes: Negative for visual disturbance.   Respiratory: Negative for cough and shortness of breath.    Cardiovascular: Negative for chest pain and leg swelling.   Gastrointestinal: Positive for abdominal pain (Surrounding new RUQ mass). Negative for blood in stool, constipation, diarrhea, nausea and vomiting.   Genitourinary: Negative for dysuria.   Musculoskeletal: Negative for back pain.   Skin: Negative for rash.   Neurological: Negative for dizziness, syncope, light-headedness and headaches.       Physical Exam     Initial Vitals [09/09/18 2318]   BP Pulse Resp Temp SpO2   (!) 173/97 97 20 98.4 °F (36.9 °C) 100 %      MAP       --         Physical Exam    Constitutional: Vital signs are normal. She appears well-developed and well-nourished.   HENT:   Head: Normocephalic and atraumatic.   Eyes: Pupils are equal, round, and reactive to light.   Neck: Normal range of motion. Neck supple. No tracheal deviation present.   Cardiovascular: Normal rate, regular rhythm, normal heart sounds and intact distal pulses.   Pulmonary/Chest:   Decreased breath sounds in L base   Abdominal: Soft. Bowel sounds are normal. She exhibits no mass. There is tenderness. There is no rigidity, no rebound, no guarding and no CVA tenderness.   approx 5cm palpable, tender mass to RUQ overlying liver   Neurological: She is alert and oriented  to person, place, and time. She has normal strength. No sensory deficit.   Skin: Skin is warm and dry.         ED Course   Procedures  Labs Reviewed - No data to display     HO-II MDM:    69 yo man woman with PMH of signifcant polycystic liver and kidney disease, lung ca s/p chemoradiation who presents with RUQ mass. Ddx concerning for hernia vs cyst. Lower suspicion for malignancy or metastasis given sudden onset of mass. Mass is soft, unable to reduce, tender to touch. Pt does not have signs of obstruction. Labs, CTAP ordered. Norco given for pain, zofran for nausea.    Erum Miles MD MPH  PGY2 [employer] [facility]  9/9/2018 11:30 PM    HO-II update:    Spoke to radiology - mass appears to be cyst off liver that is protruding externally. Since this is complication of known cystic disease and pt is hemodynamically stable, will discharge with pain medicine and instruct her to follow up with her outpatient hepatologist and nephrologist.     Erum Miles MD MPH  PGY2 [employer] [facility]  9/10/2018 3:04 AM    HO-II update:    Pt drove 90 miles to get here and will not discharge on pain medication without someone to drive her home. She was observed in ED until sister arrives to pick her up. Oxycodone given for pain. She will follow up with Dr Pires for new liver cyst.    Erum Miles MD MPH  PGY2 [employer] [facility]  9/10/2018 7:46 AM                  Imaging Results    None                     Scribe Attestation:   Scribe #1: I performed the above scribed service and the documentation accurately describes the services I performed. I attest to the accuracy of the note.    Attending Attestation:   Physician Attestation Statement for Resident:  As the supervising MD   Physician Attestation Statement: I have personally seen and examined this patient.   I agree with the above history. -: 67 y/o female with hx of lung CA and polycystic liver and kidney disease. Patient c/o RUQ that began last night after eating wendys chili with onions. Patient also noticed buldge  to upper abdomen.    As the supervising MD I agree with the above PE.   -: On exam patient is obviously uncomfortable, sitting upright on the bed. Patient has abdominal distension.A firm tender mass noted just inferior to right costal margin. Palpation in this area reproduces her pain. No rashes or erythema   As the supervising MD I agree with the above treatment, course, plan, and disposition.   -: Patient's labs show no change from chronic CKD. We did perform CT of abd and pelvis that show similar to previous CTs, numerable cysts to kidneys and liver including a large cyst in RUQ which may contain hemmorhagic material. Patient drove herself from home in [place], [place] which is 90 miles away. We will keep in ED and attempt to control pain until she can call her sister to come get her.                       Clinical Impression:   The primary encounter diagnosis was Cystic disease of liver. A diagnosis of Polycystic kidney disease was also pertinent to this visit.      Disposition:   Disposition: Discharged                        Erum Miles MD  Resident  09/10/18 5577

## 2018-09-10 NOTE — ED NOTES
Bed: [facility] 01  Expected date:   Expected time:   Means of arrival:   Comments:

## 2018-09-11 ENCOUNTER — INFUSION (OUTPATIENT)
Dept: INFUSION THERAPY | Facility: HOSPITAL | Age: 68
End: 2018-09-11
Attending: INTERNAL MEDICINE
Payer: MEDICARE

## 2018-09-11 VITALS
TEMPERATURE: 98 F | HEART RATE: 62 BPM | RESPIRATION RATE: 18 BRPM | HEIGHT: 65 IN | WEIGHT: 139 LBS | SYSTOLIC BLOOD PRESSURE: 160 MMHG | BODY MASS INDEX: 23.16 KG/M2 | DIASTOLIC BLOOD PRESSURE: 84 MMHG

## 2018-09-11 DIAGNOSIS — C34.12 PRIMARY ADENOCARCINOMA OF UPPER LOBE OF LEFT LUNG: Primary | ICD-10-CM

## 2018-09-11 PROCEDURE — 25000003 PHARM REV CODE 250: Performed by: INTERNAL MEDICINE

## 2018-09-11 PROCEDURE — 96413 CHEMO IV INFUSION 1 HR: CPT

## 2018-09-11 PROCEDURE — 63600175 PHARM REV CODE 636 W HCPCS: Mod: TB | Performed by: INTERNAL MEDICINE

## 2018-09-11 PROCEDURE — C9492 INJECTION, DURVALUMAB: HCPCS | Mod: TB | Performed by: INTERNAL MEDICINE

## 2018-09-11 RX ORDER — SODIUM CHLORIDE 0.9 % (FLUSH) 0.9 %
10 SYRINGE (ML) INJECTION
Status: DISCONTINUED | OUTPATIENT
Start: 2018-09-11 | End: 2018-09-11 | Stop reason: HOSPADM

## 2018-09-11 RX ORDER — HEPARIN 100 UNIT/ML
500 SYRINGE INTRAVENOUS
Status: DISCONTINUED | OUTPATIENT
Start: 2018-09-11 | End: 2018-09-11 | Stop reason: HOSPADM

## 2018-09-11 RX ADMIN — SODIUM CHLORIDE: 0.9 INJECTION, SOLUTION INTRAVENOUS at 03:09

## 2018-09-11 RX ADMIN — DURVALUMAB 625 MG: 120 INJECTION, SOLUTION INTRAVENOUS at 03:09

## 2018-09-11 NOTE — PLAN OF CARE
Problem: Chemotherapy Effects (Adult)  Goal: Signs and Symptoms of Listed Potential Problems Will be Absent, Minimized or Managed (Chemotherapy Effects)  Signs and symptoms of listed potential problems will be absent, minimized or managed by discharge/transition of care (reference Chemotherapy Effects (Adult) CPG).  Outcome: Ongoing (interventions implemented as appropriate)  Pt here for imfinzi infusion D1C1, consent signed 8/31/18, labs, hx, meds, allergies reviewed, pt with no complaints at this time, reclined in chair, continue to monitor

## 2018-09-11 NOTE — PLAN OF CARE
Problem: Patient Care Overview  Goal: Plan of Care Review  Outcome: Ongoing (interventions implemented as appropriate)  Pt tolerated imfinzi infusion without issue, pt has no upcoming appts scheduled at this time, no distress noted upon d/c to home

## 2018-09-12 ENCOUNTER — TELEPHONE (OUTPATIENT)
Dept: NEPHROLOGY | Facility: CLINIC | Age: 68
End: 2018-09-12

## 2018-09-12 ENCOUNTER — TELEPHONE (OUTPATIENT)
Dept: GYNECOLOGIC ONCOLOGY | Facility: CLINIC | Age: 68
End: 2018-09-12

## 2018-09-12 NOTE — TELEPHONE ENCOUNTER
----- Message from Guero Escobar MD sent at 9/12/2018  7:51 AM CDT -----  Patient needs appt with me. Please call her. Thank you.

## 2018-09-12 NOTE — TELEPHONE ENCOUNTER
----- Message from Angelina Castaneda sent at 9/12/2018 11:51 AM CDT -----  Contact: [employer]  .Pharmacy Calling    Reason for call: Clarify, Duplicate  Pharmacy Name: [facility]  Prescription Name:    ergocalciferol (ERGOCALCIFEROL) 50,000 unit Cap,calcitRIOL  & (ROCALTROL) 0.25 MCG Cap  Phone Number: [phone]  Additional Information:    Pharmacy had a question should pt be on both medications listed above as per Dr. Harris last note on 9/6/2018 yes pt is to take both meds Vit d and calcitriol

## 2018-09-25 ENCOUNTER — TELEPHONE (OUTPATIENT)
Dept: HEMATOLOGY/ONCOLOGY | Facility: CLINIC | Age: 68
End: 2018-09-25

## 2018-09-26 ENCOUNTER — TELEPHONE (OUTPATIENT)
Dept: GYNECOLOGIC ONCOLOGY | Facility: CLINIC | Age: 68
End: 2018-09-26

## 2018-09-26 PROBLEM — C43.59 MALIGNANT MELANOMA OF TORSO EXCLUDING BREAST: Status: ACTIVE | Noted: 2018-09-26

## 2018-09-27 ENCOUNTER — INITIAL CONSULT (OUTPATIENT)
Dept: GYNECOLOGIC ONCOLOGY | Facility: CLINIC | Age: 68
End: 2018-09-27
Payer: MEDICARE

## 2018-09-27 ENCOUNTER — TELEPHONE (OUTPATIENT)
Dept: GYNECOLOGIC ONCOLOGY | Facility: CLINIC | Age: 68
End: 2018-09-27

## 2018-09-27 VITALS
SYSTOLIC BLOOD PRESSURE: 137 MMHG | HEIGHT: 65 IN | HEART RATE: 76 BPM | DIASTOLIC BLOOD PRESSURE: 78 MMHG | WEIGHT: 135.38 LBS | BODY MASS INDEX: 22.56 KG/M2

## 2018-09-27 DIAGNOSIS — Q61.3 POLYCYSTIC KIDNEY DISEASE: ICD-10-CM

## 2018-09-27 DIAGNOSIS — C43.59 MALIGNANT MELANOMA OF TORSO EXCLUDING BREAST: Primary | ICD-10-CM

## 2018-09-27 DIAGNOSIS — Q44.6 POLYCYSTIC LIVER DISEASE: ICD-10-CM

## 2018-09-27 DIAGNOSIS — C34.12 PRIMARY ADENOCARCINOMA OF UPPER LOBE OF LEFT LUNG: ICD-10-CM

## 2018-09-27 PROCEDURE — 1101F PT FALLS ASSESS-DOCD LE1/YR: CPT | Mod: CPTII,,, | Performed by: OBSTETRICS & GYNECOLOGY

## 2018-09-27 PROCEDURE — 99205 OFFICE O/P NEW HI 60 MIN: CPT | Mod: S$PBB,,, | Performed by: OBSTETRICS & GYNECOLOGY

## 2018-09-27 PROCEDURE — 99213 OFFICE O/P EST LOW 20 MIN: CPT | Mod: PBBFAC | Performed by: OBSTETRICS & GYNECOLOGY

## 2018-09-27 PROCEDURE — 99999 PR PBB SHADOW E&M-EST. PATIENT-LVL III: CPT | Mod: PBBFAC,,, | Performed by: OBSTETRICS & GYNECOLOGY

## 2018-09-27 NOTE — LETTER
September 28, 2018      Mala Ontiveros MD  [address]  [address]  [place] [facility] [place] [address]           [facility] GYN [facility]  [address]  [place] [address]  Phone: [phone]          Patient: Verónica Baker   MR Number: [id number]   YOB: 1950   Date of Visit: 9/27/2018       Dear Dr. Mala Ontiveros:    Thank you for referring Verónica Baker to me for evaluation. Attached you will find relevant portions of my assessment and plan of care.    If you have questions, please do not hesitate to call me. I look forward to following Verónica Baker along with you.    Sincerely,    Guero Escobar MD    Enclosure  CC:  No Recipients    If you would like to receive this communication electronically, please contact [email or web address] or [phone] to request more information on [email or web address] Link access.    For providers and/or their staff who would like to refer a patient to [facility], please contact us through our one-stop-shop provider referral line, [facility] , at [phone].    If you feel you have received this communication in error or would no longer like to receive these types of communications, please e-mail [email or web address]

## 2018-09-27 NOTE — PROGRESS NOTES
Subjective:       Patient ID: Verónica Baker is a 68 y.o. female.    Chief Complaint: Advice Only (Dr. Ontiveros) and Vulvar Cancer    HPI     Patient comes in today for evaluation of malignant melanoma of the vuvla. Initial biopsy in 2018 was originally read as keratinizing hyperplastic squamous epithelium with scattered juctional nests of atypical melanocytes. Pathology was read at [place]. institional review in 2018 was reported a malignant melanoma. Tim's level 3 with Breslow depth 1.6 mm.     Pt reports itching at L vulva site, no VB or other complaints.    Medical history: lung CA, HTN, polycystic kidneys (50yrs) and liver    Surgical history: NEENA BSO (fibroids), two myomectomies, C/S x 2    Family history negative for skin, breast, uterine, ovarian or colon CA. Mother had some type of cancer but patient unsure what kind.  Past Medical History:   Diagnosis Date    Broken wrist     20 years ago    Diverticulitis     Fractured hip     Left hip in     Gout     Hypertension     Lung cancer 2018    Malignant melanoma of torso excluding breast 2018    Neoplastic malignant related fatigue 2018    Polycystic kidney     Polycystic liver disease 2018    Stage 4 chronic kidney disease 2018     Past Surgical History:   Procedure Laterality Date     SECTION      Fibroids removed      2 times    HEMORRHOIDOPEXY FOR PROLAPSING INTERNAL HEMORRHOIDS BY STAPLING N/A 2018    Procedure: HEMORRHOIDOPEXY, INTERNAL PROLAPSING, STAPLING;  Surgeon: Marcos Rey MD;  Location: [facility] OR;  Service: General;  Laterality: N/A;    HEMORRHOIDOPEXY, INTERNAL PROLAPSING, STAPLING N/A 2018    Performed by Marcos Rey MD at [facility] OR    TOTAL ABDOMINAL HYSTERECTOMY      VAGINAL DELIVERY      1 time     Family History   Problem Relation Age of Onset    Cancer Mother     Diabetes Mother     Heart attack Father     Diabetes Father     Polycystic kidney disease Sister      Hypertension Sister     Diabetes Sister     Cancer Sister     Diabetes type II Sister     Hypertension Sister     Breast cancer Neg Hx     Colon cancer Neg Hx     Ovarian cancer Neg Hx      Social History     Tobacco Use    Smoking status: Never Smoker    Smokeless tobacco: Never Used   Substance Use Topics    Alcohol use: No    Drug use: No     Review of patient's allergies indicates:   Allergen Reactions    Codeine Nausea Only    Sulfa (sulfonamide antibiotics)        Current Outpatient Medications:     amLODIPine (NORVASC) 10 MG tablet, Take 10 mg by mouth once daily., Disp: , Rfl:     calcitRIOL (ROCALTROL) 0.25 MCG Cap, Take 1 capsule (0.25 mcg total) by mouth once daily., Disp: 30 capsule, Rfl: 6    ferrous sulfate 324 mg (65 mg iron) TbEC, Take 1 tablet (324 mg total) by mouth once daily., Disp: , Rfl: 0    furosemide (LASIX) 40 MG tablet, Take 1 tablet (40 mg total) by mouth 2 (two) times daily., Disp: 60 tablet, Rfl: 11    gabapentin (NEURONTIN) 100 MG capsule, once daily. , Disp: , Rfl:     magnesium oxide (MAG-OX) 400 mg tablet, Take 400 mg by mouth once daily., Disp: , Rfl:     spironolactone (ALDACTONE) 25 MG tablet, Take 25 mg by mouth., Disp: , Rfl:     ergocalciferol (ERGOCALCIFEROL) 50,000 unit Cap, Take 1 capsule (50,000 Units total) by mouth every 7 days., Disp: 26 capsule, Rfl: 1    Review of Systems   Constitutional: Positive for fatigue (s/p chemo for lung CA/PCKD). Negative for activity change, appetite change, chills, diaphoresis, fever and unexpected weight change.   HENT: Negative for mouth sores and tinnitus.    Respiratory: Positive for shortness of breath (abd distention from PCKD). Negative for cough, chest tightness and wheezing.    Cardiovascular: Positive for leg swelling. Negative for chest pain and palpitations.   Gastrointestinal: Positive for abdominal distention (PCKD) and diarrhea. Negative for abdominal pain, blood in stool, constipation, nausea and  "vomiting.   Genitourinary: Positive for genital sores. Negative for difficulty urinating, dysuria, flank pain, frequency, hematuria, pelvic pain, vaginal bleeding, vaginal discharge and vaginal pain.   Musculoskeletal: Negative for arthralgias and back pain.   Skin: Negative for color change and rash.   Neurological: Negative for dizziness, weakness, numbness and headaches.   Hematological: Negative for adenopathy.   Psychiatric/Behavioral: Negative for confusion and sleep disturbance. The patient is not nervous/anxious.        Objective:   /78   Pulse 76   Ht 5' 5" (1.651 m)   Wt 61.4 kg (135 lb 5.8 oz)   BMI 22.53 kg/m²      Physical Exam   Constitutional: She is oriented to person, place, and time. She appears well-developed. No distress.   HENT:   Head: Normocephalic.   Eyes: Scleral icterus is present.   Neck: Normal range of motion.   Pulmonary/Chest: Effort normal.   Abdominal: Soft. She exhibits distension.   Firm, distended abdomen up to xiphoid   Genitourinary:         Genitourinary Comments: Bimanual exam:  Vulva:   Urethra: Normal size and location. No lesions  Bladder: No masses or tenderness.  Vagina: normal mucosa. No lesion  Cervix: absent.   Uterus: absent.  Adnexa: no masses. Exam limited by significant abdominal distention.  Rectovaginal: deferred   Musculoskeletal: Normal range of motion. She exhibits no edema.   Neurological: She is alert and oriented to person, place, and time.   Skin: Skin is warm and dry. No rash noted. She is not diaphoretic. No pallor.   Psychiatric: She has a normal mood and affect. Her behavior is normal.   Nursing note and vitals reviewed.      Assessment:       1. Malignant melanoma of torso excluding breast    2. Polycystic kidney disease    3. Primary adenocarcinoma of upper lobe of left lung    4. Polycystic liver disease        Plan:   Malignant melanoma of torso excluding breast  -     X-Ray Chest PA And Lateral; Future; Expected date: 09/27/2018  -     " Ambulatory referral to Anesthesiology  -     Vital signs; Standing  -     Insert peripheral IV; Standing  -     lidocaine (PF) 10 mg/ml (1%) injection 10 mg; Inject 1 mL (10 mg total) into the skin once.  -     Verify beta-blocker dose taken within 24 hours if patient is prescribed beta-blocker ; Standing  -     Verify discontinuation of anti thrombotics ; Standing  -     Verify Blood Consent ; Standing  -     Verify consent; Standing  -     Verify surgical site documentation; Standing  -     Void on call to OR; Standing  -     Chlorhexidine (CHG) 2% Wipes; Standing  -     Notify Physician/Vital Signs Parameters; Standing  -     Notify physician ; Standing  -     Diet NPO; Standing  -     Chlorohexidine Gluconate Bath; Standing  -     mupirocin 2 % ointment; by Nasal route On call Procedure (surgery).  -     Pulse Oximetry Q4H; Standing  -     Place in Outpatient; Standing  -     Place SHAKEEL hose; Standing  -     Place sequential compression device; Standing  -     ceFAZolin (ANCEF) 2 g in dextrose 5 % 50 mL IVPB; Inject 2 g into the vein On call Procedure (Surgery).  -     Type And Screen Preop; Future; Expected date: 09/28/2018    Polycystic kidney disease    Primary adenocarcinoma of upper lobe of left lung    Polycystic liver disease    Other orders  -     Bed rest With bathroom privileges; Standing  -     IP VTE LOW RISK PATIENT; Standing    - plan for left partial vulvectomy, bilateral SLN dissection coordinate with Dr. Osei  - anesthesia clearance as above due to medical comorbidities, significant abdominal distention with PC kidney/liver disease, lung cancer  - [facility]  - The risks, benefits, and indications for surgery were discussed with the patient. These included bleeding, infection, damage to surrounding tissues, the possibility of urologic resection and reconstruction, and the possibility of major complications including death. She voiced understanding, all questions were  answered and consents were signed.    Preop orders placed.

## 2018-09-28 ENCOUNTER — INFUSION (OUTPATIENT)
Dept: INFUSION THERAPY | Facility: HOSPITAL | Age: 68
End: 2018-09-28
Attending: INTERNAL MEDICINE
Payer: MEDICARE

## 2018-09-28 ENCOUNTER — OFFICE VISIT (OUTPATIENT)
Dept: HEMATOLOGY/ONCOLOGY | Facility: CLINIC | Age: 68
End: 2018-09-28
Payer: MEDICARE

## 2018-09-28 ENCOUNTER — HOSPITAL ENCOUNTER (OUTPATIENT)
Dept: RADIOLOGY | Facility: HOSPITAL | Age: 68
Discharge: HOME | End: 2018-09-28
Attending: NURSE PRACTITIONER
Payer: MEDICARE

## 2018-09-28 VITALS
TEMPERATURE: 98 F | DIASTOLIC BLOOD PRESSURE: 75 MMHG | SYSTOLIC BLOOD PRESSURE: 130 MMHG | RESPIRATION RATE: 18 BRPM | HEART RATE: 84 BPM

## 2018-09-28 VITALS
SYSTOLIC BLOOD PRESSURE: 137 MMHG | DIASTOLIC BLOOD PRESSURE: 78 MMHG | RESPIRATION RATE: 18 BRPM | HEART RATE: 79 BPM | BODY MASS INDEX: 22.81 KG/M2 | HEIGHT: 65 IN | OXYGEN SATURATION: 99 % | TEMPERATURE: 98 F | WEIGHT: 136.88 LBS

## 2018-09-28 DIAGNOSIS — C43.59 MALIGNANT MELANOMA OF TORSO EXCLUDING BREAST: ICD-10-CM

## 2018-09-28 DIAGNOSIS — Q44.6 POLYCYSTIC LIVER DISEASE: ICD-10-CM

## 2018-09-28 DIAGNOSIS — C34.12 PRIMARY ADENOCARCINOMA OF UPPER LOBE OF LEFT LUNG: ICD-10-CM

## 2018-09-28 DIAGNOSIS — D63.0 ANEMIA IN NEOPLASTIC DISEASE: ICD-10-CM

## 2018-09-28 DIAGNOSIS — K21.9 GASTROESOPHAGEAL REFLUX DISEASE WITHOUT ESOPHAGITIS: Primary | ICD-10-CM

## 2018-09-28 DIAGNOSIS — N18.4 STAGE 4 CHRONIC KIDNEY DISEASE: ICD-10-CM

## 2018-09-28 DIAGNOSIS — R53.82 CHRONIC FATIGUE: ICD-10-CM

## 2018-09-28 DIAGNOSIS — I10 HYPERTENSION, UNSPECIFIED TYPE: ICD-10-CM

## 2018-09-28 DIAGNOSIS — Q61.3 POLYCYSTIC KIDNEY DISEASE: ICD-10-CM

## 2018-09-28 DIAGNOSIS — C34.12 PRIMARY ADENOCARCINOMA OF UPPER LOBE OF LEFT LUNG: Primary | ICD-10-CM

## 2018-09-28 PROCEDURE — 25000003 PHARM REV CODE 250: Performed by: INTERNAL MEDICINE

## 2018-09-28 PROCEDURE — C9492 INJECTION, DURVALUMAB: HCPCS | Mod: TB | Performed by: INTERNAL MEDICINE

## 2018-09-28 PROCEDURE — 71046 X-RAY EXAM CHEST 2 VIEWS: CPT | Mod: TC

## 2018-09-28 PROCEDURE — 3078F DIAST BP <80 MM HG: CPT | Mod: CPTII,,, | Performed by: INTERNAL MEDICINE

## 2018-09-28 PROCEDURE — 1101F PT FALLS ASSESS-DOCD LE1/YR: CPT | Mod: CPTII,,, | Performed by: INTERNAL MEDICINE

## 2018-09-28 PROCEDURE — 63600175 PHARM REV CODE 636 W HCPCS: Mod: TB | Performed by: INTERNAL MEDICINE

## 2018-09-28 PROCEDURE — 96413 CHEMO IV INFUSION 1 HR: CPT

## 2018-09-28 PROCEDURE — 71046 X-RAY EXAM CHEST 2 VIEWS: CPT | Mod: 26,,, | Performed by: RADIOLOGY

## 2018-09-28 PROCEDURE — 3075F SYST BP GE 130 - 139MM HG: CPT | Mod: CPTII,,, | Performed by: INTERNAL MEDICINE

## 2018-09-28 PROCEDURE — 99214 OFFICE O/P EST MOD 30 MIN: CPT | Mod: S$PBB,,, | Performed by: INTERNAL MEDICINE

## 2018-09-28 PROCEDURE — 99214 OFFICE O/P EST MOD 30 MIN: CPT | Mod: PBBFAC,25 | Performed by: INTERNAL MEDICINE

## 2018-09-28 PROCEDURE — 99999 PR PBB SHADOW E&M-EST. PATIENT-LVL IV: CPT | Mod: PBBFAC,,, | Performed by: INTERNAL MEDICINE

## 2018-09-28 RX ORDER — SODIUM CHLORIDE 0.9 % (FLUSH) 0.9 %
10 SYRINGE (ML) INJECTION
Status: DISCONTINUED | OUTPATIENT
Start: 2018-09-28 | End: 2018-09-28 | Stop reason: HOSPADM

## 2018-09-28 RX ORDER — HEPARIN 100 UNIT/ML
500 SYRINGE INTRAVENOUS
Status: CANCELLED | OUTPATIENT
Start: 2018-09-28

## 2018-09-28 RX ORDER — MUPIROCIN 20 MG/G
OINTMENT TOPICAL
Status: CANCELLED | OUTPATIENT
Start: 2018-11-05

## 2018-09-28 RX ORDER — SODIUM CHLORIDE 0.9 % (FLUSH) 0.9 %
10 SYRINGE (ML) INJECTION
Status: CANCELLED | OUTPATIENT
Start: 2018-09-28

## 2018-09-28 RX ORDER — HEPARIN 100 UNIT/ML
500 SYRINGE INTRAVENOUS
Status: DISCONTINUED | OUTPATIENT
Start: 2018-09-28 | End: 2018-09-28 | Stop reason: HOSPADM

## 2018-09-28 RX ORDER — LIDOCAINE HYDROCHLORIDE 10 MG/ML
1 INJECTION, SOLUTION EPIDURAL; INFILTRATION; INTRACAUDAL; PERINEURAL ONCE
Status: CANCELLED | OUTPATIENT
Start: 2018-09-28 | End: 2018-09-28

## 2018-09-28 RX ADMIN — SODIUM CHLORIDE: 0.9 INJECTION, SOLUTION INTRAVENOUS at 03:09

## 2018-09-28 RX ADMIN — DURVALUMAB 625 MG: 120 INJECTION, SOLUTION INTRAVENOUS at 03:09

## 2018-09-28 NOTE — PLAN OF CARE
Problem: Patient Care Overview  Goal: Plan of Care Review  Outcome: Ongoing (interventions implemented as appropriate)  1610 -- Patient tolerated treatment well.  Vital signs stable.  No apparent distress noted.  Discharged without S/S of adverse effects.  AVS given.  Patient instructed to call provider with any questions or concerns.

## 2018-09-28 NOTE — PLAN OF CARE
Problem: Patient Care Overview  Goal: Individualization & Mutuality  Outcome: Ongoing (interventions implemented as appropriate)  1441 -- Patient's labs, history, allergies, and medication reviewed.  Assessment complete.  Vital signs stable.  Patient to receive Imfinzi.  Discussed plan of care with patient.  Patient in agreement. Chair reclined.  Warm blanket and snack offered.  Will monitor.

## 2018-09-28 NOTE — PROGRESS NOTES
CC: Lung cancer, follow up          HPI:Ms. Baker is a 68 yr old lady here for follow up.  She had a noncontrast CT chest (due to stage 4 CKD) for hemoptysis, which showed a 4cm spiculated medial LUPIS mass and possible mediastinal LAD.  She has hepatomegaly due to polycystic liver (and kidneys).  There were small lucent areas in the sternum and T9 vertebral bodies concerning for mets.  However, PET CT did not reveal any hypermetabolic bone lesions. She underwent biopsy of the lesion on 4/4/18 in [place] and pathology reveals adenocarcinoma. She wanted to come to [facility] for treatment as she has family she can stay with here.She was diagnosed with polycystic kidney/liver when she was 18.  She is followed by Dr. Nagel in [place].  She has chronic infrequent headaches that are not worsening in severity or frequency.  She was evaluated by radiation oncology here ( ).         Interval history: She is here for a follow up visit. She has completed concurrent carboplatin/paclitaxel for stage III adenocarcinoma lung. She finished 5 out of the planned 6 weekly treatments.  She feels better, eating better. Her cough is better. Shortness of breath improved. She had repeat PET CT.     Review of Systems   Constitutional: Positive for malaise/fatigue. Negative for chills, fever and weight loss.   HENT: Negative for ear discharge, ear pain and nosebleeds.    Eyes: Negative for blurred vision, double vision, photophobia and pain.   Respiratory: Negative for cough, hemoptysis, sputum production and shortness of breath.    Cardiovascular: Negative for chest pain, palpitations and orthopnea.   Gastrointestinal: Negative for abdominal pain, heartburn, nausea and vomiting.   Genitourinary: Negative for dysuria, frequency and urgency.   Musculoskeletal: Negative for back pain, myalgias and neck pain.   Skin: Negative for rash.   Neurological: Negative for dizziness, tingling, tremors, sensory change, weakness and headaches.    Endo/Heme/Allergies: Does not bruise/bleed easily.   Psychiatric/Behavioral: Negative for depression, substance abuse and suicidal ideas.       Current Outpatient Medications   Medication Sig    amLODIPine (NORVASC) 10 MG tablet Take 10 mg by mouth once daily.    calcitRIOL (ROCALTROL) 0.25 MCG Cap Take 1 capsule (0.25 mcg total) by mouth once daily.    ferrous sulfate 324 mg (65 mg iron) TbEC Take 1 tablet (324 mg total) by mouth once daily.    furosemide (LASIX) 40 MG tablet Take 1 tablet (40 mg total) by mouth 2 (two) times daily.    gabapentin (NEURONTIN) 100 MG capsule once daily.     magnesium oxide (MAG-OX) 400 mg tablet Take 400 mg by mouth once daily.    spironolactone (ALDACTONE) 25 MG tablet Take 25 mg by mouth.    ergocalciferol (ERGOCALCIFEROL) 50,000 unit Cap Take 1 capsule (50,000 Units total) by mouth every 7 days.     No current facility-administered medications for this visit.      Vitals:    09/28/18 1343   BP: 137/78   Pulse: 79   Resp: 18   Temp: 98.4 °F (36.9 °C)       Physical Exam   Constitutional: She is oriented to person, place, and time. She appears well-developed. No distress.   HENT:   Head: Normocephalic and atraumatic.   Mouth/Throat: No oropharyngeal exudate.   Eyes: Pupils are equal, round, and reactive to light. No scleral icterus.   Cardiovascular: Normal rate.   Pulmonary/Chest: Effort normal. No respiratory distress. She has no wheezes. She has no rales.   Abdominal: She exhibits distension. There is no tenderness. There is no rebound and no guarding.   Musculoskeletal: She exhibits edema.   Lymphadenopathy:     She has no cervical adenopathy.   Neurological: She is alert and oriented to person, place, and time. No cranial nerve deficit.   Skin: Skin is warm.   Psychiatric: She has a normal mood and affect.       4/13/18 PET CT        EXAMINATION:  NM PET CT ROUTINE    CLINICAL HISTORY:  Malignant neoplasm of upper lobe, left bronchus or lung.    Outside imaging  demonstrated 4 cm spiculated medial left upper lobe mass (biopsy proven adenocarcinoma) with possible mediastinal lymphadenopathy as well as lucent areas involving the sternum and T9.    TECHNIQUE:  13.12 mCi of F18-FDG was administered intravenously in the right antecubital fossa.  After an approximately 60 min distribution time, PET/CT images were acquired from the skull base to the mid thigh. Transmission images were acquired to correct for attenuation using a whole body low-dose CT scan without contrast with the arms positioned above the head. Glycemia at the time of injection was 82 mg/dL.    COMPARISON:  CT abdomen pelvis 07/21/2017, MRI brain 04/13/2018    FINDINGS:  Quality of the study: Adequate.    Abnormal foci of increased uptake are present within the left upper lobe as well as a few prevascular mediastinal lymph nodes.  Lung mass measures approximately 4.6 x 2.9 cm.    No appreciable lucent lesion in the sternum or T9 vertebral body.  Mild degenerative metabolic activity is present at the sternomanubrial junction.    Index lesions:    - Left upper lobe mass: SUV max 11.1    - Lateral pre-vascular lymph node: SUV max 5.8    Physiologic uptake of the tracer is present within the brain, salivary glands, myocardium, GI and  tracts.    Incidental CT findings: Liver and kidneys are enlarged with numerous intraparenchymal cyst and associated mass effect on the adjacent abdominal organs, unchanged.  Colonic diverticulosis without diverticulitis.  Bandlike opacification within the right lower lobe likely represents subsegmental atelectasis.  Calcific atherosclerosis involves the lower extremity vasculature bilaterally.   Impression        Known malignancy of the left upper lobe with mediastinal trini metastases.    No appreciable lucent lesion in the sternum or T9 vertebral body.  Correlation with prior outside imaging is recommended.            4/13/18 MRI brain w/o cont        FINDINGS:  Intracranial  compartment:    Ventricles and sulci are normal in size for age without evidence of hydrocephalus. No extra-axial blood or fluid collections.    Nonspecific small foci of T2/FLAIR high signal intensity in the supratentorial white matter, favored to represent chronic microvascular ischemic changes.  Remote lacunar type infarct in the left putamen.  Small punctate focus of susceptibility signal artifact in the left occipital lobe, suggestive of an old microhemorrhage or calcification.  No mass lesion, acute hemorrhage, edema or acute infarct.    Normal vascular flow voids are preserved.    Skull/extracranial contents (limited evaluation): Bone marrow signal intensity is normal.   Impression        No evidence of intracranial metastases within limitation in the absence of IV contrast which decrease the sensitivity of this exam.    Nonspecific foci of T2/FLAIR high signal intensity in the supratentorial white matter, likely representing chronic microvascular ischemic changes.                        8/30/18 PET CT        CLINICAL HISTORY:  lung cancer restaging;  Malignant neoplasm of upper lobe, left bronchus or lung    FINDINGS:  Patient was administered 11.5 millicuries of FDG intravenously.  Comparison 04/13/2018.    Left upper lung lesion SUV max 5.69, previously larger SUV max 11.13.  Mediastinal lymph nodes have returned to baseline.    There is physiologic intracranial, head and neck activity.  Heart mediastinum are normal.  There is polycystic liver and kidney disease.  There is physiologic GI and  activity.  There is diverticulosis.  No bone lesions are seen.  Right lung is clear.   Impression        See above    Improvement left upper lobe lung lesion SUV max 5.69, previously 11.13 and resolution of mediastinal metastatic lymph nodes.    Severe polycystic liver and kidney disease.      Component      Latest Ref Rng & Units 9/28/2018   WBC      3.90 - 12.70 K/uL 4.10   RBC      4.00 - 5.40 M/uL 3.19 (L)    Hemoglobin      12.0 - 16.0 g/dL 8.6 (L)   Hematocrit      37.0 - 48.5 % 27.7 (L)   MCV      82 - 98 fL 87   MCH      27.0 - 31.0 pg 27.0   MCHC      32.0 - 36.0 g/dL 31.0 (L)   RDW      11.5 - 14.5 % 13.1   Platelets      150 - 350 K/uL 247   MPV      9.2 - 12.9 fL 10.9   Immature Granulocytes      0.0 - 0.5 % 0.2   Gran # (ANC)      1.8 - 7.7 K/uL 3.0   Immature Grans (Abs)      0.00 - 0.04 K/uL 0.01   Lymph #      1.0 - 4.8 K/uL 0.5 (L)   Mono #      0.3 - 1.0 K/uL 0.4   Eos #      0.0 - 0.5 K/uL 0.1   Baso #      0.00 - 0.20 K/uL 0.04   nRBC      0 /100 WBC 0   Gran%      38.0 - 73.0 % 72.9   Lymph%      18.0 - 48.0 % 12.7 (L)   Mono%      4.0 - 15.0 % 10.5   Eosinophil%      0.0 - 8.0 % 2.7   Basophil%      0.0 - 1.9 % 1.0   Differential Method       Automated   Sodium      136 - 145 mmol/L 143   Potassium      3.5 - 5.1 mmol/L 4.0   Chloride      95 - 110 mmol/L 109   CO2      23 - 29 mmol/L 22 (L)   Glucose      70 - 110 mg/dL 90   BUN, Bld      8 - 23 mg/dL 53 (H)   Creatinine      0.5 - 1.4 mg/dL 3.9 (H)   Calcium      8.7 - 10.5 mg/dL 9.2   Total Protein      6.0 - 8.4 g/dL 6.8   Albumin      3.5 - 5.2 g/dL 3.2 (L)   Total Bilirubin      0.1 - 1.0 mg/dL 0.3   Alkaline Phosphatase      55 - 135 U/L 130   AST      10 - 40 U/L 16   ALT      10 - 44 U/L 9 (L)   Anion Gap      8 - 16 mmol/L 12   eGFR if African American      >60 mL/min/1.73 m:2 13.0 (A)   eGFR if non African American      >60 mL/min/1.73 m:2 11.3 (A)       Assessment:     1. Adenocarcinoma lung  2. Polycystic kidney disease  3. Polycystic liver disease  4. Ascites  5. Hypertension,essential  6. Cough  7. Dyspnea on exertion  8. CKD stage IV  9. Leukopenia  10. Anemia, normocytic, hypochromic  11. Dysphagia  12. Epistaxis  13. Weight loss  14. ANOREXIA  15. Vulvar melanoma     Plan:     1. She has a left upper lung mass that was biopsied, as well as hyper metabolic, prevascular mediastinal lymph nodes on PET CT. No other hypermetabolic lesions  on PET CT. MRI brain (non-contrast) does not show any metastatic lesion. She has polycystic kidney disease and she is certainly at higher risk for renal malignancy, colon CA as well as liver CA. Slides and block were requ[facility] from [facility] and were reviewed by pathology here. It was confirmed that she has adenocarcinoma originating in the lungs.   She started concurrent chemotherapy with Carboplatin/Paclitaxcel ( renally adjusted) as Pemetrexed was not appropriate with the reduced renal function.   Chemotherapy cycle 6 was held due to worsening renal function and leukopenia. Last treatment was on 6/4/18.  She is doing better now. PET CT done on 8/30/18 showed improvement in the left upper lobe lung lesion. SUV max was 5.69, previously 11.13 and there was resolution of mediastinal metastatic lymph nodes. I discussed these findings with her in detail. I discussed starting consolidative immunotherapy with CKI Duravalumab.  She has received 1 treatment so far.      4. Chronic, attributed to CKD/ polycystic liver        5. On multiple anti-HT medications. /78 mm Hg today      6. Possibly related to malignancy in her lungs. It is better now.      8. Serum Cr 3.9 today. She follows with nephrology. Next visit on 10/11/18     9. ANC 3 today     10. Secondary to CKD and chemotherapy. Serum iron saturation 33% on 5/21/18.     11. Possibly secondary to radiation. Suggested eating/drinking more liquid/semi-solid foods. She is on Ranitidine once daily. I will recommend EGD as her symptoms are persistent.       13. Now stable.     15. Discovered on biopsy done on 6/21/178. Tim's level 3, Breslow depth 1.6mm. [facility] who is planning wide local excision and SLN dissection. She needs neuro imaging. The lesion mccormack snot appear to be PET avid, on the last PET CT done in Aug 2018.           Distress Screening Results: Psychosocial Distress screening score of Distress Score: 1 noted and  reviewed. No intervention indicated.

## 2018-10-01 ENCOUNTER — OFFICE VISIT (OUTPATIENT)
Dept: SURGERY | Facility: CLINIC | Age: 68
End: 2018-10-01
Payer: MEDICARE

## 2018-10-01 VITALS
RESPIRATION RATE: 16 BRPM | HEART RATE: 76 BPM | SYSTOLIC BLOOD PRESSURE: 144 MMHG | DIASTOLIC BLOOD PRESSURE: 82 MMHG | BODY MASS INDEX: 22.92 KG/M2 | TEMPERATURE: 98 F | HEIGHT: 65 IN | WEIGHT: 137.56 LBS

## 2018-10-01 DIAGNOSIS — Z98.890 POST-OPERATIVE STATE: Primary | ICD-10-CM

## 2018-10-01 PROCEDURE — 99024 POSTOP FOLLOW-UP VISIT: CPT | Mod: ,,, | Performed by: SURGERY

## 2018-10-01 PROCEDURE — 99999 PR PBB SHADOW E&M-EST. PATIENT-LVL III: CPT | Mod: PBBFAC,,, | Performed by: SURGERY

## 2018-10-01 PROCEDURE — 99213 OFFICE O/P EST LOW 20 MIN: CPT | Mod: PBBFAC,PO | Performed by: SURGERY

## 2018-10-01 RX ORDER — DIBUCAINE 1 %
OINTMENT (GRAM) TOPICAL 3 TIMES DAILY
Qty: 28 G | Refills: 3 | Status: SHIPPED | OUTPATIENT
Start: 2018-10-01 | End: 2018-10-26 | Stop reason: CLARIF

## 2018-10-01 NOTE — PROGRESS NOTES
"2 months s/p PPH.  No further bleeding, but "raw feeling" with BM.  Still having 4-5 BMs per day from chemo treatments.    Healing nicely.  Trial of Dibucaine ointment.   "

## 2018-10-02 DIAGNOSIS — C43.9 MALIGNANT MELANOMA, UNSPECIFIED SITE: Primary | ICD-10-CM

## 2018-10-11 ENCOUNTER — TELEPHONE (OUTPATIENT)
Dept: VASCULAR SURGERY | Facility: CLINIC | Age: 68
End: 2018-10-11

## 2018-10-11 NOTE — TELEPHONE ENCOUNTER
----- Message from Carolyn Martin sent at 10/11/2018  8:44 AM CDT -----  Contact: Pt.Self   Pt. States she would like to speak with nurse in reference to rescheduling appts. For sometime next week dates not showing up on my end when trying to reschedule please call Pt. Back @ [phone] Thank You

## 2018-10-12 ENCOUNTER — INFUSION (OUTPATIENT)
Dept: INFUSION THERAPY | Facility: HOSPITAL | Age: 68
End: 2018-10-12
Attending: NURSE PRACTITIONER
Payer: MEDICARE

## 2018-10-12 ENCOUNTER — RESEARCH ENCOUNTER (OUTPATIENT)
Dept: RESEARCH | Facility: HOSPITAL | Age: 68
End: 2018-10-12

## 2018-10-12 ENCOUNTER — OFFICE VISIT (OUTPATIENT)
Dept: HEMATOLOGY/ONCOLOGY | Facility: CLINIC | Age: 68
End: 2018-10-12
Payer: MEDICARE

## 2018-10-12 VITALS
TEMPERATURE: 98 F | DIASTOLIC BLOOD PRESSURE: 83 MMHG | HEART RATE: 90 BPM | BODY MASS INDEX: 22.11 KG/M2 | HEIGHT: 65 IN | WEIGHT: 132.69 LBS | RESPIRATION RATE: 18 BRPM | SYSTOLIC BLOOD PRESSURE: 152 MMHG | OXYGEN SATURATION: 98 %

## 2018-10-12 VITALS
SYSTOLIC BLOOD PRESSURE: 132 MMHG | DIASTOLIC BLOOD PRESSURE: 70 MMHG | RESPIRATION RATE: 18 BRPM | TEMPERATURE: 98 F | OXYGEN SATURATION: 99 % | HEART RATE: 68 BPM

## 2018-10-12 DIAGNOSIS — Q44.6 POLYCYSTIC LIVER DISEASE: ICD-10-CM

## 2018-10-12 DIAGNOSIS — C34.12 PRIMARY ADENOCARCINOMA OF UPPER LOBE OF LEFT LUNG: Primary | ICD-10-CM

## 2018-10-12 DIAGNOSIS — R53.82 CHRONIC FATIGUE: Primary | ICD-10-CM

## 2018-10-12 DIAGNOSIS — N18.4 STAGE 4 CHRONIC KIDNEY DISEASE: ICD-10-CM

## 2018-10-12 DIAGNOSIS — C34.12 PRIMARY ADENOCARCINOMA OF UPPER LOBE OF LEFT LUNG: ICD-10-CM

## 2018-10-12 DIAGNOSIS — K21.9 GASTROESOPHAGEAL REFLUX DISEASE WITHOUT ESOPHAGITIS: ICD-10-CM

## 2018-10-12 DIAGNOSIS — D63.0 ANEMIA IN NEOPLASTIC DISEASE: ICD-10-CM

## 2018-10-12 DIAGNOSIS — C43.59 MALIGNANT MELANOMA OF TORSO EXCLUDING BREAST: ICD-10-CM

## 2018-10-12 DIAGNOSIS — I10 HYPERTENSION, UNSPECIFIED TYPE: ICD-10-CM

## 2018-10-12 PROCEDURE — 3077F SYST BP >= 140 MM HG: CPT | Mod: CPTII,,, | Performed by: INTERNAL MEDICINE

## 2018-10-12 PROCEDURE — 3079F DIAST BP 80-89 MM HG: CPT | Mod: CPTII,,, | Performed by: INTERNAL MEDICINE

## 2018-10-12 PROCEDURE — 99213 OFFICE O/P EST LOW 20 MIN: CPT | Mod: PBBFAC,25 | Performed by: INTERNAL MEDICINE

## 2018-10-12 PROCEDURE — 25000003 PHARM REV CODE 250: Performed by: INTERNAL MEDICINE

## 2018-10-12 PROCEDURE — 99999 PR PBB SHADOW E&M-EST. PATIENT-LVL III: CPT | Mod: PBBFAC,,, | Performed by: INTERNAL MEDICINE

## 2018-10-12 PROCEDURE — 96413 CHEMO IV INFUSION 1 HR: CPT

## 2018-10-12 PROCEDURE — C9492 INJECTION, DURVALUMAB: HCPCS | Mod: TB | Performed by: INTERNAL MEDICINE

## 2018-10-12 PROCEDURE — 63600175 PHARM REV CODE 636 W HCPCS: Mod: TB | Performed by: INTERNAL MEDICINE

## 2018-10-12 PROCEDURE — 99214 OFFICE O/P EST MOD 30 MIN: CPT | Mod: S$PBB,,, | Performed by: INTERNAL MEDICINE

## 2018-10-12 PROCEDURE — 1101F PT FALLS ASSESS-DOCD LE1/YR: CPT | Mod: CPTII,,, | Performed by: INTERNAL MEDICINE

## 2018-10-12 RX ORDER — SODIUM CHLORIDE 0.9 % (FLUSH) 0.9 %
10 SYRINGE (ML) INJECTION
Status: DISCONTINUED | OUTPATIENT
Start: 2018-10-12 | End: 2018-10-12 | Stop reason: HOSPADM

## 2018-10-12 RX ORDER — SODIUM CHLORIDE 0.9 % (FLUSH) 0.9 %
10 SYRINGE (ML) INJECTION
Status: CANCELLED | OUTPATIENT
Start: 2018-10-12

## 2018-10-12 RX ORDER — HEPARIN 100 UNIT/ML
500 SYRINGE INTRAVENOUS
Status: CANCELLED | OUTPATIENT
Start: 2018-10-12

## 2018-10-12 RX ORDER — HEPARIN 100 UNIT/ML
500 SYRINGE INTRAVENOUS
Status: DISCONTINUED | OUTPATIENT
Start: 2018-10-12 | End: 2018-10-12 | Stop reason: HOSPADM

## 2018-10-12 RX ADMIN — DURVALUMAB 625 MG: 120 INJECTION, SOLUTION INTRAVENOUS at 05:10

## 2018-10-12 RX ADMIN — SODIUM CHLORIDE: 9 INJECTION, SOLUTION INTRAVENOUS at 04:10

## 2018-10-12 NOTE — PLAN OF CARE
Problem: Chemotherapy Effects (Adult)  Goal: Signs and Symptoms of Listed Potential Problems Will be Absent, Minimized or Managed (Chemotherapy Effects)  Signs and symptoms of listed potential problems will be absent, minimized or managed by discharge/transition of care (reference Chemotherapy Effects (Adult) CPG).  Pt admitted for infusion of Imvinzi, side effects and self care tips reviewed with Pt, Pt verbalized understanding. Pt ambulated onto unit unassisted.

## 2018-10-12 NOTE — PLAN OF CARE
Problem: Patient Care Overview  Goal: Plan of Care Review  Outcome: Ongoing (interventions implemented as appropriate)  Pt completed tx, tolerated well. AVS given to Pt and plan of care reviewed and Pt instructed to contact MD with any further questions or concerns. Pt verbalized understanding. Ambulated off unit unassisted

## 2018-10-12 NOTE — PROGRESS NOTES
"The patient was approached by me in [facility] regarding participation in [employer]'s ABS#[id number] (melanoma blood study) (IRB#[id number]). Pt was agreeable.    Of note, although the patient marked on page 1 of the ICF that she is not involved in any other research studies, please note that she is enrolled in Dr. Rutherford's [employer] trial - "An Observational Study Profiling Biospecimens from Cancer Patients to Screen for Molecular Alterations" (9941.619). From previous discussion with Dr. Rutherford, there is no problem with the patient being dually enrolled.     The Informed Consent Form (ICF) was reviewed with pt. The discussion included:    - participation is voluntary and will not prohibit her from participating in future research studies;  - the blood specimen (1 x 10 ml) will be collected at time of patient's next routine blood draw;   - pt can change her mind about participating at any time;  - if she changes her mind about participation, she can call us at contact info in the ICF, and we will discard samples remaining;  - samples that have been used prior to her notification will still be included in research;  - specimens will be stored with unique code that can only be linked to pt by [employer] staff;  - all medical information released to researchers will be stripped of identifiers;  - samples will not be released to outside researchers unless approved by internal committee;  - there is a small risk of loss of confidentiality, but we make every effort to ensure privacy;  - no other physical risks outside of those involved in standard of care procedure.    Dr. Pires approved of the patient's participation and will continue to take care of the patient per his usual protocol - participation in [employer] program will not change the patient's present or future medical care. Pt did not have any questions. Pt willingly and independently signed the ICF. A copy of the signed ICF and my business card were given to pt with " instructions to call with any questions that may arise or if she should change her mind regarding participation in [employer] program.

## 2018-10-12 NOTE — PROGRESS NOTES
CC: Lung cancer, follow up          HPI:Ms. Baker is a 68 yr old lady here for follow up.  She had a noncontrast CT chest (due to stage 4 CKD) for hemoptysis, which showed a 4cm spiculated medial LUPIS mass and possible mediastinal LAD.  She has hepatomegaly due to polycystic liver (and kidneys).  There were small lucent areas in the sternum and T9 vertebral bodies concerning for mets.  However, PET CT did not reveal any hypermetabolic bone lesions. She underwent biopsy of the lesion on 4/4/18 in [place] and pathology reveals adenocarcinoma. She wanted to come to [facility] for treatment as she has family she can stay with here.She was diagnosed with polycystic kidney/liver when she was 18.  She is followed by Dr. Nagel in [place].  She has chronic infrequent headaches that are not worsening in severity or frequency.  She was evaluated by radiation oncology here ( ).    She completed concurrent carboplatin/paclitaxel for stage III adenocarcinoma lung. She finished 5 out of the planned 6 weekly treatments, with last treatment in 1st week of June 2018.   PET CT done on 8/30/18 showed improvement in the left upper lobe lung lesion. SUV max was 5.69, previously 11.13 and there was resolution of mediastinal metastatic lymph nodes.  She started consolidation treatment with Durvalumab from 9/11/18.  She has been diagnosed with vulvar melanoma.      Interval history: She is here for a follow up visit. She feels better, eating better. Her cough is better. Shortness of breath improved.       Review of Systems   Constitutional: Positive for malaise/fatigue. Negative for chills, fever and weight loss.   HENT: Negative for congestion, ear discharge and nosebleeds.    Eyes: Negative for blurred vision, double vision, photophobia and pain.   Respiratory: Positive for shortness of breath. Negative for cough and hemoptysis.    Cardiovascular: Negative for chest pain, palpitations, orthopnea and claudication.    Gastrointestinal: Positive for diarrhea. Negative for abdominal pain, blood in stool, constipation, heartburn, nausea and vomiting.   Genitourinary: Negative for dysuria, frequency and urgency.   Musculoskeletal: Negative for myalgias and neck pain.   Skin: Negative for rash.   Neurological: Positive for weakness. Negative for dizziness, tingling, tremors and headaches.   Endo/Heme/Allergies: Does not bruise/bleed easily.   Psychiatric/Behavioral: Negative for depression, substance abuse and suicidal ideas.       Current Outpatient Medications   Medication Sig    amLODIPine (NORVASC) 10 MG tablet Take 10 mg by mouth once daily.    calcitRIOL (ROCALTROL) 0.25 MCG Cap Take 1 capsule (0.25 mcg total) by mouth once daily.    dibucaine 1% (NUPERCAINAL) 1 % ointment Place rectally 3 (three) times daily.    ergocalciferol (ERGOCALCIFEROL) 50,000 unit Cap Take 1 capsule (50,000 Units total) by mouth every 7 days.    ferrous sulfate 324 mg (65 mg iron) TbEC Take 1 tablet (324 mg total) by mouth once daily.    furosemide (LASIX) 40 MG tablet Take 1 tablet (40 mg total) by mouth 2 (two) times daily.    gabapentin (NEURONTIN) 100 MG capsule once daily.     magnesium oxide (MAG-OX) 400 mg tablet Take 400 mg by mouth once daily.    spironolactone (ALDACTONE) 25 MG tablet Take 25 mg by mouth.     No current facility-administered medications for this visit.              Vitals:    10/12/18 1512   BP: (!) 152/83   Pulse: 90   Resp: 18   Temp: 98 °F (36.7 °C)     Physical Exam   Constitutional: She is oriented to person, place, and time. She appears well-developed.   HENT:   Head: Normocephalic and atraumatic.   Mouth/Throat: No oropharyngeal exudate.   Eyes: Pupils are equal, round, and reactive to light. No scleral icterus.   Neck: Normal range of motion.   Cardiovascular: Normal rate and regular rhythm.   No murmur heard.  Pulmonary/Chest: Effort normal and breath sounds normal. No respiratory distress. She has no  wheezes.   Abdominal: She exhibits distension. There is no tenderness. There is no rebound and no guarding.   Musculoskeletal: She exhibits no edema.   Lymphadenopathy:     She has no cervical adenopathy.   Neurological: She is alert and oriented to person, place, and time. No cranial nerve deficit.   Skin: Skin is warm.   Psychiatric: She has a normal mood and affect.       4/13/18 PET CT        EXAMINATION:  NM PET CT ROUTINE    CLINICAL HISTORY:  Malignant neoplasm of upper lobe, left bronchus or lung.    Outside imaging demonstrated 4 cm spiculated medial left upper lobe mass (biopsy proven adenocarcinoma) with possible mediastinal lymphadenopathy as well as lucent areas involving the sternum and T9.    TECHNIQUE:  13.12 mCi of F18-FDG was administered intravenously in the right antecubital fossa.  After an approximately 60 min distribution time, PET/CT images were acquired from the skull base to the mid thigh. Transmission images were acquired to correct for attenuation using a whole body low-dose CT scan without contrast with the arms positioned above the head. Glycemia at the time of injection was 82 mg/dL.    COMPARISON:  CT abdomen pelvis 07/21/2017, MRI brain 04/13/2018    FINDINGS:  Quality of the study: Adequate.    Abnormal foci of increased uptake are present within the left upper lobe as well as a few prevascular mediastinal lymph nodes.  Lung mass measures approximately 4.6 x 2.9 cm.    No appreciable lucent lesion in the sternum or T9 vertebral body.  Mild degenerative metabolic activity is present at the sternomanubrial junction.    Index lesions:    - Left upper lobe mass: SUV max 11.1    - Lateral pre-vascular lymph node: SUV max 5.8    Physiologic uptake of the tracer is present within the brain, salivary glands, myocardium, GI and  tracts.    Incidental CT findings: Liver and kidneys are enlarged with numerous intraparenchymal cyst and associated mass effect on the adjacent abdominal organs,  unchanged.  Colonic diverticulosis without diverticulitis.  Bandlike opacification within the right lower lobe likely represents subsegmental atelectasis.  Calcific atherosclerosis involves the lower extremity vasculature bilaterally.   Impression        Known malignancy of the left upper lobe with mediastinal trini metastases.    No appreciable lucent lesion in the sternum or T9 vertebral body.  Correlation with prior outside imaging is recommended.            4/13/18 MRI brain w/o cont        FINDINGS:  Intracranial compartment:    Ventricles and sulci are normal in size for age without evidence of hydrocephalus. No extra-axial blood or fluid collections.    Nonspecific small foci of T2/FLAIR high signal intensity in the supratentorial white matter, favored to represent chronic microvascular ischemic changes.  Remote lacunar type infarct in the left putamen.  Small punctate focus of susceptibility signal artifact in the left occipital lobe, suggestive of an old microhemorrhage or calcification.  No mass lesion, acute hemorrhage, edema or acute infarct.    Normal vascular flow voids are preserved.    Skull/extracranial contents (limited evaluation): Bone marrow signal intensity is normal.   Impression        No evidence of intracranial metastases within limitation in the absence of IV contrast which decrease the sensitivity of this exam.    Nonspecific foci of T2/FLAIR high signal intensity in the supratentorial white matter, likely representing chronic microvascular ischemic changes.               8/30/18 PET CT        CLINICAL HISTORY:  lung cancer restaging;  Malignant neoplasm of upper lobe, left bronchus or lung    FINDINGS:  Patient was administered 11.5 millicuries of FDG intravenously.  Comparison 04/13/2018.    Left upper lung lesion SUV max 5.69, previously larger SUV max 11.13.  Mediastinal lymph nodes have returned to baseline.    There is physiologic intracranial, head and neck activity.  Heart  mediastinum are normal.  There is polycystic liver and kidney disease.  There is physiologic GI and  activity.  There is diverticulosis.  No bone lesions are seen.  Right lung is clear.   Impression        See above    Improvement left upper lobe lung lesion SUV max 5.69, previously 11.13 and resolution of mediastinal metastatic lymph nodes.    Severe polycystic liver and kidney disease.              Component      Latest Ref Rng & Units 10/12/2018   WBC      3.90 - 12.70 K/uL 3.27 (L)   RBC      4.00 - 5.40 M/uL 3.46 (L)   Hemoglobin      12.0 - 16.0 g/dL 9.3 (L)   Hematocrit      37.0 - 48.5 % 29.7 (L)   MCV      82 - 98 fL 86   MCH      27.0 - 31.0 pg 26.9 (L)   MCHC      32.0 - 36.0 g/dL 31.3 (L)   RDW      11.5 - 14.5 % 14.0   Platelets      150 - 350 K/uL 217   MPV      9.2 - 12.9 fL 11.3   Immature Granulocytes      0.0 - 0.5 % 0.3   Gran # (ANC)      1.8 - 7.7 K/uL 2.3   Immature Grans (Abs)      0.00 - 0.04 K/uL 0.01   Lymph #      1.0 - 4.8 K/uL 0.5 (L)   Mono #      0.3 - 1.0 K/uL 0.3   Eos #      0.0 - 0.5 K/uL 0.1   Baso #      0.00 - 0.20 K/uL 0.02   nRBC      0 /100 WBC 0   Gran%      38.0 - 73.0 % 71.6   Lymph%      18.0 - 48.0 % 15.9 (L)   Mono%      4.0 - 15.0 % 9.5   Eosinophil%      0.0 - 8.0 % 2.1   Basophil%      0.0 - 1.9 % 0.6   Differential Method       Automated   Sodium      136 - 145 mmol/L 141   Potassium      3.5 - 5.1 mmol/L 3.9   Chloride      95 - 110 mmol/L 108   CO2      23 - 29 mmol/L 20 (L)   Glucose      70 - 110 mg/dL 133 (H)   BUN, Bld      8 - 23 mg/dL 50 (H)   Creatinine      0.5 - 1.4 mg/dL 3.8 (H)   Calcium      8.7 - 10.5 mg/dL 9.3   Total Protein      6.0 - 8.4 g/dL 7.2   Albumin      3.5 - 5.2 g/dL 3.4 (L)   Total Bilirubin      0.1 - 1.0 mg/dL 0.3   Alkaline Phosphatase      55 - 135 U/L 139 (H)   AST      10 - 40 U/L 17   ALT      10 - 44 U/L 15   Anion Gap      8 - 16 mmol/L 13   eGFR if African American      >60 mL/min/1.73 m:2 13.4 (A)   eGFR if non African  American      >60 mL/min/1.73 m:2 11.6 (A)     Assessment:     1. Adenocarcinoma lung  2. Polycystic kidney disease  3. Polycystic liver disease  4. Ascites  5. Hypertension,essential  6. Cough  7. Dyspnea on exertion  8. CKD stage IV  9. Leukopenia  10. Anemia, normocytic, hypochromic  11. Dysphagia  12. Epistaxis  13. Weight loss  14. ANOREXIA  15. Vulvar melanoma     Plan:     1. She has a left upper lung mass that was biopsied, as well as hyper metabolic, prevascular mediastinal lymph nodes on PET CT. No other hypermetabolic lesions on PET CT. MRI brain (non-contrast) does not show any metastatic lesion. She has polycystic kidney disease and she is certainly at higher risk for renal malignancy, colon CA as well as liver CA. Slides and block were requested from [facility] and were reviewed by pathology here. It was confirmed that she has adenocarcinoma originating in the lungs.   She started concurrent chemotherapy with Carboplatin/Paclitaxcel ( renally adjusted) as Pemetrexed was not appropriate with the reduced renal function.   Chemotherapy cycle 6 was held due to worsening renal function and leukopenia. Last treatment was on 6/4/18.  She is doing better now. PET CT done on 8/30/18 showed improvement in the left upper lobe lung lesion. SUV max was 5.69, previously 11.13 and there was resolution of mediastinal metastatic lymph nodes. I discussed these findings with her in detail. I discussed starting consolidative immunotherapy with CKI Duravalumab on 9/11/18.  She has received 2 treatments so far. She has upcoming vulvar surgery ( for melanoma). She will hold CKI prior to the surgery and will resume the current CKI or a different one ( if indicated for adjuvant melanoma therapy) 2 weeks after surgery.      4. Chronic, attributed to CKD/ polycystic liver        5. On multiple anti-HT medications. /83 mm Hg today      6. Possibly related to malignancy in her lungs. It is better now.      8.  Serum Cr 3.8 today. She follows with nephrology.      9. ANC 2.3 today     10. Secondary to CKD and chemotherapy. Serum iron saturation 33% on 5/21/18.     11. Possibly secondary to radiation. Suggested eating/drinking more liquid/semi-solid foods. She is on Ranitidine once daily. I will recommend EGD as her symptoms are persistent.       13. Now stable.      15. Discovered on biopsy done on 6/21/178. Tim's level 3, Breslow depth 1.6mm. She is being seen by  ([facility]) who is planning wide local excision and SLN dissection. She needs neuro imaging. The lesion does not appear to be PET avid, on the last PET CT done in Aug 2018. Plan is for left partial vulvectomy, bilateral SLN dissection on 10/29/18                  Distress Screening Results: Psychosocial Distress screening score of Distress Score: 0 noted and reviewed. No intervention indicated.

## 2018-10-18 ENCOUNTER — TELEPHONE (OUTPATIENT)
Dept: VASCULAR SURGERY | Facility: CLINIC | Age: 68
End: 2018-10-18

## 2018-10-18 NOTE — TELEPHONE ENCOUNTER
----- Message from Maty Vogt sent at 10/18/2018  8:07 AM CDT -----  Contact: Patient  Patient is calling to reschedule their appointment for today, please contact the patient [phone]

## 2018-10-25 ENCOUNTER — ANESTHESIA EVENT (OUTPATIENT)
Dept: SURGERY | Facility: HOSPITAL | Age: 68
End: 2018-10-25
Payer: MEDICARE

## 2018-10-26 ENCOUNTER — TELEPHONE (OUTPATIENT)
Dept: INTERNAL MEDICINE | Facility: CLINIC | Age: 68
End: 2018-10-26

## 2018-10-26 ENCOUNTER — TELEPHONE (OUTPATIENT)
Dept: GYNECOLOGIC ONCOLOGY | Facility: CLINIC | Age: 68
End: 2018-10-26

## 2018-10-26 ENCOUNTER — DOCUMENTATION ONLY (OUTPATIENT)
Dept: NEPHROLOGY | Facility: CLINIC | Age: 68
End: 2018-10-26

## 2018-10-26 NOTE — TELEPHONE ENCOUNTER
----- Message from Milli Juárez RN sent at 10/25/2018  5:04 PM CDT -----  Hello Provider,     You patient indicated above requires pre-op clearance/ risk stratification for a Vulvectomy/ Lymphadenectomy  with Lukasz Osei on  10/29/2018. They were last seen in your office on 8/2/2018.     If a chart review is  appropriate for clearance, please indicated in a note in the EMR.      If not, given the time constraints can the patient be seen ASAP.    If further diagnostics are required please feel free to initiate.    Milli  [id number]

## 2018-10-26 NOTE — PROGRESS NOTES
"Was called about "renal clearance" for surgery. The patient has advanced renal failure and there will be a risk that she will end up on dialysis with a significant blood pressure drop during surgery. However, that said, if the surgery is necessary she will need to have it. This decision needs to be made by the Surgeon. We will support the patient after surgery with dialysis in needed. Further risk assessment by anesthesiology.    "

## 2018-10-26 NOTE — TELEPHONE ENCOUNTER
----- Message from Milli Juárez RN sent at 10/25/2018  5:04 PM CDT -----  Hello Provider,     You patient indicated above requires pre-op clearance/ risk stratification for a Vulvectomy/ Lymphadenectomy  with Lukasz Osei on  10/29/2018. They were last seen in your office on 8/2/2018.     If a chart review is  appropriate for clearance, please indicated in a note in the EMR.      If not, given the time constraints can the patient be seen ASAP.    If further diagnostics are required please feel free to initiate.    Milli  [phone]

## 2018-10-26 NOTE — PROGRESS NOTES
The patient, Verónica Baker , who is a 68 y.o.  female  With primary adenocarcinoma of the lung, Polycystic kidney disease with CKD stage V, HTN, GERD, and vulvar cancer who needs preoperative chart review/risk stratification.     Dr Escobar     HPI:  Vulvar cancer-  malignant melanoma of the vuvla. Initial biopsy in June 2018 was originally read as keratinizing hyperplastic squamous epithelium with scattered juctional nests of atypical melanocytes. Pathology was read at [place]. institional review in Sept 2018 was reported a malignant melanoma  -initial consult with Dr Escobar 9/27/2018.   - plan for left partial vulvectomy, bilateral SLN dissection coordinate with Dr. Osei (Monday 10/29/2018)    HTN- taking aldactone 25mg daily and amlodipine 10mg daily. BP 10/12/2018 ( most recent) at goal.      Polycystic kidney disease and CKD stage 5- seen by Dr Harris 9/6/2018  -secondary ADpolycystic kidney disease  -used to be seen by a nephrologist in [place] prior to moving to [place], up until about a year ago she says she used to be a stage III, but then sCr started to abruptly increase over the past year, her ACE-I was stopped.  Creatinine has been 3.2-3.4 since coming [place] and [place] [facility] [place]    -not a transplant candidate 2/2 active malignancy   -referred for vein mapping and dialysis education   - Creatinine 3.8 10/12/2018    Anemia- with iron deficiency, ferritin 1100 but TSAT 18%. Taking ferrous sulfate   H&H 9.3 and 29.7 (up from 8.6 and 27.7)     Secondary hyperparathyroidism  (elevated) started on calcitriol and weekly ergocalciferol by nephology        Adenocarcinoma of the lung- followed by Dr Pires last visit 7/2/2018  -s/p chemo and radiation   -noncontrast CT chest (due to stage 4 CKD) for hemoptysis, which showed a 4cm spiculated medial LUPIS mass and possible mediastinal LAD.  -PET CT did not reveal any hypermetabolic bone lesions. She underwent biopsy of the lesion on  18 in [place] and pathology reveals adenocarcinoma.   -completed concurrent carboplatin/paclitaxel for stage III adenocarcinoma lung. She finished 5 out of the planned 6 weekly treatments. Chemotherapy cycle 6 was held due to worsening renal function and leukopenia. Last treatment was on 18  -PET CT done on 18 showed improvement in the left upper lobe lung lesion  -starting consolidative immunotherapy with CKI Duravalumab.  She has received 1 treatment so far 2018      Patient Active Problem List   Diagnosis    Primary adenocarcinoma of upper lobe of left lung    Polycystic kidney disease    Other ascites    Bruising    Anemia in neoplastic disease    Chemotherapy-induced neutropenia    Gastroesophageal reflux disease without esophagitis    Hypertension    Anemia    Anorexia    Hemorrhoids    Heart burn    Grade II internal hemorrhoids    Stage 4 chronic kidney disease    Chronic fatigue    Malignant melanoma of torso excluding breast    Polycystic liver disease        Past Medical History:   Diagnosis Date    Broken wrist     20 years ago    Diverticulitis     Fractured hip     Left hip in     Gout     Hypertension     Lung cancer 2018    Malignant melanoma of torso excluding breast 2018    Neoplastic malignant related fatigue 2018    Polycystic kidney     Polycystic liver disease 2018    Stage 4 chronic kidney disease 2018        Past Surgical History:   Procedure Laterality Date     SECTION      Fibroids removed      2 times    HEMORRHOIDOPEXY FOR PROLAPSING INTERNAL HEMORRHOIDS BY STAPLING N/A 2018    Procedure: HEMORRHOIDOPEXY, INTERNAL PROLAPSING, STAPLING;  Surgeon: Marcos Rey MD;  Location: [facility] OR;  Service: General;  Laterality: N/A;    HEMORRHOIDOPEXY, INTERNAL PROLAPSING, STAPLING N/A 2018    Performed by Marcos Rey MD at [facility] OR    TOTAL ABDOMINAL HYSTERECTOMY  1983    VAGINAL DELIVERY      1 time         Family History   Problem Relation Age of Onset    Cancer Mother     Diabetes Mother     Heart attack Father     Diabetes Father     Polycystic kidney disease Sister     Hypertension Sister     Diabetes Sister     Cancer Sister     Diabetes type II Sister     Hypertension Sister     Breast cancer Neg Hx     Colon cancer Neg Hx     Ovarian cancer Neg Hx         Social History     Tobacco Use   Smoking Status Never Smoker   Smokeless Tobacco Never Used        Allergies as of 08/02/2018 - Reviewed 08/02/2018   Allergen Reaction Noted    Codeine Nausea Only 04/29/2017    Sulfa (sulfonamide antibiotics)  04/17/2018        Current Outpatient Medications on File Prior to Visit   Medication Sig Dispense Refill    amLODIPine (NORVASC) 10 MG tablet Take 10 mg by mouth once daily.      magnesium oxide (MAG-OX) 400 mg tablet Take 400 mg by mouth once daily.      spironolactone (ALDACTONE) 25 MG tablet Take 25 mg by mouth.       No current facility-administered medications on file prior to visit.         Review of Systems        Diagnoses and all orders for this visit:     Stage 5 chronic kidney disease- pt needs pre-operative clearance from Nephrology.     Polycystic kidney disease- pt needs pre-operative clearance from Nephrology     Primary adenocarcinoma of upper lobe of left lung- followed Oncology who is aware of surgery. No SOB, COPD or restrictive lung disease.     Hypertension, unspecified type- at goal. Take BP meds the day of surgery.     Vulvar cancer- for vulvectomy and LND on Monday.     Reviewed most recent labs and VS and CXR (9/27/2018) and EKG (8/21/2018). No h/o CHF, CAD, CVD.  Pre-operative clearance restricted by chart review only.   Pt has multiple medical problems and needs proper clearance through anesthesia and nephology prior to procedure.           Lab Results   Component Value Date    WBC 3.27 (L) 10/12/2018    HGB 9.3 (L) 10/12/2018    HCT 29.7 (L) 10/12/2018      10/12/2018    ALT 15 10/12/2018    AST 17 10/12/2018     10/12/2018    K 3.9 10/12/2018     10/12/2018    CREATININE 3.8 (H) 10/12/2018    BUN 50 (H) 10/12/2018    CO2 20 (L) 10/12/2018    TSH 2.725 09/11/2018    INR 1.2 04/17/2018        Lab Results   Component Value Date    ALT 15 10/12/2018    AST 17 10/12/2018    ALKPHOS 139 (H) 10/12/2018    BILITOT 0.3 10/12/2018      Ekg and CXR reviewed      Yuko Malik MN, APRN, FNP-c  Nurse Practitioner   Internal Medicine   [address] [facility] [address]  [phone]

## 2018-10-29 ENCOUNTER — HOSPITAL ENCOUNTER (OUTPATIENT)
Facility: HOSPITAL | Age: 68
Discharge: HOME | End: 2018-10-30
Attending: OBSTETRICS & GYNECOLOGY | Admitting: OBSTETRICS & GYNECOLOGY
Payer: MEDICARE

## 2018-10-29 ENCOUNTER — HOSPITAL ENCOUNTER (OUTPATIENT)
Dept: RADIOLOGY | Facility: HOSPITAL | Age: 68
Discharge: HOME | End: 2018-10-29
Attending: SURGERY | Admitting: OBSTETRICS & GYNECOLOGY
Payer: MEDICARE

## 2018-10-29 ENCOUNTER — ANESTHESIA (OUTPATIENT)
Dept: SURGERY | Facility: HOSPITAL | Age: 68
End: 2018-10-29
Payer: MEDICARE

## 2018-10-29 DIAGNOSIS — C43.9 MALIGNANT MELANOMA, UNSPECIFIED SITE: ICD-10-CM

## 2018-10-29 DIAGNOSIS — C43.59 MALIGNANT MELANOMA OF TORSO EXCLUDING BREAST: ICD-10-CM

## 2018-10-29 DIAGNOSIS — C43.59 MALIGNANT MELANOMA OF TORSO EXCLUDING BREAST: Primary | ICD-10-CM

## 2018-10-29 LAB
ABO + RH BLD: NORMAL
BLD GP AB SCN CELLS X3 SERPL QL: NORMAL

## 2018-10-29 PROCEDURE — 27201037 HC PRESSURE MONITORING SET UP

## 2018-10-29 PROCEDURE — 37000009 HC ANESTHESIA EA ADD 15 MINS: Performed by: OBSTETRICS & GYNECOLOGY

## 2018-10-29 PROCEDURE — 25000003 PHARM REV CODE 250: Performed by: NURSE ANESTHETIST, CERTIFIED REGISTERED

## 2018-10-29 PROCEDURE — 88341 IMHCHEM/IMCYTCHM EA ADD ANTB: CPT | Performed by: PATHOLOGY

## 2018-10-29 PROCEDURE — 36000707: Performed by: OBSTETRICS & GYNECOLOGY

## 2018-10-29 PROCEDURE — 25000003 PHARM REV CODE 250: Performed by: OBSTETRICS & GYNECOLOGY

## 2018-10-29 PROCEDURE — 71000039 HC RECOVERY, EACH ADD'L HOUR: Performed by: OBSTETRICS & GYNECOLOGY

## 2018-10-29 PROCEDURE — 37000008 HC ANESTHESIA 1ST 15 MINUTES: Performed by: OBSTETRICS & GYNECOLOGY

## 2018-10-29 PROCEDURE — D9220A PRA ANESTHESIA: Mod: CRNA,,, | Performed by: NURSE ANESTHETIST, CERTIFIED REGISTERED

## 2018-10-29 PROCEDURE — 88341 IMHCHEM/IMCYTCHM EA ADD ANTB: CPT | Mod: 26,,, | Performed by: PATHOLOGY

## 2018-10-29 PROCEDURE — 25000003 PHARM REV CODE 250: Performed by: STUDENT IN AN ORGANIZED HEALTH CARE EDUCATION/TRAINING PROGRAM

## 2018-10-29 PROCEDURE — 27000221 HC OXYGEN, UP TO 24 HOURS

## 2018-10-29 PROCEDURE — 63600175 PHARM REV CODE 636 W HCPCS: Performed by: OBSTETRICS & GYNECOLOGY

## 2018-10-29 PROCEDURE — 56630 VULVECTOMY RADICAL PARTIAL: CPT | Mod: ,,, | Performed by: OBSTETRICS & GYNECOLOGY

## 2018-10-29 PROCEDURE — 36620 INSERTION CATHETER ARTERY: CPT | Mod: 59,,, | Performed by: ANESTHESIOLOGY

## 2018-10-29 PROCEDURE — 63600175 PHARM REV CODE 636 W HCPCS

## 2018-10-29 PROCEDURE — 25000003 PHARM REV CODE 250: Performed by: ANESTHESIOLOGY

## 2018-10-29 PROCEDURE — 86901 BLOOD TYPING SEROLOGIC RH(D): CPT

## 2018-10-29 PROCEDURE — 63600175 PHARM REV CODE 636 W HCPCS: Performed by: NURSE ANESTHETIST, CERTIFIED REGISTERED

## 2018-10-29 PROCEDURE — C1729 CATH, DRAINAGE: HCPCS | Performed by: OBSTETRICS & GYNECOLOGY

## 2018-10-29 PROCEDURE — 63600175 PHARM REV CODE 636 W HCPCS: Mod: JG | Performed by: SURGERY

## 2018-10-29 PROCEDURE — 27201423 OPTIME MED/SURG SUP & DEVICES STERILE SUPPLY: Performed by: OBSTETRICS & GYNECOLOGY

## 2018-10-29 PROCEDURE — 71000033 HC RECOVERY, INTIAL HOUR: Performed by: OBSTETRICS & GYNECOLOGY

## 2018-10-29 PROCEDURE — 88307 TISSUE EXAM BY PATHOLOGIST: CPT | Mod: 26,,, | Performed by: PATHOLOGY

## 2018-10-29 PROCEDURE — 36000706: Performed by: OBSTETRICS & GYNECOLOGY

## 2018-10-29 PROCEDURE — 88342 IMHCHEM/IMCYTCHM 1ST ANTB: CPT | Mod: 26,,, | Performed by: PATHOLOGY

## 2018-10-29 PROCEDURE — 88342 IMHCHEM/IMCYTCHM 1ST ANTB: CPT | Performed by: PATHOLOGY

## 2018-10-29 PROCEDURE — 38500 BIOPSY/REMOVAL LYMPH NODES: CPT | Mod: 50,79,, | Performed by: SURGERY

## 2018-10-29 PROCEDURE — 88309 TISSUE EXAM BY PATHOLOGIST: CPT | Mod: 26,,, | Performed by: PATHOLOGY

## 2018-10-29 PROCEDURE — 63600175 PHARM REV CODE 636 W HCPCS: Performed by: ANESTHESIOLOGY

## 2018-10-29 PROCEDURE — D9220A PRA ANESTHESIA: Mod: ANES,,, | Performed by: ANESTHESIOLOGY

## 2018-10-29 PROCEDURE — A9520 TC99 TILMANOCEPT DIAG 0.5MCI: HCPCS

## 2018-10-29 PROCEDURE — 20600001 HC STEP DOWN PRIVATE ROOM

## 2018-10-29 PROCEDURE — 63600175 PHARM REV CODE 636 W HCPCS: Performed by: STUDENT IN AN ORGANIZED HEALTH CARE EDUCATION/TRAINING PROGRAM

## 2018-10-29 RX ORDER — OXYCODONE HYDROCHLORIDE 5 MG/1
5 TABLET ORAL
Status: DISCONTINUED | OUTPATIENT
Start: 2018-10-29 | End: 2018-10-29 | Stop reason: HOSPADM

## 2018-10-29 RX ORDER — LIDOCAINE HYDROCHLORIDE 10 MG/ML
1 INJECTION, SOLUTION EPIDURAL; INFILTRATION; INTRACAUDAL; PERINEURAL ONCE
Status: COMPLETED | OUTPATIENT
Start: 2018-10-29 | End: 2018-10-29

## 2018-10-29 RX ORDER — PHENYLEPHRINE HYDROCHLORIDE 10 MG/ML
INJECTION INTRAVENOUS
Status: DISCONTINUED | OUTPATIENT
Start: 2018-10-29 | End: 2018-10-29

## 2018-10-29 RX ORDER — GLYCOPYRROLATE 0.2 MG/ML
INJECTION INTRAMUSCULAR; INTRAVENOUS
Status: DISCONTINUED | OUTPATIENT
Start: 2018-10-29 | End: 2018-10-29

## 2018-10-29 RX ORDER — KETAMINE HCL IN 0.9 % NACL 50 MG/5 ML
SYRINGE (ML) INTRAVENOUS
Status: DISCONTINUED | OUTPATIENT
Start: 2018-10-29 | End: 2018-10-29

## 2018-10-29 RX ORDER — HYDROMORPHONE HYDROCHLORIDE 1 MG/ML
0.2 INJECTION, SOLUTION INTRAMUSCULAR; INTRAVENOUS; SUBCUTANEOUS EVERY 5 MIN PRN
Status: DISCONTINUED | OUTPATIENT
Start: 2018-10-29 | End: 2018-10-29 | Stop reason: HOSPADM

## 2018-10-29 RX ORDER — CISATRACURIUM BESYLATE 2 MG/ML
INJECTION, SOLUTION INTRAVENOUS
Status: DISCONTINUED | OUTPATIENT
Start: 2018-10-29 | End: 2018-10-29

## 2018-10-29 RX ORDER — ESMOLOL HYDROCHLORIDE 10 MG/ML
INJECTION INTRAVENOUS
Status: DISCONTINUED | OUTPATIENT
Start: 2018-10-29 | End: 2018-10-29

## 2018-10-29 RX ORDER — ONDANSETRON 2 MG/ML
INJECTION INTRAMUSCULAR; INTRAVENOUS
Status: DISCONTINUED | OUTPATIENT
Start: 2018-10-29 | End: 2018-10-29

## 2018-10-29 RX ORDER — NEOSTIGMINE METHYLSULFATE 1 MG/ML
INJECTION, SOLUTION INTRAVENOUS
Status: DISCONTINUED | OUTPATIENT
Start: 2018-10-29 | End: 2018-10-29

## 2018-10-29 RX ORDER — HYDRALAZINE HYDROCHLORIDE 20 MG/ML
INJECTION INTRAMUSCULAR; INTRAVENOUS
Status: COMPLETED
Start: 2018-10-29 | End: 2018-10-29

## 2018-10-29 RX ORDER — SODIUM CHLORIDE 9 MG/ML
INJECTION, SOLUTION INTRAVENOUS CONTINUOUS
Status: DISCONTINUED | OUTPATIENT
Start: 2018-10-29 | End: 2018-10-30

## 2018-10-29 RX ORDER — ERGOCALCIFEROL 1.25 MG/1
50000 CAPSULE ORAL
COMMUNITY
End: 2018-11-12

## 2018-10-29 RX ORDER — HYDRALAZINE HYDROCHLORIDE 20 MG/ML
10 INJECTION INTRAMUSCULAR; INTRAVENOUS EVERY 6 HOURS PRN
Status: DISCONTINUED | OUTPATIENT
Start: 2018-10-29 | End: 2018-10-29 | Stop reason: HOSPADM

## 2018-10-29 RX ORDER — SODIUM CHLORIDE 0.9 % (FLUSH) 0.9 %
3 SYRINGE (ML) INJECTION
Status: DISCONTINUED | OUTPATIENT
Start: 2018-10-29 | End: 2018-10-30

## 2018-10-29 RX ORDER — MUPIROCIN 20 MG/G
OINTMENT TOPICAL
Status: DISCONTINUED | OUTPATIENT
Start: 2018-11-05 | End: 2018-10-29 | Stop reason: HOSPADM

## 2018-10-29 RX ORDER — LIDOCAINE HCL/PF 100 MG/5ML
SYRINGE (ML) INTRAVENOUS
Status: DISCONTINUED | OUTPATIENT
Start: 2018-10-29 | End: 2018-10-29

## 2018-10-29 RX ORDER — LORAZEPAM 2 MG/ML
0.25 INJECTION INTRAMUSCULAR ONCE AS NEEDED
Status: DISCONTINUED | OUTPATIENT
Start: 2018-10-29 | End: 2018-10-29 | Stop reason: HOSPADM

## 2018-10-29 RX ORDER — GABAPENTIN 300 MG/1
300 CAPSULE ORAL 2 TIMES DAILY
COMMUNITY
End: 2019-01-14

## 2018-10-29 RX ORDER — OXYCODONE HYDROCHLORIDE 5 MG/1
5 TABLET ORAL EVERY 4 HOURS PRN
Status: DISCONTINUED | OUTPATIENT
Start: 2018-10-29 | End: 2018-10-30

## 2018-10-29 RX ORDER — ISOSULFAN BLUE 50 MG/5ML
INJECTION, SOLUTION SUBCUTANEOUS
Status: DISCONTINUED | OUTPATIENT
Start: 2018-10-29 | End: 2018-10-29 | Stop reason: HOSPADM

## 2018-10-29 RX ORDER — MIDAZOLAM HYDROCHLORIDE 1 MG/ML
INJECTION, SOLUTION INTRAMUSCULAR; INTRAVENOUS
Status: DISCONTINUED | OUTPATIENT
Start: 2018-10-29 | End: 2018-10-29

## 2018-10-29 RX ORDER — HYDROMORPHONE HYDROCHLORIDE 1 MG/ML
INJECTION, SOLUTION INTRAMUSCULAR; INTRAVENOUS; SUBCUTANEOUS
Status: COMPLETED
Start: 2018-10-29 | End: 2018-10-29

## 2018-10-29 RX ORDER — PROPOFOL 10 MG/ML
VIAL (ML) INTRAVENOUS
Status: DISCONTINUED | OUTPATIENT
Start: 2018-10-29 | End: 2018-10-29

## 2018-10-29 RX ORDER — CEFAZOLIN SODIUM 1 G/3ML
2 INJECTION, POWDER, FOR SOLUTION INTRAMUSCULAR; INTRAVENOUS
Status: COMPLETED | OUTPATIENT
Start: 2018-10-29 | End: 2018-10-29

## 2018-10-29 RX ORDER — OXYCODONE HYDROCHLORIDE 10 MG/1
10 TABLET ORAL EVERY 4 HOURS PRN
Status: DISCONTINUED | OUTPATIENT
Start: 2018-10-29 | End: 2018-10-30

## 2018-10-29 RX ORDER — HYDROMORPHONE HYDROCHLORIDE 1 MG/ML
0.5 INJECTION, SOLUTION INTRAMUSCULAR; INTRAVENOUS; SUBCUTANEOUS
Status: DISCONTINUED | OUTPATIENT
Start: 2018-10-29 | End: 2018-10-30

## 2018-10-29 RX ORDER — FENTANYL CITRATE 50 UG/ML
INJECTION, SOLUTION INTRAMUSCULAR; INTRAVENOUS
Status: DISCONTINUED | OUTPATIENT
Start: 2018-10-29 | End: 2018-10-29

## 2018-10-29 RX ADMIN — FENTANYL CITRATE 50 MCG: 50 INJECTION, SOLUTION INTRAMUSCULAR; INTRAVENOUS at 01:10

## 2018-10-29 RX ADMIN — CISATRACURIUM BESYLATE 2 MG: 2 INJECTION INTRAVENOUS at 12:10

## 2018-10-29 RX ADMIN — PROPOFOL 30 MG: 10 INJECTION, EMULSION INTRAVENOUS at 12:10

## 2018-10-29 RX ADMIN — SODIUM CHLORIDE: 0.9 INJECTION, SOLUTION INTRAVENOUS at 02:10

## 2018-10-29 RX ADMIN — GLYCOPYRROLATE 0.4 MG: 0.2 INJECTION, SOLUTION INTRAMUSCULAR; INTRAVENOUS at 01:10

## 2018-10-29 RX ADMIN — HYDRALAZINE HYDROCHLORIDE 10 MG: 20 INJECTION INTRAMUSCULAR; INTRAVENOUS at 04:10

## 2018-10-29 RX ADMIN — ONDANSETRON 4 MG: 2 INJECTION INTRAMUSCULAR; INTRAVENOUS at 01:10

## 2018-10-29 RX ADMIN — FENTANYL CITRATE 50 MCG: 50 INJECTION, SOLUTION INTRAMUSCULAR; INTRAVENOUS at 12:10

## 2018-10-29 RX ADMIN — MUPIROCIN: 20 OINTMENT TOPICAL at 11:10

## 2018-10-29 RX ADMIN — HYDROMORPHONE HYDROCHLORIDE 0.2 MG: 1 INJECTION, SOLUTION INTRAMUSCULAR; INTRAVENOUS; SUBCUTANEOUS at 03:10

## 2018-10-29 RX ADMIN — FENTANYL CITRATE 50 MCG: 50 INJECTION, SOLUTION INTRAMUSCULAR; INTRAVENOUS at 11:10

## 2018-10-29 RX ADMIN — ESMOLOL HYDROCHLORIDE 10 MG: 10 INJECTION INTRAVENOUS at 12:10

## 2018-10-29 RX ADMIN — PHENYLEPHRINE HYDROCHLORIDE 50 MCG: 10 INJECTION INTRAVENOUS at 01:10

## 2018-10-29 RX ADMIN — OXYCODONE HYDROCHLORIDE 5 MG: 5 TABLET ORAL at 02:10

## 2018-10-29 RX ADMIN — LIDOCAINE HYDROCHLORIDE 60 MG: 20 INJECTION, SOLUTION INTRAVENOUS at 11:10

## 2018-10-29 RX ADMIN — SODIUM CHLORIDE, SODIUM GLUCONATE, SODIUM ACETATE, POTASSIUM CHLORIDE, MAGNESIUM CHLORIDE, SODIUM PHOSPHATE, DIBASIC, AND POTASSIUM PHOSPHATE: .53; .5; .37; .037; .03; .012; .00082 INJECTION, SOLUTION INTRAVENOUS at 01:10

## 2018-10-29 RX ADMIN — HYDROMORPHONE HYDROCHLORIDE 0.2 MG: 1 INJECTION, SOLUTION INTRAMUSCULAR; INTRAVENOUS; SUBCUTANEOUS at 02:10

## 2018-10-29 RX ADMIN — MIDAZOLAM HYDROCHLORIDE 2 MG: 1 INJECTION, SOLUTION INTRAMUSCULAR; INTRAVENOUS at 11:10

## 2018-10-29 RX ADMIN — PHENYLEPHRINE HYDROCHLORIDE 50 MCG: 10 INJECTION INTRAVENOUS at 12:10

## 2018-10-29 RX ADMIN — PROPOFOL 100 MG: 10 INJECTION, EMULSION INTRAVENOUS at 11:10

## 2018-10-29 RX ADMIN — PROMETHAZINE HYDROCHLORIDE 6.25 MG: 25 INJECTION INTRAMUSCULAR; INTRAVENOUS at 04:10

## 2018-10-29 RX ADMIN — LIDOCAINE HYDROCHLORIDE 0.2 MG: 10 INJECTION, SOLUTION EPIDURAL; INFILTRATION; INTRACAUDAL; PERINEURAL at 11:10

## 2018-10-29 RX ADMIN — NEOSTIGMINE METHYLSULFATE 4 MG: 1 INJECTION INTRAVENOUS at 01:10

## 2018-10-29 RX ADMIN — SODIUM CHLORIDE, SODIUM GLUCONATE, SODIUM ACETATE, POTASSIUM CHLORIDE, MAGNESIUM CHLORIDE, SODIUM PHOSPHATE, DIBASIC, AND POTASSIUM PHOSPHATE: .53; .5; .37; .037; .03; .012; .00082 INJECTION, SOLUTION INTRAVENOUS at 12:10

## 2018-10-29 RX ADMIN — HYDROMORPHONE HYDROCHLORIDE 0.5 MG: 1 INJECTION, SOLUTION INTRAMUSCULAR; INTRAVENOUS; SUBCUTANEOUS at 05:10

## 2018-10-29 RX ADMIN — SODIUM CHLORIDE: 0.9 INJECTION, SOLUTION INTRAVENOUS at 11:10

## 2018-10-29 RX ADMIN — Medication 20 MG: at 01:10

## 2018-10-29 RX ADMIN — CISATRACURIUM BESYLATE 4 MG: 2 INJECTION INTRAVENOUS at 12:10

## 2018-10-29 RX ADMIN — CISATRACURIUM BESYLATE 8 MG: 2 INJECTION INTRAVENOUS at 11:10

## 2018-10-29 RX ADMIN — CEFAZOLIN 2 G: 330 INJECTION, POWDER, FOR SOLUTION INTRAMUSCULAR; INTRAVENOUS at 12:10

## 2018-10-29 RX ADMIN — OXYCODONE HYDROCHLORIDE 5 MG: 5 TABLET ORAL at 11:10

## 2018-10-29 NOTE — ANESTHESIA PREPROCEDURE EVALUATION
10/29/2018  Verónica Baker is a 68 y.o., female.    Anesthesia Evaluation    I have reviewed the Patient Summary Reports.        Review of Systems  Anesthesia Hx:  No problems with previous Anesthesia    Social:  Non-Smoker    Hematology/Oncology:  Hematology Normal      Current/Recent Cancer. (Melanoma)   EENT/Dental:EENT/Dental Normal   Cardiovascular:   Hypertension    Pulmonary:  Pulmonary Normal    Renal/:   Chronic Renal Disease, CRI    Hepatic/GI:   GERD    Musculoskeletal:  Musculoskeletal Normal    Neurological:  Neurology Normal    Endocrine:  Endocrine Normal    Dermatological:  Skin Normal    Psych:  Psychiatric Normal           Physical Exam  General:  Well nourished    Airway/Jaw/Neck:  Airway Findings: Mouth Opening: Normal Tongue: Normal  General Airway Assessment: Adult  Mallampati: II  Improves to II with phonation.  TM Distance: Normal, at least 6 cm  Jaw/Neck Findings:  Neck ROM: Normal ROM      Dental:  Dental Findings: In tact   Chest/Lungs:  Chest/Lungs Findings: Clear to auscultation, Normal Respiratory Rate     Heart/Vascular:  Heart Findings: Rate: Normal  Rhythm: Regular Rhythm  Sounds: Normal             Anesthesia Plan  Type of Anesthesia, risks & benefits discussed:  Anesthesia Type:  general  Patient's Preference: General  Intra-op Monitoring Plan: arterial line  Intra-op Monitoring Plan Comments:   Post Op Pain Control Plan:   Post Op Pain Control Plan Comments:   Induction:   IV  Beta Blocker:  Patient is not currently on a Beta-Blocker (No further documentation required).       Informed Consent: Patient understands risks and agrees with Anesthesia plan.  Questions answered. Anesthesia consent signed with patient.  ASA Score: 3     Day of Surgery Review of History & Physical: I have interviewed and examined the patient. I have reviewed the patient's H&P dated:  There are no  "significant changes.          Ready For Surgery From Anesthesia Perspective.     Nephrology Note 10/16/18:  Was called about "renal clearance" for surgery. The patient has advanced renal failure and there will be a risk that she will end up on dialysis with a significant blood pressure drop during surgery. However, that said, if the surgery is necessary she will need to have it. This decision needs to be made by the Surgeon. We will support the patient after surgery with dialysis in needed. Further risk assessment by anesthesiology.     Tequila Castorena MD    PCP Note:  Reviewed most recent labs and VS and CXR (9/27/2018) and EKG (8/21/2018). No h/o CHF, CAD, CVD.  Pre-operative clearance restricted by chart review only.   Pt has multiple medical problems and needs proper clearance through anesthesia and nephology prior to procedure.    Yuko HOLLY, APRN, FNP-c   Department of Internal Medicine - [facility]  "

## 2018-10-29 NOTE — ANESTHESIA PROCEDURE NOTES
Arterial    Diagnosis: Vulvar cancer    Patient location during procedure: done in OR  Procedure start time: 10/29/2018 12:02 PM  Timeout: 10/29/2018 12:01 PM  Procedure end time: 10/29/2018 12:11 PM  Staffing  Anesthesiologist: Camilo Vega MD  Performed: anesthesiologist   Anesthesiologist was present at the time of the procedure.  Preanesthetic Checklist  Completed: patient identified, site marked, surgical consent, pre-op evaluation, timeout performed, IV checked, risks and benefits discussed, monitors and equipment checked and anesthesia consent givenArterial  Skin Prep: chlorhexidine gluconate  Local Infiltration: none  Orientation: right  Location: radial  Catheter Size: 20 G  Catheter placement by Anatomical landmarks. Heme positive aspiration all ports.Insertion Attempts: 2  Additional Notes  Attempted on left x 1, unable to advance wire, successful on right first attempt.

## 2018-10-29 NOTE — BRIEF OP NOTE
[facility]  Brief Operative Note    SUMMARY     Surgery Date: 10/29/2018     Surgeon(s) and Role:  Panel 1:     * Guero Escobar MD - Primary  Panel 2:     * Ba Osei MD - Primary    Assisting Surgeon: None    Pre-op Diagnosis:  Malignant melanoma of torso excluding breast [C43.59]    Post-op Diagnosis:  Post-Op Diagnosis Codes:     * Malignant melanoma of torso excluding breast [C43.59]    Procedure(s) (LRB):  VULVECTOMY (Left)  INJECTION, FOR SENTINEL NODE IDENTIFICATION - bilateral groin (Bilateral)  BIOPSY, LYMPH NODE, SENTINEL (Bilateral)    Anesthesia: General    Description of Procedure: left radical vulvectomy and bilateral SLN biopsy,.    Description of the findings of the procedure: removal of left vulva and clitoris. Bilateral SLN biopsies.     Estimated Blood Loss: 20 ml   Total Fluids: 1600 ml   Urine Output:100 ml            Specimens:   Specimen (12h ago, onward)    Start     Ordered    10/29/18 1346  Specimen to Pathology - Surgery  Once     Comments:  1. Left sentinel node, hot ,blue, 150-permanent2. Right sentinel node, hot, blue, 150-permanent3. Left vulva stitch @12 o'clock-permanent     Start Status   10/29/18 1346 Collected (10/29/18 1346)       10/29/18 1346

## 2018-10-29 NOTE — H&P
There have been no interval changes. Please see full H&P below per Dr. Escobar.    Angélica Howell MD  OB/GYN, PGY-3      Subjective:       Patient ID: Verónica Baker is a 68 y.o. female.     Chief Complaint: Advice Only (Dr. Ontiveros) and Vulvar Cancer     HPI      Patient comes in today for evaluation of malignant melanoma of the vuvla. Initial biopsy in 2018 was originally read as keratinizing hyperplastic squamous epithelium with scattered juctional nests of atypical melanocytes. Pathology was read at [facility]. institional review in 2018 was reported a malignant melanoma. Tim's level 3 with Breslow depth 1.6 mm.      Pt reports itching at L vulva site, no VB or other complaints.     Medical history: lung CA, HTN, polycystic kidneys (50yrs) and liver     Surgical history: NEENA BSO (fibroids), two myomectomies, C/S x 2     Family history negative for skin, breast, uterine, ovarian or colon CA. Mother had some type of cancer but patient unsure what kind.       Past Medical History:   Diagnosis Date    Broken wrist       20 years ago    Diverticulitis      Fractured hip       Left hip in     Gout      Hypertension      Lung cancer 2018    Malignant melanoma of torso excluding breast 2018    Neoplastic malignant related fatigue 2018    Polycystic kidney      Polycystic liver disease 2018    Stage 4 chronic kidney disease 2018            Past Surgical History:   Procedure Laterality Date     SECTION        Fibroids removed         2 times    HEMORRHOIDOPEXY FOR PROLAPSING INTERNAL HEMORRHOIDS BY STAPLING N/A 2018     Procedure: HEMORRHOIDOPEXY, INTERNAL PROLAPSING, STAPLING;  Surgeon: Marcos Rey MD;  Location: [facility] OR;  Service: General;  Laterality: N/A;    HEMORRHOIDOPEXY, INTERNAL PROLAPSING, STAPLING N/A 2018     Performed by Marcos Rey MD at [facility] OR    TOTAL ABDOMINAL HYSTERECTOMY       VAGINAL DELIVERY         1 time            Family  History   Problem Relation Age of Onset    Cancer Mother      Diabetes Mother      Heart attack Father      Diabetes Father      Polycystic kidney disease Sister      Hypertension Sister      Diabetes Sister      Cancer Sister      Diabetes type II Sister      Hypertension Sister      Breast cancer Neg Hx      Colon cancer Neg Hx      Ovarian cancer Neg Hx        Social History      Tobacco Use    Smoking status: Never Smoker    Smokeless tobacco: Never Used   Substance Use Topics    Alcohol use: No    Drug use: No           Review of patient's allergies indicates:   Allergen Reactions    Codeine Nausea Only    Sulfa (sulfonamide antibiotics)           Current Outpatient Medications:     amLODIPine (NORVASC) 10 MG tablet, Take 10 mg by mouth once daily., Disp: , Rfl:     calcitRIOL (ROCALTROL) 0.25 MCG Cap, Take 1 capsule (0.25 mcg total) by mouth once daily., Disp: 30 capsule, Rfl: 6    ferrous sulfate 324 mg (65 mg iron) TbEC, Take 1 tablet (324 mg total) by mouth once daily., Disp: , Rfl: 0    furosemide (LASIX) 40 MG tablet, Take 1 tablet (40 mg total) by mouth 2 (two) times daily., Disp: 60 tablet, Rfl: 11    gabapentin (NEURONTIN) 100 MG capsule, once daily. , Disp: , Rfl:     magnesium oxide (MAG-OX) 400 mg tablet, Take 400 mg by mouth once daily., Disp: , Rfl:     spironolactone (ALDACTONE) 25 MG tablet, Take 25 mg by mouth., Disp: , Rfl:     ergocalciferol (ERGOCALCIFEROL) 50,000 unit Cap, Take 1 capsule (50,000 Units total) by mouth every 7 days., Disp: 26 capsule, Rfl: 1     Review of Systems   Constitutional: Positive for fatigue (s/p chemo for lung CA/PCKD). Negative for activity change, appetite change, chills, diaphoresis, fever and unexpected weight change.   HENT: Negative for mouth sores and tinnitus.    Respiratory: Positive for shortness of breath (abd distention from PCKD). Negative for cough, chest tightness and wheezing.    Cardiovascular: Positive for leg swelling.  "Negative for chest pain and palpitations.   Gastrointestinal: Positive for abdominal distention (PCKD) and diarrhea. Negative for abdominal pain, blood in stool, constipation, nausea and vomiting.   Genitourinary: Positive for genital sores. Negative for difficulty urinating, dysuria, flank pain, frequency, hematuria, pelvic pain, vaginal bleeding, vaginal discharge and vaginal pain.   Musculoskeletal: Negative for arthralgias and back pain.   Skin: Negative for color change and rash.   Neurological: Negative for dizziness, weakness, numbness and headaches.   Hematological: Negative for adenopathy.   Psychiatric/Behavioral: Negative for confusion and sleep disturbance. The patient is not nervous/anxious.        Objective:   /78   Pulse 76   Ht 5' 5" (1.651 m)   Wt 61.4 kg (135 lb 5.8 oz)   BMI 22.53 kg/m²   Physical Exam   Constitutional: She is oriented to person, place, and time. She appears well-developed. No distress.   HENT:   Head: Normocephalic.   Eyes: Scleral icterus is present.   Neck: Normal range of motion.   Pulmonary/Chest: Effort normal.   Abdominal: Soft. She exhibits distension.   Firm, distended abdomen up to xiphoid   Genitourinary:         Genitourinary Comments: Bimanual exam:  Vulva:   Urethra: Normal size and location. No lesions  Bladder: No masses or tenderness.  Vagina: normal mucosa. No lesion  Cervix: absent.   Uterus: absent.  Adnexa: no masses. Exam limited by significant abdominal distention.  Rectovaginal: deferred   Musculoskeletal: Normal range of motion. She exhibits no edema.   Neurological: She is alert and oriented to person, place, and time.   Skin: Skin is warm and dry. No rash noted. She is not diaphoretic. No pallor.   Psychiatric: She has a normal mood and affect. Her behavior is normal.   Nursing note and vitals reviewed.      Assessment:       1. Malignant melanoma of torso excluding breast    2. Polycystic kidney disease    3. Primary adenocarcinoma of upper " lobe of left lung    4. Polycystic liver disease        Plan:   Malignant melanoma of torso excluding breast  -     X-Ray Chest PA And Lateral; Future; Expected date: 09/27/2018  -     Ambulatory referral to Anesthesiology  -     Vital signs; Standing  -     Insert peripheral IV; Standing  -     lidocaine (PF) 10 mg/ml (1%) injection 10 mg; Inject 1 mL (10 mg total) into the skin once.  -     Verify beta-blocker dose taken within 24 hours if patient is prescribed beta-blocker ; Standing  -     Verify discontinuation of anti thrombotics ; Standing  -     Verify Blood Consent ; Standing  -     Verify consent; Standing  -     Verify surgical site documentation; Standing  -     Void on call to OR; Standing  -     Chlorhexidine (CHG) 2% Wipes; Standing  -     Notify Physician/Vital Signs Parameters; Standing  -     Notify physician ; Standing  -     Diet NPO; Standing  -     Chlorohexidine Gluconate Bath; Standing  -     mupirocin 2 % ointment; by Nasal route On call Procedure (surgery).  -     Pulse Oximetry Q4H; Standing  -     Place in Outpatient; Standing  -     Place SHAKEEL hose; Standing  -     Place sequential compression device; Standing  -     ceFAZolin (ANCEF) 2 g in dextrose 5 % 50 mL IVPB; Inject 2 g into the vein On call Procedure (Surgery).  -     Type And Screen Preop; Future; Expected date: 09/28/2018     Polycystic kidney disease     Primary adenocarcinoma of upper lobe of left lung     Polycystic liver disease     Other orders  -     Bed rest With bathroom privileges; Standing  -     IP VTE LOW RISK PATIENT; Standing     - plan for left partial vulvectomy, bilateral SLN dissection coordinate with Dr. Osei  - anesthesia clearance as above due to medical comorbidities, significant abdominal distention with PC kidney/liver disease, lung cancer  - message Dr. Pires with [facility]  - The risks, benefits, and indications for surgery were discussed with the patient. These included bleeding, infection, damage to  surrounding tissues, the possibility of urologic resection and reconstruction, and the possibility of major complications including death. She voiced understanding, all questions were answered and consents were signed.     Preop orders placed.

## 2018-10-29 NOTE — TRANSFER OF CARE
"Anesthesia Transfer of Care Note    Patient: Verónica Baker    Procedure(s) Performed: Procedure(s) (LRB):  VULVECTOMY (Left)  INJECTION, FOR SENTINEL NODE IDENTIFICATION - bilateral groin (Bilateral)  BIOPSY, LYMPH NODE, SENTINEL (Bilateral)    Patient location: PACU    Anesthesia Type: general    Transport from OR: Transported from OR on 6-10 L/min O2 by face mask with adequate spontaneous ventilation    Post pain: adequate analgesia    Post assessment: no apparent anesthetic complications    Level of consciousness: awake    Nausea/Vomiting: no nausea/vomiting    Complications: none    Transfer of care protocol was followed      Last vitals:   Visit Vitals  BP (!) 165/96 (BP Location: Left arm, Patient Position: Lying)   Pulse 76   Temp 36.4 °C (97.5 °F) (Temporal)   Resp 18   Ht 5' 5" (1.651 m)   Wt 59.9 kg (132 lb)   SpO2 100%   Breastfeeding? No   BMI 21.97 kg/m²     "

## 2018-10-29 NOTE — NURSING TRANSFER
Nursing Transfer Note      10/29/2018     Transfer From: PACU    Transfer via stretcher     Transfer with N/A    Transported by PACU PCT    Medicines sent: No    Chart send with patient: Yes    Notified: Niece    Patient reassessed at:  (10/29, 1731)

## 2018-10-29 NOTE — H&P
[facility]  General Surgery    Subjective:     History of Present Illness:  Patient is a 68 y.o.  Female with malignant melanoma of the vuvla. Initial biopsy in June 2018 was originally read as keratinizing hyperplastic squamous epithelium with scattered juctional nests of atypical melanocytes. Pathology was read at [facility]. institional review in Sept 2018 was reported a malignant melanoma. Tim's level 3 with Breslow depth 1.6 mm.      Pt reports itching at L vulva site, no VB or other complaints.     Medical history: lung CA, HTN, polycystic kidneys (50yrs) and liver     Surgical history: NEENA BSO (fibroids), two myomectomies, C/S x 2     Family history negative for skin, breast, uterine, ovarian or colon CA. Mother had some type of cancer but patient unsure what kind.        No current facility-administered medications on file prior to encounter.      Current Outpatient Medications on File Prior to Encounter   Medication Sig    amLODIPine (NORVASC) 10 MG tablet Take 10 mg by mouth once daily.    calcitRIOL (ROCALTROL) 0.25 MCG Cap Take 1 capsule (0.25 mcg total) by mouth once daily.    ferrous sulfate 324 mg (65 mg iron) TbEC Take 1 tablet (324 mg total) by mouth once daily.    furosemide (LASIX) 40 MG tablet Take 1 tablet (40 mg total) by mouth 2 (two) times daily.    magnesium oxide (MAG-OX) 400 mg tablet Take 400 mg by mouth once daily.    spironolactone (ALDACTONE) 25 MG tablet Take 25 mg by mouth once daily. PT TAKING 1/2 TAB PER DAY       Review of patient's allergies indicates:   Allergen Reactions    Codeine Nausea Only    Sulfa (sulfonamide antibiotics)        Past Medical History:   Diagnosis Date    Broken wrist     20 years ago    Diverticulitis     Fractured hip     Left hip in 1998    Gout     Hypertension     Lung cancer 04/2018    Malignant melanoma of torso excluding breast 9/26/2018    Neoplastic malignant related fatigue 5/14/2018    Polycystic kidney      Polycystic liver disease 2018    Stage 4 chronic kidney disease 2018     Past Surgical History:   Procedure Laterality Date     SECTION      Fibroids removed      2 times    HEMORRHOIDOPEXY FOR PROLAPSING INTERNAL HEMORRHOIDS BY STAPLING N/A 2018    Procedure: HEMORRHOIDOPEXY, INTERNAL PROLAPSING, STAPLING;  Surgeon: Marcos Rey MD;  Location: [facility] OR;  Service: General;  Laterality: N/A;    HEMORRHOIDOPEXY, INTERNAL PROLAPSING, STAPLING N/A 2018    Performed by Marcos Rey MD at [facility] OR    TOTAL ABDOMINAL HYSTERECTOMY      VAGINAL DELIVERY      1 time     Family History     Problem Relation (Age of Onset)    Cancer Mother, Sister    Diabetes Mother, Father, Sister    Diabetes type II Sister    Heart attack Father    Hypertension Sister, Sister    Polycystic kidney disease Sister        Tobacco Use    Smoking status: Never Smoker    Smokeless tobacco: Never Used   Substance and Sexual Activity    Alcohol use: No    Drug use: No    Sexual activity: Not Currently     Review of Systems   Negative except as above  Objective:     Vital Signs (Most Recent):    Vital Signs (24h Range):        (see epic)     There is no height or weight on file to calculate BMI.    Physical Exam    GEN: No acute distress  HEENT: normocephalic, atraumatic  CV: RRR  Resp: normal work of breathing  Abd: non distended, bowel sounds normal  Ext: Distal pulses intact, no peripheral edema  Neuro: alert and oriented x3  Skin: no rashes, bruising      Assessment/Plan:     There are no hospital problems to display for this patient.    VTE Risk Mitigation (From admission, onward)    None        Plan for left partial vulvectomy ([place]), and bilateral SLNBx        Fidelia Chew MD  General Surgery  [facility]

## 2018-10-29 NOTE — NURSING TRANSFER
Nursing Transfer Note      10/29/2018     Transfer 1008a    Transfer via stretcher    Transfer with     Transported by PACU PCT    Medicines sent:     Chart send with patient: Yes    Notified: Marie CAPONE, 10th floor RN

## 2018-10-30 ENCOUNTER — TELEPHONE (OUTPATIENT)
Dept: GYNECOLOGIC ONCOLOGY | Facility: CLINIC | Age: 68
End: 2018-10-30

## 2018-10-30 VITALS
SYSTOLIC BLOOD PRESSURE: 149 MMHG | HEIGHT: 65 IN | BODY MASS INDEX: 21.99 KG/M2 | RESPIRATION RATE: 18 BRPM | OXYGEN SATURATION: 95 % | WEIGHT: 132 LBS | TEMPERATURE: 99 F | HEART RATE: 100 BPM | DIASTOLIC BLOOD PRESSURE: 84 MMHG

## 2018-10-30 LAB
ANION GAP SERPL CALC-SCNC: 13 MMOL/L
BASOPHILS # BLD AUTO: 0.02 K/UL
BASOPHILS NFR BLD: 0.5 %
BUN SERPL-MCNC: 54 MG/DL
CALCIUM SERPL-MCNC: 9.3 MG/DL
CHLORIDE SERPL-SCNC: 108 MMOL/L
CO2 SERPL-SCNC: 17 MMOL/L
CREAT SERPL-MCNC: 3.8 MG/DL
DIFFERENTIAL METHOD: ABNORMAL
EOSINOPHIL # BLD AUTO: 0 K/UL
EOSINOPHIL NFR BLD: 0.5 %
ERYTHROCYTE [DISTWIDTH] IN BLOOD BY AUTOMATED COUNT: 14.2 %
EST. GFR  (AFRICAN AMERICAN): 13.4 ML/MIN/1.73 M^2
EST. GFR  (NON AFRICAN AMERICAN): 11.6 ML/MIN/1.73 M^2
GLUCOSE SERPL-MCNC: 87 MG/DL
HCT VFR BLD AUTO: 26 %
HGB BLD-MCNC: 8.1 G/DL
IMM GRANULOCYTES # BLD AUTO: 0.01 K/UL
IMM GRANULOCYTES NFR BLD AUTO: 0.3 %
LYMPHOCYTES # BLD AUTO: 0.3 K/UL
LYMPHOCYTES NFR BLD: 9 %
MAGNESIUM SERPL-MCNC: 1.9 MG/DL
MCH RBC QN AUTO: 26.6 PG
MCHC RBC AUTO-ENTMCNC: 31.2 G/DL
MCV RBC AUTO: 86 FL
MONOCYTES # BLD AUTO: 0.4 K/UL
MONOCYTES NFR BLD: 9.5 %
NEUTROPHILS # BLD AUTO: 3 K/UL
NEUTROPHILS NFR BLD: 80.2 %
NRBC BLD-RTO: 0 /100 WBC
PHOSPHATE SERPL-MCNC: 4.1 MG/DL
PLATELET # BLD AUTO: 226 K/UL
PMV BLD AUTO: 12.3 FL
POTASSIUM SERPL-SCNC: 3.9 MMOL/L
RBC # BLD AUTO: 3.04 M/UL
SODIUM SERPL-SCNC: 138 MMOL/L
WBC # BLD AUTO: 3.77 K/UL

## 2018-10-30 PROCEDURE — G8987 SELF CARE CURRENT STATUS: HCPCS | Mod: CH

## 2018-10-30 PROCEDURE — G8978 MOBILITY CURRENT STATUS: HCPCS | Mod: CI

## 2018-10-30 PROCEDURE — 99024 POSTOP FOLLOW-UP VISIT: CPT | Mod: GC,,, | Performed by: OBSTETRICS & GYNECOLOGY

## 2018-10-30 PROCEDURE — G8988 SELF CARE GOAL STATUS: HCPCS | Mod: CH

## 2018-10-30 PROCEDURE — 80048 BASIC METABOLIC PNL TOTAL CA: CPT

## 2018-10-30 PROCEDURE — 36415 COLL VENOUS BLD VENIPUNCTURE: CPT

## 2018-10-30 PROCEDURE — 85025 COMPLETE CBC W/AUTO DIFF WBC: CPT

## 2018-10-30 PROCEDURE — G8989 SELF CARE D/C STATUS: HCPCS | Mod: CH

## 2018-10-30 PROCEDURE — G8979 MOBILITY GOAL STATUS: HCPCS | Mod: CI

## 2018-10-30 PROCEDURE — 97161 PT EVAL LOW COMPLEX 20 MIN: CPT

## 2018-10-30 PROCEDURE — 25000003 PHARM REV CODE 250: Performed by: STUDENT IN AN ORGANIZED HEALTH CARE EDUCATION/TRAINING PROGRAM

## 2018-10-30 PROCEDURE — G8980 MOBILITY D/C STATUS: HCPCS | Mod: CI

## 2018-10-30 PROCEDURE — 84100 ASSAY OF PHOSPHORUS: CPT

## 2018-10-30 PROCEDURE — 63600175 PHARM REV CODE 636 W HCPCS: Performed by: STUDENT IN AN ORGANIZED HEALTH CARE EDUCATION/TRAINING PROGRAM

## 2018-10-30 PROCEDURE — 83735 ASSAY OF MAGNESIUM: CPT

## 2018-10-30 PROCEDURE — 97165 OT EVAL LOW COMPLEX 30 MIN: CPT

## 2018-10-30 RX ORDER — HYDROCODONE BITARTRATE AND ACETAMINOPHEN 5; 325 MG/1; MG/1
1 TABLET ORAL EVERY 6 HOURS PRN
Qty: 30 TABLET | Refills: 0 | Status: ON HOLD | OUTPATIENT
Start: 2018-10-30 | End: 2018-11-09 | Stop reason: SDUPTHER

## 2018-10-30 RX ORDER — HYDROCODONE BITARTRATE AND ACETAMINOPHEN 5; 325 MG/1; MG/1
1 TABLET ORAL EVERY 6 HOURS PRN
Qty: 30 TABLET | Refills: 0 | Status: SHIPPED | OUTPATIENT
Start: 2018-10-30 | End: 2018-10-30 | Stop reason: SDUPTHER

## 2018-10-30 RX ADMIN — OXYCODONE HYDROCHLORIDE 5 MG: 5 TABLET ORAL at 06:10

## 2018-10-30 RX ADMIN — HYDROMORPHONE HYDROCHLORIDE 0.5 MG: 1 INJECTION, SOLUTION INTRAMUSCULAR; INTRAVENOUS; SUBCUTANEOUS at 01:10

## 2018-10-30 NOTE — PLAN OF CARE
[facility]    HOME HEALTH ORDERS  FACE TO FACE ENCOUNTER    Patient Name: Verónica Baker  YOB: 1950    PCP: Primary Doctor No   PCP Address: None  PCP Phone Number: None  PCP Fax: None    Encounter Date: 10/30/2018    Admit to Home Health    Diagnoses:  Active Hospital Problems    Diagnosis  POA    Malignant melanoma of torso excluding breast [C43.59]  Yes      Resolved Hospital Problems   No resolved problems to display.       Future Appointments   Date Time Provider Department Center   10/31/2018  8:30 AM VASCULAR, LAB [facility] VASCLAB [facility]   10/31/2018  9:15 AM Hernesto Decker MD [facility] VASCSUR [facility]   11/5/2018  2:00 PM Matilde Trinh MD [facility] IM [facility]   11/8/2018  2:15 PM Ba Osei MD [facility] GENSUR [facility]   11/12/2018  1:15 PM LAB, HEMONC SAME DAY [facility] LAB [facility]   11/12/2018  2:20 PM Patricia Pires MD [facility] HEM ONC [facility]   11/12/2018  3:30 PM [facility]MO [facility]     Follow-up Information     Guero Escobar MD.    Specialty:  Gynecologic Oncology  Why:  As scheduled by the clinic  Contact information:  [address]  [facility] [address]  [phone]               I have seen and examined this patient face to face today. My clinical findings that support the need for the home health skilled services and home bound status are the following:  Weakness/numbness causing balance and gait disturbance due to Liver Disease and Malignancy/Cancer making it taxing to leave home.    Allergies:  Review of patient's allergies indicates:   Allergen Reactions    Codeine Nausea Only    Sulfa (sulfonamide antibiotics)        Diet: regular diet    Activities: activity as tolerated and ambulate in house with assistance    Nursing:   SN to complete comprehensive assessment including routine vital signs. Instruct on disease process and s/s of complications to report to MD. Review/verify medication list sent home with the patient  at time of discharge  and instruct patient/caregiver as needed. Frequency may be adjusted depending on start of care date.    Notify MD if SBP > 160 or < 90; DBP > 90 or < 50; HR > 120 or < 50; Temp > 101      CONSULTS:    Physical Therapy to evaluate and treat. Evaluate for home safety and equipment needs; Establish/upgrade home exercise program. Perform / instruct on therapeutic exercises, gait training, transfer training, and Range of Motion.  Occupational Therapy to evaluate and treat. Evaluate home environment for safety and equipment needs. Perform/Instruct on transfers, ADL training, ROM, and therapeutic exercises.    MISCELLANEOUS CARE:  None    WOUND CARE ORDERS  yes:  Surgical Wound:  Location: left vulva s/p excisioon of left labia minora and clitoris with subcuticular dissolvable suture in place. Wound care to irrigate with saline or wash wish soapy water if needed, and pat dry. Instruct patient to shower and wash the area with warm, soapy water twice daily, as well as washing the area after each BM. Pat dry with a clean towel each time.     Consult ET nurse        Apply the following to wound:   Nothing      Medications: Review discharge medications with patient and family and provide education.      Current Discharge Medication List      CONTINUE these medications which have NOT CHANGED    Details   amLODIPine (NORVASC) 10 MG tablet Take 10 mg by mouth once daily.      calcitRIOL (ROCALTROL) 0.25 MCG Cap Take 1 capsule (0.25 mcg total) by mouth once daily.  Qty: 30 capsule, Refills: 6      ergocalciferol (VITAMIN D2) 50,000 unit Cap Take 50,000 Units by mouth every 7 days.      ferrous sulfate 324 mg (65 mg iron) TbEC Take 1 tablet (324 mg total) by mouth once daily.  Refills: 0      furosemide (LASIX) 40 MG tablet Take 1 tablet (40 mg total) by mouth 2 (two) times daily.  Qty: 60 tablet, Refills: 11      gabapentin (NEURONTIN) 300 MG capsule Take 300 mg by mouth 2 (two) times daily.      magnesium  oxide (MAG-OX) 400 mg tablet Take 400 mg by mouth once daily.      spironolactone (ALDACTONE) 25 MG tablet Take 25 mg by mouth once daily. PT TAKING 1/2 TAB PER DAY             I certify that this patient is confined to her home and needs intermittent skilled nursing care, physical therapy and occupational therapy.      Tyrone Vick M.D.  Obstetrics & Gynecology  Pager [phone]  PGY-2

## 2018-10-30 NOTE — DISCHARGE SUMMARY
Post-Operative Discharge Summary  Gynecology Oncology      Primary Clinician: Dr. Guero Escobar    Admission date: 10/29/2018  Discharge date: 10/30/2018    Disposition: To home, self care    Admit Dx:  Patient Active Problem List   Diagnosis    Primary adenocarcinoma of upper lobe of left lung    Polycystic kidney disease    Other ascites    Bruising    Anemia in neoplastic disease    Chemotherapy-induced neutropenia    Gastroesophageal reflux disease without esophagitis    Hypertension    Anemia    Anorexia    Hemorrhoids    Heart burn    Grade II internal hemorrhoids    Stage 4 chronic kidney disease    Chronic fatigue    Malignant melanoma of vulva    Polycystic liver disease     Discharge Dx:    Same    Hospital Course:    Verónica Baker is a 68 y.o.  who presented for scheduled wide local excision and sentinel lymph node biopsy for melanoma of the vulva. General surgery was present in the case to perform node biopsy, then wide local excision of left labia minora and clitoris was done without complication. Catherine catheter was left in place due to proximity of excision site to urethral meatus. She tolerated the procedure well and was meeting routine post-operative advances on POD#1. Pain well controlled. Catherine catheter was removed, and patient voided spontaneously. Home health was arranged for wound care, and patient was discharged in good condition with follow up as scheduled below.     Pertinent studies:    Lab Results   Component Value Date    WBC 3.77 (L) 10/30/2018    HGB 8.1 (L) 10/30/2018    HCT 26.0 (L) 10/30/2018    MCV 86 10/30/2018     10/30/2018     Patient Instructions:   Current Discharge Medication List      START taking these medications    Details   HYDROcodone-acetaminophen (NORCO) 5-325 mg per tablet Take 1 tablet by mouth every 6 (six) hours as needed for Pain.  Qty: 30 tablet, Refills: 0         CONTINUE these medications which have NOT CHANGED    Details    amLODIPine (NORVASC) 10 MG tablet Take 10 mg by mouth once daily.      calcitRIOL (ROCALTROL) 0.25 MCG Cap Take 1 capsule (0.25 mcg total) by mouth once daily.  Qty: 30 capsule, Refills: 6      ergocalciferol (VITAMIN D2) 50,000 unit Cap Take 50,000 Units by mouth every 7 days.      ferrous sulfate 324 mg (65 mg iron) TbEC Take 1 tablet (324 mg total) by mouth once daily.  Refills: 0      furosemide (LASIX) 40 MG tablet Take 1 tablet (40 mg total) by mouth 2 (two) times daily.  Qty: 60 tablet, Refills: 11      gabapentin (NEURONTIN) 300 MG capsule Take 300 mg by mouth 2 (two) times daily.      magnesium oxide (MAG-OX) 400 mg tablet Take 400 mg by mouth once daily.      spironolactone (ALDACTONE) 25 MG tablet Take 25 mg by mouth once daily. PT TAKING 1/2 TAB PER DAY           Discharge Procedure Orders   Notify your health care provider if you experience any of the following:  temperature >100.4     Notify your health care provider if you experience any of the following:  severe uncontrolled pain     Notify your health care provider if you experience any of the following:  redness, tenderness, or signs of infection (pain, swelling, redness, odor or green/yellow discharge around incision site)     Notify your health care provider if you experience any of the following:   Order Comments: Heavy vaginal bleeding.     No dressing needed   Order Comments: Home health wound care will come to help clean and dry the incision.  No dressing needed, just keep the area clean and dry.  Wash the areas with warm, soapy water twice a day.     Activity as tolerated   Order Comments: Try to spend time out of bed: Sit up in a chair, Walk around several times a day, etc.   Nothing in the vagina for 8 weeks - No douching, tampons, or sex.     Follow-up Information     Guero Escobar MD.    Specialty:  Gynecologic Oncology  Why:  As scheduled by the clinic  Contact information:  [address] [place]  [address]  [phone]               Tyrone Vick M.D.  Obstetrics & Gynecology  PGY-2

## 2018-10-30 NOTE — PT/OT/SLP EVAL
Physical Therapy Evaluation and Discharge Note    Patient Name:  Verónica Baker   MRN:  [id number]    Recommendations:     Discharge Recommendations:  home   Discharge Equipment Recommendations: cane, straight, shower chair   Barriers to discharge: None    Assessment:     Verónica Baker is a 68 y.o. female admitted with a medical diagnosis of Malignant melanoma of torso excluding breast. .  At this time, patient is functioning at their prior level of function and does not require further acute PT services.     Recent Surgery: Procedure(s) (LRB):  VULVECTOMY (Left)  INJECTION, FOR SENTINEL NODE IDENTIFICATION - bilateral groin (Bilateral)  BIOPSY, LYMPH NODE, SENTINEL (Bilateral) 1 Day Post-Op    Plan:     During this hospitalization, patient does not require further acute PT services.  Please re-consult if situation changes.      Subjective     Chief Complaint: incisional pain  Patient/Family Comments/goals: to go home  Pain/Comfort:  · Pain Rating 1: 2/10  · Location 1: (incision site)  · Pain Addressed 1: Reposition, Cessation of Activity  · Pain Rating Post-Intervention 1: 2/10    Patients cultural, spiritual, Religion conflicts given the current situation: no    Living Environment:  Pt. Lives in first floor apartment with no LASHON.  Prior to admission, patients level of function was indep.  Equipment used at home: bedside commode.  Upon discharge, patient will have assistance from neighbors.    Objective:     Communicated with nursing prior to session.  Patient found seated in bedside chair upon PT entry to room found with: peripheral IV     General Precautions: Standard, fall   Orthopedic Precautions:N/A   Braces:       Exams:  · RUE ROM: WFL  · RUE Strength: WFL  · LUE ROM: WFL  · LUE Strength: WFL  · RLE ROM: WFL  · RLE Strength: WFL  · LLE ROM: WFL  · LLE Strength: WFL    Functional Mobility:  · Transfers:     · Sit to Stand:  supervision with no AD  · Gait: ~400' with Supervision without AD or LOB  · Balance:  good    AM-PAC 6 CLICK MOBILITY  Total Score:23       Therapeutic Activities and Exercises:   Discussed therapy/DME needs, goals, and POC.    AM-PAC 6 CLICK MOBILITY  Total Score:23     Patient left up in chair with all lines intact and call button in reach.    GOALS:   Multidisciplinary Problems     Physical Therapy Goals     Not on file          Multidisciplinary Problems (Resolved)        Problem: Physical Therapy Goal    Goal Priority Disciplines Outcome Goal Variances Interventions   Physical Therapy Goal   (Resolved)     PT, PT/OT Outcome(s) achieved                     History:     Past Medical History:   Diagnosis Date    Broken wrist     20 years ago    Diverticulitis     Fractured hip     Left hip in     Gout     Hypertension     Lung cancer 2018    Malignant melanoma of torso excluding breast 2018    Neoplastic malignant related fatigue 2018    Polycystic kidney     Polycystic liver disease 2018    Stage 4 chronic kidney disease 2018       Past Surgical History:   Procedure Laterality Date    BIOPSY, LYMPH NODE, SENTINEL Bilateral 10/29/2018    Performed by Ba Osei MD at [facility] OR [address] [place] [address]     SECTION      Fibroids removed      2 times    HEMORRHOIDOPEXY FOR PROLAPSING INTERNAL HEMORRHOIDS BY STAPLING N/A 2018    Procedure: HEMORRHOIDOPEXY, INTERNAL PROLAPSING, STAPLING;  Surgeon: Marcos Rey MD;  Location: [facility] OR;  Service: General;  Laterality: N/A;    HEMORRHOIDOPEXY, INTERNAL PROLAPSING, STAPLING N/A 2018    Performed by Marcos Rey MD at [facility] OR    INJECTION FOR SENTINEL NODE IDENTIFICATION Bilateral 10/29/2018    Procedure: INJECTION, FOR SENTINEL NODE IDENTIFICATION - bilateral groin;  Surgeon: Ba Osei MD;  Location: [facility] OR [address] [place] [address];  Service: General;  Laterality: Bilateral;    INJECTION, FOR SENTINEL NODE IDENTIFICATION - bilateral groin Bilateral 10/29/2018    Performed by Ba Osei MD at [facility]    SENTINEL LYMPH NODE BIOPSY Bilateral 10/29/2018    Procedure: BIOPSY, LYMPH NODE, SENTINEL;  Surgeon: Ba Osei MD;  Location: [facility] OR [address] [place] [address];  Service: General;  Laterality: Bilateral;    TOTAL ABDOMINAL HYSTERECTOMY  1983    VAGINAL DELIVERY      1 time    VULVECTOMY Left 10/29/2018    Procedure: VULVECTOMY;  Surgeon: Guero Escobar MD;  Location: [facility] OR [address] [place] [address];  Service: OB/GYN;  Laterality: Left;  patient need to see Anesthesia for clearance    VULVECTOMY Left 10/29/2018    Performed by Guero Escobar MD at [facility] OR [address] [place] [address]       Clinical Decision Making:     History  Co-morbidities and personal factors that may impact the plan of care Examination  Body Structures and Functions, activity limitations and participation restrictions that may impact the plan of care Clinical Presentation   Decision Making/ Complexity Score   Co-morbidities:   [] Time since onset of injury / illness / exacerbation  [] Status of current condition  []Patient's cognitive status and safety concerns    [] Multiple Medical Problems (see med hx)  Personal Factors:   [] Patient's age  [] Prior Level of function   [] Patient's home situation (environment and family support)  [] Patient's level of motivation  [] Expected progression of patient      HISTORY:(criteria)    [] 16029 - no personal factors/history    [] 14208 - has 1-2 personal factor/comorbidity     [] 19830 - has >3 personal factor/comorbidity     Body Regions:  [] Objective examination findings  [] Head     []  Neck  [] Trunk   [] Upper Extremity  [] Lower Extremity    Body Systems:  [] For communication ability, affect, cognition, language, and learning style: the assessment of the ability to make needs known, consciousness, orientation (person, place, and time), expected emotional /behavioral responses, and learning preferences (eg, learning barriers, education  needs)  [] For the neuromuscular system: a general assessment of gross coordinated  movement (eg, balance, gait, locomotion, transfers, and transitions) and motor function  (motor control and motor learning)  [] For the musculoskeletal system: the assessment of gross symmetry, gross range of motion, gross strength, height, and weight  [] For the integumentary system: the assessment of pliability(texture), presence of scar formation, skin color, and skin integrity  [] For cardiovascular/pulmonary system: the assessment of heart rate, respiratory rate, blood pressure, and edema     Activity limitations:    [] Patient's cognitive status and saf ety concerns          [] Status of current condition      [] Weight bearing restriction  [] Cardiopulmunary Restriction    Participation Restrictions:   [] Goals and goal agreement with the patient     [] Rehab potential (prognosis) and probable outcome      Examination of Body System: (criteria)    [] 16335 - addressing 1-2 elements    [] 12030 - addressing a total of 3 or more elements     [] 45604 -  Addressing a total of 4 or more elements         Clinical Presentation: (criteria)  Choose one     On examination of body system using standardized tests and measures patient presents with (CHOOSE ONE) elements from any of the following: body structures and functions, activity limitations, and/or participation restrictions.  Leading to a clinical presentation that is considered (CHOOSE ONE)                              Clinical Decision Making  (Eval Complexity):  Choose One     Time Tracking:     PT Received On: 10/30/18  PT Start Time: 1106     PT Stop Time: 1122  PT Total Time (min): 16 min     Billable Minutes: Evaluation 16      Vasiliy Corcoran, PT  10/30/2018

## 2018-10-30 NOTE — ANESTHESIA POSTPROCEDURE EVALUATION
"Anesthesia Post Evaluation    Patient: Verónica Baker    Procedure(s) Performed: Procedure(s) (LRB):  VULVECTOMY (Left)  INJECTION, FOR SENTINEL NODE IDENTIFICATION - bilateral groin (Bilateral)  BIOPSY, LYMPH NODE, SENTINEL (Bilateral)    Final Anesthesia Type: general  Patient location during evaluation: PACU  Patient participation: Yes- Able to Participate  Level of consciousness: awake and alert  Post-procedure vital signs: reviewed and stable  Pain management: adequate  Airway patency: patent  PONV status at discharge: No PONV  Anesthetic complications: no      Cardiovascular status: blood pressure returned to baseline and stable  Respiratory status: unassisted  Hydration status: euvolemic  Follow-up not needed.        Visit Vitals  BP (!) 164/91 (BP Location: Right arm, Patient Position: Sitting)   Pulse 100   Temp 37.2 °C (98.9 °F) (Oral)   Resp 18   Ht 5' 5" (1.651 m)   Wt 59.9 kg (132 lb)   SpO2 95%   Breastfeeding? No   BMI 21.97 kg/m²       Pain/Billy Score: Pain Assessment Performed: Yes (10/30/2018 12:00 PM)  Presence of Pain: denies (10/30/2018 12:00 PM)  Pain Rating Prior to Med Admin: 4 (10/30/2018  6:16 AM)  Pain Rating Post Med Admin: 2 (10/29/2018  5:05 PM)  Billy Score: 10 (10/29/2018  5:05 PM)        "

## 2018-10-30 NOTE — PLAN OF CARE
Problem: Physical Therapy Goal  Goal: Physical Therapy Goal  Outcome: Outcome(s) achieved Date Met: 10/30/18  No goals set

## 2018-10-30 NOTE — PLAN OF CARE
Problem: Occupational Therapy Goal  Goal: Occupational Therapy Goal  Outcome: Ongoing (interventions implemented as appropriate)  Eval completed; no OT needs    Comments: D/C OT 10/30/2018    Patti Hodgson, OT

## 2018-10-30 NOTE — PT/OT/SLP EVAL
Occupational Therapy   Evaluation and Discharge Note    Name: Verónica Baker  MRN: [id number]  Admitting Diagnosis:  <principal problem not specified> 1 Day Post-Op    Recommendations:     Discharge Recommendations: home  Discharge Equipment Recommendations:  shower chair, cane, straight  Barriers to discharge:  None    History:     Occupational Profile:  Living Environment: Pt lives alone in 1st floor efficiency apt with 0STE. Bathroom has tub/shower with grab bar  Previous level of function: PTA, pt reports independence with all ADL and IADL, including driving. Pt was not using any AD for ambulation or ADL  Roles and Routines: n/a  Equipment Owned:  none (owns BSC but does not use)  Assistance upon Discharge: Pt lives alone but reports neighbors assist as needed    Past Medical History:   Diagnosis Date    Broken wrist     20 years ago    Diverticulitis     Fractured hip     Left hip in     Gout     Hypertension     Lung cancer 2018    Malignant melanoma of torso excluding breast 2018    Neoplastic malignant related fatigue 2018    Polycystic kidney     Polycystic liver disease 2018    Stage 4 chronic kidney disease 2018       Past Surgical History:   Procedure Laterality Date    BIOPSY, LYMPH NODE, SENTINEL Bilateral 10/29/2018    Performed by Ba [facility]sei MD at [facility] OR [address] [place] [address]     SECTION      Fibroids removed      2 times    HEMORRHOIDOPEXY FOR PROLAPSING INTERNAL HEMORRHOIDS BY STAPLING N/A 2018    Procedure: HEMORRHOIDOPEXY, INTERNAL PROLAPSING, STAPLING;  Surgeon: Marcos Rey MD;  Location: [facility] OR;  Service: General;  Laterality: N/A;    HEMORRHOIDOPEXY, INTERNAL PROLAPSING, STAPLING N/A 2018    Performed by Marcos Rey MD at [facility] OR    INJECTION FOR SENTINEL NODE IDENTIFICATION Bilateral 10/29/2018    Procedure: INJECTION, FOR SENTINEL NODE IDENTIFICATION - bilateral groin;  Surgeon: Ba Osei MD;  Location: [facility] OR [address] [place] [address];   Service: General;  Laterality: Bilateral;    INJECTION, FOR SENTINEL NODE IDENTIFICATION - bilateral groin Bilateral 10/29/2018    Performed by Ba Osei MD at [facility] OR [address] [place] [address]    SENTINEL LYMPH NODE BIOPSY Bilateral 10/29/2018    Procedure: BIOPSY, LYMPH NODE, SENTINEL;  Surgeon: Ba Osei MD;  Location: [address];  Service: General;  Laterality: Bilateral;    TOTAL ABDOMINAL HYSTERECTOMY  1983    VAGINAL DELIVERY      1 time    VULVECTOMY Left 10/29/2018    Procedure: VULVECTOMY;  Surgeon: Guero Escobar MD;  Location: [address];  Service: OB/GYN;  Laterality: Left;  patient need to see Anesthesia for clearance    VULVECTOMY Left 10/29/2018    Performed by Guero Escobar MD at [facility] OR [address] [place] [address]       Subjective     Chief Complaint: pain  Patient/Family stated goals: return to PLOF  Communicated with: RN prior to session.  Pain/Comfort:  · Pain Rating 1: 2/10  · Location 1: (at incision site)  · Pain Addressed 1: Reposition, Distraction, Cessation of Activity  · Pain Rating Post-Intervention 1: 2/10    Patients cultural, spiritual, Bahai conflicts given the current situation: none reported    Objective:     Patient found with: (no lines connected)    General Precautions: Standard, fall   Orthopedic Precautions:N/A   Braces: N/A     Occupational Performance:    Bed Mobility:    · Pt found UIC    Functional Mobility/Transfers:  · Patient completed Sit <> Stand Transfer with supervision  with  no assistive device   · Patient completed Bed <> Chair Transfer using Step Transfer technique with supervision with no assistive device  · Functional Mobility: Pt ambulate ~400 ft with supervision using no AD    Activities of Daily Living:  · Lower Body Dressing: independence to manage B socks while seated UIC using figure-four position    Cognitive/Visual Perceptual:  Cognitive/Psychosocial Skills:     -       Oriented to: Person, Place, Time and Situation   -       Follows  "Commands/attention:Follows multistep  commands  -       Communication: clear/fluent  -       Memory: No Deficits noted  -       Safety awareness/insight to disability: intact   -       Mood/Affect/Coping skills/emotional control: Appropriate to situation  Visual/Perceptual:      -Pt wears glasses; hearing intact    Physical Exam:  Balance:    -       good sitting/standing balance  Postural examination/scapula alignment:    -       Rounded shoulders  -       Forward head  Skin integrity: Visible skin intact  Edema:  None noted  Sensation:    -       Intact UE and LE  Dominant hand:    -       L hand  Upper Extremity Range of Motion:     -       Right Upper Extremity: WFL  -       Left Upper Extremity: WFL   Upper Extremity Strength:    -       Right Upper Extremity: WFL  -       Left Upper Extremity: WFL   Strength:    -       Right Upper Extremity: WFL  -       Left Upper Extremity: WFL  Fine Motor Coordination:    -       Intact  Gross motor coordination:   WFL    Patient left up in chair with all lines intact and call button in reach    [facility] 6 Click:  [facility] Total Score: 24    Treatment & Education:  Pt educated on role of OT/POC  Pt educated on importance of continued ambulation/UIC  Pt educated on and in agreement with d/c from OT   White board/communication board updated  Education:    Assessment:     Verónica Baker is a 68 y.o. female with a medical diagnosis of <principal problem not specified>. At this time, patient is functioning at their prior level of function and does not require further acute OT services.     Clinical Decision Makin.  OT Low:  "Pt evaluation falls under low complexity for evaluation coding due to performance deficits noted in 1-3 areas as stated above and 0 co-morbities affecting current functional status. Data obtained from problem focused assessments. No modifications or assistance was required for completion of evaluation. Only brief occupational profile and history review " "completed."     Plan:     During this hospitalization, patient does not require further acute OT services.  Please re-consult if situation changes.    · Plan of Care Reviewed with: patient    This Plan of care has been discussed with the patient who was involved in its development and understands and is in agreement with the identified goals and treatment plan    GOALS:   Multidisciplinary Problems     Occupational Therapy Goals        Problem: Occupational Therapy Goal    Goal Priority Disciplines Outcome Interventions   Occupational Therapy Goal     OT, PT/OT Ongoing (interventions implemented as appropriate)                    Time Tracking:     OT Date of Treatment: 10/30/18  OT Start Time: 1106  OT Stop Time: 1122  OT Total Time (min): 16 min    Billable Minutes:Evaluation 16    Patti Hodgson OT  10/30/2018    "

## 2018-10-30 NOTE — SUBJECTIVE & OBJECTIVE
Interval History:   Patient reports she is feeling well this morning. Pain is minimal and only bothers her with movement but is well controlled with medication. She denies nausea/vomiting, fever/chills    Scheduled Meds:  Continuous Infusions:   sodium chloride 0.9% 10 mL/hr at 10/29/18 1115    sodium chloride 0.9% 75 mL/hr at 10/29/18 1440     PRN Meds:HYDROmorphone, oxyCODONE, oxyCODONE, sodium chloride 0.9%    Review of patient's allergies indicates:   Allergen Reactions    Codeine Nausea Only    Sulfa (sulfonamide antibiotics)        Objective:     Vital Signs (Most Recent):  Temp: 99.1 °F (37.3 °C) (10/30/18 0346)  Pulse: 106 (10/30/18 0346)  Resp: 14 (10/30/18 0346)  BP: (!) 147/93 (10/30/18 0346)  SpO2: (!) 92 % (10/30/18 0346) Vital Signs (24h Range):  Temp:  [97.4 °F (36.3 °C)-99.1 °F (37.3 °C)] 99.1 °F (37.3 °C)  Pulse:  [] 106  Resp:  [14-20] 14  SpO2:  [92 %-100 %] 92 %  BP: (142-172)/(78-96) 147/93     Weight: 59.9 kg (132 lb)  Body mass index is 21.97 kg/m².    Intake/Output - Last 3 Shifts       10/28 0700 - 10/29 0659 10/29 0700 - 10/30 0659    P.O.  490    I.V. (mL/kg)  2899.9 (48.4)    Total Intake(mL/kg)  3389.9 (56.6)    Urine (mL/kg/hr)  975    Stool  0    Total Output  975    Net  +2414.9          Stool Occurrence  0 x             Physical Exam:   Constitutional: She is oriented to person, place, and time. She appears well-developed and well-nourished. No distress.    HENT:   Head: Normocephalic and atraumatic.   Mouth/Throat: Oropharynx is clear and moist.    Eyes: Conjunctivae and EOM are normal.    Neck: Normal range of motion. Neck supple.    Cardiovascular: Normal rate and regular rhythm.  Exam reveals no cyanosis and no edema.     Pulmonary/Chest: Effort normal and breath sounds normal.        Abdominal: There is no tenderness.   Baseline abdomen firm, distended, with large masses palpable through the abdominal wall. No change in baseline exam. No tenderness.   Bilateral  incision in inguinal region clean and dry with steri strips in place.      Genitourinary:   Genitourinary Comments: Had Kerlix applying pressure to vulva.   No active bleeding.   Excision site appears clean and dry.               Neurological: She is alert and oriented to person, place, and time.    Skin: Skin is warm and dry. She is not diaphoretic. No cyanosis.    Psychiatric: She has a normal mood and affect. Her behavior is normal.       Lines/Drains/Airways     Drain                 Urethral Catheter 10/29/18 1353 Non-latex;Straight-tip 16 Fr. less than 1 day          Peripheral Intravenous Line                 Peripheral IV - Single Lumen 09/10/18 0000 Right Antecubital 50 days         Peripheral IV - Single Lumen 10/29/18 1115 Right Forearm less than 1 day         Peripheral IV - Single Lumen 10/29/18 1205 Right Antecubital less than 1 day              Laboratory:  AM labs pending    Diagnostic Results:  None new

## 2018-10-30 NOTE — HOSPITAL COURSE
Patient presented for scheduled wide local excision and sentinel lymph node biopsy for melanoma of the vulva. General surgery was present in the case to perform node biopsy, then wide local excision of left labia minora and clitoris was done without complication. Catherine catheter was left in place due to proximity of excision site to urethral meatus. She tolerated the procedure well and was meeting routine post-operative advances on POD#1. Pain well controlled.

## 2018-10-30 NOTE — HPI
Patient presents for scheduled wide local excision of melanoma of the vulva. Initial biopsy in June 2018 was originally read as keratinizing hyperplastic squamous epithelium with scattered juctional nests of atypical melanocytes. Pathology was read at [place]. institional review in Sept 2018 was reported a malignant melanoma. Tim's level 3 with Breslow depth 1.6 mm.   Pt reports itching at L vulva site, no VB or other complaints.  Medical history: lung CA, HTN, polycystic kidneys (50yrs) and liver  Surgical history: NEENA BSO (fibroids), two myomectomies, C/S x 2  Family history negative for skin, breast, uterine, ovarian or colon CA. Mother had some type of cancer but patient unsure what kind.

## 2018-10-30 NOTE — OP NOTE
DATE OF PROCEDURE:  10/29/2018    PREOPERATIVE DIAGNOSIS:  Malignant melanoma of the left vulva.    POSTOPERATIVE DIAGNOSIS:  Malignant melanoma of the left vulva.    PROCEDURES:  1.  Bilateral sentinel lymph node biopsies done with Dr. Ba Osei.  2.  Left radical vulvectomy.    SURGEON:  Guero Escobar M.D.    FIRST ASSISTANT:  Tyrone Vick M.D. (RES)    SECOND ASSISTANT:  Angélica Howell M.D. (RES)    ANESTHESIA:  GETA.    ESTIMATED BLOOD LOSS:  20 mL    IV FLUIDS:  1600 mL    URINE OUTPUT:  100 mL    OPERATIVE HISTORY:  This is a 68-year-old patient referred to me with a vulvar   lesion that has been biopsied that shows malignant melanoma, Tim's level 3   with Breslow depth of 1.6 mm.  She is brought to the Operating Room for surgical   management.  Preoperative CT scan of the abdomen and pelvis revealed no   evidence for metastatic disease.  A PET/CT did not show any evidence for   metastatic disease to the groin.  The patient has a history of malignant   lung cancer in her left lung.  She is presently receiving treatment for this.    OPERATIVE PROCEDURE:  The patient was brought to the Operating Room after   induction with general anesthesia, [facility]  At this   time, after timeout x2, the vulva was injected with both azurin blue dye and radioactive   isotope.  Following this, the vulva and vagina were prepped with Betadine scrub   and solution.  The abdomen was prepped with ChloraPrep and the patient was   sterilely draped.    At this point, Dr. Osei has injected the vulva and we are now identifying   nodes in each groin.  We subsequently identified a hot spot in each groin.  An   incision is made first over the left groin and carried down to the lymph node   that is identified and removed.  It was hot and blue.  It was sent off for   permanent section.    We then made an incision in the right groin and once again identified the   sentinel node, which was removed.  This  also was hot and blue.    Each of the groins were then reexamined with the gamma counter and there was no remaining   radioactive activity.    We then closed each groin in two-layer closure.  The 2-0 Vicryl sutures were   used to reapproximate the subcutaneous tissue and the skin was closed on each   side with a running 4-0 Monocryl suture in a subcuticular closure.  Steri-Strips   were placed over the incisions.    Attention was now directed to the vulva.  At this point, I had previously marked   a margin of 2 cm about the invasive component of the vulva.  The skin was now   incised laterally and dissected superiorly and inferiorly.  Because of the close   proximity of the lesion to the midline, I had to resect the clitoris.  We   subsequently came across the base of the clitoris with the Bovie.  There was   additional bleeding that was controlled with interrupted 3-0 Vicryl suture.    I then incised the skin medially and we resected the lesion.  It was marked at   the 12 o'clock position.    Hemostasis was obtained with interrupted 3-0 Vicryl sutures.    Subcutaneous tissues were then reapproximated with interrupted 3-0 Vicryl   suture.  The skin was then closed with a running 4-0 Monocryl suture in a   subcuticular closure beginning superiorly and ending inferiorly.    Catherine catheter was placed.  There was no active bleeding.  The patient's legs   were brought back down to straight position.  She was awakened and taken to the   Recovery Room in stable condition.  Lap, needle, sponge and instrument count was   correct.          /ls [id number] blank(s)        MINNIE  dd: 10/29/2018 14:02:36 (CDT)  td: 10/30/2018 09:06:27 (CDT)  Doc ID   #[id number]  Job ID #[id number]    CC:

## 2018-10-30 NOTE — ASSESSMENT & PLAN NOTE
- Melanoma of vulva, s/p WLE  - Post-op advances tolerated well:  - Regular diet  - stop IVF and saline lock IV  - Pain control with PO oxycodone (no tylenol 2/2 polycystic liver)  - Encourage ambulation  - Leave greene catheter in place  - strict monitoring of I&O - 900 in/675 out over 12 hours  - awaiting AM labs  - SW/PT/OT consulted for home health for greene care

## 2018-10-30 NOTE — PLAN OF CARE
Future Appointments   Date Time Provider Department Center   10/31/2018  8:30 AM VASCULAR, LAB [facility] VASCLAB [facility]   10/31/2018  9:15 AM Hernesto Decker MD [facility] VASCSUR [facility]   11/5/2018  2:00 PM Matilde Trinh MD [facility] IM [facility] PCW   11/8/2018  2:15 PM Ba Osei MD [facility] GENSUR [facility]   11/12/2018  1:15 PM LAB, HEMONC SAME DAY [facility]   11/12/2018  2:20 PM Patricia Pires MD [facility] HEM ONC [facility]   11/12/2018  [facility]   11/27/2018  1:15 PM Guero Escobar MD [facility] GYN ONC [facility]      10/30/18 4900   Final Note   Assessment Type Final Discharge Note   Anticipated Discharge Disposition Home-Health   What phone number can be called within the next 1-3 days to see how you are doing after discharge? ([phone])   Hospital Follow Up  Appt(s) scheduled? Yes   Discharge plans and expectations educations in teach back method with documentation complete? Yes

## 2018-10-30 NOTE — PROGRESS NOTES
[facility]  Gynecologic Oncology  Progress Note      Patient Name: Verónica Baker  MRN: [id number]  Admission Date: 10/29/2018  Hospital Length of Stay: 1 days  Attending Provider: Guero Escobar MD  Primary Care Provider: Primary Doctor No  Principal Problem: <principal problem not specified>    Follow-up For: Procedure(s) (LRB):  VULVECTOMY (Left)  INJECTION, FOR SENTINEL NODE IDENTIFICATION - bilateral groin (Bilateral)  BIOPSY, LYMPH NODE, SENTINEL (Bilateral)  Post-Operative Day: 1 Day Post-Op     Subjective:      History of Present Illness:  Patient presents for scheduled wide local excision of melanoma of the vulva. Initial biopsy in June 2018 was originally read as keratinizing hyperplastic squamous epithelium with scattered juctional nests of atypical melanocytes. Pathology was read at [place]. institional review in Sept 2018 was reported a malignant melanoma. Tim's level 3 with Breslow depth 1.6 mm.   Pt reports itching at L vulva site, no VB or other complaints.  Medical history: lung CA, HTN, polycystic kidneys (50yrs) and liver  Surgical history: NEENA BSO (fibroids), two myomectomies, C/S x 2  Family history negative for skin, breast, uterine, ovarian or colon CA. Mother had some type of cancer but patient unsure what kind.    Hospital Course:  Patient presented for scheduled wide local excision and sentinel lymph node biopsy for melanoma of the vulva. General surgery was present in the case to perform node biopsy, then wide local excision of left labia minora and clitoris was done without complication. Catherine catheter was left in place due to proximity of excision site to urethral meatus. She tolerated the procedure well and was meeting routine post-operative advances on POD#1. Pain well controlled.    Interval History:   Patient reports she is feeling well this morning. Pain is minimal and only bothers her with movement but is well controlled with medication. She denies nausea/vomiting,  fever/chills    Scheduled Meds:  Continuous Infusions:   sodium chloride 0.9% 10 mL/hr at 10/29/18 1115    sodium chloride 0.9% 75 mL/hr at 10/29/18 1440     PRN Meds:HYDROmorphone, oxyCODONE, oxyCODONE, sodium chloride 0.9%    Review of patient's allergies indicates:   Allergen Reactions    Codeine Nausea Only    Sulfa (sulfonamide antibiotics)        Objective:     Vital Signs (Most Recent):  Temp: 99.1 °F (37.3 °C) (10/30/18 0346)  Pulse: 106 (10/30/18 0346)  Resp: 14 (10/30/18 0346)  BP: (!) 147/93 (10/30/18 0346)  SpO2: (!) 92 % (10/30/18 0346) Vital Signs (24h Range):  Temp:  [97.4 °F (36.3 °C)-99.1 °F (37.3 °C)] 99.1 °F (37.3 °C)  Pulse:  [] 106  Resp:  [14-20] 14  SpO2:  [92 %-100 %] 92 %  BP: (142-172)/(78-96) 147/93     Weight: 59.9 kg (132 lb)  Body mass index is 21.97 kg/m².    Intake/Output - Last 3 Shifts       10/28 0700 - 10/29 0659 10/29 0700 - 10/30 0659    P.O.  490    I.V. (mL/kg)  2899.9 (48.4)    Total Intake(mL/kg)  3389.9 (56.6)    Urine (mL/kg/hr)  975    Stool  0    Total Output  975    Net  +2414.9          Stool Occurrence  0 x             Physical Exam:   Constitutional: She is oriented to person, place, and time. She appears well-developed and well-nourished. No distress.    HENT:   Head: Normocephalic and atraumatic.   Mouth/Throat: Oropharynx is clear and moist.    Eyes: Conjunctivae and EOM are normal.    Neck: Normal range of motion. Neck supple.    Cardiovascular: Normal rate and regular rhythm.  Exam reveals no cyanosis and no edema.     Pulmonary/Chest: Effort normal and breath sounds normal.        Abdominal: There is no tenderness.   Baseline abdomen firm, distended, with large masses palpable through the abdominal wall. No change in baseline exam. No tenderness.   Bilateral incision in inguinal region clean and dry with steri strips in place.      Genitourinary:   Genitourinary Comments: Had Kerlix applying pressure to vulva.   No active bleeding.   Excision site  appears clean and dry.               Neurological: She is alert and oriented to person, place, and time.    Skin: Skin is warm and dry. She is not diaphoretic. No cyanosis.    Psychiatric: She has a normal mood and affect. Her behavior is normal.       Lines/Drains/Airways     Drain                 Urethral Catheter 10/29/18 1353 Non-latex;Straight-tip 16 Fr. less than 1 day          Peripheral Intravenous Line                 Peripheral IV - Single Lumen 09/10/18 0000 Right Antecubital 50 days         Peripheral IV - Single Lumen 10/29/18 1115 Right Forearm less than 1 day         Peripheral IV - Single Lumen 10/29/18 1205 Right Antecubital less than 1 day              Laboratory:  AM labs pending    Diagnostic Results:  None new    Assessment/Plan:     Malignant melanoma of torso excluding breast    - Melanoma of vulva, s/p WLE  - Post-op advances tolerated well:  - Regular diet  - stop IVF and saline lock IV  - Pain control with PO oxycodone (no tylenol 2/2 polycystic liver)  - Encourage ambulation  - Leave greene catheter in place  - strict monitoring of I&O - 900 in/675 out over 12 hours  - awaiting AM labs  - SW/PT/OT consulted for home health for greene care       VTE Risk Mitigation (From admission, onward)        Ordered     Place sequential compression device  Until discontinued      10/29/18 1015     Place SHAKEEL hose  Until discontinued      10/29/18 1015          Was greene catheter removed? No: Vulvectomy margin very near urethral meatus.    Tyrone Vick MD  Gynecologic Oncology  [facility]

## 2018-10-30 NOTE — PLAN OF CARE
CM communicating plan with SW. Patient will need home health services set up for wound care at home. Patient ambulating. Patient tolerating diet. Patient remains afebrile. Pain control regimen in place. Catherine catheter removed; patient will need to void spontaneously before being discharged. Likely discharge today. SW to set up home health services. SW and CM to continue to follow.     Marie Freedman RN, CM  [facility]  [phone] -[phone]

## 2018-10-30 NOTE — PHYSICIAN QUERY
PT Name: Verónica Baker  MR #: [id number]     Physician Query Form - Documentation Clarification      CDS/: PABLO Hermosillo,RNC-MNN          Contact information:[email or web address]    This form is a permanent document in the medical record.     Query Date: October 30, 2018    By submitting this query, we are merely seeking further clarification of documentation. Please utilize your independent clinical judgment when addressing the question(s) below.    The Medical record reflects the following:    Supporting Clinical Findings Location in Medical Record   Patient presents for scheduled wide local excision of melanoma of the vulva. Initial biopsy in June 2018 was originally read as keratinizing hyperplastic squamous epithelium with scattered juctional nests of atypical melanocytes. Pathology was read at [place]. institional review in Sept 2018 was reported a malignant melanoma. Tim's level 3 with Breslow depth 1.6 mm.   Pt reports itching at L vulva site, no VB or other complaints.    Patient presented for scheduled wide local excision and sentinel lymph node biopsy for melanoma of the vulva. General surgery was present in the case to perform node biopsy, then wide local excision of left labia minora and clitoris was done without complication    POSTOPERATIVE DIAGNOSIS:  Malignant melanoma of the left vulva.     PROCEDURES:  1.  Bilateral sentinel lymph node biopsies done with Dr. Ba Osei.  2.  Left radical vulvectomy.    Attention was now directed to the vulva.  At this point, I had previously marked   a margin of 2 cm about the invasive component of the vulva.  The skin was now   incised laterally and dissected superiorly and inferiorly.  Because of the close   proximity of the lesion to the midline, I had to resect the clitoris.  We   subsequently came across the base of the clitoris with the Bovie.  There was   additional bleeding that was controlled with interrupted 3-0 Vicryl suture.     I  then incised the skin medially and we resected the lesion.  It was marked at   the 12 o'clock position.   Gyn Onc Progress note 1030@837am                                    Op note 10/30                                                                                            Doctor, Please specify site of vulvar cancer.    Provider Use Only      [  ] Clitoris  [  ] Labia majora  [  ] Labia minors  [x  ] Overlapping lesions  [  ] Other, please specify:____________________________                                                                                                                    Clinically Undetermined

## 2018-10-31 ENCOUNTER — TELEPHONE (OUTPATIENT)
Dept: VASCULAR SURGERY | Facility: CLINIC | Age: 68
End: 2018-10-31

## 2018-10-31 ENCOUNTER — HOSPITAL ENCOUNTER (OUTPATIENT)
Dept: VASCULAR SURGERY | Facility: CLINIC | Age: 68
Discharge: HOME | End: 2018-10-31
Attending: SURGERY
Payer: MEDICARE

## 2018-10-31 ENCOUNTER — OFFICE VISIT (OUTPATIENT)
Dept: VASCULAR SURGERY | Facility: CLINIC | Age: 68
End: 2018-10-31
Payer: MEDICARE

## 2018-10-31 VITALS
DIASTOLIC BLOOD PRESSURE: 86 MMHG | TEMPERATURE: 98 F | SYSTOLIC BLOOD PRESSURE: 167 MMHG | WEIGHT: 131.19 LBS | HEIGHT: 65 IN | BODY MASS INDEX: 21.86 KG/M2 | HEART RATE: 68 BPM

## 2018-10-31 DIAGNOSIS — Z01.818 PREOP EXAMINATION: ICD-10-CM

## 2018-10-31 DIAGNOSIS — N18.4 CKD (CHRONIC KIDNEY DISEASE), STAGE IV: Primary | ICD-10-CM

## 2018-10-31 DIAGNOSIS — N18.4 STAGE 4 CHRONIC KIDNEY DISEASE: Primary | ICD-10-CM

## 2018-10-31 PROCEDURE — 99213 OFFICE O/P EST LOW 20 MIN: CPT | Mod: PBBFAC | Performed by: SURGERY

## 2018-10-31 PROCEDURE — 99204 OFFICE O/P NEW MOD 45 MIN: CPT | Mod: S$PBB,,, | Performed by: SURGERY

## 2018-10-31 PROCEDURE — 1101F PT FALLS ASSESS-DOCD LE1/YR: CPT | Mod: CPTII,,, | Performed by: SURGERY

## 2018-10-31 PROCEDURE — 93970 EXTREMITY STUDY: CPT | Mod: PBBFAC | Performed by: SURGERY

## 2018-10-31 PROCEDURE — 93970 EXTREMITY STUDY: CPT | Mod: 26,S$PBB,, | Performed by: SURGERY

## 2018-10-31 PROCEDURE — 3077F SYST BP >= 140 MM HG: CPT | Mod: CPTII,,, | Performed by: SURGERY

## 2018-10-31 PROCEDURE — 99999 PR PBB SHADOW E&M-EST. PATIENT-LVL III: CPT | Mod: PBBFAC,,, | Performed by: SURGERY

## 2018-10-31 PROCEDURE — 3079F DIAST BP 80-89 MM HG: CPT | Mod: CPTII,,, | Performed by: SURGERY

## 2018-10-31 NOTE — LETTER
October 31, 2018      Remington Harris MD  [address]  [facility] [address]           [facility]  [address]  [facility] [address]  Phone: [phone]  Fax: [phone]          Patient: Verónica Baker   MR Number: [id number]   YOB: 1950   Date of Visit: 10/31/2018       Dear Dr. Remington Harris:    Thank you for referring Verónica Baker to me for evaluation. Attached you will find relevant portions of my assessment and plan of care.    If you have questions, please do not hesitate to call me. I look forward to following Verónica Baker along with you.    Sincerely,    Hernesto Decker MD    Enclosure  CC:  No Recipients    If you would like to receive this communication electronically, please contact [email or web address] or [phone] to request more information on Issue Link access.    For providers and/or their staff who would like to refer a patient to [facility], please contact us through our one-stop-shop provider referral line, [facility], at [phone].    If you feel you have received this communication in error or would no longer like to receive these types of communications, please e-mail [email or web address]

## 2018-10-31 NOTE — PROGRESS NOTES
Verónica CAMARGO Samuel  10/31/2018    HPI:  Patient is a 68 y.o. female with a h/o PCKD (Dx 19 yo), stage IV CKD, plocystic liver disease,  DM II, metastatic lung CA (mets to T9 vert bodies) on immunotherapy,  with recent lymph node bx  who is here today for evaluation for AV fistula creation.   Also - recent L radical vulvectomy and bilatral sentinel LN Bx  L-hand dominant    No MI/stroke  Tobacco use: denies    Retired   Lives alone    Renal is Dr Tequila Castorena/Remington Diez    Past Medical History:   Diagnosis Date    Broken wrist     20 years ago    Diverticulitis     Fractured hip     Left hip in     Gout     Hypertension     Lung cancer 2018    Malignant melanoma of torso excluding breast 2018    Neoplastic malignant related fatigue 2018    Polycystic kidney     Polycystic liver disease 2018    Stage 4 chronic kidney disease 2018     Past Surgical History:   Procedure Laterality Date    BIOPSY, LYMPH NODE, SENTINEL Bilateral 10/29/2018    Performed by Ba Osei MD at [facility] OR [address] [place] [address]     SECTION      Fibroids removed      2 times    HEMORRHOIDOPEXY FOR PROLAPSING INTERNAL HEMORRHOIDS BY STAPLING N/A 2018    Procedure: HEMORRHOIDOPEXY, INTERNAL PROLAPSING, STAPLING;  Surgeon: Marcos Rey MD;  Location: [facility] OR;  Service: General;  Laterality: N/A;    HEMORRHOIDOPEXY, INTERNAL PROLAPSING, STAPLING N/A 2018    Performed by Marcos Rey MD at [facility] OR    INJECTION FOR SENTINEL NODE IDENTIFICATION Bilateral 10/29/2018    Procedure: INJECTION, FOR SENTINEL NODE IDENTIFICATION - bilateral groin;  Surgeon: Ba Osei MD;  Location: [facility] OR [address] [place] [address];  Service: General;  Laterality: Bilateral;    INJECTION, FOR SENTINEL NODE IDENTIFICATION - bilateral groin Bilateral 10/29/2018    Performed by Ba Osei MD at [facility] OR [address] [place] [address]    SENTINEL LYMPH NODE BIOPSY Bilateral 10/29/2018    Procedure: BIOPSY, LYMPH NODE, SENTINEL;  Surgeon: Ba  CELESTE Osei MD;  Location: [address];  Service: General;  Laterality: Bilateral;    TOTAL ABDOMINAL HYSTERECTOMY  1983    VAGINAL DELIVERY      1 time    VULVECTOMY Left 10/29/2018    Procedure: VULVECTOMY;  Surgeon: Guero Escobar MD;  Location: [address];  Service: OB/GYN;  Laterality: Left;  patient need to see Anesthesia for clearance    VULVECTOMY Left 10/29/2018    Performed by [facility] at [facility] OR [address] [place] [address]     Family History   Problem Relation Age of Onset    Cancer Mother     Diabetes Mother     Heart attack Father     Diabetes Father     Polycystic kidney disease Sister     Hypertension Sister     Diabetes Sister     Cancer Sister     Diabetes type II Sister     Hypertension Sister     Breast cancer Neg Hx     Colon cancer Neg Hx     Ovarian cancer Neg Hx      Social History     Socioeconomic History    Marital status: Single     Spouse name: Not on file    Number of children: Not on file    Years of education: Not on file    Highest education level: Not on file   Social Needs    Financial resource strain: Not on file    Food insecurity - worry: Not on file    Food insecurity - inability: Not on file    Transportation needs - medical: Not on file    Transportation needs - non-medical: Not on file   Occupational History    Not on file   Tobacco Use    Smoking status: Never Smoker    Smokeless tobacco: Never Used   Substance and Sexual Activity    Alcohol use: No    Drug use: No    Sexual activity: Not Currently   Other Topics Concern    Not on file   Social History Narrative    Not on file       Current Outpatient Medications:     amLODIPine (NORVASC) 10 MG tablet, Take 10 mg by mouth once daily., Disp: , Rfl:     calcitRIOL (ROCALTROL) 0.25 MCG Cap, Take 1 capsule (0.25 mcg total) by mouth once daily., Disp: 30 capsule, Rfl: 6    ergocalciferol (VITAMIN D2) 50,000 unit Cap, Take 50,000 Units by mouth every 7 days., Disp: , Rfl:     ferrous  sulfate 324 mg (65 mg iron) TbEC, Take 1 tablet (324 mg total) by mouth once daily., Disp: , Rfl: 0    furosemide (LASIX) 40 MG tablet, Take 1 tablet (40 mg total) by mouth 2 (two) times daily., Disp: 60 tablet, Rfl: 11    gabapentin (NEURONTIN) 300 MG capsule, Take 300 mg by mouth 2 (two) times daily., Disp: , Rfl:     HYDROcodone-acetaminophen (NORCO) 5-325 mg per tablet, Take 1 tablet by mouth every 6 (six) hours as needed for Pain., Disp: 30 tablet, Rfl: 0    magnesium oxide (MAG-OX) 400 mg tablet, Take 400 mg by mouth once daily., Disp: , Rfl:     spironolactone (ALDACTONE) 25 MG tablet, Take 25 mg by mouth once daily. PT TAKING 1/2 TAB PER DAY, Disp: , Rfl:   No current facility-administered medications for this visit.     REVIEW OF SYSTEMS:  General: negative; ENT: negative; Allergy and Immunology: negative; Hematological and Lymphatic: Negative; Endocrine: negative; Respiratory: no cough, shortness of breath, or wheezing; Cardiovascular: no chest pain or dyspnea on exertion; Gastrointestinal: no abdominal pain/back, change in bowel habits, or bloody stools; Genito-Urinary: no dysuria, trouble voiding, or hematuria; Musculoskeletal: negative  Neurological: no TIA or stroke symptoms; Psychiatric: no nervousness, anxiety or depression.    PHYSICAL EXAM:   Right Arm BP - Sittin/86 (10/31/18 0903)  Left Arm BP - Sittin/96 (10/31/18 0903)  Pulse: 68  Temp: 98.1 °F (36.7 °C)      General appearance:  Alert, well-appearing, and in no distress.  Oriented to person, place, and time   Neurological: Normal speech, no focal findings noted; CN II - XII grossly intact           Musculoskeletal: Digits/nail without cyanosis/clubbing.  Normal muscle strength/tone.                 Neck: Supple, no significant adenopathy; thyroid is not enlarged                  No carotid bruit can be auscultated                Chest:  Clear to auscultation, no wheezes, rales or rhonchi, symmetric air entry     No use of  accessory muscles             Cardiac: Normal rate and regular rhythm, S1 and S2 normal; PMI non-displaced          Abdomen: Soft, nontender, distended - + ascites     No rebound tenderness noted; bowel sounds normal     Pulsatile aortic mass is not palpable.     No groin adenopathy      Extremities:   2+ R brachial and radila     + R Ruddy's test      Thin arm    LAB RESULTS:  Lab Results   Component Value Date    K 3.9 10/30/2018    K 3.9 10/12/2018    K 4.0 09/28/2018    CREATININE 3.8 (H) 10/30/2018    CREATININE 3.8 (H) 10/12/2018    CREATININE 3.9 (H) 09/28/2018     Lab Results   Component Value Date    WBC 3.77 (L) 10/30/2018    WBC 3.27 (L) 10/12/2018    WBC 4.10 09/28/2018    HCT 26.0 (L) 10/30/2018    HCT 29.7 (L) 10/12/2018    HCT 27.7 (L) 09/28/2018     10/30/2018     10/12/2018     09/28/2018     No results found for: HGBA1C  IMAGING:  Vein mapping on chart, possibly suitable L cephalic and larger basilic vein    IMP/PLAN:  68 y.o. female with h/o PCKD (Dx 19 yo), stage IV CKD, plocystic liver disease,  DM II, metastatic lung CA (mets to T9 vert bodies) on immunotherapy,  with recent lymph node bx  In need of AV access  Plan for R BC AVF if suitable cephalic or 1st stage R brachial/basilic vein: 11/9/18    Hernesto Decker MD FACS  Vascular/Endovascular Surgery

## 2018-10-31 NOTE — PROGRESS NOTES
Consult received to arrange for home health. Met with pt. And discussed service. Pt. Agreeable and does not have a preference in agency. Referral for HH made to [facility] ([phone]). Spoke with andrea Stinson necessary information given and orders faxed to the office ([phone]). Pt. Will be seen for services beginning tomorrow. Pt. Aware of the plan and in agreement. Will follow.

## 2018-10-31 NOTE — TELEPHONE ENCOUNTER
Spoke with patient via phone advised per Dr. Hernesto Decker that her surgery was reschedule from Thursday 11/8/2018 to Friday 11/9/2018 and to keep post op appointment with Dr Osei as scheduled. Patient verbalized understanding. GSL/LPN

## 2018-10-31 NOTE — H&P (VIEW-ONLY)
Verónica CAMARGO Samuel  10/31/2018    HPI:  Patient is a 68 y.o. female with a h/o PCKD (Dx 17 yo), stage IV CKD, plocystic liver disease,  DM II, metastatic lung CA (mets to T9 vert bodies) on immunotherapy,  with recent lymph node bx  who is here today for evaluation for AV fistula creation.   Also - recent L radical vulvectomy and bilatral sentinel LN Bx  L-hand dominant    No MI/stroke  Tobacco use: denies    Retired   Lives alone    Renal is Dr Tequila Castorena/Remington Diez    Past Medical History:   Diagnosis Date    Broken wrist     20 years ago    Diverticulitis     Fractured hip     Left hip in     Gout     Hypertension     Lung cancer 2018    Malignant melanoma of torso excluding breast 2018    Neoplastic malignant related fatigue 2018    Polycystic kidney     Polycystic liver disease 2018    Stage 4 chronic kidney disease 2018     Past Surgical History:   Procedure Laterality Date    BIOPSY, LYMPH NODE, SENTINEL Bilateral 10/29/2018    Performed by Ba Osei MD at [facility] OR [address] [place] [address]     SECTION      Fibroids removed      2 times    HEMORRHOIDOPEXY FOR PROLAPSING INTERNAL HEMORRHOIDS BY STAPLING N/A 2018    Procedure: HEMORRHOIDOPEXY, INTERNAL PROLAPSING, STAPLING;  Surgeon: Marcos Rey MD;  Location: [facility] OR;  Service: General;  Laterality: N/A;    HEMORRHOIDOPEXY, INTERNAL PROLAPSING, STAPLING N/A 2018    Performed by Marcos Rey MD at [facility] OR    INJECTION FOR SENTINEL NODE IDENTIFICATION Bilateral 10/29/2018    Procedure: INJECTION, FOR SENTINEL NODE IDENTIFICATION - bilateral groin;  Surgeon: Ba Osei MD;  Location: [facility] OR [address] [place] [address];  Service: General;  Laterality: Bilateral;    INJECTION, FOR SENTINEL NODE IDENTIFICATION - bilateral groin Bilateral 10/29/2018    Performed by Ba Osei MD at [facility] OR [address] [place] [address]    SENTINEL LYMPH NODE BIOPSY Bilateral 10/29/2018    Procedure: BIOPSY, LYMPH NODE, SENTINEL;  Surgeon: Ba  CELESTE Osei MD;  Location: [address];  Service: General;  Laterality: Bilateral;    TOTAL ABDOMINAL HYSTERECTOMY  1983    VAGINAL DELIVERY      1 time    VULVECTOMY Left 10/29/2018    Procedure: VULVECTOMY;  Surgeon: Guero Escobar MD;  Location: [address];  Service: OB/GYN;  Laterality: Left;  patient need to see Anesthesia for clearance    VULVECTOMY Left 10/29/2018    Performed by Guero Escobar MD at [facility] OR [address] [place] [address]     Family History   Problem Relation Age of Onset    Cancer Mother     Diabetes Mother     Heart attack Father     Diabetes Father     Polycystic kidney disease Sister     Hypertension Sister     Diabetes Sister     Cancer Sister     Diabetes type II Sister     Hypertension Sister     Breast cancer Neg Hx     Colon cancer Neg Hx     Ovarian cancer Neg Hx      Social History     Socioeconomic History    Marital status: Single     Spouse name: Not on file    Number of children: Not on file    Years of education: Not on file    Highest education level: Not on file   Social Needs    Financial resource strain: Not on file    Food insecurity - worry: Not on file    Food insecurity - inability: Not on file    Transportation needs - medical: Not on file    Transportation needs - non-medical: Not on file   Occupational History    Not on file   Tobacco Use    Smoking status: Never Smoker    Smokeless tobacco: Never Used   Substance and Sexual Activity    Alcohol use: No    Drug use: No    Sexual activity: Not Currently   Other Topics Concern    Not on file   Social History Narrative    Not on file       Current Outpatient Medications:     amLODIPine (NORVASC) 10 MG tablet, Take 10 mg by mouth once daily., Disp: , Rfl:     calcitRIOL (ROCALTROL) 0.25 MCG Cap, Take 1 capsule (0.25 mcg total) by mouth once daily., Disp: 30 capsule, Rfl: 6    ergocalciferol (VITAMIN D2) 50,000 unit Cap, Take 50,000 Units by mouth every 7 days., Disp: , Rfl:     ferrous  sulfate 324 mg (65 mg iron) TbEC, Take 1 tablet (324 mg total) by mouth once daily., Disp: , Rfl: 0    furosemide (LASIX) 40 MG tablet, Take 1 tablet (40 mg total) by mouth 2 (two) times daily., Disp: 60 tablet, Rfl: 11    gabapentin (NEURONTIN) 300 MG capsule, Take 300 mg by mouth 2 (two) times daily., Disp: , Rfl:     HYDROcodone-acetaminophen (NORCO) 5-325 mg per tablet, Take 1 tablet by mouth every 6 (six) hours as needed for Pain., Disp: 30 tablet, Rfl: 0    magnesium oxide (MAG-OX) 400 mg tablet, Take 400 mg by mouth once daily., Disp: , Rfl:     spironolactone (ALDACTONE) 25 MG tablet, Take 25 mg by mouth once daily. PT TAKING 1/2 TAB PER DAY, Disp: , Rfl:   No current facility-administered medications for this visit.     REVIEW OF SYSTEMS:  General: negative; ENT: negative; Allergy and Immunology: negative; Hematological and Lymphatic: Negative; Endocrine: negative; Respiratory: no cough, shortness of breath, or wheezing; Cardiovascular: no chest pain or dyspnea on exertion; Gastrointestinal: no abdominal pain/back, change in bowel habits, or bloody stools; Genito-Urinary: no dysuria, trouble voiding, or hematuria; Musculoskeletal: negative  Neurological: no TIA or stroke symptoms; Psychiatric: no nervousness, anxiety or depression.    PHYSICAL EXAM:   Right Arm BP - Sittin/86 (10/31/18 0903)  Left Arm BP - Sittin/96 (10/31/18 0903)  Pulse: 68  Temp: 98.1 °F (36.7 °C)      General appearance:  Alert, well-appearing, and in no distress.  Oriented to person, place, and time   Neurological: Normal speech, no focal findings noted; CN II - XII grossly intact           Musculoskeletal: Digits/nail without cyanosis/clubbing.  Normal muscle strength/tone.                 Neck: Supple, no significant adenopathy; thyroid is not enlarged                  No carotid bruit can be auscultated                Chest:  Clear to auscultation, no wheezes, rales or rhonchi, symmetric air entry     No use of  accessory muscles             Cardiac: Normal rate and regular rhythm, S1 and S2 normal; PMI non-displaced          Abdomen: Soft, nontender, distended - + ascites     No rebound tenderness noted; bowel sounds normal     Pulsatile aortic mass is not palpable.     No groin adenopathy      Extremities:   2+ R brachial and radila     + R Ruddy's test      Thin arm    LAB RESULTS:  Lab Results   Component Value Date    K 3.9 10/30/2018    K 3.9 10/12/2018    K 4.0 09/28/2018    CREATININE 3.8 (H) 10/30/2018    CREATININE 3.8 (H) 10/12/2018    CREATININE 3.9 (H) 09/28/2018     Lab Results   Component Value Date    WBC 3.77 (L) 10/30/2018    WBC 3.27 (L) 10/12/2018    WBC 4.10 09/28/2018    HCT 26.0 (L) 10/30/2018    HCT 29.7 (L) 10/12/2018    HCT 27.7 (L) 09/28/2018     10/30/2018     10/12/2018     09/28/2018     No results found for: HGBA1C  IMAGING:  Vein mapping on chart, possibly suitable L cephalic and larger basilic vein    IMP/PLAN:  68 y.o. female with h/o PCKD (Dx 17 yo), stage IV CKD, plocystic liver disease,  DM II, metastatic lung CA (mets to T9 vert bodies) on immunotherapy,  with recent lymph node bx  In need of AV access  Plan for R BC AVF if suitable cephalic or 1st stage R brachial/basilic vein: 11/9/18    Hernesto Decker MD FACS  Vascular/Endovascular Surgery

## 2018-11-03 NOTE — OP NOTE
DATE OF PROCEDURE:  10/29/2018    PRIMARY SURGEON:  Ba Osei M.D.    CO-SURGEONS:  Ba Osei M.D. and Guero Escobar M.D. for the bilateral   groin sentinel lymph node biopsies with bilateral vulvar injection for sentinel   lymph node mapping with blue dye and radiocolloid.    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    PROCEDURES PERFORMED:  Injection of bilateral vulvar regions intradermally with   both technetium-labeled radiocolloid and isosulfan Lymphazurin blue dye for   sentinel lymph node mapping and identification; mapping and identification of   bilateral groin sentinel lymph nodes; excisional biopsy of bilateral deep   inguinal sentinel lymph nodes.    The case was performed as a cosurgeon with Dr. Guero Escobar.    PROCEDURE IN DETAIL:  The patient underwent informed consent.  The patient was   brought to the Operating Room.  The patient was under general endotracheal   anesthesia.  The patient was supine in lithotomy position.  The genitalia were   exposed to allow for injection intradermally around the left melanoma vulvar   lesion as well as contralaterally within the normal left vulvar skin   intradermally.  We used a melanoma protocol type injection with approximately   four separate injections bilaterally on each side, making a total of 8   injections each containing approximately 150 uCi of technetium-labeled   radiocolloid for sentinel lymph node mapping and identification.  We then   injected intradermally approximately 2.5 mL of isosulfan Lymphazurin blue dye   intradermally within each vulvar region within the anticipated area of resection   on the left side as well as within the normal bulbar skin intradermally on the   right.  This was allowed to migrate to the bilateral groin regions while we   prepped and draped the area in a sterile fashion.  We  initiated the sentinel   lymph node biopsy on the left side.  On the ipsilateral side, we made a small   oblique incision in the left groin over the area of greatest radioactivity.  We   identified a hot blue sentinel lymph node, which was excised and sent to   Pathology for permanent sectioning.  Once this lymph node was removed, the   background residual radioactivity counts were negligible.  There were no further   blue dye staining nodes noted and no further palpable nodes were noted in the   left groin.    Attention was then turned to the right groin.  Similar oblique incision was made   in the right groin over the area of greatest radioactivity.  Again, a blue hot   sentinel lymph node was identified and removed.  Once the lymph node was   removed, background residual radioactivity counts were negligible and no further   blue dye staining and no further palpable nodes were noted indicating adequate   and appropriate sentinel lymph node mapping, identification and biopsy.  Both   sided bilateral lymph nodes were sent to Pathology for permanent sectioning   given the melanoma histology.  The closure of the groin incision sites will be   dictated by Dr. Escobar who assisted me with the sentinel lymph node biopsy   dissection.  She tolerated the procedure well without complication.  Estimated   blood loss was minimal.  At this point, all needle, instrument and sponge counts   were correct.  The remaining portion of the procedure will be dictated by Dr. Escobar.  Again, estimated blood loss was minimal and specimens were sent to   Pathology for permanent sectioning.      LAMIN/IN  dd: 11/03/2018 16:14:48 (CDT)  td: 11/03/2018 17:46:13 (CDT)  Doc ID   #[id number]  Job ID #[id number]    CC:

## 2018-11-05 PROCEDURE — 25000003 PHARM REV CODE 250: Performed by: OBSTETRICS & GYNECOLOGY

## 2018-11-07 ENCOUNTER — OFFICE VISIT (OUTPATIENT)
Dept: INTERNAL MEDICINE | Facility: CLINIC | Age: 68
End: 2018-11-07
Payer: MEDICARE

## 2018-11-07 VITALS
OXYGEN SATURATION: 98 % | BODY MASS INDEX: 21.96 KG/M2 | HEIGHT: 65 IN | SYSTOLIC BLOOD PRESSURE: 130 MMHG | HEART RATE: 96 BPM | WEIGHT: 131.81 LBS | DIASTOLIC BLOOD PRESSURE: 80 MMHG

## 2018-11-07 DIAGNOSIS — T81.89XA PROBLEM INVOLVING SURGICAL INCISION: Primary | ICD-10-CM

## 2018-11-07 PROCEDURE — 1100F PTFALLS ASSESS-DOCD GE2>/YR: CPT | Mod: CPTII,GC,S$GLB, | Performed by: INTERNAL MEDICINE

## 2018-11-07 PROCEDURE — 3079F DIAST BP 80-89 MM HG: CPT | Mod: CPTII,GC,S$GLB, | Performed by: INTERNAL MEDICINE

## 2018-11-07 PROCEDURE — 99999 PR PBB SHADOW E&M-EST. PATIENT-LVL IV: CPT | Mod: PBBFAC,GC,, | Performed by: INTERNAL MEDICINE

## 2018-11-07 PROCEDURE — 3288F FALL RISK ASSESSMENT DOCD: CPT | Mod: CPTII,GC,S$GLB, | Performed by: INTERNAL MEDICINE

## 2018-11-07 PROCEDURE — 3075F SYST BP GE 130 - 139MM HG: CPT | Mod: CPTII,GC,S$GLB, | Performed by: INTERNAL MEDICINE

## 2018-11-07 PROCEDURE — 99213 OFFICE O/P EST LOW 20 MIN: CPT | Mod: GC,S$GLB,, | Performed by: INTERNAL MEDICINE

## 2018-11-07 NOTE — PROGRESS NOTES
"Subjective:       Patient ID: Verónica Baker is a 68 y.o. female.    Chief Complaint: Follow-up and Leg Pain    Ms. Baker is a 68 y/o F pt with PMHx of polycystic kidney and liver disease (diagnosed at age 18y), CKD 5, HTN, adenocarcinoma of the Lt lung s/p chemo/radiation tx on immunotherapy with duravalumab since 9/11/18, newly diagnosed vulvar melanoma s/p local excision and bilateral LN dissection on 10/29/18 presents for evaluation of surgical incision. Pt notes she removed the incision dressings on monday as directed by her surgeon and soon after noticed a "knot" at the rt inguinal excision site, larger than on the left side. She denies tenderness or secretions at the site. Denies fevers, chills, nausea, vomiting or diarrhea. She is not due to see gyn-onc until 11/27/18.               Review of Systems   Constitutional: Negative for activity change, chills, fatigue and fever.   HENT: Negative for congestion and sore throat.    Eyes: Negative for pain and visual disturbance.   Respiratory: Negative for cough and shortness of breath.    Cardiovascular: Positive for leg swelling. Negative for chest pain.   Gastrointestinal: Negative for abdominal pain, nausea and vomiting.   Genitourinary: Negative for dysuria and frequency.   Musculoskeletal: Negative for gait problem and joint swelling.   Skin: Negative for color change and pallor.   Neurological: Negative for dizziness and light-headedness.   Psychiatric/Behavioral: Negative for confusion and sleep disturbance.       Objective:      Physical Exam   Constitutional: She is oriented to person, place, and time. She appears well-developed.   malnourished   HENT:   Head: Normocephalic and atraumatic.   Mouth/Throat: Oropharynx is clear and moist.   Eyes: Conjunctivae and EOM are normal. Pupils are equal, round, and reactive to light.   Neck: Normal range of motion. Neck supple.   Cardiovascular: Normal rate, regular rhythm and normal heart sounds.   Pulmonary/Chest: " "Effort normal and breath sounds normal. No respiratory distress.   Abdominal: Soft. Bowel sounds are normal. She exhibits distension. There is no tenderness. There is no guarding.   Musculoskeletal: Normal range of motion. She exhibits edema (1+ pitting edema bilaterally). She exhibits no tenderness.   Neurological: She is alert and oriented to person, place, and time. No cranial nerve deficit.   Skin: Skin is warm and dry. No erythema.   Bilateral inguinal incisions. Erythematous skin noted at rt incision, there is swelling (significantly more pronounced than on the left side), no purulence or secretions and no tenderness.   Psychiatric: She has a normal mood and affect. Her behavior is normal. Judgment and thought content normal.       Assessment:       1. Problem involving surgical incision        Plan:       Problem involving surgical incision  --hx of vulvar melanoma  --Rt sided inguinal incision from bilateral LN dissection and local excision on 10/29/18   --"knot" or swelling at site with some skin erythema, no tenderness or secretions  --no systemic signs to suggest infection  --patient has follow-up with gyn-onc on 11/27.   --patient was instructed to call gyn-onc clinic and request to be seen asap  --patient instructed to call clinic if develops fever, chills, N/V, pain at incision site     Opting to get flu shot after discussing with her oncologist.   RTC in 6mths for follow-up  Case seen and discussed with Dr. Mary.  Matilde Trinh PGY-3   Internal Medicine    "

## 2018-11-08 ENCOUNTER — TELEPHONE (OUTPATIENT)
Dept: VASCULAR SURGERY | Facility: CLINIC | Age: 68
End: 2018-11-08

## 2018-11-08 ENCOUNTER — TELEPHONE (OUTPATIENT)
Dept: GYNECOLOGIC ONCOLOGY | Facility: CLINIC | Age: 68
End: 2018-11-08

## 2018-11-08 ENCOUNTER — ANESTHESIA EVENT (OUTPATIENT)
Dept: SURGERY | Facility: HOSPITAL | Age: 68
End: 2018-11-08
Payer: MEDICARE

## 2018-11-08 ENCOUNTER — OFFICE VISIT (OUTPATIENT)
Dept: SURGERY | Facility: CLINIC | Age: 68
End: 2018-11-08
Payer: MEDICARE

## 2018-11-08 VITALS
HEIGHT: 65 IN | BODY MASS INDEX: 21.58 KG/M2 | TEMPERATURE: 98 F | SYSTOLIC BLOOD PRESSURE: 139 MMHG | DIASTOLIC BLOOD PRESSURE: 79 MMHG | WEIGHT: 129.5 LBS | HEART RATE: 87 BPM

## 2018-11-08 DIAGNOSIS — C43.59 MALIGNANT MELANOMA OF TORSO EXCLUDING BREAST: Primary | ICD-10-CM

## 2018-11-08 PROCEDURE — 99999 PR PBB SHADOW E&M-EST. PATIENT-LVL III: CPT | Mod: PBBFAC,,, | Performed by: SURGERY

## 2018-11-08 PROCEDURE — 99024 POSTOP FOLLOW-UP VISIT: CPT | Mod: S$GLB,,, | Performed by: SURGERY

## 2018-11-08 NOTE — PROGRESS NOTES
Post Op Note    Pt is a 67 yo woman s/p WLE for vulvar melanoma (combined case with Dr. Escobar) and bilateral groin SLNBx. Path still pending. She is doing well. Pain well controlled, having some swelling in the suprapubic area and under the right groin incision. Plans to have RUE AVF placed with Dr. Decker tomorrow.    ROS negative except as stated in HPI.    Vitals:    11/08/18 1428   BP: 139/79   Pulse: 87   Temp: 98.3 °F (36.8 °C)     NAD  RRR  Normal work of breathing  Abdomen significantly distended  Mons swollen  Bilateral groin incisions healing well without sign of infection  Some swelling beneath the right groin incision    A/P  S/p WLE for vulvar melanoma with bilateral SLNBx, doing well postoperatively.    - f/u pathology results with Dr. Escobar  - f/u with Dr. Escobar  I have personally taken the history and examined this patient and agree with the resident's note as stated above.  F/U prn with me.

## 2018-11-08 NOTE — TELEPHONE ENCOUNTER
Spoke to pt. Pt had surgery on 10/29/18.  Pt states that she has some redness and swelling on the right side of her vagina. Explained to pt that some redness and swelling after surgery is normal. Pt denies any fever, drainage, or odor from the vagina. Informed pt to continue to monitor and if she has of the s/s above to call. Pt said thanks she will continue to monitor.

## 2018-11-09 ENCOUNTER — HOSPITAL ENCOUNTER (OUTPATIENT)
Facility: HOSPITAL | Age: 68
Discharge: HOME | End: 2018-11-09
Attending: SURGERY | Admitting: SURGERY
Payer: MEDICARE

## 2018-11-09 ENCOUNTER — ANESTHESIA (OUTPATIENT)
Dept: SURGERY | Facility: HOSPITAL | Age: 68
End: 2018-11-09
Payer: MEDICARE

## 2018-11-09 VITALS
OXYGEN SATURATION: 94 % | BODY MASS INDEX: 21.66 KG/M2 | DIASTOLIC BLOOD PRESSURE: 88 MMHG | RESPIRATION RATE: 18 BRPM | TEMPERATURE: 98 F | SYSTOLIC BLOOD PRESSURE: 159 MMHG | HEART RATE: 91 BPM | WEIGHT: 130 LBS | HEIGHT: 65 IN

## 2018-11-09 DIAGNOSIS — N18.6 ESRD (END STAGE RENAL DISEASE): ICD-10-CM

## 2018-11-09 DIAGNOSIS — N18.6 ESRD (END STAGE RENAL DISEASE): Primary | ICD-10-CM

## 2018-11-09 LAB
GLUCOSE SERPL-MCNC: 91 MG/DL (ref 70–110)
HCO3 UR-SCNC: 26.9 MMOL/L (ref 24–28)
HCT VFR BLD CALC: 26 %PCV (ref 36–54)
PCO2 BLDA: 39.2 MMHG (ref 35–45)
PH SMN: 7.44 [PH] (ref 7.35–7.45)
PO2 BLDA: 27 MMHG (ref 40–60)
POC BE: 3 MMOL/L
POC IONIZED CALCIUM: 1.16 MMOL/L (ref 1.06–1.42)
POC SATURATED O2: 53 % (ref 95–100)
POC TCO2: 28 MMOL/L (ref 24–29)
POTASSIUM BLD-SCNC: 4 MMOL/L (ref 3.5–5.1)
SAMPLE: ABNORMAL
SODIUM BLD-SCNC: 138 MMOL/L (ref 136–145)

## 2018-11-09 PROCEDURE — 71000015 HC POSTOP RECOV 1ST HR: Performed by: SURGERY

## 2018-11-09 PROCEDURE — 71000016 HC POSTOP RECOV ADDL HR: Performed by: SURGERY

## 2018-11-09 PROCEDURE — 63600175 PHARM REV CODE 636 W HCPCS: Performed by: ANESTHESIOLOGY

## 2018-11-09 PROCEDURE — 25000003 PHARM REV CODE 250: Performed by: NURSE ANESTHETIST, CERTIFIED REGISTERED

## 2018-11-09 PROCEDURE — D9220A PRA ANESTHESIA: Mod: CRNA,,, | Performed by: NURSE ANESTHETIST, CERTIFIED REGISTERED

## 2018-11-09 PROCEDURE — 27200750 HC INSULATED NEEDLE/ STIMUPLEX: Performed by: NURSE ANESTHETIST, CERTIFIED REGISTERED

## 2018-11-09 PROCEDURE — 36000706: Performed by: SURGERY

## 2018-11-09 PROCEDURE — 37000009 HC ANESTHESIA EA ADD 15 MINS: Performed by: SURGERY

## 2018-11-09 PROCEDURE — 37000008 HC ANESTHESIA 1ST 15 MINUTES: Performed by: SURGERY

## 2018-11-09 PROCEDURE — 25000003 PHARM REV CODE 250: Performed by: STUDENT IN AN ORGANIZED HEALTH CARE EDUCATION/TRAINING PROGRAM

## 2018-11-09 PROCEDURE — 36000707: Performed by: SURGERY

## 2018-11-09 PROCEDURE — 27201423 OPTIME MED/SURG SUP & DEVICES STERILE SUPPLY: Performed by: SURGERY

## 2018-11-09 PROCEDURE — 25000003 PHARM REV CODE 250: Performed by: SURGERY

## 2018-11-09 PROCEDURE — 71000044 HC DOSC ROUTINE RECOVERY FIRST HOUR: Performed by: SURGERY

## 2018-11-09 PROCEDURE — 63600175 PHARM REV CODE 636 W HCPCS: Performed by: NURSE ANESTHETIST, CERTIFIED REGISTERED

## 2018-11-09 PROCEDURE — D9220A PRA ANESTHESIA: Mod: ANES,,, | Performed by: ANESTHESIOLOGY

## 2018-11-09 PROCEDURE — 63600175 PHARM REV CODE 636 W HCPCS: Performed by: SURGERY

## 2018-11-09 PROCEDURE — 36821 AV FUSION DIRECT ANY SITE: CPT | Mod: ,,, | Performed by: SURGERY

## 2018-11-09 PROCEDURE — 63600175 PHARM REV CODE 636 W HCPCS: Performed by: STUDENT IN AN ORGANIZED HEALTH CARE EDUCATION/TRAINING PROGRAM

## 2018-11-09 RX ORDER — SODIUM CHLORIDE 0.9 % (FLUSH) 0.9 %
5 SYRINGE (ML) INJECTION
Status: DISCONTINUED | OUTPATIENT
Start: 2018-11-09 | End: 2018-11-09 | Stop reason: HOSPADM

## 2018-11-09 RX ORDER — CEFAZOLIN SODIUM 1 G/3ML
2 INJECTION, POWDER, FOR SOLUTION INTRAMUSCULAR; INTRAVENOUS
Status: COMPLETED | OUTPATIENT
Start: 2018-11-09 | End: 2018-11-09

## 2018-11-09 RX ORDER — MUPIROCIN 20 MG/G
OINTMENT TOPICAL
Status: DISCONTINUED | OUTPATIENT
Start: 2018-11-09 | End: 2018-11-09 | Stop reason: HOSPADM

## 2018-11-09 RX ORDER — PROPOFOL 10 MG/ML
VIAL (ML) INTRAVENOUS CONTINUOUS PRN
Status: DISCONTINUED | OUTPATIENT
Start: 2018-11-09 | End: 2018-11-09

## 2018-11-09 RX ORDER — HYDROCODONE BITARTRATE AND ACETAMINOPHEN 5; 325 MG/1; MG/1
1 TABLET ORAL EVERY 4 HOURS PRN
Status: DISCONTINUED | OUTPATIENT
Start: 2018-11-09 | End: 2018-11-09 | Stop reason: HOSPADM

## 2018-11-09 RX ORDER — PHENYLEPHRINE HYDROCHLORIDE 10 MG/ML
INJECTION INTRAVENOUS
Status: DISCONTINUED | OUTPATIENT
Start: 2018-11-09 | End: 2018-11-09

## 2018-11-09 RX ORDER — HEPARIN SODIUM 1000 [USP'U]/ML
INJECTION, SOLUTION INTRAVENOUS; SUBCUTANEOUS
Status: DISCONTINUED | OUTPATIENT
Start: 2018-11-09 | End: 2018-11-09 | Stop reason: HOSPADM

## 2018-11-09 RX ORDER — FENTANYL CITRATE 50 UG/ML
INJECTION, SOLUTION INTRAMUSCULAR; INTRAVENOUS
Status: DISCONTINUED | OUTPATIENT
Start: 2018-11-09 | End: 2018-11-09

## 2018-11-09 RX ORDER — HEPARIN SODIUM 1000 [USP'U]/ML
INJECTION, SOLUTION INTRAVENOUS; SUBCUTANEOUS
Status: DISCONTINUED | OUTPATIENT
Start: 2018-11-09 | End: 2018-11-09

## 2018-11-09 RX ORDER — LIDOCAINE HCL/PF 100 MG/5ML
SYRINGE (ML) INTRAVENOUS
Status: DISCONTINUED | OUTPATIENT
Start: 2018-11-09 | End: 2018-11-09

## 2018-11-09 RX ORDER — ESMOLOL HYDROCHLORIDE 10 MG/ML
INJECTION INTRAVENOUS
Status: DISCONTINUED | OUTPATIENT
Start: 2018-11-09 | End: 2018-11-09

## 2018-11-09 RX ORDER — BACITRACIN 50000 [IU]/1
INJECTION, POWDER, FOR SOLUTION INTRAMUSCULAR
Status: DISCONTINUED | OUTPATIENT
Start: 2018-11-09 | End: 2018-11-09 | Stop reason: HOSPADM

## 2018-11-09 RX ORDER — LIDOCAINE HYDROCHLORIDE 10 MG/ML
1 INJECTION, SOLUTION EPIDURAL; INFILTRATION; INTRACAUDAL; PERINEURAL ONCE
Status: COMPLETED | OUTPATIENT
Start: 2018-11-09 | End: 2018-11-09

## 2018-11-09 RX ORDER — SODIUM CHLORIDE 0.9 % (FLUSH) 0.9 %
3 SYRINGE (ML) INJECTION
Status: DISCONTINUED | OUTPATIENT
Start: 2018-11-09 | End: 2018-11-09 | Stop reason: HOSPADM

## 2018-11-09 RX ORDER — HYDROCODONE BITARTRATE AND ACETAMINOPHEN 5; 325 MG/1; MG/1
1 TABLET ORAL EVERY 4 HOURS PRN
Qty: 15 TABLET | Refills: 0 | Status: SHIPPED | OUTPATIENT
Start: 2018-11-09 | End: 2018-12-10

## 2018-11-09 RX ORDER — PROTAMINE SULFATE 10 MG/ML
INJECTION, SOLUTION INTRAVENOUS
Status: DISCONTINUED | OUTPATIENT
Start: 2018-11-09 | End: 2018-11-09

## 2018-11-09 RX ORDER — SODIUM CHLORIDE 9 MG/ML
INJECTION, SOLUTION INTRAVENOUS CONTINUOUS PRN
Status: DISCONTINUED | OUTPATIENT
Start: 2018-11-09 | End: 2018-11-09

## 2018-11-09 RX ORDER — PAPAVERINE HYDROCHLORIDE 30 MG/ML
INJECTION INTRAMUSCULAR; INTRAVENOUS
Status: DISCONTINUED | OUTPATIENT
Start: 2018-11-09 | End: 2018-11-09 | Stop reason: HOSPADM

## 2018-11-09 RX ORDER — MIDAZOLAM HYDROCHLORIDE 1 MG/ML
INJECTION, SOLUTION INTRAMUSCULAR; INTRAVENOUS
Status: DISCONTINUED | OUTPATIENT
Start: 2018-11-09 | End: 2018-11-09

## 2018-11-09 RX ORDER — GLYCOPYRROLATE 0.2 MG/ML
INJECTION INTRAMUSCULAR; INTRAVENOUS
Status: DISCONTINUED | OUTPATIENT
Start: 2018-11-09 | End: 2018-11-09

## 2018-11-09 RX ADMIN — PHENYLEPHRINE HYDROCHLORIDE 100 MCG: 10 INJECTION INTRAVENOUS at 09:11

## 2018-11-09 RX ADMIN — MEPIVACAINE HYDROCHLORIDE 30 ML: 15 INJECTION, SOLUTION EPIDURAL; INFILTRATION at 07:11

## 2018-11-09 RX ADMIN — HEPARIN SODIUM 3000 UNITS: 1000 INJECTION, SOLUTION INTRAVENOUS; SUBCUTANEOUS at 08:11

## 2018-11-09 RX ADMIN — PROTAMINE SULFATE 5 MG: 10 INJECTION, SOLUTION INTRAVENOUS at 09:11

## 2018-11-09 RX ADMIN — MIDAZOLAM HYDROCHLORIDE 1 MG: 1 INJECTION, SOLUTION INTRAMUSCULAR; INTRAVENOUS at 07:11

## 2018-11-09 RX ADMIN — FENTANYL CITRATE 25 MCG: 50 INJECTION, SOLUTION INTRAMUSCULAR; INTRAVENOUS at 07:11

## 2018-11-09 RX ADMIN — PROTAMINE SULFATE 10 MG: 10 INJECTION, SOLUTION INTRAVENOUS at 09:11

## 2018-11-09 RX ADMIN — SODIUM CHLORIDE: 0.9 INJECTION, SOLUTION INTRAVENOUS at 06:11

## 2018-11-09 RX ADMIN — LIDOCAINE HYDROCHLORIDE 20 MG: 20 INJECTION, SOLUTION INTRAVENOUS at 07:11

## 2018-11-09 RX ADMIN — GLYCOPYRROLATE 0.2 MG: 0.2 INJECTION, SOLUTION INTRAMUSCULAR; INTRAVENOUS at 07:11

## 2018-11-09 RX ADMIN — ESMOLOL HYDROCHLORIDE 5 MG: 10 INJECTION INTRAVENOUS at 09:11

## 2018-11-09 RX ADMIN — PROPOFOL 150 MCG/KG/MIN: 10 INJECTION, EMULSION INTRAVENOUS at 07:11

## 2018-11-09 RX ADMIN — LIDOCAINE HYDROCHLORIDE 10 MG: 10 INJECTION, SOLUTION EPIDURAL; INFILTRATION; INTRACAUDAL; PERINEURAL at 06:11

## 2018-11-09 RX ADMIN — FENTANYL CITRATE 25 MCG: 50 INJECTION, SOLUTION INTRAMUSCULAR; INTRAVENOUS at 09:11

## 2018-11-09 RX ADMIN — CEFAZOLIN 1.5 G: 330 INJECTION, POWDER, FOR SOLUTION INTRAMUSCULAR; INTRAVENOUS at 07:11

## 2018-11-09 RX ADMIN — FENTANYL CITRATE 25 MCG: 50 INJECTION, SOLUTION INTRAMUSCULAR; INTRAVENOUS at 08:11

## 2018-11-09 RX ADMIN — MUPIROCIN: 20 OINTMENT TOPICAL at 06:11

## 2018-11-09 NOTE — INTERVAL H&P NOTE
The patient has been examined and the H&P has been reviewed:    I concur with the findings and no changes have occurred since H&P was written.    Anesthesia/Surgery risks, benefits and alternative options discussed and understood by patient/family.          Active Hospital Problems    Diagnosis  POA    ESRD (end stage renal disease) [N18.6]  Yes      Resolved Hospital Problems   No resolved problems to display.

## 2018-11-09 NOTE — DISCHARGE INSTRUCTIONS
Arteriovenous (AV) Fistula for Dialysis  An AV fistula is a connection between an artery and a vein. For this procedure, an AV fistula is surgically created using an artery and a vein in your arm. (Your healthcare provider will let you know if another site is to be used.) When the artery and vein are joined, blood flow increases from the artery into the vein. As a result, the vein gets bigger over time. The enlarged vein provides easier access to the blood for a treatment for kidney failure (dialysis). This sheet explains the procedure and what to expect.     An AV fistula increases blood flow from the artery into the vein. Over time, the vein becomes stronger and enlarged.   Preparing for the procedure  Prepare as you have been told. In addition:  · Tell your healthcare provider about all the medicines you take. This includes all over-the-counter and prescription medicines, and street drugs. It also includes herbs, vitamins, and other supplements. You may need to stop taking some or all of them before the procedure.  · Follow any directions youre given for not eating or drinking before the procedure.  · Do not allow anyone to draw blood from or take blood pressure on the arm that will have the fistula before the procedure.  The day of the procedure  The procedure takes about 1 to 2 hours. Youll likely go home the same day.  Before the procedure begins:  · An IV (intravenous) line is put into a vein in the arm or hand not being used for the procedure. This line supplies fluids and medicines.  · To keep you free of pain during the procedure, youre given general anesthesia. This medicine puts you into a state like a deep sleep through the procedure. Or a nerve block may be used. This medicine numbs the arm. With it, you may also be given medicine that makes you relaxed and drowsy through the procedure.  During the procedure:  · The skin over your arm may be injected with numbing medicine.  · One or more small  cuts (incisions) are then made through the numbed skin. This depends on the size of your arm and the depth of the vein in your arm.  · The vein is attached to the selected artery.  · Any incisions made are then closed with stitches (sutures), staples, surgical glue, or strips of surgical tape.  After the procedure:  · Youll be asked to keep your arm raised (elevated) as often as possible for at least a week after the procedure.  · Youll be given medicines to manage pain as needed.  · Your arm and hand will be checked to make sure blood is flowing through the fistula properly. The feeling of blood rushing through the fistula is called a thrill. It is somewhat similar to the purring of a cat. Youll be taught how to check for this feeling each day to make sure there are no problems with your fistula. Youll also be taught how to care for your fistula at home.  · When its time for you to leave the hospital, have an adult family member or friend ready to drive you home.  Recovering at home  Once at home, follow all of the instructions youve been given. Be sure to:  · Take all medicines as directed.  · Care for your incision as instructed.  · Check for signs of infection at the incision site (see below).  · Avoid heavy lifting and strenuous activities as directed.  · Monitor and care for your fistula as instructed.  · Do your hand and arm exercises as instructed. This usually involves squeezing a ball in your hand for a few minutes each hour.  Call your healthcare provider if you have any of the following:  · Fever of 100.4°F (38°C) or higher  · Signs of infection at the incision site, such as increased redness or swelling, warmth, worsening pain, bleeding, or bad-smelling drainage  · You cant feel a thrill (the vibration of blood going through your arm)  · Pain or numbness in your fingers, hand, or arm  · Bleeding, redness, or warmth around your fistula  · Sudden bulging of the fistula (more than usual; a slight  bulge is normal)   Follow-Up  Your healthcare provider will check your fistula within 1 to 2 weeks after the procedure. It will likely take about 6 to 8 weeks for the fistula to enlarge enough to start dialysis. After that, make sure the fistula is checked each time you have dialysis. Your healthcare provider may also suggest checkups every 6 months.  Risks and possible complications include:  · The fistula not working properly  · Long wait before the fistula is ready (up to 6 months)  · Coldness or numbness in the hand (due to blood flowing away from the hand and into the fistula)  · An unsightly bump under the skin (due to enlargement of the fistula)  · Prolonged bleeding from the fistula after dialysis  · Narrowing or weakening of the blood vessels used for the fistula  · Formation of blood clots in the blood vessels used for the fistula  · Risks of anesthesia or any other medicines used during the procedure   Living with an AV Fistula  A problem, such as a narrowing (stricture) of the vein or an infection, can make the fistula unusable. If this happens, you may need other treatments to repair or make a new fistula. To protect your fistula, follow these and any other guidelines youre given:  · Check your fistula as often as your healthcare provider says. If you cant feel your thrill, let your provider know right away.  · Make sure your fistula is checked before each dialysis treatment.  · Dont let anyone draw blood from or take blood pressure on the arm that has the fistula.  · Wash your hands often and keep the area around your fistula clean.  · Dont sleep on the arm that has the fistula.  · Dont wear tight jewelry or a watch on the arm with your fistula.  · Protect your fistula from cuts, scrapes, or blows.        Discharge Instructions: After Your Surgery  Youve just had surgery. During surgery, you were given medicine called anesthesia to keep you relaxed and free of pain. After surgery, you may have  some pain or nausea. This is common. Here are some tips for feeling better and getting well after surgery.     Stay on schedule with your medicine.   Going home  Your healthcare provider will show you how to take care of yourself when you go home. He or she will also answer your questions. Have an adult family member or friend drive you home. For the first 24 hours after your surgery:  · Do not drive or use heavy equipment.  · Do not make important decisions or sign legal papers.  · Do not drink alcohol.  · Have someone stay with you, if needed. He or she can watch for problems and help keep you safe.  Be sure to go to all follow-up visits with your healthcare provider. And rest after your surgery for as long as your healthcare provider tells you to.    Coping with pain  If you have pain after surgery, pain medicine will help you feel better. Take it as told, before pain becomes severe. Also, ask your healthcare provider or pharmacist about other ways to control pain. This might be with heat, ice, or relaxation. And follow any other instructions your surgeon or nurse gives you.  Tips for taking pain medicine  To get the best relief possible, remember these points:  · Pain medicines can upset your stomach. Taking them with a little food may help.  · Most pain relievers taken by mouth need at least 20 to 30 minutes to start to work.  · Taking medicine on a schedule can help you remember to take it. Try to time your medicine so that you can take it before starting an activity. This might be before you get dressed, go for a walk, or sit down for dinner.  · Constipation is a common side effect of pain medicines. Call your healthcare provider before taking any medicines such as laxatives or stool softeners to help ease constipation. Also ask if you should skip any foods. Drinking lots of fluids and eating foods such as fruits and vegetables that are high in fiber can also help. Remember, do not take laxatives unless your  surgeon has prescribed them.  · Drinking alcohol and taking pain medicine can cause dizziness and slow your breathing. It can even be deadly. Do not drink alcohol while taking pain medicine.  · Pain medicine can make you react more slowly to things. Do not drive or run machinery while taking pain medicine.    Your healthcare provider may tell you to take acetaminophen to help ease your pain. Ask him or her how much you are supposed to take each day. Acetaminophen or other pain relievers may interact with your prescription medicines or other over-the-counter (OTC) medicines. Some prescription medicines have acetaminophen and other ingredients. Using both prescription and OTC acetaminophen for pain can cause you to overdose. Read the labels on your OTC medicines with care. This will help you to clearly know the list of ingredients, how much to take, and any warnings. It may also help you not take too much acetaminophen. If you have questions or do not understand the information, ask your pharmacist or healthcare provider to explain it to you before you take the OTC medicine.    Managing nausea  Some people have an upset stomach after surgery. This is often because of anesthesia, pain, or pain medicine, or the stress of surgery. These tips will help you handle nausea and eat healthy foods as you get better. If you were on a special food plan before surgery, ask your healthcare provider if you should follow it while you get better. These tips may help:  · Do not push yourself to eat. Your body will tell you when to eat and how much.  · Start off with clear liquids and soup. They are easier to digest.  · Next try semi-solid foods, such as mashed potatoes, applesauce, and gelatin, as you feel ready.  · Slowly move to solid foods. Dont eat fatty, rich, or spicy foods at first.  · Do not force yourself to have 3 large meals a day. Instead eat smaller amounts more often.  · Take pain medicines with a small amount of solid  food, such as crackers or toast, to avoid nausea.     Call your surgeon if  · You still have pain an hour after taking medicine. The medicine may not be strong enough.  · You feel too sleepy, dizzy, or groggy. The medicine may be too strong.  · You have side effects like nausea, vomiting, or skin changes, such as rash, itching, or hives.

## 2018-11-09 NOTE — ANESTHESIA RELEASE NOTE
"Anesthesia Release from PACU Note    Patient: Verónica Baker    Procedure(s) Performed: Procedure(s) (LRB):  CREATION, AV FISTULA (Right)    Anesthesia type: regional    Post pain: Adequate analgesia    Post assessment: no apparent anesthetic complications    Last Vitals:   Visit Vitals  /87   Pulse 91   Temp 36.6 °C (97.9 °F) (Temporal)   Resp 18   Ht 5' 5" (1.651 m)   Wt 59 kg (130 lb)   SpO2 (!) 94%   Breastfeeding? No   BMI 21.63 kg/m²       Post vital signs: stable    Level of consciousness: awake    Nausea/Vomiting: no nausea/no vomiting    Complications: none    Airway Patency: patent    Respiratory: unassisted       Cardiovascular: stable    Hydration: euvolemic  "

## 2018-11-09 NOTE — TRANSFER OF CARE
"Anesthesia Transfer of Care Note    Patient: Verónica Baker    Procedure(s) Performed: Procedure(s) (LRB):  CREATION, AV FISTULA (Right)    Patient location: [facility]    Anesthesia Type: general    Transport from OR: Transported from OR on room air with adequate spontaneous ventilation    Post pain: adequate analgesia    Post assessment: no apparent anesthetic complications and tolerated procedure well    Post vital signs: stable    Level of consciousness: awake    Nausea/Vomiting: no nausea/vomiting    Complications: none    Transfer of care protocol was followed      Last vitals: 11/09/18 1005  Visit Vitals  /82   Pulse 89   Temp 36.5 °C (97.7 °F) (Temporal)   Resp 18   Ht 5' 5" (1.651 m)   Wt 59 kg (130 lb)   SpO2 (!) 94%   Breastfeeding? No   BMI 21.63 kg/m²     " Admission

## 2018-11-09 NOTE — ANESTHESIA PROCEDURE NOTES
Supraclavicular single shot    Patient location during procedure: OR    Reason for block: primary anesthetic   Diagnosis: ESRD   Start time: 11/9/2018 7:25 AM  Timeout: 11/9/2018 7:23 AM   End time: 11/9/2018 7:33 AM  Staffing  Anesthesiologist: Kassandra Newby MD  Performed: anesthesiologist   Preanesthetic Checklist  Completed: patient identified, site marked, surgical consent, pre-op evaluation, timeout performed, IV checked, risks and benefits discussed and monitors and equipment checked  Peripheral Block  Patient position: supine  Prep: ChloraPrep  Patient monitoring: heart rate, cardiac monitor, continuous pulse ox, continuous capnometry and frequent blood pressure checks  Block type: supraclavicular  Laterality: left  Injection technique: single shot  Needle  Needle type: Stimuplex   Needle gauge: 22 G  Needle length: 2 in  Needle localization: ultrasound guidance   -ultrasound image captured on disc.  Assessment  Injection assessment: negative aspiration, negative parasthesia and local visualized surrounding nerve  Paresthesia pain: none  Heart rate change: no  Slow fractionated injection: yes  Additional Notes  5 cc of 1.5% mepiv used as local for ICB

## 2018-11-09 NOTE — ANESTHESIA PREPROCEDURE EVALUATION
11/08/2018  Verónica Baker is a 68 y.o., female.  Pre-operative evaluation for Procedure(s) (LRB):  CREATION, AV FISTULA (Right)    Verónica Baker is a 68 y.o. female     LDA:     Prev airway:     Drips:     Patient Active Problem List   Diagnosis    Primary adenocarcinoma of upper lobe of left lung    Polycystic kidney disease    Other ascites    Bruising    Anemia in neoplastic disease    Chemotherapy-induced neutropenia    Gastroesophageal reflux disease without esophagitis    Hypertension    Anemia    Anorexia    Hemorrhoids    Heart burn    Grade II internal hemorrhoids    Stage 4 chronic kidney disease    Chronic fatigue    Malignant melanoma of vulva    Polycystic liver disease    Preop examination       Review of patient's allergies indicates:   Allergen Reactions    Codeine Nausea Only    Sulfa (sulfonamide antibiotics)         No current facility-administered medications on file prior to encounter.      Current Outpatient Medications on File Prior to Encounter   Medication Sig Dispense Refill    amLODIPine (NORVASC) 10 MG tablet Take 10 mg by mouth once daily.      calcitRIOL (ROCALTROL) 0.25 MCG Cap Take 1 capsule (0.25 mcg total) by mouth once daily. 30 capsule 6    ergocalciferol (VITAMIN D2) 50,000 unit Cap Take 50,000 Units by mouth every 7 days.      ferrous sulfate 324 mg (65 mg iron) TbEC Take 1 tablet (324 mg total) by mouth once daily.  0    furosemide (LASIX) 40 MG tablet Take 1 tablet (40 mg total) by mouth 2 (two) times daily. 60 tablet 11    gabapentin (NEURONTIN) 300 MG capsule Take 300 mg by mouth 2 (two) times daily.      HYDROcodone-acetaminophen (NORCO) 5-325 mg per tablet Take 1 tablet by mouth every 6 (six) hours as needed for Pain. 30 tablet 0    magnesium oxide (MAG-OX) 400 mg tablet Take 400 mg by mouth once daily.      spironolactone (ALDACTONE) 25 MG  tablet Take 25 mg by mouth once daily. PT TAKING 1/2 TAB PER DAY         Past Surgical History:   Procedure Laterality Date    BIOPSY, LYMPH NODE, SENTINEL Bilateral 10/29/2018    Performed by Ba Osei MD at [facility] OR [address] [place] [address]     SECTION      Fibroids removed      2 times    HEMORRHOIDOPEXY FOR PROLAPSING INTERNAL HEMORRHOIDS BY STAPLING N/A 2018    Procedure: HEMORRHOIDOPEXY, INTERNAL PROLAPSING, STAPLING;  Surgeon: Marcos Rey MD;  Location: [facility] OR;  Service: General;  Laterality: N/A;    HEMORRHOIDOPEXY, INTERNAL PROLAPSING, STAPLING N/A 2018    Performed by Marcos Rey MD at [facility] OR    INJECTION FOR SENTINEL NODE IDENTIFICATION Bilateral 10/29/2018    Procedure: INJECTION, FOR SENTINEL NODE IDENTIFICATION - bilateral groin;  Surgeon: Ba Osei MD;  Location: [facility] OR [address] [place] [address];  Service: General;  Laterality: Bilateral;    INJECTION, FOR SENTINEL NODE IDENTIFICATION - bilateral groin Bilateral 10/29/2018    Performed by Ba Osei MD at [facility] OR [address] [place] [address]    SENTINEL LYMPH NODE BIOPSY Bilateral 10/29/2018    Procedure: BIOPSY, LYMPH NODE, SENTINEL;  Surgeon: Ba Osei MD;  Location: [address];  Service: General;  Laterality: Bilateral;    TOTAL ABDOMINAL HYSTERECTOMY  1983    VAGINAL DELIVERY      1 time    VULVECTOMY Left 10/29/2018    Procedure: VULVECTOMY;  Surgeon: Guero Escobar MD;  Location: [address];  Service: OB/GYN;  Laterality: Left;  patient need to see Anesthesia for clearance    VULVECTOMY Left 10/29/2018    Performed by Guero Escobar MD at [facility] OR [address] [place] [address]           Vital Signs Range (Last 24H):  Temp:  [36.8 °C (98.3 °F)]   Pulse:  [87]   BP: (139)/(79)       CBC: No results for input(s): WBC, RBC, HGB, HCT, PLT, MCV, MCH, MCHC in the last 72 hours.    CMP: No results for input(s): NA, K, CL, CO2, BUN, CREATININE, GLU, MG, PHOS, CALCIUM, ALBUMIN, PROT, ALKPHOS, ALT, AST, BILITOT in the last 72  hours.    INR        Anesthesia Evaluation    I have reviewed the Patient Summary Reports.    I have reviewed the Nursing Notes.   I have reviewed the Medications.     Review of Systems  Anesthesia Hx:  No problems with previous Anesthesia    Social:  Non-Smoker    Hematology/Oncology:  Hematology Normal   Oncology Normal     EENT/Dental:EENT/Dental Normal   Pulmonary:  Pulmonary Normal    Musculoskeletal:  Musculoskeletal Normal    Neurological:  Neurology Normal    Endocrine:  Endocrine Normal    Dermatological:  Skin Normal    Psych:  Psychiatric Normal           Physical Exam  General:  Well nourished    Airway/Jaw/Neck:  Airway Findings: Mouth Opening: Normal Tongue: Normal  General Airway Assessment: Adult  Mallampati: II  Improves to II with phonation.  TM Distance: Normal, at least 6 cm      Dental:  Dental Findings: In tact   Chest/Lungs:  Chest/Lungs Findings: Clear to auscultation     Heart/Vascular:  Heart Findings: Rate: Normal  Rhythm: Regular Rhythm  Sounds: Normal        Mental Status:  Mental Status Findings:  Cooperative, Alert and Oriented         Anesthesia Plan  Type of Anesthesia, risks & benefits discussed:  Anesthesia Type:  MAC, general  Patient's Preference:   Intra-op Monitoring Plan:   Intra-op Monitoring Plan Comments:   Post Op Pain Control Plan:   Post Op Pain Control Plan Comments:   Induction:   IV  Beta Blocker:  Patient is on a Beta-Blocker and has received one dose within the past 24 hours (No further documentation required).       Informed Consent: Patient understands risks and agrees with Anesthesia plan.  Questions answered.   ASA Score: 3     Day of Surgery Review of History & Physical:            Ready For Surgery From Anesthesia Perspective.

## 2018-11-09 NOTE — OP NOTE
DATE OF PROCEDURE: 11/9/2018    PREOPERATIVE DIAGNOSIS: CKD stage IV    POSTOPERATIVE DIAGNOSIS: Same    PROCEDURES PERFORMED:   Right brachiocephalic AVF creation    ATTENDING SURGEON: Hernesto Decker MD [facility]    RESIDENT: Talita Resendiz MD    ANESTHESIA: Local MAC, regional block    KEY FINDINGS: Very diminutive arm and vessels.  Right BC AVF dilated to 3-4mm.  Strong thrill, 2+ R radial pulse with only mild augmentation by doppler with AVF compression; similar in R ulnar artery.    ESTIMATED BLOOD LOSS: Minimal    COMPLICATIONS: None    INDICATIONS FOR THE PROCEDURE:   This patient has end-stage renal disease.The patient is left-handed. Preoperative evaluation of the patients veins was performed prior to the start of the procedure.    OPERATIVE PROCEDURE IN DETAIL:  After informed consent the patient was taken to the operating room. A time out was performed that identified the correct patient, procedure and location.     The patient was placed in the supine position. Then received a regional nerve block and intravenous sedation. The right arm was prepped and draped in the usual sterile fashion. A transverse incision was made in the right antecubital fossa at the elbow crease. The cephalic vein was identified and mobilized proximally and distally.  The fascia was incised, and the brachial artery was also identified and mobilized proximally and distally. Systemic IV heparin was then administer. The brachial artery was dissected free and isolated using vessel loops. The cephalic vein was freed to the appropriate length. The anterior margin of the cephalic vein was made discernible using a sterile marker. The distal end of the cephalic vein was then ligated with silk suture and the proximal end was beveled for the upcoming anastomosis. After disconnection, the cephalic vein was distended and flushed using heparinized saline without resistance. The vein was then controlled with a small clamp. Clamps were placed on the  proximal and distal aspects of the brachial artery for control. A longitudinal arteriotomy was made in the brachial artery, the artery was irrigated with heparinized saline. Next the vein and artery were anastomosed in an end-to-side fashion using a running 6-0 Prolene suture.Just prior to completion of the anastomosis the fistula was flushed, and the anastomosis was then completed. A palpable thrill was appreciated through the fistula and strong palpable radial artery was felt distally. Hemostasis was obtained and the wound was closed using interrupted Vicryl sutures and interrupted mattressed 3-0 nylon suture for the skin. Sterile dry dressing was applied. The patient tolerated the procedure well and was transported to the recovery room in stable condition.    Talita Resendiz MD  Surgery Resident, PGY-3  Pager [phone]  11/09/2018

## 2018-11-09 NOTE — ANESTHESIA POSTPROCEDURE EVALUATION
"Anesthesia Post Evaluation    Patient: Verónica Baker    Procedure(s) Performed: Procedure(s) (LRB):  CREATION, AV FISTULA (Right)    Final Anesthesia Type: general  Patient location during evaluation: PACU  Patient participation: Yes- Able to Participate  Level of consciousness: awake and alert  Post-procedure vital signs: reviewed and stable  Pain management: adequate  Airway patency: patent  PONV status at discharge: No PONV  Anesthetic complications: no      Cardiovascular status: blood pressure returned to baseline  Respiratory status: unassisted  Hydration status: euvolemic  Follow-up not needed.        Visit Vitals  /87   Pulse 91   Temp 36.6 °C (97.9 °F) (Temporal)   Resp 18   Ht 5' 5" (1.651 m)   Wt 59 kg (130 lb)   SpO2 (!) 94%   Breastfeeding? No   BMI 21.63 kg/m²       Pain/Billy Score: Pain Assessment Performed: Yes (11/9/2018 11:30 AM)  Presence of Pain: denies (11/9/2018 11:30 AM)  Billy Score: 10 (11/9/2018 11:30 AM)        "

## 2018-11-09 NOTE — BRIEF OP NOTE
Brief Operative Note  Date: 11/09/2018    Pre-op Diagnosis:  CKD (chronic kidney disease), stage IV [N18.4]    Post-op Diagnosis:  Same    Procedure(s):  R BC AVF creation    Surgeon: Hernesto Decker MD FACS    Assistant: MARY JO Resendiz MD    Anesthesia: Regional    Findings/Key Components:  Strong thrill  Very diminutive arm and vessels; R BC AVF dilated to ~3-4mm  2+ R radial pulse with only mild augmentation by doppler with AVF compression; similar in R ulnar artery    EBL: Minimal      Specimens (From admission, onward)    None        I attest to being present for the procedure and performing the case.  Hernesto Decker MD FACS  Discharge Note  SUMMARY    Admit Date: 11/9/2018    Attending Physician: Hernesto Decker MD FACS    Discharge Physician: Hernesto Decker MD FACS    Discharge Date: 11/09/2018    Final Diagnosis: CKD (chronic kidney disease), stage IV [N18.4]    Outcome of Treatment: Successful R BC AVF creation    Disposition: Home or self-care    Patient Instructions:   Current Discharge Medication List      CONTINUE these medications which have NOT CHANGED    Details   amLODIPine (NORVASC) 10 MG tablet Take 10 mg by mouth once daily.      calcitRIOL (ROCALTROL) 0.25 MCG Cap Take 1 capsule (0.25 mcg total) by mouth once daily.  Qty: 30 capsule, Refills: 6      ferrous sulfate 324 mg (65 mg iron) TbEC Take 1 tablet (324 mg total) by mouth once daily.  Refills: 0      furosemide (LASIX) 40 MG tablet Take 1 tablet (40 mg total) by mouth 2 (two) times daily.  Qty: 60 tablet, Refills: 11      spironolactone (ALDACTONE) 25 MG tablet Take 25 mg by mouth once daily. PT TAKING 1/2 TAB PER DAY      ergocalciferol (VITAMIN D2) 50,000 unit Cap Take 50,000 Units by mouth every 7 days.      gabapentin (NEURONTIN) 300 MG capsule Take 300 mg by mouth 2 (two) times daily.      HYDROcodone-acetaminophen (NORCO) 5-325 mg per tablet Take 1 tablet by mouth every 6 (six) hours as needed for Pain.  Qty: 30 tablet, Refills: 0      magnesium  oxide (MAG-OX) 400 mg tablet Take 400 mg by mouth once daily.             Diet:  Resume pre-operative diet    Activity:  Ad cary    Follow-up:  Follow-up in clinic with Dr Decker within 4 weeks; please call clinic nurse at

## 2018-11-10 ENCOUNTER — ANESTHESIA EVENT (OUTPATIENT)
Dept: ANESTHESIOLOGY | Facility: HOSPITAL | Age: 68
End: 2018-11-10

## 2018-11-10 ENCOUNTER — ANESTHESIA (OUTPATIENT)
Dept: ANESTHESIOLOGY | Facility: HOSPITAL | Age: 68
End: 2018-11-10

## 2018-11-12 ENCOUNTER — INFUSION (OUTPATIENT)
Dept: INFUSION THERAPY | Facility: HOSPITAL | Age: 68
End: 2018-11-12
Attending: NURSE PRACTITIONER
Payer: MEDICARE

## 2018-11-12 ENCOUNTER — OFFICE VISIT (OUTPATIENT)
Dept: HEMATOLOGY/ONCOLOGY | Facility: CLINIC | Age: 68
End: 2018-11-12
Payer: MEDICARE

## 2018-11-12 VITALS
BODY MASS INDEX: 21.55 KG/M2 | OXYGEN SATURATION: 100 % | DIASTOLIC BLOOD PRESSURE: 72 MMHG | HEART RATE: 96 BPM | HEIGHT: 66 IN | SYSTOLIC BLOOD PRESSURE: 136 MMHG | RESPIRATION RATE: 18 BRPM | TEMPERATURE: 98 F | WEIGHT: 134.06 LBS

## 2018-11-12 VITALS
SYSTOLIC BLOOD PRESSURE: 147 MMHG | DIASTOLIC BLOOD PRESSURE: 68 MMHG | TEMPERATURE: 98 F | RESPIRATION RATE: 20 BRPM | HEART RATE: 97 BPM

## 2018-11-12 DIAGNOSIS — C43.59 MALIGNANT MELANOMA OF TORSO EXCLUDING BREAST: ICD-10-CM

## 2018-11-12 DIAGNOSIS — R05.9 COUGH: ICD-10-CM

## 2018-11-12 DIAGNOSIS — R53.82 CHRONIC FATIGUE: ICD-10-CM

## 2018-11-12 DIAGNOSIS — I10 HYPERTENSION, UNSPECIFIED TYPE: ICD-10-CM

## 2018-11-12 DIAGNOSIS — N18.4 STAGE 4 CHRONIC KIDNEY DISEASE: ICD-10-CM

## 2018-11-12 DIAGNOSIS — C34.12 PRIMARY ADENOCARCINOMA OF UPPER LOBE OF LEFT LUNG: ICD-10-CM

## 2018-11-12 DIAGNOSIS — C34.12 PRIMARY ADENOCARCINOMA OF UPPER LOBE OF LEFT LUNG: Primary | ICD-10-CM

## 2018-11-12 DIAGNOSIS — R06.09 DYSPNEA ON EXERTION: ICD-10-CM

## 2018-11-12 DIAGNOSIS — Q44.6 POLYCYSTIC LIVER DISEASE: ICD-10-CM

## 2018-11-12 DIAGNOSIS — R53.0 NEOPLASTIC MALIGNANT RELATED FATIGUE: Primary | ICD-10-CM

## 2018-11-12 PROCEDURE — 96413 CHEMO IV INFUSION 1 HR: CPT

## 2018-11-12 PROCEDURE — 99999 PR PBB SHADOW E&M-EST. PATIENT-LVL IV: CPT | Mod: PBBFAC,,, | Performed by: INTERNAL MEDICINE

## 2018-11-12 PROCEDURE — 25000003 PHARM REV CODE 250: Performed by: INTERNAL MEDICINE

## 2018-11-12 PROCEDURE — 1101F PT FALLS ASSESS-DOCD LE1/YR: CPT | Mod: CPTII,S$GLB,, | Performed by: INTERNAL MEDICINE

## 2018-11-12 PROCEDURE — 99214 OFFICE O/P EST MOD 30 MIN: CPT | Mod: S$GLB,,, | Performed by: INTERNAL MEDICINE

## 2018-11-12 PROCEDURE — C9492 INJECTION, DURVALUMAB: HCPCS | Mod: TB | Performed by: INTERNAL MEDICINE

## 2018-11-12 PROCEDURE — 63600175 PHARM REV CODE 636 W HCPCS: Mod: TB | Performed by: INTERNAL MEDICINE

## 2018-11-12 RX ORDER — HEPARIN 100 UNIT/ML
500 SYRINGE INTRAVENOUS
Status: DISCONTINUED | OUTPATIENT
Start: 2018-11-12 | End: 2018-11-12 | Stop reason: HOSPADM

## 2018-11-12 RX ORDER — SODIUM BICARBONATE 650 MG/1
650 TABLET ORAL
COMMUNITY
End: 2018-11-26

## 2018-11-12 RX ORDER — SODIUM CHLORIDE 0.9 % (FLUSH) 0.9 %
10 SYRINGE (ML) INJECTION
Status: DISCONTINUED | OUTPATIENT
Start: 2018-11-12 | End: 2018-11-12 | Stop reason: HOSPADM

## 2018-11-12 RX ORDER — LANOLIN ALCOHOL/MO/W.PET/CERES
CREAM (GRAM) TOPICAL
COMMUNITY
Start: 2018-10-13 | End: 2018-11-26

## 2018-11-12 RX ORDER — HEPARIN 100 UNIT/ML
500 SYRINGE INTRAVENOUS
Status: CANCELLED | OUTPATIENT
Start: 2018-11-12

## 2018-11-12 RX ORDER — SODIUM CHLORIDE 0.9 % (FLUSH) 0.9 %
10 SYRINGE (ML) INJECTION
Status: CANCELLED | OUTPATIENT
Start: 2018-11-12

## 2018-11-12 RX ADMIN — DURVALUMAB 625 MG: 120 INJECTION, SOLUTION INTRAVENOUS at 04:11

## 2018-11-12 RX ADMIN — SODIUM CHLORIDE: 0.9 INJECTION, SOLUTION INTRAVENOUS at 03:11

## 2018-11-12 NOTE — PLAN OF CARE
Problem: Chemotherapy Effects (Adult)  Goal: Signs and Symptoms of Listed Potential Problems Will be Absent, Minimized or Managed (Chemotherapy Effects)  Signs and symptoms of listed potential problems will be absent, minimized or managed by discharge/transition of care (reference Chemotherapy Effects (Adult) CPG).  Outcome: Ongoing (interventions implemented as appropriate)  Here for Imfinzi.

## 2018-11-12 NOTE — PROGRESS NOTES
CC: Lung cancer, follow up          HPI:Ms. Baker is a 68 yr old lady here for follow up.  She had a noncontrast CT chest (due to stage 4 CKD) for hemoptysis, which showed a 4cm spiculated medial LUPIS mass and possible mediastinal LAD.  She has hepatomegaly due to polycystic liver (and kidneys).  There were small lucent areas in the sternum and T9 vertebral bodies concerning for mets.  However, PET CT did not reveal any hypermetabolic bone lesions. She underwent biopsy of the lesion on 4/4/18 in [place] and pathology reveals adenocarcinoma. She wanted to come to [facility] for treatment as she has family she can stay with here.She was diagnosed with polycystic kidney/liver when she was 18.  She is followed by Dr. Nagel in [place].  She has chronic infrequent headaches that are not worsening in severity or frequency.  She was evaluated by radiation oncology here ( ).    She completed concurrent carboplatin/paclitaxel for stage III adenocarcinoma lung. She finished 5 out of the planned 6 weekly treatments, with last treatment in 1st week of June 2018.   PET CT done on 8/30/18 showed improvement in the left upper lobe lung lesion. SUV max was 5.69, previously 11.13 and there was resolution of mediastinal metastatic lymph nodes.  She started consolidation treatment with Durvalumab from 9/11/18.  She has been diagnosed with vulvar melanoma.      Interval history: She is here for a follow up visit. She feels better, eating better. Her cough is better. She had vulvar surgery. She is still short of breath and has productive cough. She had AV fistula surgery on 11/9 in her [facility]      Review of Systems   Constitutional: Positive for malaise/fatigue. Negative for chills, fever and weight loss.   HENT: Negative for congestion, nosebleeds and sinus pain.    Eyes: Negative for blurred vision, double vision, photophobia and pain.   Respiratory: Positive for cough and shortness of breath. Negative for hemoptysis and  sputum production.    Cardiovascular: Negative for chest pain, palpitations and orthopnea.   Gastrointestinal: Negative for abdominal pain, constipation, heartburn, nausea and vomiting.   Genitourinary: Negative for dysuria, frequency and urgency.   Musculoskeletal: Negative for myalgias.   Skin: Negative for rash.   Neurological: Negative for dizziness, tremors, sensory change, weakness and headaches.   Endo/Heme/Allergies: Does not bruise/bleed easily.   Psychiatric/Behavioral: Negative for depression, substance abuse and suicidal ideas.         Current Outpatient Medications   Medication Sig    amLODIPine (NORVASC) 10 MG tablet Take 10 mg by mouth once daily.    calcitRIOL (ROCALTROL) 0.25 MCG Cap Take 1 capsule (0.25 mcg total) by mouth once daily.    ferrous sulfate 324 mg (65 mg iron) TbEC Take 1 tablet (324 mg total) by mouth once daily.    furosemide (LASIX) 40 MG tablet Take 1 tablet (40 mg total) by mouth 2 (two) times daily.    gabapentin (NEURONTIN) 300 MG capsule Take 300 mg by mouth 2 (two) times daily.    HYDROcodone-acetaminophen (NORCO) 5-325 mg per tablet Take 1 tablet by mouth every 4 (four) hours as needed for Pain.    magnesium oxide (MAG-OX) 400 mg (241.3 mg magnesium) tablet     magnesium oxide (MAG-OX) 400 mg tablet Take 400 mg by mouth once daily.    sodium bicarbonate 650 MG tablet Take 650 mg by mouth.    spironolactone (ALDACTONE) 25 MG tablet Take 25 mg by mouth once daily. PT TAKING 1/2 TAB PER DAY     No current facility-administered medications for this visit.          Vitals:    11/12/18 1410   BP: 136/72   Pulse: 96   Resp: 18   Temp: 98.1 °F (36.7 °C)       Physical Exam   Constitutional: She is oriented to person, place, and time. She appears well-developed.   HENT:   Head: Normocephalic and atraumatic.   Mouth/Throat: No oropharyngeal exudate.   Eyes: Pupils are equal, round, and reactive to light. No scleral icterus.   Neck: Normal range of motion.   Cardiovascular:  Normal rate and regular rhythm.   No murmur heard.  Pulmonary/Chest: Effort normal and breath sounds normal. No respiratory distress. She has no wheezes. She has no rales.   Abdominal: She exhibits distension. There is no tenderness. There is no guarding.   Musculoskeletal: She exhibits no edema.   Lymphadenopathy:     She has no cervical adenopathy.   Neurological: She is alert and oriented to person, place, and time. No cranial nerve deficit.   Skin: Skin is warm.   Psychiatric: She has a normal mood and affect.       Component      Latest Ref Rng & Units 11/12/2018   WBC      3.90 - 12.70 K/uL 5.40   RBC      4.00 - 5.40 M/uL 3.27 (L)   Hemoglobin      12.0 - 16.0 g/dL 8.7 (L)   Hematocrit      37.0 - 48.5 % 27.2 (L)   MCV      82 - 98 fL 83   MCH      27.0 - 31.0 pg 26.6 (L)   MCHC      32.0 - 36.0 g/dL 32.0   RDW      11.5 - 14.5 % 14.3   Platelets      150 - 350 K/uL 280   MPV      9.2 - 12.9 fL 11.6   Immature Granulocytes      0.0 - 0.5 % 0.2   Gran # (ANC)      1.8 - 7.7 K/uL 4.3   Immature Grans (Abs)      0.00 - 0.04 K/uL 0.01   Lymph #      1.0 - 4.8 K/uL 0.5 (L)   Mono #      0.3 - 1.0 K/uL 0.5   Eos #      0.0 - 0.5 K/uL 0.0   Baso #      0.00 - 0.20 K/uL 0.02   nRBC      0 /100 WBC 0   Gran%      38.0 - 73.0 % 80.1 (H)   Lymph%      18.0 - 48.0 % 10.0 (L)   Mono%      4.0 - 15.0 % 9.3   Eosinophil%      0.0 - 8.0 % 0.0   Basophil%      0.0 - 1.9 % 0.4   Differential Method       Automated   Sodium      136 - 145 mmol/L 143   Potassium      3.5 - 5.1 mmol/L 3.5   Chloride      95 - 110 mmol/L 105   CO2      23 - 29 mmol/L 23   Glucose      70 - 110 mg/dL 112 (H)   BUN, Bld      8 - 23 mg/dL 61 (H)   Creatinine      0.5 - 1.4 mg/dL 3.9 (H)   Calcium      8.7 - 10.5 mg/dL 9.5   Total Protein      6.0 - 8.4 g/dL 7.8   Albumin      3.5 - 5.2 g/dL 3.4 (L)   Total Bilirubin      0.1 - 1.0 mg/dL 0.4   Alkaline Phosphatase      55 - 135 U/L 179 (H)   AST      10 - 40 U/L 16   ALT      10 - 44 U/L <5 (L)    Anion Gap      8 - 16 mmol/L 15   eGFR if African American      >60 mL/min/1.73 m:2 13.0 (A)   eGFR if non African American      >60 mL/min/1.73 m:2 11.3 (A)     4/13/18 PET CT        EXAMINATION:  NM PET CT ROUTINE    CLINICAL HISTORY:  Malignant neoplasm of upper lobe, left bronchus or lung.    Outside imaging demonstrated 4 cm spiculated medial left upper lobe mass (biopsy proven adenocarcinoma) with possible mediastinal lymphadenopathy as well as lucent areas involving the sternum and T9.    TECHNIQUE:  13.12 mCi of F18-FDG was administered intravenously in the right antecubital fossa.  After an approximately 60 min distribution time, PET/CT images were acquired from the skull base to the mid thigh. Transmission images were acquired to correct for attenuation using a whole body low-dose CT scan without contrast with the arms positioned above the head. Glycemia at the time of injection was 82 mg/dL.    COMPARISON:  CT abdomen pelvis 07/21/2017, MRI brain 04/13/2018    FINDINGS:  Quality of the study: Adequate.    Abnormal foci of increased uptake are present within the left upper lobe as well as a few prevascular mediastinal lymph nodes.  Lung mass measures approximately 4.6 x 2.9 cm.    No appreciable lucent lesion in the sternum or T9 vertebral body.  Mild degenerative metabolic activity is present at the sternomanubrial junction.    Index lesions:    - Left upper lobe mass: SUV max 11.1    - Lateral pre-vascular lymph node: SUV max 5.8    Physiologic uptake of the tracer is present within the brain, salivary glands, myocardium, GI and  tracts.    Incidental CT findings: Liver and kidneys are enlarged with numerous intraparenchymal cyst and associated mass effect on the adjacent abdominal organs, unchanged.  Colonic diverticulosis without diverticulitis.  Bandlike opacification within the right lower lobe likely represents subsegmental atelectasis.  Calcific atherosclerosis involves the lower extremity  vasculature bilaterally.   Impression        Known malignancy of the left upper lobe with mediastinal trini metastases.    No appreciable lucent lesion in the sternum or T9 vertebral body.  Correlation with prior outside imaging is recommended.            4/13/18 MRI brain w/o cont        FINDINGS:  Intracranial compartment:    Ventricles and sulci are normal in size for age without evidence of hydrocephalus. No extra-axial blood or fluid collections.    Nonspecific small foci of T2/FLAIR high signal intensity in the supratentorial white matter, favored to represent chronic microvascular ischemic changes.  Remote lacunar type infarct in the left putamen.  Small punctate focus of susceptibility signal artifact in the left occipital lobe, suggestive of an old microhemorrhage or calcification.  No mass lesion, acute hemorrhage, edema or acute infarct.    Normal vascular flow voids are preserved.    Skull/extracranial contents (limited evaluation): Bone marrow signal intensity is normal.   Impression        No evidence of intracranial metastases within limitation in the absence of IV contrast which decrease the sensitivity of this exam.    Nonspecific foci of T2/FLAIR high signal intensity in the supratentorial white matter, likely representing chronic microvascular ischemic changes.               8/30/18 PET CT        CLINICAL HISTORY:  lung cancer restaging;  Malignant neoplasm of upper lobe, left bronchus or lung    FINDINGS:  Patient was administered 11.5 millicuries of FDG intravenously.  Comparison 04/13/2018.    Left upper lung lesion SUV max 5.69, previously larger SUV max 11.13.  Mediastinal lymph nodes have returned to baseline.    There is physiologic intracranial, head and neck activity.  Heart mediastinum are normal.  There is polycystic liver and kidney disease.  There is physiologic GI and  activity.  There is diverticulosis.  No bone lesions are seen.  Right lung is clear.   Impression        See  above    Improvement left upper lobe lung lesion SUV max 5.69, previously 11.13 and resolution of mediastinal metastatic lymph nodes.    Severe polycystic liver and kidney disease.       Bilateral sentinel lymph node biopsies done with Dr. Ba Osei.  2.  Left radical vulvectomy          Assessment:     1. Adenocarcinoma lung  2. Polycystic kidney disease  3. Polycystic liver disease  4. Ascites  5. Hypertension,essential  6. Cough  7. Dyspnea on exertion  8. CKD stage IV  9. Leukopenia  10. Anemia, normocytic, hypochromic  11. Dysphagia  12. Epistaxis  13. Weight loss  14. ANOREXIA  15. Vulvar melanoma     Plan:     1. She has a left upper lung mass that was biopsied, as well as hyper metabolic, prevascular mediastinal lymph nodes on PET CT. No other hypermetabolic lesions on PET CT. MRI brain (non-contrast) does not show any metastatic lesion. She has polycystic kidney disease and she is certainly at higher risk for renal malignancy, colon CA as well as liver CA. Slides and block were requested from [facility] and were reviewed by pathology here. It was confirmed that she has adenocarcinoma originating in the lungs.   She started concurrent chemotherapy with Carboplatin/Paclitaxcel ( renally adjusted) as Pemetrexed was not appropriate with the reduced renal function.   Chemotherapy cycle 6 was held due to worsening renal function and leukopenia. Last treatment was on 6/4/18.  She is doing better now. PET CT done on 8/30/18 showed improvement in the left upper lobe lung lesion. SUV max was 5.69, previously 11.13 and there was resolution of mediastinal metastatic lymph nodes. I discussed these findings with her in detail. I discussed starting consolidative immunotherapy with CKI Duravalumab on 9/11/18.  She has received 2 treatments so far. She had vulvar surgery ( for melanoma) on 10/20/18.  CKI was held prior to the surgery.  Final pathology from melanoma surgery pending. She will continue  Durvalumab.      4. Chronic, attributed to CKD/ polycystic liver        5. On multiple anti-HT medications. /72 mm Hg today      6. Possibly related to malignancy in her lungs. It is better now.      8. Serum Cr 3.9 today. She follows with nephrology. She has AVF now.      9. ANC 4.3 today     10. Secondary to CKD and chemotherapy. Serum iron saturation 33% on 5/21/18.     11. Possibly secondary to radiation. Suggested eating/drinking more liquid/semi-solid foods. She is on Ranitidine once daily. I will recommend EGD as her symptoms are persistent.       13. Now stable.      15. Discovered on biopsy done on 6/21/18. Tim's level 3, Breslow depth 1.6mm. She is being seen by  ([facility]) who is planning wide local excision and SLN dissection. She needs neuro imaging. The lesion does not appear to be PET avid, on the last PET CT done in Aug 2018. Plan is for left partial vulvectomy, bilateral SLN dissection on 10/29/18   She underwent bilateral sentinel lymph node biopsies and left radical vulvectomy on 10/30/18. Final pathology still awaited. She will have MRI brain w/o contrast.            Distress Screening Results: Psychosocial Distress screening score of Distress Score: 0 noted and reviewed. No intervention indicated.

## 2018-11-12 NOTE — PLAN OF CARE
Problem: Patient Care Overview  Goal: Plan of Care Review  Outcome: Ongoing (interventions implemented as appropriate)  Tolerated treatment well.  Advised to call MD for any problems or concerns.  AVS given.  RTC in 2 weeks.  NAD noted upon discharge.

## 2018-11-22 ENCOUNTER — PATIENT MESSAGE (OUTPATIENT)
Dept: ADMINISTRATIVE | Facility: OTHER | Age: 68
End: 2018-11-22

## 2018-11-26 ENCOUNTER — OFFICE VISIT (OUTPATIENT)
Dept: HEMATOLOGY/ONCOLOGY | Facility: CLINIC | Age: 68
End: 2018-11-26
Payer: MEDICARE

## 2018-11-26 ENCOUNTER — INFUSION (OUTPATIENT)
Dept: INFUSION THERAPY | Facility: HOSPITAL | Age: 68
End: 2018-11-26
Attending: NURSE PRACTITIONER
Payer: MEDICARE

## 2018-11-26 ENCOUNTER — TELEPHONE (OUTPATIENT)
Dept: GYNECOLOGIC ONCOLOGY | Facility: CLINIC | Age: 68
End: 2018-11-26

## 2018-11-26 ENCOUNTER — DOCUMENTATION ONLY (OUTPATIENT)
Dept: HEMATOLOGY/ONCOLOGY | Facility: CLINIC | Age: 68
End: 2018-11-26

## 2018-11-26 ENCOUNTER — HOSPITAL ENCOUNTER (OUTPATIENT)
Dept: RADIOLOGY | Facility: HOSPITAL | Age: 68
Discharge: HOME | End: 2018-11-26
Attending: INTERNAL MEDICINE
Payer: MEDICARE

## 2018-11-26 VITALS
BODY MASS INDEX: 21.11 KG/M2 | HEART RATE: 88 BPM | HEIGHT: 66 IN | TEMPERATURE: 98 F | OXYGEN SATURATION: 98 % | DIASTOLIC BLOOD PRESSURE: 88 MMHG | SYSTOLIC BLOOD PRESSURE: 157 MMHG | RESPIRATION RATE: 18 BRPM | WEIGHT: 131.38 LBS

## 2018-11-26 VITALS
TEMPERATURE: 98 F | WEIGHT: 131.38 LBS | HEART RATE: 89 BPM | SYSTOLIC BLOOD PRESSURE: 142 MMHG | RESPIRATION RATE: 18 BRPM | DIASTOLIC BLOOD PRESSURE: 78 MMHG | BODY MASS INDEX: 21.11 KG/M2 | HEIGHT: 66 IN

## 2018-11-26 DIAGNOSIS — C34.12 PRIMARY ADENOCARCINOMA OF UPPER LOBE OF LEFT LUNG: Primary | ICD-10-CM

## 2018-11-26 DIAGNOSIS — D63.0 ANEMIA IN NEOPLASTIC DISEASE: ICD-10-CM

## 2018-11-26 DIAGNOSIS — I10 HYPERTENSION, UNSPECIFIED TYPE: ICD-10-CM

## 2018-11-26 DIAGNOSIS — C43.59 MALIGNANT MELANOMA OF TORSO EXCLUDING BREAST: ICD-10-CM

## 2018-11-26 DIAGNOSIS — C34.12 PRIMARY ADENOCARCINOMA OF UPPER LOBE OF LEFT LUNG: ICD-10-CM

## 2018-11-26 DIAGNOSIS — R06.09 DYSPNEA ON EXERTION: Primary | ICD-10-CM

## 2018-11-26 DIAGNOSIS — R53.82 CHRONIC FATIGUE: ICD-10-CM

## 2018-11-26 PROCEDURE — 70551 MRI BRAIN STEM W/O DYE: CPT | Mod: TC

## 2018-11-26 PROCEDURE — 25000003 PHARM REV CODE 250: Performed by: INTERNAL MEDICINE

## 2018-11-26 PROCEDURE — 70551 MRI BRAIN STEM W/O DYE: CPT | Mod: 26,,, | Performed by: RADIOLOGY

## 2018-11-26 PROCEDURE — 1101F PT FALLS ASSESS-DOCD LE1/YR: CPT | Mod: CPTII,S$GLB,, | Performed by: INTERNAL MEDICINE

## 2018-11-26 PROCEDURE — 99999 PR PBB SHADOW E&M-EST. PATIENT-LVL IV: CPT | Mod: PBBFAC,,, | Performed by: INTERNAL MEDICINE

## 2018-11-26 PROCEDURE — 63600175 PHARM REV CODE 636 W HCPCS: Mod: TB | Performed by: INTERNAL MEDICINE

## 2018-11-26 PROCEDURE — 99214 OFFICE O/P EST MOD 30 MIN: CPT | Mod: S$GLB,,, | Performed by: INTERNAL MEDICINE

## 2018-11-26 PROCEDURE — C9492 INJECTION, DURVALUMAB: HCPCS | Mod: TB | Performed by: INTERNAL MEDICINE

## 2018-11-26 PROCEDURE — 96413 CHEMO IV INFUSION 1 HR: CPT

## 2018-11-26 RX ORDER — HEPARIN 100 UNIT/ML
500 SYRINGE INTRAVENOUS
Status: CANCELLED | OUTPATIENT
Start: 2018-11-26

## 2018-11-26 RX ORDER — SODIUM CHLORIDE 0.9 % (FLUSH) 0.9 %
10 SYRINGE (ML) INJECTION
Status: CANCELLED | OUTPATIENT
Start: 2018-11-26

## 2018-11-26 RX ADMIN — DURVALUMAB 625 MG: 120 INJECTION, SOLUTION INTRAVENOUS at 02:11

## 2018-11-26 NOTE — PROGRESS NOTES
"Subjective:       Patient ID: Verónica Baker is a 68 y.o. female.    Chief Complaint: Post-op Evaluation (4 week)    HPI   Patient comes in today for post op visit after radical vulvectomy and bilateral negative SLN biopsies on 10/29/2019 for malignant melanoma of the vulva. She is healing well and only notes some mild swelling to R groin node excision site. Otherwise has no complaints.    Her oncologic history is:  Initial biopsy in June 2018 was originally read as keratinizing hyperplastic squamous epithelium with scattered juctional nests of atypical melanocytes. Pathology was read at [place]. institional review in Sept 2018 was reported a malignant melanoma. Tim's level 3 with Breslow depth 1.6 mm.     10/29/2018:  Breslow's level of 4 mm. Positive medial margin. Negative bilateral SL nodes.     I called her to review pathology and she is aware she will need a re excision once she is healed.      Review of Systems   Constitutional: Negative for appetite change, chills, diaphoresis, fatigue, fever and unexpected weight change.   Respiratory: Negative for cough, chest tightness, shortness of breath and wheezing.    Cardiovascular: Negative for chest pain, palpitations and leg swelling.   Gastrointestinal: Positive for abdominal distention (chronic). Negative for abdominal pain, blood in stool, constipation, diarrhea, nausea and vomiting.   Genitourinary: Negative for difficulty urinating, dysuria, flank pain, frequency, hematuria, pelvic pain, vaginal bleeding, vaginal discharge and vaginal pain.   Musculoskeletal: Negative for arthralgias and back pain.   Skin: Negative for color change and rash.   Neurological: Negative for dizziness, weakness, numbness and headaches.   Hematological: Negative for adenopathy.   Psychiatric/Behavioral: Negative for confusion and sleep disturbance. The patient is not nervous/anxious.        Objective:   BP (!) 159/80   Pulse 107   Ht 5' 6" (1.676 m)   Wt 60.9 kg (134 lb 4.2 oz)  "  BMI 21.67 kg/m²      Physical Exam   Constitutional: She is oriented to person, place, and time. She appears well-developed and well-nourished. No distress.   HENT:   Head: Normocephalic.   Eyes: No scleral icterus.   Neck: Normal range of motion.   Pulmonary/Chest: Effort normal. No respiratory distress.   Abdominal: Soft. She exhibits distension.   Genitourinary:   Genitourinary Comments: Vulva: healing vulvar incision. Intact. Pigmented lesion on right vulva.    Musculoskeletal: Normal range of motion. She exhibits no edema.   Lymphadenopathy:        Right: No inguinal (2x3cm lymphocyst) adenopathy present.        Left: No inguinal adenopathy present.   Neurological: She is alert and oriented to person, place, and time.   Skin: Skin is warm and dry. No rash noted. She is not diaphoretic. No pallor.   Psychiatric: She has a normal mood and affect. Her behavior is normal.   Nursing note and vitals reviewed.      Assessment:       1. Malignant melanoma of torso excluding breast        Plan:   Malignant melanoma of torso excluding breast  RTC in 2 weeks for re-exam and to schedule for re-excision of the left vulva and right.   Continue to monitor right groin lymphocyst.

## 2018-11-26 NOTE — PLAN OF CARE
Problem: Chemotherapy Effects (Adult)  Goal: Signs and Symptoms of Listed Potential Problems Will be Absent, Minimized or Managed (Chemotherapy Effects)  Signs and symptoms of listed potential problems will be absent, minimized or managed by discharge/transition of care (reference Chemotherapy Effects (Adult) CPG).  Outcome: Ongoing (interventions implemented as appropriate)  Patient here for finzi.  Assessment completed and labs reviewed.  VSS.  No needs at this time.  Chair reclined and blanket offered.  Will continue to monitor.

## 2018-11-26 NOTE — PROGRESS NOTES
CC: Lung cancer, vulvar melanoma, follow up          HPI:Ms. Baker is a 68 yr old lady here for follow up.  She had a noncontrast CT chest (due to stage 4 CKD) for hemoptysis, which showed a 4cm spiculated medial LUPIS mass and possible mediastinal LAD.  She has hepatomegaly due to polycystic liver (and kidneys).  There were small lucent areas in the sternum and T9 vertebral bodies concerning for mets.  However, PET CT did not reveal any hypermetabolic bone lesions. She underwent biopsy of the lesion on 4/4/18 in [place] and pathology reveals adenocarcinoma. She wanted to come to [facility] for treatment as she has family she can stay with here.She was diagnosed with polycystic kidney/liver when she was 18.  She is followed by Dr. Nagel in [place].  She has chronic infrequent headaches that are not worsening in severity or frequency.  She was evaluated by radiation oncology here ( ).    She completed concurrent carboplatin/paclitaxel for stage III adenocarcinoma lung. She finished 5 out of the planned 6 weekly treatments, with last treatment in 1st week of June 2018.   PET CT done on 8/30/18 showed improvement in the left upper lobe lung lesion. SUV max was 5.69, previously 11.13 and there was resolution of mediastinal metastatic lymph nodes.  She started consolidation treatment with Durvalumab from 9/11/18.  She has been diagnosed with vulvar melanoma.      Interval history: She is here for a follow up visit. She feels better, eating better. Her cough is better. She had vulvar surgery. She is still short of breath and has productive cough. She had AV fistula surgery on 11/9 in her right UE        Review of Systems   Constitutional: Positive for malaise/fatigue and weight loss. Negative for chills and fever.   HENT: Negative for congestion, ear discharge and nosebleeds.    Eyes: Negative for blurred vision, double vision and photophobia.   Respiratory: Negative for cough, hemoptysis and sputum production.     Cardiovascular: Negative for chest pain, palpitations, orthopnea and claudication.   Gastrointestinal: Negative for abdominal pain, heartburn, nausea and vomiting.   Genitourinary: Negative for dysuria, frequency and urgency.   Musculoskeletal: Negative for back pain, myalgias and neck pain.   Skin: Negative for rash.   Neurological: Positive for weakness. Negative for dizziness, tingling, tremors, sensory change and headaches.   Endo/Heme/Allergies: Does not bruise/bleed easily.   Psychiatric/Behavioral: Negative for depression.           Current Outpatient Medications   Medication Sig    amLODIPine (NORVASC) 10 MG tablet Take 10 mg by mouth once daily.    calcitRIOL (ROCALTROL) 0.25 MCG Cap Take 1 capsule (0.25 mcg total) by mouth once daily.    ferrous sulfate 324 mg (65 mg iron) TbEC Take 1 tablet (324 mg total) by mouth once daily.    FLUAD [id number], 65 YR UP,,PF, 45 mcg (15 mcg x 3)/0.5 mL Syrg     furosemide (LASIX) 40 MG tablet Take 1 tablet (40 mg total) by mouth 2 (two) times daily.    gabapentin (NEURONTIN) 300 MG capsule Take 300 mg by mouth 2 (two) times daily.    HYDROcodone-acetaminophen (NORCO) 5-325 mg per tablet Take 1 tablet by mouth every 4 (four) hours as needed for Pain.    spironolactone (ALDACTONE) 25 MG tablet Take 25 mg by mouth once daily. PT TAKING 1/2 TAB PER DAY     No current facility-administered medications for this visit.        Vitals:    11/26/18 1135   BP: (!) 157/88   Pulse: 88   Resp: 18   Temp: 97.9 °F (36.6 °C)     Physical Exam   Constitutional: She is oriented to person, place, and time. She appears well-developed.   HENT:   Head: Normocephalic and atraumatic.   Mouth/Throat: No oropharyngeal exudate.   Eyes: Pupils are equal, round, and reactive to light. No scleral icterus.   Neck: Normal range of motion.   Cardiovascular: Normal rate and regular rhythm.   No murmur heard.  Pulmonary/Chest: Effort normal and breath sounds normal. No respiratory distress. She  has no wheezes.   Abdominal: Bowel sounds are normal. She exhibits distension. There is no tenderness. There is no rebound.   Musculoskeletal: She exhibits no edema.   Lymphadenopathy:     She has no cervical adenopathy.   Neurological: She is alert and oriented to person, place, and time. No cranial nerve deficit.   Skin: Skin is warm.   Psychiatric: She has a normal mood and affect.       Component      Latest Ref Rng & Units 11/26/2018   WBC      3.90 - 12.70 K/uL 4.24   RBC      4.00 - 5.40 M/uL 3.22 (L)   Hemoglobin      12.0 - 16.0 g/dL 8.4 (L)   Hematocrit      37.0 - 48.5 % 27.4 (L)   MCV      82 - 98 fL 85   MCH      27.0 - 31.0 pg 26.1 (L)   MCHC      32.0 - 36.0 g/dL 30.7 (L)   RDW      11.5 - 14.5 % 14.9 (H)   Platelets      150 - 350 K/uL 223   MPV      9.2 - 12.9 fL 12.0   Immature Granulocytes      0.0 - 0.5 % 0.2   Gran # (ANC)      1.8 - 7.7 K/uL 2.9   Immature Grans (Abs)      0.00 - 0.04 K/uL 0.01   Lymph #      1.0 - 4.8 K/uL 0.5 (L)   Mono #      0.3 - 1.0 K/uL 0.4   Eos #      0.0 - 0.5 K/uL 0.4   Baso #      0.00 - 0.20 K/uL 0.04   nRBC      0 /100 WBC 0   Gran%      38.0 - 73.0 % 68.0   Lymph%      18.0 - 48.0 % 12.3 (L)   Mono%      4.0 - 15.0 % 10.1   Eosinophil%      0.0 - 8.0 % 8.5 (H)   Basophil%      0.0 - 1.9 % 0.9   Differential Method       Automated   Sodium      136 - 145 mmol/L 145   Potassium      3.5 - 5.1 mmol/L 4.2   Chloride      95 - 110 mmol/L 110   CO2      23 - 29 mmol/L 23   Glucose      70 - 110 mg/dL 96   BUN, Bld      8 - 23 mg/dL 61 (H)   Creatinine      0.5 - 1.4 mg/dL 4.4 (H)   Calcium      8.7 - 10.5 mg/dL 9.6   Total Protein      6.0 - 8.4 g/dL 7.3   Albumin      3.5 - 5.2 g/dL 3.3 (L)   Total Bilirubin      0.1 - 1.0 mg/dL 0.4   Alkaline Phosphatase      55 - 135 U/L 149 (H)   AST      10 - 40 U/L 19   ALT      10 - 44 U/L 14   Anion Gap      8 - 16 mmol/L 12   eGFR if African American      >60 mL/min/1.73 m:2 11.2 (A)   eGFR if non       >60  mL/min/1.73 m:2 9.7 (A)     4/13/18 PET CT        EXAMINATION:  NM PET CT ROUTINE    CLINICAL HISTORY:  Malignant neoplasm of upper lobe, left bronchus or lung.    Outside imaging demonstrated 4 cm spiculated medial left upper lobe mass (biopsy proven adenocarcinoma) with possible mediastinal lymphadenopathy as well as lucent areas involving the sternum and T9.    TECHNIQUE:  13.12 mCi of F18-FDG was administered intravenously in the right antecubital fossa.  After an approximately 60 min distribution time, PET/CT images were acquired from the skull base to the mid thigh. Transmission images were acquired to correct for attenuation using a whole body low-dose CT scan without contrast with the arms positioned above the head. Glycemia at the time of injection was 82 mg/dL.    COMPARISON:  CT abdomen pelvis 07/21/2017, MRI brain 04/13/2018    FINDINGS:  Quality of the study: Adequate.    Abnormal foci of increased uptake are present within the left upper lobe as well as a few prevascular mediastinal lymph nodes.  Lung mass measures approximately 4.6 x 2.9 cm.    No appreciable lucent lesion in the sternum or T9 vertebral body.  Mild degenerative metabolic activity is present at the sternomanubrial junction.    Index lesions:    - Left upper lobe mass: SUV max 11.1    - Lateral pre-vascular lymph node: SUV max 5.8    Physiologic uptake of the tracer is present within the brain, salivary glands, myocardium, GI and  tracts.    Incidental CT findings: Liver and kidneys are enlarged with numerous intraparenchymal cyst and associated mass effect on the adjacent abdominal organs, unchanged.  Colonic diverticulosis without diverticulitis.  Bandlike opacification within the right lower lobe likely represents subsegmental atelectasis.  Calcific atherosclerosis involves the lower extremity vasculature bilaterally.   Impression        Known malignancy of the left upper lobe with mediastinal trini metastases.    No appreciable  lucent lesion in the sternum or T9 vertebral body.  Correlation with prior outside imaging is recommended.            4/13/18 MRI brain w/o cont        FINDINGS:  Intracranial compartment:    Ventricles and sulci are normal in size for age without evidence of hydrocephalus. No extra-axial blood or fluid collections.    Nonspecific small foci of T2/FLAIR high signal intensity in the supratentorial white matter, favored to represent chronic microvascular ischemic changes.  Remote lacunar type infarct in the left putamen.  Small punctate focus of susceptibility signal artifact in the left occipital lobe, suggestive of an old microhemorrhage or calcification.  No mass lesion, acute hemorrhage, edema or acute infarct.    Normal vascular flow voids are preserved.    Skull/extracranial contents (limited evaluation): Bone marrow signal intensity is normal.   Impression        No evidence of intracranial metastases within limitation in the absence of IV contrast which decrease the sensitivity of this exam.    Nonspecific foci of T2/FLAIR high signal intensity in the supratentorial white matter, likely representing chronic microvascular ischemic changes.               8/30/18 PET CT        CLINICAL HISTORY:  lung cancer restaging;  Malignant neoplasm of upper lobe, left bronchus or lung    FINDINGS:  Patient was administered 11.5 millicuries of FDG intravenously.  Comparison 04/13/2018.    Left upper lung lesion SUV max 5.69, previously larger SUV max 11.13.  Mediastinal lymph nodes have returned to baseline.    There is physiologic intracranial, head and neck activity.  Heart mediastinum are normal.  There is polycystic liver and kidney disease.  There is physiologic GI and  activity.  There is diverticulosis.  No bone lesions are seen.  Right lung is clear.   Impression        See above    Improvement left upper lobe lung lesion SUV max 5.69, previously 11.13 and resolution of mediastinal metastatic lymph nodes.    Severe  polycystic liver and kidney disease.       Bilateral sentinel lymph node biopsies done with Dr. Ba Osei.  2.  Left radical vulvectomy     10/29/18  FINAL PATHOLOGIC DIAGNOSIS  [place] FINAL DIAGNOSIS:  Skin, left sentinel node hot blue 150, right sentinel node hot blue 150, and left vulva, excisions (MS [id number],  10/29/2018):  Lymph node, left sentinel hot blue 150, excision (1.): Lymph node identified negative for metastatic melanoma.  HMB45, Mart 1, and S100 immunostains performed at the referring institution are negative.  Right sentinel node, hot blue, 150, excision (2.): Lymph node identified negative for metastatic melanoma. S100,HMB45, and Mart 1 immunostains performed at the referring institution are negative.  Skin, left vulva, excision (3.) Extensive residual malignant melanoma, invasive to a Breslow depth of 4.0 mm. The specimen shows ulceration but this may be due to prior biopsy rather than tumoral ulceration. Mitotic rate is 10 per  mm2. The tumor cells extend to the green inked margin and within 0.2 mm of the blue inked margin.  Melan A and Sox 10 immunostains report performed at [facility] on sections from blocks 3D, 3E, 3 F, 3 G, 3H,3I, 3 J, 3 M, 3 P, 3 Q, and 3 are to evaluate the extent of the lesion.       11/26/18 MRI brain w/o cont    COMPARISON:  04/13/2018    FINDINGS:  There is no restricted diffusion to suggest acute infarction.  There is a mild degree of a age-appropriate generalized cerebral volume loss.  Compensatory enlargement ventricles sulci and cisterns without hydrocephalus.  The major intracranial T2 flow voids are present.    Ill-defined mild T2 flair signal hyperintensity diffusely in the supratentorial white matter similar to prior suggestive for posttherapy change.  There is a focal area of cystic change in the left caudate/putamen anteriorly compatible with remote lacunar-type infarction.    There is a stable focus of punctate susceptibility in the right parietal  subcortical white matter and a small focus in the left posterior temporal subcortical white matter which are unchanged suggestive for prior hemorrhage from treated metastases or remote microhemorrhage.  No new parenchymal signal abnormality.  Please note evaluation for subtle metastases is limited by lack of contrast.    No abnormal extra-axial fluid collection.   Impression       No significant change from prior.    No evidence for new signal abnormality to suggest new or worsening intracranial metastatic disease.    Continued ill-defined T2 FLAIR hyperintensity supratentorial white matter suggestive for posttherapy change with small anterior caudate/putamen remote lacunar-type infarct    Stable small left posterior temporal and punctate right parietal subcortical foci susceptibility suggestive for prior hemorrhage or treated hemorrhagic metastases.    Clinical correlation and continued follow-up advised          Assessment:     1. Adenocarcinoma lung  2. Polycystic kidney disease  3. Polycystic liver disease  4. Ascites  5. Hypertension,essential  6. Cough  7. Dyspnea on exertion  8. CKD stage IV  9. Anemia, normocytic, hypochromic  10. Weight loss  11. Vulvar melanoma     Plan:     1. She has a left upper lung mass that was biopsied, as well as hyper metabolic, prevascular mediastinal lymph nodes on PET CT. No other hypermetabolic lesions on PET CT. MRI brain (non-contrast) does not show any metastatic lesion. She has polycystic kidney disease and she is certainly at higher risk for renal malignancy, colon CA as well as liver CA. Slides and block were requested from [facility] and were reviewed by pathology here. It was confirmed that she has adenocarcinoma originating in the lungs.   She started concurrent chemotherapy with Carboplatin/Paclitaxcel ( renally adjusted) as Pemetrexed was not appropriate with the reduced renal function.   Chemotherapy cycle 6 was held due to worsening renal function and  leukopenia. Last treatment was on 6/4/18.  She is doing better now. PET CT done on 8/30/18 showed improvement in the left upper lobe lung lesion. SUV max was 5.69, previously 11.13 and there was resolution of mediastinal metastatic lymph nodes. I discussed these findings with her in detail. I discussed starting consolidative immunotherapy with [facility] Duravalumab on 9/11/18.  She has received 2 treatments so far. She had vulvar surgery ( for melanoma) on 10/20/18.  CKI was held prior to the surgery.  Final pathology from melanoma surgery pending. She will continue Durvalumab.      4. Chronic, attributed to CKD/ polycystic liver        5. On multiple anti-HT medications. /72 mm Hg today      6. Possibly related to malignancy in her lungs. It is better now.      8. Serum Cr 4.4 today. She follows with nephrology. She has AVF now.      9. Secondary to CKD and chemotherapy. Serum iron saturation 33% on 5/21/18.        10. Now stable.      11. Discovered on biopsy done on 6/21/18. Tim's level 3, Breslow depth 1.6mm. She is being followed seen by  ([facility]) . The lesion does not appear to be PET avid, on the last PET CT done in Aug 2018. She underwent bilateral sentinel lymph node biopsies and left radical vulvectomy on 10/30/18. She likely has residual melanoma as the margins in the resected specimen were positive. [address] LN was negative. No clear evidence of brain metastases on non-contrast MRI brain done on 11/26/18.  She has a follow up with .              Distress Screening Results: Psychosocial Distress screening score of Distress Score: 0 noted and reviewed. No intervention indicated.

## 2018-11-26 NOTE — PROGRESS NOTES
Spoke to the patient after receiving a message from Dr Pires that the patient requested to stay at the [facility] for the rest of her treatments (she is receiving a transfusion every two weeks). Explained to the patient that she would not be able to live at the [facility], but that she could call for a reservation for the [facility]. Eligibility requirements are: Arrival one day prior to her appointment and departure one day after her appointment (to be verified in the reservation request). Also informed her that availability at the [facility] was limited and on a first come first service base. She expressed understanding and agreed to call as needed. Also discussed with her that Dr Pires offered to set up infusions for her closer to her home. He agreed to discuss it with her at her next appointment. Provided the patient with contact information and she agreed to call as needed for a reservation.

## 2018-11-27 ENCOUNTER — OFFICE VISIT (OUTPATIENT)
Dept: GYNECOLOGIC ONCOLOGY | Facility: CLINIC | Age: 68
End: 2018-11-27
Payer: MEDICARE

## 2018-11-27 VITALS
HEIGHT: 66 IN | DIASTOLIC BLOOD PRESSURE: 80 MMHG | SYSTOLIC BLOOD PRESSURE: 159 MMHG | WEIGHT: 134.25 LBS | BODY MASS INDEX: 21.57 KG/M2 | HEART RATE: 107 BPM

## 2018-11-27 DIAGNOSIS — C43.59 MALIGNANT MELANOMA OF TORSO EXCLUDING BREAST: Primary | ICD-10-CM

## 2018-11-27 PROCEDURE — 99024 POSTOP FOLLOW-UP VISIT: CPT | Mod: S$GLB,,, | Performed by: OBSTETRICS & GYNECOLOGY

## 2018-11-27 PROCEDURE — 99999 PR PBB SHADOW E&M-EST. PATIENT-LVL III: CPT | Mod: PBBFAC,,, | Performed by: OBSTETRICS & GYNECOLOGY

## 2018-11-29 ENCOUNTER — DOCUMENTATION ONLY (OUTPATIENT)
Dept: HEMATOLOGY/ONCOLOGY | Facility: CLINIC | Age: 68
End: 2018-11-29

## 2018-11-29 NOTE — PROGRESS NOTES
The patient called to request a reservation at the [facility] for her upcoming appointments. Reservation completed and faxed to the [facility] for arrival on 12/6/18 and departure on 12/11/18. The patient is aware that the [facility] will contact her with information on availability closer to the time of her arrival.

## 2018-12-04 ENCOUNTER — DOCUMENTATION ONLY (OUTPATIENT)
Dept: ANESTHESIOLOGY | Facility: HOSPITAL | Age: 68
End: 2018-12-04

## 2018-12-06 ENCOUNTER — OFFICE VISIT (OUTPATIENT)
Dept: VASCULAR SURGERY | Facility: CLINIC | Age: 68
End: 2018-12-06
Payer: MEDICARE

## 2018-12-06 ENCOUNTER — HOSPITAL ENCOUNTER (OUTPATIENT)
Dept: VASCULAR SURGERY | Facility: CLINIC | Age: 68
Discharge: HOME | End: 2018-12-06
Payer: MEDICARE

## 2018-12-06 VITALS
HEIGHT: 65 IN | HEART RATE: 85 BPM | SYSTOLIC BLOOD PRESSURE: 129 MMHG | TEMPERATURE: 98 F | DIASTOLIC BLOOD PRESSURE: 81 MMHG | WEIGHT: 121.25 LBS | BODY MASS INDEX: 20.2 KG/M2

## 2018-12-06 DIAGNOSIS — N18.6 ESRD (END STAGE RENAL DISEASE): Primary | ICD-10-CM

## 2018-12-06 DIAGNOSIS — T82.858A ARTERIOVENOUS FISTULA STENOSIS: ICD-10-CM

## 2018-12-06 DIAGNOSIS — N18.6 ESRD (END STAGE RENAL DISEASE): ICD-10-CM

## 2018-12-06 PROCEDURE — 99024 POSTOP FOLLOW-UP VISIT: CPT | Mod: S$GLB,,, | Performed by: SURGERY

## 2018-12-06 PROCEDURE — 93990 DOPPLER FLOW TESTING: CPT | Mod: S$GLB,,, | Performed by: SURGERY

## 2018-12-06 PROCEDURE — 99999 PR PBB SHADOW E&M-EST. PATIENT-LVL III: CPT | Mod: PBBFAC,,, | Performed by: SURGERY

## 2018-12-06 NOTE — PATIENT INSTRUCTIONS
Arteriovenous (AV) Fistula for Dialysis  An AV fistula is a connection between an artery and a vein. For this procedure, an AV fistula is surgically created using an artery and a vein in your arm. (Your healthcare provider will let you know if another site is to be used.) When the artery and vein are joined, blood flow increases from the artery into the vein. As a result, the vein gets bigger over time. The enlarged vein provides easier access to the blood for a treatment for kidney failure (dialysis). This sheet explains the procedure and what to expect.     An AV fistula increases blood flow from the artery into the vein. Over time, the vein becomes stronger and enlarged.   Preparing for the procedure  Prepare as you have been told. In addition:  · Tell your healthcare provider about all the medicines you take. This includes all over-the-counter and prescription medicines, and street drugs. It also includes herbs, vitamins, and other supplements. You may need to stop taking some or all of them before the procedure.  · Follow any directions youre given for not eating or drinking before the procedure.  · Do not allow anyone to draw blood from or take blood pressure on the arm that will have the fistula before the procedure.  The day of the procedure  The procedure takes about 1 to 2 hours. Youll likely go home the same day.  Before the procedure begins:  · An IV (intravenous) line is put into a vein in the arm or hand not being used for the procedure. This line supplies fluids and medicines.  · To keep you free of pain during the procedure, youre given general anesthesia. This medicine puts you into a state like a deep sleep through the procedure. Or a nerve block may be used. This medicine numbs the arm. With it, you may also be given medicine that makes you relaxed and drowsy through the procedure.  During the procedure:  · The skin over your arm may be injected with numbing medicine.  · One or more small  cuts (incisions) are then made through the numbed skin. This depends on the size of your arm and the depth of the vein in your arm.  · The vein is attached to the selected artery.  · Any incisions made are then closed with stitches (sutures), staples, surgical glue, or strips of surgical tape.  After the procedure:  · Youll be asked to keep your arm raised (elevated) as often as possible for at least a week after the procedure.  · Youll be given medicines to manage pain as needed.  · Your arm and hand will be checked to make sure blood is flowing through the fistula properly. The feeling of blood rushing through the fistula is called a thrill. It is somewhat similar to the purring of a cat. Youll be taught how to check for this feeling each day to make sure there are no problems with your fistula. Youll also be taught how to care for your fistula at home.  · When its time for you to leave the hospital, have an adult family member or friend ready to drive you home.  Recovering at home  Once at home, follow all of the instructions youve been given. Be sure to:  · Take all medicines as directed.  · Care for your incision as instructed.  · Check for signs of infection at the incision site (see below).  · Avoid heavy lifting and strenuous activities as directed.  · Monitor and care for your fistula as instructed.  · Do your hand and arm exercises as instructed. This usually involves squeezing a ball in your hand for a few minutes each hour.  Call your healthcare provider if you have any of the following:  · Fever of 100.4°F (38°C) or higher  · Signs of infection at the incision site, such as increased redness or swelling, warmth, worsening pain, bleeding, or bad-smelling drainage  · You cant feel a thrill (the vibration of blood going through your arm)  · Pain or numbness in your fingers, hand, or arm  · Bleeding, redness, or warmth around your fistula  · Sudden bulging of the fistula (more than usual; a slight  bulge is normal)   Follow-Up  Your healthcare provider will check your fistula within 1 to 2 weeks after the procedure. It will likely take about 6 to 8 weeks for the fistula to enlarge enough to start dialysis. After that, make sure the fistula is checked each time you have dialysis. Your healthcare provider may also suggest checkups every 6 months.  Risks and possible complications include:  · The fistula not working properly  · Long wait before the fistula is ready (up to 6 months)  · Coldness or numbness in the hand (due to blood flowing away from the hand and into the fistula)  · An unsightly bump under the skin (due to enlargement of the fistula)  · Prolonged bleeding from the fistula after dialysis  · Narrowing or weakening of the blood vessels used for the fistula  · Formation of blood clots in the blood vessels used for the fistula  · Risks of anesthesia or any other medicines used during the procedure   Living with an AV Fistula  A problem, such as a narrowing (stricture) of the vein or an infection, can make the fistula unusable. If this happens, you may need other treatments to repair or make a new fistula. To protect your fistula, follow these and any other guidelines youre given:  · Check your fistula as often as your healthcare provider says. If you cant feel your thrill, let your provider know right away.  · Make sure your fistula is checked before each dialysis treatment.  · Dont let anyone draw blood from or take blood pressure on the arm that has the fistula.  · Wash your hands often and keep the area around your fistula clean.  · Dont sleep on the arm that has the fistula.  · Dont wear tight jewelry or a watch on the arm with your fistula.  · Protect your fistula from cuts, scrapes, or blows.  Date Last Reviewed: 1/1/2017  © 9323-5652 [employer]. [address] [place] [address], [place] [address]. All rights reserved. This information is not intended as a substitute for professional  medical care. Always follow your healthcare professional's instructions.

## 2018-12-06 NOTE — PROGRESS NOTES
Verónica Baker  2018    HPI:  Patient is a 68 y.o. female with a h/o PCKD (Dx 19 yo), stage IV CKD, plocystic liver disease,  DM II, metastatic lung CA (mets to T9 vert bodies) on immunotherapy,  with recent lymph node bx  who is here today for evaluation for AV fistula creation.   Also - recent L radical vulvectomy and bilatral sentinel LN Bx.  Interval placement of R BC AVF.  Pt still not needing dialysis at this time.  Denies any pain, redness, soreness.  Denies any fevers.   L-hand dominant    S/p  18: Creation of R BC AVF - doing well    No MI/stroke  Tobacco use: denies    Retired   Lives alone    Renal is Dr Tequila Castorena/Remington Diez    S/p  Right arm BC AVF creation: 2018    Past Medical History:   Diagnosis Date    Broken wrist     20 years ago    Diverticulitis     Fractured hip     Left hip in     Gout     Hypertension     Lung cancer 2018    Malignant melanoma of torso excluding breast 2018    Neoplastic malignant related fatigue 2018    Polycystic kidney     Polycystic liver disease 2018    Stage 4 chronic kidney disease 2018     Past Surgical History:   Procedure Laterality Date    BIOPSY, LYMPH NODE, SENTINEL Bilateral 10/29/2018    Performed by [facility]sei [facility] at [facility] OR 2ND FLR     SECTION      CREATION, AV FISTULA Right 2018    Performed by Hernesto Decker MD at [facility] OR 2ND FLR    Fibroids removed      2 times    HEMORRHOIDOPEXY FOR PROLAPSING INTERNAL HEMORRHOIDS BY STAPLING N/A 2018    Procedure: HEMORRHOIDOPEXY, INTERNAL PROLAPSING, STAPLING;  Surgeon: Marcos Rey MD;  Location: [facility] OR;  Service: General;  Laterality: N/A;    HEMORRHOIDOPEXY, INTERNAL PROLAPSING, STAPLING N/A 2018    Performed by Marcos Rey MD at [facility] OR    INJECTION FOR SENTINEL NODE IDENTIFICATION Bilateral 10/29/2018    Procedure: INJECTION, FOR SENTINEL NODE IDENTIFICATION - bilateral groin;  Surgeon: Ba ALONSO  MD Sea;  Location: [address];  Service: General;  Laterality: Bilateral;    INJECTION, FOR SENTINEL NODE IDENTIFICATION - bilateral groin Bilateral 10/29/2018    Performed by [facility] MD at [facility] OR [address] [place] [address]    SENTINEL LYMPH NODE BIOPSY Bilateral 10/29/2018    Procedure: BIOPSY, LYMPH NODE, SENTINEL;  Surgeon: Ba Osei MD;  Location: [address];  Service: General;  Laterality: Bilateral;    TOTAL ABDOMINAL HYSTERECTOMY  1983    VAGINAL DELIVERY      1 time    VULVECTOMY Left 10/29/2018    Procedure: VULVECTOMY;  Surgeon: Guero Escobar MD;  Location: [address];  Service: OB/GYN;  Laterality: Left;  patient need to see Anesthesia for clearance    VULVECTOMY Left 10/29/2018    Performed by Guero Escobar MD at [facility] OR [address] [place] [address]     Family History   Problem Relation Age of Onset    Cancer Mother     Diabetes Mother     Heart attack Father     Diabetes Father     Polycystic kidney disease Sister     Hypertension Sister     Diabetes Sister     Cancer Sister     Diabetes type II Sister     Hypertension Sister     Breast cancer Neg Hx     Colon cancer Neg Hx     Ovarian cancer Neg Hx      Social History     Socioeconomic History    Marital status: Single     Spouse name: Not on file    Number of children: Not on file    Years of education: Not on file    Highest education level: Not on file   Social Needs    Financial resource strain: Not on file    Food insecurity - worry: Not on file    Food insecurity - inability: Not on file    Transportation needs - medical: Not on file    Transportation needs - non-medical: Not on file   Occupational History    Not on file   Tobacco Use    Smoking status: Never Smoker    Smokeless tobacco: Never Used   Substance and Sexual Activity    Alcohol use: No    Drug use: No    Sexual activity: Not Currently   Other Topics Concern    Not on file   Social History Narrative    Not on file       Current Outpatient  Medications:     amLODIPine (NORVASC) 10 MG tablet, Take 10 mg by mouth once daily., Disp: , Rfl:     calcitRIOL (ROCALTROL) 0.25 MCG Cap, Take 1 capsule (0.25 mcg total) by mouth once daily., Disp: 30 capsule, Rfl: 6    ferrous sulfate 324 mg (65 mg iron) TbEC, Take 1 tablet (324 mg total) by mouth once daily., Disp: , Rfl: 0    furosemide (LASIX) 40 MG tablet, Take 1 tablet (40 mg total) by mouth 2 (two) times daily., Disp: 60 tablet, Rfl: 11    gabapentin (NEURONTIN) 300 MG capsule, Take 300 mg by mouth 2 (two) times daily., Disp: , Rfl:     HYDROcodone-acetaminophen (NORCO) 5-325 mg per tablet, Take 1 tablet by mouth every 4 (four) hours as needed for Pain., Disp: 15 tablet, Rfl: 0    spironolactone (ALDACTONE) 25 MG tablet, Take 25 mg by mouth once daily. PT TAKING 1/2 TAB PER DAY, Disp: , Rfl:     REVIEW OF SYSTEMS:  General: negative; ENT: negative; Allergy and Immunology: negative; Hematological and Lymphatic: Negative; Endocrine: negative; Respiratory: no cough, shortness of breath, or wheezing; Cardiovascular: no chest pain or dyspnea on exertion; Gastrointestinal: no abdominal pain/back, change in bowel habits, or bloody stools; Genito-Urinary: no dysuria, trouble voiding, or hematuria; Musculoskeletal: negative  Neurological: no TIA or stroke symptoms; Psychiatric: no nervousness, anxiety or depression.    PHYSICAL EXAM:      Pulse: 85  Temp: 98.4 °F (36.9 °C)      General appearance:  Alert, well-appearing, and in no distress.  Oriented to person, place, and time   Neurological: Normal speech, no focal findings noted; CN II - XII grossly intact           Musculoskeletal: Digits/nail without cyanosis/clubbing.  Normal muscle strength/tone.                 Neck: Supple, no significant adenopathy; thyroid is not enlarged                  No carotid bruit can be auscultated                Chest:  Clear to auscultation, no wheezes, rales or rhonchi, symmetric air entry     No use of accessory  muscles             Cardiac: Normal rate and regular rhythm, S1 and S2 normal; PMI non-displaced          Abdomen: Soft, nontender, distended - + ascites     No rebound tenderness noted; bowel sounds normal     Pulsatile aortic mass is not palpable.     No groin adenopathy      Extremities:   R BC AVF noted with strong thrill throughout.  Nylon sutures in place, incision c/d/i.  Removed sutures    during exam.     2+ R brachial and radial     + R Ruddy's test      Thin arm    LAB RESULTS:  Lab Results   Component Value Date    K 4.2 11/26/2018    K 3.5 11/12/2018    K 3.9 10/30/2018    CREATININE 4.4 (H) 11/26/2018    CREATININE 3.9 (H) 11/12/2018    CREATININE 3.8 (H) 10/30/2018     Lab Results   Component Value Date    WBC 4.24 11/26/2018    WBC 5.40 11/12/2018    WBC 3.77 (L) 10/30/2018    HCT 27.4 (L) 11/26/2018    HCT 27.2 (L) 11/12/2018    HCT 26 (L) 11/09/2018     11/26/2018     11/12/2018     10/30/2018     No results found for: HGBA1C  IMAGING:  AVF u/s: Flow volume 1614 ml/min  Diam 5 - 7.5 mm  Depth < 2mm    IMP/PLAN:  68 y.o. female with h/o PCKD (Dx 19 yo), stage IV CKD, plocystic liver disease,  DM II, metastatic lung CA (mets to T9 vert bodies) on immunotherapy,  with recent lymph node bx  In need of AV access  S/p  11/9/18: Creation of R BC AVF - doing well, it is matured  Follow-up with me in 1 yr for PE and u/s surveillance; sooner should issues arise    Hernesto Decker MD FACS  Vascular/Endovascular Surgery

## 2018-12-10 ENCOUNTER — TELEPHONE (OUTPATIENT)
Dept: GYNECOLOGIC ONCOLOGY | Facility: CLINIC | Age: 68
End: 2018-12-10

## 2018-12-10 ENCOUNTER — OFFICE VISIT (OUTPATIENT)
Dept: HEMATOLOGY/ONCOLOGY | Facility: CLINIC | Age: 68
End: 2018-12-10
Payer: MEDICARE

## 2018-12-10 ENCOUNTER — INFUSION (OUTPATIENT)
Dept: INFUSION THERAPY | Facility: HOSPITAL | Age: 68
End: 2018-12-10
Attending: INTERNAL MEDICINE
Payer: MEDICARE

## 2018-12-10 VITALS
BODY MASS INDEX: 21.19 KG/M2 | TEMPERATURE: 98 F | HEART RATE: 95 BPM | RESPIRATION RATE: 18 BRPM | SYSTOLIC BLOOD PRESSURE: 160 MMHG | WEIGHT: 127.19 LBS | OXYGEN SATURATION: 98 % | HEIGHT: 65 IN | DIASTOLIC BLOOD PRESSURE: 79 MMHG

## 2018-12-10 VITALS
HEART RATE: 87 BPM | DIASTOLIC BLOOD PRESSURE: 80 MMHG | RESPIRATION RATE: 18 BRPM | TEMPERATURE: 98 F | SYSTOLIC BLOOD PRESSURE: 150 MMHG

## 2018-12-10 DIAGNOSIS — I10 ESSENTIAL HYPERTENSION: ICD-10-CM

## 2018-12-10 DIAGNOSIS — C43.59 MALIGNANT MELANOMA OF TORSO EXCLUDING BREAST: ICD-10-CM

## 2018-12-10 DIAGNOSIS — Q61.3 POLYCYSTIC KIDNEY DISEASE: ICD-10-CM

## 2018-12-10 DIAGNOSIS — C34.12 PRIMARY ADENOCARCINOMA OF UPPER LOBE OF LEFT LUNG: Primary | ICD-10-CM

## 2018-12-10 DIAGNOSIS — R53.0 NEOPLASTIC MALIGNANT RELATED FATIGUE: ICD-10-CM

## 2018-12-10 DIAGNOSIS — D63.0 ANEMIA IN NEOPLASTIC DISEASE: ICD-10-CM

## 2018-12-10 DIAGNOSIS — N18.4 STAGE 4 CHRONIC KIDNEY DISEASE: ICD-10-CM

## 2018-12-10 PROCEDURE — 99999 PR PBB SHADOW E&M-EST. PATIENT-LVL IV: CPT | Mod: PBBFAC,,, | Performed by: INTERNAL MEDICINE

## 2018-12-10 PROCEDURE — C9492 INJECTION, DURVALUMAB: HCPCS | Mod: TB | Performed by: INTERNAL MEDICINE

## 2018-12-10 PROCEDURE — 25000003 PHARM REV CODE 250: Performed by: INTERNAL MEDICINE

## 2018-12-10 PROCEDURE — 99214 OFFICE O/P EST MOD 30 MIN: CPT | Mod: S$GLB,,, | Performed by: INTERNAL MEDICINE

## 2018-12-10 PROCEDURE — 63600175 PHARM REV CODE 636 W HCPCS: Mod: TB | Performed by: INTERNAL MEDICINE

## 2018-12-10 PROCEDURE — 1101F PT FALLS ASSESS-DOCD LE1/YR: CPT | Mod: CPTII,S$GLB,, | Performed by: INTERNAL MEDICINE

## 2018-12-10 PROCEDURE — 96413 CHEMO IV INFUSION 1 HR: CPT

## 2018-12-10 RX ORDER — HEPARIN 100 UNIT/ML
500 SYRINGE INTRAVENOUS
Status: CANCELLED | OUTPATIENT
Start: 2018-12-10

## 2018-12-10 RX ORDER — SODIUM CHLORIDE 0.9 % (FLUSH) 0.9 %
10 SYRINGE (ML) INJECTION
Status: CANCELLED | OUTPATIENT
Start: 2018-12-10

## 2018-12-10 RX ADMIN — SODIUM CHLORIDE: 0.9 INJECTION, SOLUTION INTRAVENOUS at 01:12

## 2018-12-10 RX ADMIN — DURVALUMAB 625 MG: 120 INJECTION, SOLUTION INTRAVENOUS at 01:12

## 2018-12-10 NOTE — H&P (VIEW-ONLY)
Subjective:       Patient ID: Verónica Baker is a 68 y.o. female.    Chief Complaint: Post-op Evaluation (2 week)    HPI     Patient comes in today for post op visit after radical vulvectomy and bilateral negative SLN biopsies on 10/29/2019 for malignant melanoma of the vulva.     She is healing well and only notes some mild swelling to R groin node excision site. Otherwise has no complaints.       I called her to review pathology and she is aware she will need a re excision once she is healed.    Her oncologic history is:  Initial biopsy in 2018 was originally read as keratinizing hyperplastic squamous epithelium with scattered juctional nests of atypical melanocytes. Pathology was read at [place]. institional review in 2018 was reported a malignant melanoma. Tim's level 3 with Breslow depth 1.6 mm.      10/29/2018:  Breslow's level of 4 mm. Positive medial margin. Negative bilateral SL nodes.           Past Medical History:   Diagnosis Date    Broken wrist     20 years ago    Diverticulitis     Fractured hip     Left hip in     Gout     Hypertension     Lung cancer 2018    Malignant melanoma of torso excluding breast 2018    Neoplastic malignant related fatigue 2018    Polycystic kidney     Polycystic liver disease 2018    Stage 4 chronic kidney disease 2018     Past Surgical History:   Procedure Laterality Date    BIOPSY, LYMPH NODE, SENTINEL Bilateral 10/29/2018    Performed by [facility]i MD at [facility] OR 2ND FLR     SECTION      CREATION, AV FISTULA Right 2018    Performed by Hernesto Decker MD at [facility] OR 2ND FLR    Fibroids removed      2 times    HEMORRHOIDOPEXY FOR PROLAPSING INTERNAL HEMORRHOIDS BY STAPLING N/A 2018    Procedure: HEMORRHOIDOPEXY, INTERNAL PROLAPSING, STAPLING;  Surgeon: Marcos Rey MD;  Location: [facility];  Service: General;  Laterality: N/A;    HEMORRHOIDOPEXY, INTERNAL PROLAPSING, STAPLING N/A 2018     Performed by Marcos Rey MD at [facility] OR    INJECTION FOR SENTINEL NODE IDENTIFICATION Bilateral 10/29/2018    Procedure: INJECTION, FOR SENTINEL NODE IDENTIFICATION - bilateral groin;  Surgeon: Ba Osei MD;  Location: [address];  Service: General;  Laterality: Bilateral;    INJECTION, FOR SENTINEL NODE IDENTIFICATION - bilateral groin Bilateral 10/29/2018    Performed by Ba [facility]i MD at [facility] OR [address] [place] [address]    SENTINEL LYMPH NODE BIOPSY Bilateral 10/29/2018    Procedure: BIOPSY, LYMPH NODE, SENTINEL;  Surgeon: Ba Osei MD;  Location: [address];  Service: General;  Laterality: Bilateral;    TOTAL ABDOMINAL HYSTERECTOMY  1983    VAGINAL DELIVERY      1 time    VULVECTOMY Left 10/29/2018    Procedure: VULVECTOMY;  Surgeon: Guero Escobar MD;  Location: [address];  Service: OB/GYN;  Laterality: Left;  patient need to see Anesthesia for clearance    VULVECTOMY Left 10/29/2018    Performed by Guero Escobar MD at [facility] OR [address] [place] [address]     Family History   Problem Relation Age of Onset    Cancer Mother     Diabetes Mother     Heart attack Father     Diabetes Father     Polycystic kidney disease Sister     Hypertension Sister     Diabetes Sister     Cancer Sister     Diabetes type II Sister     Hypertension Sister     Breast cancer Neg Hx     Colon cancer Neg Hx     Ovarian cancer Neg Hx      Social History     Tobacco Use    Smoking status: Never Smoker    Smokeless tobacco: Never Used   Substance Use Topics    Alcohol use: No    Drug use: No     Review of patient's allergies indicates:   Allergen Reactions    Codeine Nausea Only    Sulfa (sulfonamide antibiotics) Rash       Current Outpatient Medications:     amLODIPine (NORVASC) 10 MG tablet, Take 10 mg by mouth once daily., Disp: , Rfl:     calcitRIOL (ROCALTROL) 0.25 MCG Cap, Take 1 capsule (0.25 mcg total) by mouth once daily., Disp: 30 capsule, Rfl: 6    ferrous sulfate 324 mg (65 mg iron) TbEC,  "Take 1 tablet (324 mg total) by mouth once daily., Disp: , Rfl: 0    furosemide (LASIX) 40 MG tablet, Take 1 tablet (40 mg total) by mouth 2 (two) times daily., Disp: 60 tablet, Rfl: 11    gabapentin (NEURONTIN) 300 MG capsule, Take 300 mg by mouth 2 (two) times daily., Disp: , Rfl:     spironolactone (ALDACTONE) 25 MG tablet, Take 25 mg by mouth once daily. PT TAKING 1/2 TAB PER DAY, Disp: , Rfl:   No current facility-administered medications for this visit.     Review of Systems   Constitutional: Negative for chills, fatigue and fever.   Respiratory: Negative for cough, shortness of breath and wheezing.    Cardiovascular: Negative for chest pain, palpitations and leg swelling.   Gastrointestinal: Negative for abdominal pain, constipation, diarrhea, nausea and vomiting.   Genitourinary: Negative for difficulty urinating, dysuria, frequency, genital sores, hematuria, urgency, vaginal bleeding, vaginal discharge and vaginal pain.   Neurological: Negative for weakness.   Hematological: Negative for adenopathy. Does not bruise/bleed easily.   Psychiatric/Behavioral: The patient is not nervous/anxious.        Objective:   BP (!) 161/82   Pulse 84   Ht 5' 5" (1.651 m)   Wt 57.7 kg (127 lb 3.3 oz)   BMI 21.17 kg/m²      Physical Exam   Constitutional: She is oriented to person, place, and time. She appears well-developed and well-nourished.   Neck: No thyromegaly present.   Cardiovascular: Normal rate and regular rhythm.   Pulmonary/Chest: Effort normal and breath sounds normal.   Abdominal: Soft.   Genitourinary:         Genitourinary Comments: Vulva: surgical changes. Few hyperpigmented lesions just at urethra. Slight pigmented area at the right surgical margin.    Neurological: She is alert and oriented to person, place, and time.   Skin: Skin is warm and dry.   Psychiatric: She has a normal mood and affect. Her behavior is normal. Judgment and thought content normal.       Assessment:       1. Malignant " melanoma of vulva    2. Primary adenocarcinoma of upper lobe of left lung        Plan:   Malignant melanoma of vulva  I have recommended upper vulvectomy and distal urethrectomy given the positive margin.     Consent forms were reviewed with patient. Questions were answered. Patient voiced understanding. Consents were signed.    Preop orders placed. -     Basic metabolic panel; Standing  -     CBC auto differential; Future; Expected date: 12/11/2018    Primary adenocarcinoma of upper lobe of left lung

## 2018-12-10 NOTE — PLAN OF CARE
Problem: Patient Care Overview  Goal: Individualization & Mutuality  PIV   Outcome: Ongoing (interventions implemented as appropriate)  1300-Labs , hx, and medications reviewed, patient was seen by Md prior to arrival. Assessment completed. Discussed plan of care with patient. Patient in agreement. Chair reclined and warm blanket and snack offered.

## 2018-12-10 NOTE — PROGRESS NOTES
Subjective:       Patient ID: Veróniac Baker is a 68 y.o. female.    Chief Complaint: Post-op Evaluation (2 week)    HPI     Patient comes in today for post op visit after radical vulvectomy and bilateral negative SLN biopsies on 10/29/2019 for malignant melanoma of the vulva.     She is healing well and only notes some mild swelling to R groin node excision site. Otherwise has no complaints.       I called her to review pathology and she is aware she will need a re excision once she is healed.    Her oncologic history is:  Initial biopsy in 2018 was originally read as keratinizing hyperplastic squamous epithelium with scattered juctional nests of atypical melanocytes. Pathology was read at [place]. institional review in 2018 was reported a malignant melanoma. Tim's level 3 with Breslow depth 1.6 mm.      10/29/2018:  Breslow's level of 4 mm. Positive medial margin. Negative bilateral SL nodes.           Past Medical History:   Diagnosis Date    Broken wrist     20 years ago    Diverticulitis     Fractured hip     Left hip in     Gout     Hypertension     Lung cancer 2018    Malignant melanoma of torso excluding breast 2018    Neoplastic malignant related fatigue 2018    Polycystic kidney     Polycystic liver disease 2018    Stage 4 chronic kidney disease 2018     Past Surgical History:   Procedure Laterality Date    BIOPSY, LYMPH NODE, SENTINEL Bilateral 10/29/2018    Performed by [facility] at [facility] OR 2ND FLR     SECTION      CREATION, AV FISTULA Right 2018    Performed by Hernesto Decker MD at [facility] OR 2ND FLR    Fibroids removed      2 times    HEMORRHOIDOPEXY FOR PROLAPSING INTERNAL HEMORRHOIDS BY STAPLING N/A 2018    Procedure: HEMORRHOIDOPEXY, INTERNAL PROLAPSING, STAPLING;  Surgeon: Marcos Rey MD;  [facility]: [facility];  Service: General;  Laterality: N/A;    HEMORRHOIDOPEXY, INTERNAL PROLAPSING, STAPLING N/A 2018     Performed by [facility]cos [facility] MD at [facility] OR    INJECTION FOR SENTINEL NODE IDENTIFICATION Bilateral 10/29/2018    Procedure: INJECTION, FOR SENTINEL NODE IDENTIFICATION - bilateral groin;  Surgeon: Ba Osei MD;  Location: [address];  Service: General;  Laterality: Bilateral;    INJECTION, FOR SENTINEL NODE IDENTIFICATION - bilateral groin Bilateral 10/29/2018    [facility] at [facility] OR [address] [place] [address]    SENTINEL LYMPH NODE BIOPSY Bilateral 10/29/2018    Procedure: BIOPSY, LYMPH NODE, SENTINEL;  Surgeon: Ba Osei MD;  Location: [address];  Service: General;  Laterality: Bilateral;    TOTAL ABDOMINAL HYSTERECTOMY  1983    VAGINAL DELIVERY      1 time    VULVECTOMY Left 10/29/2018    Procedure: VULVECTOMY;  Surgeon: Guero Escobar MD;  Location: [address];  Service: OB/GYN;  Laterality: Left;  patient need to see Anesthesia for clearance    VULVECTOMY Left 10/29/2018    Performed by Guero Escobar MD at [facility] OR [address] [place] [address]     Family History   Problem Relation Age of Onset    Cancer Mother     Diabetes Mother     Heart attack Father     Diabetes Father     Polycystic kidney disease Sister     Hypertension Sister     Diabetes Sister     Cancer Sister     Diabetes type II Sister     Hypertension Sister     Breast cancer Neg Hx     Colon cancer Neg Hx     Ovarian cancer Neg Hx      Social History     Tobacco Use    Smoking status: Never Smoker    Smokeless tobacco: Never Used   Substance Use Topics    Alcohol use: No    Drug use: No     Review of patient's allergies indicates:   Allergen Reactions    Codeine Nausea Only    Sulfa (sulfonamide antibiotics) Rash       Current Outpatient Medications:     amLODIPine (NORVASC) 10 MG tablet, Take 10 mg by mouth once daily., Disp: , Rfl:     calcitRIOL (ROCALTROL) 0.25 MCG Cap, Take 1 capsule (0.25 mcg total) by mouth once daily., Disp: 30 capsule, Rfl: 6    ferrous sulfate 324 mg (65 mg iron) TbEC,  "Take 1 tablet (324 mg total) by mouth once daily., Disp: , Rfl: 0    furosemide (LASIX) 40 MG tablet, Take 1 tablet (40 mg total) by mouth 2 (two) times daily., Disp: 60 tablet, Rfl: 11    gabapentin (NEURONTIN) 300 MG capsule, Take 300 mg by mouth 2 (two) times daily., Disp: , Rfl:     spironolactone (ALDACTONE) 25 MG tablet, Take 25 mg by mouth once daily. PT TAKING 1/2 TAB PER DAY, Disp: , Rfl:   No current facility-administered medications for this visit.     Review of Systems   Constitutional: Negative for chills, fatigue and fever.   Respiratory: Negative for cough, shortness of breath and wheezing.    Cardiovascular: Negative for chest pain, palpitations and leg swelling.   Gastrointestinal: Negative for abdominal pain, constipation, diarrhea, nausea and vomiting.   Genitourinary: Negative for difficulty urinating, dysuria, frequency, genital sores, hematuria, urgency, vaginal bleeding, vaginal discharge and vaginal pain.   Neurological: Negative for weakness.   Hematological: Negative for adenopathy. Does not bruise/bleed easily.   Psychiatric/Behavioral: The patient is not nervous/anxious.        Objective:   BP (!) 161/82   Pulse 84   Ht 5' 5" (1.651 m)   Wt 57.7 kg (127 lb 3.3 oz)   BMI 21.17 kg/m²      Physical Exam   Constitutional: She is oriented to person, place, and time. She appears well-developed and well-nourished.   Neck: No thyromegaly present.   Cardiovascular: Normal rate and regular rhythm.   Pulmonary/Chest: Effort normal and breath sounds normal.   Abdominal: Soft.   Genitourinary:         Genitourinary Comments: Vulva: surgical changes. Few hyperpigmented lesions just at urethra. Slight pigmented area at the right surgical margin.    Neurological: She is alert and oriented to person, place, and time.   Skin: Skin is warm and dry.   Psychiatric: She has a normal mood and affect. Her behavior is normal. Judgment and thought content normal.       Assessment:       1. Malignant " melanoma of vulva    2. Primary adenocarcinoma of upper lobe of left lung        Plan:   Malignant melanoma of vulva  I have recommended upper vulvectomy and distal urethrectomy given the positive margin.     Consent forms were reviewed with patient. Questions were answered. Patient voiced understanding. Consents were signed.    Preop orders placed. -     Basic metabolic panel; Standing  -     CBC auto differential; Future; Expected date: 12/11/2018    Primary adenocarcinoma of upper lobe of left lung

## 2018-12-10 NOTE — PLAN OF CARE
Problem: Patient Care Overview  Goal: Plan of Care Review  Outcome: Ongoing (interventions implemented as appropriate)  1436-Patient tolerated treatment well. Discharged without complaints or S/S of adverse event. AVS given.  Instructed to call provider for any questions or concerns.

## 2018-12-10 NOTE — PROGRESS NOTES
CC: Lung cancer, vulvar melanoma, follow up          HPI:Ms. Baker is a 68 yr old lady here for follow up.  She had a noncontrast CT chest (due to stage 4 CKD) for hemoptysis, which showed a 4cm spiculated medial LUPIS mass and possible mediastinal LAD.  She has hepatomegaly due to polycystic liver (and kidneys).  There were small lucent areas in the sternum and T9 vertebral bodies concerning for mets.  However, PET CT did not reveal any hypermetabolic bone lesions. She underwent biopsy of the lesion on 4/4/18 in [place] and pathology reveals adenocarcinoma. She wanted to come to [facility] for treatment as she has family she can stay with here.She was diagnosed with polycystic kidney/liver when she was 18.  She is followed by Dr. Nagel in [place].  She has chronic infrequent headaches that are not worsening in severity or frequency.  She was evaluated by radiation oncology here ( ).    She completed concurrent carboplatin/paclitaxel for stage III adenocarcinoma lung. She finished 5 out of the planned 6 weekly treatments, with last treatment in 1st week of June 2018.   PET CT done on 8/30/18 showed improvement in the left upper lobe lung lesion. SUV max was 5.69, previously 11.13 and there was resolution of mediastinal metastatic lymph nodes.  She started consolidation treatment with Durvalumab from 9/11/18.  She has been diagnosed with vulvar melanoma.      Interval history: She is here for a follow up visit. She feels better, eating better. Her cough is better. She had vulvar surgery. She is still short of breath and has productive cough. She had AV fistula surgery on 11/9 in her right UE         Review of Systems   Constitutional: Positive for malaise/fatigue. Negative for chills, fever and weight loss.   HENT: Negative for congestion, ear discharge, nosebleeds and sinus pain.    Eyes: Negative for blurred vision, double vision, photophobia and pain.   Respiratory: Negative for cough, hemoptysis,  sputum production and shortness of breath.    Cardiovascular: Negative for chest pain, palpitations, orthopnea and claudication.   Gastrointestinal: Negative for abdominal pain, blood in stool, diarrhea, heartburn, nausea and vomiting.   Genitourinary: Negative for dysuria, frequency and urgency.   Musculoskeletal: Negative for back pain, myalgias and neck pain.   Skin: Negative for rash.   Neurological: Negative for dizziness, tingling, tremors, sensory change, weakness and headaches.   Endo/Heme/Allergies: Does not bruise/bleed easily.   Psychiatric/Behavioral: Negative for depression, hallucinations, substance abuse and suicidal ideas. The patient is not nervous/anxious.          Current Outpatient Medications   Medication Sig    amLODIPine (NORVASC) 10 MG tablet Take 10 mg by mouth once daily.    calcitRIOL (ROCALTROL) 0.25 MCG Cap Take 1 capsule (0.25 mcg total) by mouth once daily.    ferrous sulfate 324 mg (65 mg iron) TbEC Take 1 tablet (324 mg total) by mouth once daily.    furosemide (LASIX) 40 MG tablet Take 1 tablet (40 mg total) by mouth 2 (two) times daily.    gabapentin (NEURONTIN) 300 MG capsule Take 300 mg by mouth 2 (two) times daily.    HYDROcodone-acetaminophen (NORCO) 5-325 mg per tablet Take 1 tablet by mouth every 4 (four) hours as needed for Pain.    spironolactone (ALDACTONE) 25 MG tablet Take 25 mg by mouth once daily. PT TAKING 1/2 TAB PER DAY     No current facility-administered medications for this visit.            Vitals:    12/10/18 1031   BP: (!) 160/79   Pulse: 95   Resp: 18   Temp: 98.4 °F (36.9 °C)     Physical Exam   Constitutional: She is oriented to person, place, and time. She appears well-developed.   HENT:   Head: Normocephalic and atraumatic.   Mouth/Throat: No oropharyngeal exudate.   Eyes: Pupils are equal, round, and reactive to light. No scleral icterus.   Neck: Normal range of motion.   Cardiovascular: Normal rate and regular rhythm.   No murmur  heard.  Pulmonary/Chest: Effort normal and breath sounds normal. No respiratory distress. She has no wheezes. She has no rales.   Abdominal: Soft. She exhibits distension. There is no rebound.   Musculoskeletal: She exhibits no edema.   Lymphadenopathy:     She has no cervical adenopathy.   Neurological: She is alert and oriented to person, place, and time. No cranial nerve deficit.   Skin: Skin is warm.   Psychiatric: She has a normal mood and affect.         4/13/18 PET CT        EXAMINATION:  NM PET CT ROUTINE    CLINICAL HISTORY:  Malignant neoplasm of upper lobe, left bronchus or lung.    Outside imaging demonstrated 4 cm spiculated medial left upper lobe mass (biopsy proven adenocarcinoma) with possible mediastinal lymphadenopathy as well as lucent areas involving the sternum and T9.    TECHNIQUE:  13.12 mCi of F18-FDG was administered intravenously in the right antecubital fossa.  After an approximately 60 min distribution time, PET/CT images were acquired from the skull base to the mid thigh. Transmission images were acquired to correct for attenuation using a whole body low-dose CT scan without contrast with the arms positioned above the head. Glycemia at the time of injection was 82 mg/dL.    COMPARISON:  CT abdomen pelvis 07/21/2017, MRI brain 04/13/2018    FINDINGS:  Quality of the study: Adequate.    Abnormal foci of increased uptake are present within the left upper lobe as well as a few prevascular mediastinal lymph nodes.  Lung mass measures approximately 4.6 x 2.9 cm.    No appreciable lucent lesion in the sternum or T9 vertebral body.  Mild degenerative metabolic activity is present at the sternomanubrial junction.    Index lesions:    - Left upper lobe mass: SUV max 11.1    - Lateral pre-vascular lymph node: SUV max 5.8    Physiologic uptake of the tracer is present within the brain, salivary glands, myocardium, GI and  tracts.    Incidental CT findings: Liver and kidneys are enlarged with  numerous intraparenchymal cyst and associated mass effect on the adjacent abdominal organs, unchanged.  Colonic diverticulosis without diverticulitis.  Bandlike opacification within the right lower lobe likely represents subsegmental atelectasis.  Calcific atherosclerosis involves the lower extremity vasculature bilaterally.   Impression        Known malignancy of the left upper lobe with mediastinal trini metastases.    No appreciable lucent lesion in the sternum or T9 vertebral body.  Correlation with prior outside imaging is recommended.            4/13/18 MRI brain w/o cont        FINDINGS:  Intracranial compartment:    Ventricles and sulci are normal in size for age without evidence of hydrocephalus. No extra-axial blood or fluid collections.    Nonspecific small foci of T2/FLAIR high signal intensity in the supratentorial white matter, favored to represent chronic microvascular ischemic changes.  Remote lacunar type infarct in the left putamen.  Small punctate focus of susceptibility signal artifact in the left occipital lobe, suggestive of an old microhemorrhage or calcification.  No mass lesion, acute hemorrhage, edema or acute infarct.    Normal vascular flow voids are preserved.    Skull/extracranial contents (limited evaluation): Bone marrow signal intensity is normal.   Impression        No evidence of intracranial metastases within limitation in the absence of IV contrast which decrease the sensitivity of this exam.    Nonspecific foci of T2/FLAIR high signal intensity in the supratentorial white matter, likely representing chronic microvascular ischemic changes.               8/30/18 PET CT        CLINICAL HISTORY:  lung cancer restaging;  Malignant neoplasm of upper lobe, left bronchus or lung    FINDINGS:  Patient was administered 11.5 millicuries of FDG intravenously.  Comparison 04/13/2018.    Left upper lung lesion SUV max 5.69, previously larger SUV max 11.13.  Mediastinal lymph nodes have  returned to baseline.    There is physiologic intracranial, head and neck activity.  Heart mediastinum are normal.  There is polycystic liver and kidney disease.  There is physiologic GI and  activity.  There is diverticulosis.  No bone lesions are seen.  Right lung is clear.   Impression        See above    Improvement left upper lobe lung lesion SUV max 5.69, previously 11.13 and resolution of mediastinal metastatic lymph nodes.    Severe polycystic liver and kidney disease.       Bilateral sentinel lymph node biopsies done with Dr. Ba Osei.  2.  Left radical vulvectomy     10/29/18  FINAL PATHOLOGIC DIAGNOSIS  [place] FINAL DIAGNOSIS:  Skin, left sentinel node hot blue 150, right sentinel node hot blue 150, and left vulva, excisions (MS [id number],  10/29/2018):  Lymph node, left sentinel hot blue 150, excision (1.): Lymph node identified negative for metastatic melanoma.  HMB45, Mart 1, and S100 immunostains performed at the referring institution are negative.  Right sentinel node, hot blue, 150, excision (2.): Lymph node identified negative for metastatic melanoma. S100,HMB45, and Mart 1 immunostains performed at the referring institution are negative.  Skin, left vulva, excision (3.) Extensive residual malignant melanoma, invasive to a Breslow depth of 4.0 mm. The specimen shows ulceration but this may be due to prior biopsy rather than tumoral ulceration. Mitotic rate is 10 per  mm2. The tumor cells extend to the green inked margin and within 0.2 mm of the blue inked margin.  Melan A and Sox 10 immunostains report performed at [facility] on sections from blocks 3D, 3E, 3 F, 3 G, 3H,3I, 3 J, 3 M, 3 P, 3 Q, and 3 are to evaluate the extent of the lesion.        11/26/18 MRI brain w/o cont     COMPARISON:  04/13/2018    FINDINGS:  There is no restricted diffusion to suggest acute infarction.  There is a mild degree of a age-appropriate generalized cerebral volume loss.  Compensatory enlargement ventricles  sulci and cisterns without hydrocephalus.  The major intracranial T2 flow voids are present.    Ill-defined mild T2 flair signal hyperintensity diffusely in the supratentorial white matter similar to prior suggestive for posttherapy change.  There is a focal area of cystic change in the left caudate/putamen anteriorly compatible with remote lacunar-type infarction.    There is a stable focus of punctate susceptibility in the right parietal subcortical white matter and a small focus in the left posterior temporal subcortical white matter which are unchanged suggestive for prior hemorrhage from treated metastases or remote microhemorrhage.  No new parenchymal signal abnormality.  Please note evaluation for subtle metastases is limited by lack of contrast.    No abnormal extra-axial fluid collection.   Impression        No significant change from prior.    No evidence for new signal abnormality to suggest new or worsening intracranial metastatic disease.    Continued ill-defined T2 FLAIR hyperintensity supratentorial white matter suggestive for posttherapy change with small anterior caudate/putamen remote lacunar-type infarct    Stable small left posterior temporal and punctate right parietal subcortical foci susceptibility suggestive for prior hemorrhage or treated hemorrhagic metastases.    Clinical correlation and continued follow-up advised         Component      Latest Ref Rng & Units 12/10/2018   WBC      3.90 - 12.70 K/uL 4.65   RBC      4.00 - 5.40 M/uL 3.49 (L)   Hemoglobin      12.0 - 16.0 g/dL 9.5 (L)   Hematocrit      37.0 - 48.5 % 31.1 (L)   MCV      82 - 98 fL 89   MCH      27.0 - 31.0 pg 27.2   MCHC      32.0 - 36.0 g/dL 30.5 (L)   RDW      11.5 - 14.5 % 15.8 (H)   Platelets      150 - 350 K/uL 235   MPV      9.2 - 12.9 fL 11.3   Immature Granulocytes      0.0 - 0.5 % 0.2   Gran # (ANC)      1.8 - 7.7 K/uL 3.5   Immature Grans (Abs)      0.00 - 0.04 K/uL 0.01   Lymph #      1.0 - 4.8 K/uL 0.6 (L)   Mono  #      0.3 - 1.0 K/uL 0.3   Eos #      0.0 - 0.5 K/uL 0.2   Baso #      0.00 - 0.20 K/uL 0.07   nRBC      0 /100 WBC 0   Gran%      38.0 - 73.0 % 74.4 (H)   Lymph%      18.0 - 48.0 % 12.7 (L)   Mono%      4.0 - 15.0 % 7.1   Eosinophil%      0.0 - 8.0 % 4.1   Basophil%      0.0 - 1.9 % 1.5   Differential Method       Automated   Sodium      136 - 145 mmol/L 138   Potassium      3.5 - 5.1 mmol/L 3.8   Chloride      95 - 110 mmol/L 108   CO2      23 - 29 mmol/L 18 (L)   Glucose      70 - 110 mg/dL 115 (H)   BUN, Bld      8 - 23 mg/dL 64 (H)   Creatinine      0.5 - 1.4 mg/dL 4.5 (H)   Calcium      8.7 - 10.5 mg/dL 9.8   Total Protein      6.0 - 8.4 g/dL 7.9   Albumin      3.5 - 5.2 g/dL 3.5   Total Bilirubin      0.1 - 1.0 mg/dL 0.4   Alkaline Phosphatase      55 - 135 U/L 220 (H)   AST      10 - 40 U/L 25   ALT      10 - 44 U/L 26   Anion Gap      8 - 16 mmol/L 12   eGFR if African American      >60 mL/min/1.73 m:2 10.9 (A)   eGFR if non African American      >60 mL/min/1.73 m:2 9.5 (A)          Assessment:     1. Adenocarcinoma lung  2. Polycystic kidney disease  3. Polycystic liver disease  4. Ascites  5. Hypertension,essential  6. Cough  7. Dyspnea on exertion  8. CKD stage IV  9. Anemia, normocytic, hypochromic  10. Weight loss  11. Vulvar melanoma     Plan:     1. She has a left upper lung mass that was biopsied, as well as hyper metabolic, prevascular mediastinal lymph nodes on PET CT. No other hypermetabolic lesions on PET CT. MRI brain (non-contrast) does not show any metastatic lesion. She has polycystic kidney disease and she is certainly at higher risk for renal malignancy, colon CA as well as liver CA. Slides and block were requ[facility] from [facility] and were reviewed by pathology here. It was confirmed that she has adenocarcinoma originating in the lungs.   She started concurrent chemotherapy with Carboplatin/Paclitaxcel ( renally adjusted) as Pemetrexed was not appropriate with the reduced renal  function.   Chemotherapy cycle 6 was held due to worsening renal function and leukopenia. Last treatment was on 6/4/18.  She is doing better now. PET CT done on 8/30/18 showed improvement in the left upper lobe lung lesion. SUV max was 5.69, previously 11.13 and there was resolution of mediastinal metastatic lymph nodes. I discussed these findings with her in detail. I discussed starting consolidative immunotherapy with [facility] Duravalumab on 9/11/18.  She has received 2 treatments so far. She had vulvar surgery ( for melanoma) on 10/20/18.  CKI was held prior to the surgery.  Final pathology from melanoma surgery pending. She will continue Durvalumab.      4. Chronic, attributed to CKD/ polycystic liver        5. On multiple anti-HT medications. /79 mm Hg today      6. Possibly related to malignancy in her lungs. It is better now.      8. Serum Cr 4.5 today. She follows with nephrology. She has AVF now. She is still not on dialysis.      9. Secondary to CKD and chemotherapy. Serum iron saturation 33% on 5/21/18.         10. Now stable.      11. Discovered on biopsy done on 6/21/18. Tim's level 3, Breslow depth 1.6mm. She is being followed seen by  ([facility]) . The lesion does not appear to be PET avid, on the last PET CT done in Aug 2018. She underwent bilateral sentinel lymph node biopsies and left radical vulvectomy on 10/30/18. She likely has residual melanoma as the margins in the resected specimen were positive. [place] LN was negative. No clear evidence of brain metastases on non-contrast MRI brain done on 11/26/18.  She has a follow up with  on 12/11/18.            Distress Screening Results: Psychosocial Distress screening score of Distress Score: 0 noted and reviewed. No intervention indicated.

## 2018-12-11 ENCOUNTER — TELEPHONE (OUTPATIENT)
Dept: GYNECOLOGIC ONCOLOGY | Facility: CLINIC | Age: 68
End: 2018-12-11

## 2018-12-11 ENCOUNTER — OFFICE VISIT (OUTPATIENT)
Dept: GYNECOLOGIC ONCOLOGY | Facility: CLINIC | Age: 68
End: 2018-12-11
Payer: MEDICARE

## 2018-12-11 VITALS
BODY MASS INDEX: 21.19 KG/M2 | WEIGHT: 127.19 LBS | HEIGHT: 65 IN | SYSTOLIC BLOOD PRESSURE: 161 MMHG | DIASTOLIC BLOOD PRESSURE: 82 MMHG | HEART RATE: 84 BPM

## 2018-12-11 DIAGNOSIS — C34.12 PRIMARY ADENOCARCINOMA OF UPPER LOBE OF LEFT LUNG: ICD-10-CM

## 2018-12-11 DIAGNOSIS — C43.59 MALIGNANT MELANOMA OF TORSO EXCLUDING BREAST: Primary | ICD-10-CM

## 2018-12-11 PROCEDURE — 1101F PT FALLS ASSESS-DOCD LE1/YR: CPT | Mod: CPTII,S$GLB,, | Performed by: OBSTETRICS & GYNECOLOGY

## 2018-12-11 PROCEDURE — 99999 PR PBB SHADOW E&M-EST. PATIENT-LVL III: CPT | Mod: PBBFAC,,, | Performed by: OBSTETRICS & GYNECOLOGY

## 2018-12-11 PROCEDURE — 99214 OFFICE O/P EST MOD 30 MIN: CPT | Mod: 24,S$GLB,, | Performed by: OBSTETRICS & GYNECOLOGY

## 2018-12-11 RX ORDER — MUPIROCIN 20 MG/G
OINTMENT TOPICAL
Status: CANCELLED | OUTPATIENT
Start: 2019-01-07

## 2018-12-11 RX ORDER — SODIUM CHLORIDE 0.9 % (FLUSH) 0.9 %
5 SYRINGE (ML) INJECTION
Status: CANCELLED | OUTPATIENT
Start: 2019-01-07

## 2018-12-11 RX ORDER — LIDOCAINE HYDROCHLORIDE 10 MG/ML
1 INJECTION, SOLUTION EPIDURAL; INFILTRATION; INTRACAUDAL; PERINEURAL ONCE
Status: CANCELLED | OUTPATIENT
Start: 2018-12-11 | End: 2018-12-11

## 2018-12-12 ENCOUNTER — TELEPHONE (OUTPATIENT)
Dept: GYNECOLOGIC ONCOLOGY | Facility: CLINIC | Age: 68
End: 2018-12-12

## 2018-12-12 DIAGNOSIS — C51.9 MALIGNANT NEOPLASM OF VULVA: Primary | ICD-10-CM

## 2018-12-19 ENCOUNTER — ANESTHESIA EVENT (OUTPATIENT)
Dept: SURGERY | Facility: HOSPITAL | Age: 68
End: 2018-12-19
Payer: MEDICARE

## 2018-12-19 NOTE — PRE ADMISSION SCREENING
Anesthesia Assessment: Preoperative EQUATION    Planned Procedure: Procedure(s) (LRB):  VULVECTOMY (N/A)  Requested Anesthesia Type:General  Surgeon: Guero Escobar MD  Service: OB/GYN  Known or anticipated Date of Surgery:1/7/2019    Surgeon notes: reviewed    Electronic QUestionnaire Assessment completed via nurse interview with patient.        NO AQ      Triage considerations:     The patient has no apparent active cardiac condition (No unstable coronary Syndrome such as severe unstable angina or recent [<1 month] myocardial infarction, decompensated CHF, severe valvular   disease or significant arrhythmia)    Previous anesthesia records:GETA, MAC and No problems 10/29/18 Placement Time: 1158 Method of Intubation: Direct laryngoscopy Inserted by: CRNA Airway Device: Endotracheal Tube Mask Ventilation: Easy Intubated: Postinduction Blade: Luke #2 Airway Device Size: 7.0 Style: Cuffed Cuff Inflation: Minimal occlusive pressure Inflation Amount (mL): 5 Placement Verified By: Auscultation;Capnometry Grade: Grade I Complicating Factors: None Findings Post-Intubation: Positive EtCO2;Bilateral breath sounds;Atraumatic/Condition of teeth unchanged Depth of Insertion (cm): 21 Secured at: Lips Complications: None Breath Sounds: Equal Bilateral Insertion attempts (enter comment if more than 2 attempts): 1     11/9/18 AV fistula:  Airway/Jaw/Neck:  Airway Findings: Mouth Opening: Normal Tongue: Normal  General Airway Assessment: Adult  Mallampati: II  Improves to II with phonation.  TM Distance: Normal, at least 6 cm      Dental:  Dental Findings: In tact       Last PCP note: within 3 months , within [facility]   Subspecialty notes: Hematology/Oncology, Vascular Surgery    Other important co-morbidities: HTN, HX lung cancer, CKD stage 4-5, vulva cancer, anemia     Tests already available:  Available tests,  within 3 months . 12/10/18 CMP, CBC. 8/2018 EKG            Instructions given. (See in Nurse's note)    Optimization:   Anesthesia Preop Clinic Assessment  Indicated-not required for this procedure          Plan:    Testing:  Hemoglobin     Patient  has previously scheduled Medical Appointment: 12/24/18 lab, heme onc and immunotherapy infusion    Navigation: Tests Scheduled. (12/24 CBC and CMP scheduled by heme onc)                      Results will be tracked by Preop Clinic.

## 2018-12-19 NOTE — ANESTHESIA PREPROCEDURE EVALUATION
Aixa Meraz, RN   Registered Nurse      Pre Admission Screening   Signed                             []Hide copied text    []Leonver for details      Anesthesia Assessment: Preoperative EQUATION     Planned Procedure: Procedure(s) (LRB):  VULVECTOMY (N/A)  Requested Anesthesia Type:General  Surgeon: Guero Escobar MD  Service: OB/GYN  Known or anticipated Date of Surgery:1/7/2019     Surgeon notes: reviewed     Electronic QUestionnaire Assessment completed via nurse interview with patient.         NO AQ        Triage considerations:      The patient has no apparent active cardiac condition (No unstable coronary Syndrome such as severe unstable angina or recent [<1 month] myocardial infarction, decompensated CHF, severe valvular   disease or significant arrhythmia)     Previous anesthesia records:GETA, MAC and No problems 10/29/18 Placement Time: 1158 Method of Intubation: Direct laryngoscopy Inserted by: CRNA Airway Device: Endotracheal Tube Mask Ventilation: Easy Intubated: Postinduction Blade: Luke #2 Airway Device Size: 7.0 Style: Cuffed Cuff Inflation: Minimal occlusive pressure Inflation Amount (mL): 5 Placement Verified By: Auscultation;Capnometry Grade: Grade I Complicating Factors: None Findings Post-Intubation: Positive EtCO2;Bilateral breath sounds;Atraumatic/Condition of teeth unchanged Depth of Insertion (cm): 21 Secured at: Lips Complications: None Breath Sounds: Equal Bilateral Insertion attempts (enter comment if more than 2 attempts): 1      11/9/18 AV fistula:  Airway/Jaw/Neck:  Airway Findings: Mouth Opening: Normal Tongue: Normal  General Airway Assessment: Adult  Mallampati: II  Improves to II with phonation.  TM Distance: Normal, at least 6 cm      Dental:  Dental Findings: In tact         [facility]   Subspecialty notes: Hematology/Oncology, Vascular Surgery     Other important co-morbidities: HTN, HX lung cancer, CKD stage 4-5, vulva cancer,  anemia     Tests already available:  Available tests,  within 3 months . 12/10/18 CMP, CBC. 2018 EKG                            Instructions given. (See in Nurse's note)     Optimization:  Anesthesia Preop Clinic Assessment  Indicated-not required for this procedure                    Plan:    Testing:  Hemoglobin                           Patient  has previously scheduled Medical Appointment: 18 lab, heme onc and immunotherapy infusion     Navigation: Tests Scheduled. ( CBC and CMP scheduled by heme onc)                                    Results will be tracked by Preop Clinic.                                   Electronically signed by Aixa Meraz RN at 2018  9:53 AM       Pre-admit on 2019            Detailed Report    18 lab results and Heme ONC note reviewed.                                                                                                               2018  Verónica Baker is a 68 y.o., female.  Pre-operative evaluation for VULVECTOMY (N/A)    Chief Complaint: vulvar melanoma    PMH:  HTN  adenoCA of lung status post chemo on consolidation therapy. She has mediastinal mets  Stage 5 kidney disease with elevated BUN, creat, swelling of LE near need for hemodialysis    Past Surgical History:   Procedure Laterality Date    BIOPSY, LYMPH NODE, SENTINEL Bilateral 10/29/2018    Performed by Ba Osei MD at [facility] OR 2ND FLR     SECTION      CREATION, AV FISTULA Right 2018    Performed by Hernesto Decker MD at [facility] 2ND FLR    Fibroids removed      2 times    HEMORRHOIDOPEXY, INTERNAL PROLAPSING, STAPLING N/A 2018    [facility]    [facility], FOR SENTINEL NODE IDENTIFICATION - bilateral groin Bilateral 10/29/2018    Performed by Ba Osei MD at [facility] 2ND FLR    TOTAL ABDOMINAL HYSTERECTOMY  1983    VAGINAL DELIVERY      1 time    VULVECTOMY Left 10/29/2018    Performed by Guero Escobar  MD at [facility] OR [address] [place] [address]         Vital Signs Range (Last 24H):  Temp:  [36.8 °C (98.2 °F)]   Pulse:  [97]   Resp:  [16]   BP: (163)/(86)   SpO2:  [99 %]       CBC:     Recent Labs   Lab 12/10/18  0937 18  0914   WBC 4.65 3.41*   RBC 3.49* 3.45*   HGB 9.5* 9.3*   HCT 31.1* 29.2*    248   MCV 89 85   MCH 27.2 27.0   MCHC 30.5* 31.8*       CMP:   Recent Labs   Lab 12/10/18  0937 18  0914    139  139   K 3.8 4.0  4.0    105  105   CO2 18* 21*  21*   BUN 64* 66*  66*   CREATININE 4.5* 5.3*  5.3*   * 110  110   CALCIUM 9.8 9.5  9.5   ALBUMIN 3.5 3.4*   PROT 7.9 7.7   ALKPHOS 220* 282*   ALT 26 25   AST 25 25   BILITOT 0.4 0.4       INR:  No results for input(s): PT, INR, PROTIME, APTT in the last 720 hours.      Diagnostic Studies:      EKD Echo:  Anesthesia Evaluation    I have reviewed the Patient Summary Reports.    I have reviewed the Nursing Notes.   I have reviewed the Medications.     Review of Systems  Anesthesia Hx:  No problems with previous Anesthesia  History of prior surgery of interest to airway management or planning: Previous anesthesia: MAC  18 av fistula with MAC.  Procedure performed at an [facility]. Denies Family Hx of Anesthesia complications.   Denies Personal Hx of Anesthesia complications.   Social:  Non-Smoker, No Alcohol Use    Hematology/Oncology:         -- Anemia (H/H 31.1 & 9.5 on 12/10/18): Vulvar s/p surgery and has confirmed metastises  Metastises: lung   Current/Recent Cancer. Oncology Comments: Left upper lobe-continues with immunotherapy infusions     EENT/Dental:EENT/Dental Normal   Cardiovascular:  Cardiovascular Normal   Denies Angina.  Functional Capacity 3 METS  Hypertension (pt did not like side effects of amlodipine-stopped it one week ago on her own-BP controlled with 2 diuretics- /80s) , Fairly Controlled on RX    Pulmonary:  Pulmonary Normal  Pulmonary Symptoms:  are shortness of breath with activity.  Chest  Tumor/Mass:  left, upper lobe.    Renal/:  Kidney Function/Disease, Chronic Kidney Disease (CKD) , CKD Stage IV (GFR 15-29) , contributing etiologies of Polycystic Kidney Disease. AV fistula inserted 11/9-has not started dialysis yet.    Hepatic/GI:  Liver Disease Polycystic liver disease   Musculoskeletal:  Musculoskeletal Normal    Neurological:  Neurology Normal   Peripheral Neuropathy    Endocrine:  Endocrine Normal    Psych:  Psychiatric Normal           Physical Exam  General:  Cachexia    Airway/Jaw/Neck:  Airway Findings: Mouth Opening: Normal Tongue: Normal  General Airway Assessment: Good  Mallampati: I  TM Distance: Normal, at least 6 cm  Jaw/Neck Findings:  Neck ROM: Normal ROM      Dental:  Dental Findings: In tact    Chest/Lungs:  Chest/Lungs Findings: Clear to auscultation, Normal Respiratory Rate     Heart/Vascular:  Heart Findings: Rate: Normal  Rhythm: Regular Rhythm  Sounds: Normal     Abdomen:  Abdomen Findings:  Hepatomegaly, Distention       Mental Status:  Mental Status Findings:  Cooperative, Alert and Oriented         Anesthesia Plan  Type of Anesthesia, risks & benefits discussed:  Anesthesia Type:  general  Patient's Preference:   Intra-op Monitoring Plan: standard ASA monitors  Intra-op Monitoring Plan Comments:   Post Op Pain Control Plan:   Post Op Pain Control Plan Comments:   Induction:   IV  Beta Blocker:  Patient is not currently on a Beta-Blocker (No further documentation required).       Informed Consent: Patient understands risks and agrees with Anesthesia plan.  Questions answered. Anesthesia consent signed with patient.  ASA Score: 3     Day of Surgery Review of History & Physical:    H&P update referred to the surgeon.         Ready For Surgery From Anesthesia Perspective.

## 2018-12-24 ENCOUNTER — OFFICE VISIT (OUTPATIENT)
Dept: HEMATOLOGY/ONCOLOGY | Facility: CLINIC | Age: 68
End: 2018-12-24
Payer: MEDICARE

## 2018-12-24 ENCOUNTER — INFUSION (OUTPATIENT)
Dept: INFUSION THERAPY | Facility: HOSPITAL | Age: 68
End: 2018-12-24
Attending: INTERNAL MEDICINE
Payer: MEDICARE

## 2018-12-24 VITALS
HEIGHT: 65 IN | TEMPERATURE: 98 F | HEART RATE: 78 BPM | RESPIRATION RATE: 16 BRPM | DIASTOLIC BLOOD PRESSURE: 77 MMHG | BODY MASS INDEX: 20.94 KG/M2 | SYSTOLIC BLOOD PRESSURE: 156 MMHG | WEIGHT: 125.69 LBS | OXYGEN SATURATION: 100 %

## 2018-12-24 VITALS — DIASTOLIC BLOOD PRESSURE: 63 MMHG | SYSTOLIC BLOOD PRESSURE: 121 MMHG | RESPIRATION RATE: 16 BRPM | HEART RATE: 81 BPM

## 2018-12-24 DIAGNOSIS — D63.0 ANEMIA IN NEOPLASTIC DISEASE: ICD-10-CM

## 2018-12-24 DIAGNOSIS — N18.6 ESRD (END STAGE RENAL DISEASE): ICD-10-CM

## 2018-12-24 DIAGNOSIS — R06.09 DYSPNEA ON EXERTION: ICD-10-CM

## 2018-12-24 DIAGNOSIS — N18.4 STAGE 4 CHRONIC KIDNEY DISEASE: ICD-10-CM

## 2018-12-24 DIAGNOSIS — I10 ESSENTIAL HYPERTENSION: ICD-10-CM

## 2018-12-24 DIAGNOSIS — R53.0 NEOPLASTIC MALIGNANT RELATED FATIGUE: Primary | ICD-10-CM

## 2018-12-24 DIAGNOSIS — C43.59 MALIGNANT MELANOMA OF TORSO EXCLUDING BREAST: ICD-10-CM

## 2018-12-24 DIAGNOSIS — C34.12 PRIMARY ADENOCARCINOMA OF UPPER LOBE OF LEFT LUNG: Primary | ICD-10-CM

## 2018-12-24 DIAGNOSIS — C34.12 PRIMARY ADENOCARCINOMA OF UPPER LOBE OF LEFT LUNG: ICD-10-CM

## 2018-12-24 PROCEDURE — 25000003 PHARM REV CODE 250: Performed by: INTERNAL MEDICINE

## 2018-12-24 PROCEDURE — 99214 OFFICE O/P EST MOD 30 MIN: CPT | Mod: S$GLB,,, | Performed by: INTERNAL MEDICINE

## 2018-12-24 PROCEDURE — C9492 INJECTION, DURVALUMAB: HCPCS | Mod: TB | Performed by: INTERNAL MEDICINE

## 2018-12-24 PROCEDURE — 1101F PT FALLS ASSESS-DOCD LE1/YR: CPT | Mod: CPTII,S$GLB,, | Performed by: INTERNAL MEDICINE

## 2018-12-24 PROCEDURE — 99999 PR PBB SHADOW E&M-EST. PATIENT-LVL III: CPT | Mod: PBBFAC,,, | Performed by: INTERNAL MEDICINE

## 2018-12-24 PROCEDURE — 96413 CHEMO IV INFUSION 1 HR: CPT

## 2018-12-24 PROCEDURE — 63600175 PHARM REV CODE 636 W HCPCS: Mod: TB | Performed by: INTERNAL MEDICINE

## 2018-12-24 RX ORDER — HEPARIN 100 UNIT/ML
500 SYRINGE INTRAVENOUS
Status: DISCONTINUED | OUTPATIENT
Start: 2018-12-24 | End: 2018-12-24 | Stop reason: HOSPADM

## 2018-12-24 RX ORDER — SODIUM CHLORIDE 0.9 % (FLUSH) 0.9 %
10 SYRINGE (ML) INJECTION
Status: DISCONTINUED | OUTPATIENT
Start: 2018-12-24 | End: 2018-12-24 | Stop reason: HOSPADM

## 2018-12-24 RX ORDER — HEPARIN 100 UNIT/ML
500 SYRINGE INTRAVENOUS
Status: CANCELLED | OUTPATIENT
Start: 2018-12-24

## 2018-12-24 RX ORDER — SODIUM CHLORIDE 0.9 % (FLUSH) 0.9 %
10 SYRINGE (ML) INJECTION
Status: CANCELLED | OUTPATIENT
Start: 2018-12-24

## 2018-12-24 RX ADMIN — SODIUM CHLORIDE: 0.9 INJECTION, SOLUTION INTRAVENOUS at 11:12

## 2018-12-24 RX ADMIN — DURVALUMAB 625 MG: 120 INJECTION, SOLUTION INTRAVENOUS at 11:12

## 2018-12-24 NOTE — PLAN OF CARE
Problem: Adult Inpatient Plan of Care  Goal: Plan of Care Review  Outcome: Ongoing (interventions implemented as appropriate)  Pt received imfinzi; tolerated well. Creatinine elevated, Dr. Pires aware and ok's to proceed with treatment. VSS and NAD. Pt instructed to call MD with any concerns. Pt discharged home independently.

## 2018-12-24 NOTE — PROGRESS NOTES
CC: Lung cancer, vulvar melanoma, follow up          HPI:Ms. Baker is a 68 yr old lady here for follow up.  She had a noncontrast CT chest (due to stage 4 CKD) for hemoptysis, which showed a 4cm spiculated medial LUPIS mass and possible mediastinal LAD.  She has hepatomegaly due to polycystic liver (and kidneys).  There were small lucent areas in the sternum and T9 vertebral bodies concerning for mets.  However, PET CT did not reveal any hypermetabolic bone lesions. She underwent biopsy of the lesion on 4/4/18 in [place] and pathology reveals adenocarcinoma. She wanted to come to [facility] for treatment as she has family she can stay with here.She was diagnosed with polycystic kidney/liver when she was 18.  She is followed by Dr. Nagel in [place].  She has chronic infrequent headaches that are not worsening in severity or frequency.  She was evaluated by radiation oncology here ( ).    She completed concurrent carboplatin/paclitaxel for stage III adenocarcinoma lung. She finished 5 out of the planned 6 weekly treatments, with last treatment in 1st week of June 2018.   PET CT done on 8/30/18 showed improvement in the left upper lobe lung lesion. SUV max was 5.69, previously 11.13 and there was resolution of mediastinal metastatic lymph nodes.  She started consolidation treatment with Durvalumab from 9/11/18.  She has been diagnosed with vulvar melanoma.      Interval history: She is here for a follow up visit. She feels better, eating better. Her cough is better. She had vulvar surgery. She is still short of breath and has productive cough. She had AV fistula surgery on 11/9 in her right UE         Review of Systems   Constitutional: Positive for malaise/fatigue. Negative for chills, fever and weight loss.   HENT: Negative for ear discharge, ear pain and nosebleeds.    Eyes: Negative for blurred vision, double vision and photophobia.   Respiratory: Negative for cough, hemoptysis and sputum production.     Cardiovascular: Negative for chest pain, palpitations, orthopnea and claudication.   Gastrointestinal: Negative for abdominal pain, constipation, diarrhea, heartburn, nausea and vomiting.   Genitourinary: Negative for dysuria, frequency and urgency.   Musculoskeletal: Negative for myalgias and neck pain.   Skin: Positive for itching. Negative for rash.   Neurological: Negative for dizziness, tingling, tremors, sensory change, weakness and headaches.   Endo/Heme/Allergies: Does not bruise/bleed easily.   Psychiatric/Behavioral: Negative for depression, hallucinations, substance abuse and suicidal ideas.       Current Outpatient Medications   Medication Sig    calcitRIOL (ROCALTROL) 0.25 MCG Cap Take 1 capsule (0.25 mcg total) by mouth once daily.    ferrous sulfate 324 mg (65 mg iron) TbEC Take 1 tablet (324 mg total) by mouth once daily.    furosemide (LASIX) 40 MG tablet Take 1 tablet (40 mg total) by mouth 2 (two) times daily.    gabapentin (NEURONTIN) 300 MG capsule Take 300 mg by mouth 2 (two) times daily.    spironolactone (ALDACTONE) 25 MG tablet Take 25 mg by mouth once daily. PT TAKING 1/2 TAB PER DAY    amLODIPine (NORVASC) 10 MG tablet Take 10 mg by mouth once daily.     No current facility-administered medications for this visit.          Vitals:    12/24/18 1017   BP: (!) 156/77   Pulse: 78   Resp: 16   Temp: 97.7 °F (36.5 °C)       Physical Exam   Constitutional: She is oriented to person, place, and time. She appears well-developed.   HENT:   Head: Normocephalic and atraumatic.   Mouth/Throat: No oropharyngeal exudate.   Eyes: Pupils are equal, round, and reactive to light. No scleral icterus.   Neck: Normal range of motion.   Cardiovascular: Normal rate and regular rhythm.   No murmur heard.  Pulmonary/Chest: Effort normal and breath sounds normal. No respiratory distress. She has no wheezes.   Abdominal: She exhibits distension. There is tenderness. There is no rebound.   Musculoskeletal: She  exhibits no edema.   Lymphadenopathy:     She has no cervical adenopathy.   Neurological: She is alert and oriented to person, place, and time.   Skin: Skin is warm.   Psychiatric: She has a normal mood and affect.     Component      Latest Ref Rng & Units 12/24/2018   WBC      3.90 - 12.70 K/uL 3.41 (L)   RBC      4.00 - 5.40 M/uL 3.45 (L)   Hemoglobin      12.0 - 16.0 g/dL 9.3 (L)   Hematocrit      37.0 - 48.5 % 29.2 (L)   MCV      82 - 98 fL 85   MCH      27.0 - 31.0 pg 27.0   MCHC      32.0 - 36.0 g/dL 31.8 (L)   RDW      11.5 - 14.5 % 14.6 (H)   Platelets      150 - 350 K/uL 248   MPV      9.2 - 12.9 fL 11.6   Immature Granulocytes      0.0 - 0.5 % 0.3   Gran # (ANC)      1.8 - 7.7 K/uL 2.3   Immature Grans (Abs)      0.00 - 0.04 K/uL 0.01   Lymph #      1.0 - 4.8 K/uL 0.5 (L)   Mono #      0.3 - 1.0 K/uL 0.4   Eos #      0.0 - 0.5 K/uL 0.2   Baso #      0.00 - 0.20 K/uL 0.02   nRBC      0 /100 WBC 0   Gran%      38.0 - 73.0 % 67.4   Lymph%      18.0 - 48.0 % 15.0 (L)   Mono%      4.0 - 15.0 % 12.0   Eosinophil%      0.0 - 8.0 % 4.7   Basophil%      0.0 - 1.9 % 0.6   Differential Method       Automated   Sodium      136 - 145 mmol/L 139   Potassium      3.5 - 5.1 mmol/L 4.0   Chloride      95 - 110 mmol/L 105   CO2      23 - 29 mmol/L 21 (L)   Glucose      70 - 110 mg/dL 110   BUN, Bld      8 - 23 mg/dL 66 (H)   Creatinine      0.5 - 1.4 mg/dL 5.3 (H)   Calcium      8.7 - 10.5 mg/dL 9.5   Total Protein      6.0 - 8.4 g/dL 7.7   Albumin      3.5 - 5.2 g/dL 3.4 (L)   Total Bilirubin      0.1 - 1.0 mg/dL 0.4   Alkaline Phosphatase      55 - 135 U/L 282 (H)   AST      10 - 40 U/L 25   ALT      10 - 44 U/L 25   Anion Gap      8 - 16 mmol/L 13   eGFR if African American      >60 mL/min/1.73 m:2 9.0 (A)   eGFR if non African American      >60 mL/min/1.73 m:2 7.8 (A)   TSH      0.400 - 4.000 uIU/mL 2.104   Free T4      0.71 - 1.51 ng/dL 0.94         4/13/18 PET CT        EXAMINATION:  NM PET CT ROUTINE    CLINICAL  HISTORY:  Malignant neoplasm of upper lobe, left bronchus or lung.    Outside imaging demonstrated 4 cm spiculated medial left upper lobe mass (biopsy proven adenocarcinoma) with possible mediastinal lymphadenopathy as well as lucent areas involving the sternum and T9.    TECHNIQUE:  13.12 mCi of F18-FDG was administered intravenously in the right antecubital fossa.  After an approximately 60 min distribution time, PET/CT images were acquired from the skull base to the mid thigh. Transmission images were acquired to correct for attenuation using a whole body low-dose CT scan without contrast with the arms positioned above the head. Glycemia at the time of injection was 82 mg/dL.    COMPARISON:  CT abdomen pelvis 07/21/2017, MRI brain 04/13/2018    FINDINGS:  Quality of the study: Adequate.    Abnormal foci of increased uptake are present within the left upper lobe as well as a few prevascular mediastinal lymph nodes.  Lung mass measures approximately 4.6 x 2.9 cm.    No appreciable lucent lesion in the sternum or T9 vertebral body.  Mild degenerative metabolic activity is present at the sternomanubrial junction.    Index lesions:    - Left upper lobe mass: SUV max 11.1    - Lateral pre-vascular lymph node: SUV max 5.8    Physiologic uptake of the tracer is present within the brain, salivary glands, myocardium, GI and  tracts.    Incidental CT findings: Liver and kidneys are enlarged with numerous intraparenchymal cyst and associated mass effect on the adjacent abdominal organs, unchanged.  Colonic diverticulosis without diverticulitis.  Bandlike opacification within the right lower lobe likely represents subsegmental atelectasis.  Calcific atherosclerosis involves the lower extremity vasculature bilaterally.   Impression        Known malignancy of the left upper lobe with mediastinal trini metastases.    No appreciable lucent lesion in the sternum or T9 vertebral body.  Correlation with prior outside imaging is  recommended.            4/13/18 MRI brain w/o cont        FINDINGS:  Intracranial compartment:    Ventricles and sulci are normal in size for age without evidence of hydrocephalus. No extra-axial blood or fluid collections.    Nonspecific small foci of T2/FLAIR high signal intensity in the supratentorial white matter, favored to represent chronic microvascular ischemic changes.  Remote lacunar type infarct in the left putamen.  Small punctate focus of susceptibility signal artifact in the left occipital lobe, suggestive of an old microhemorrhage or calcification.  No mass lesion, acute hemorrhage, edema or acute infarct.    Normal vascular flow voids are preserved.    Skull/extracranial contents (limited evaluation): Bone marrow signal intensity is normal.   Impression        No evidence of intracranial metastases within limitation in the absence of IV contrast which decrease the sensitivity of this exam.    Nonspecific foci of T2/FLAIR high signal intensity in the supratentorial white matter, likely representing chronic microvascular ischemic changes.               8/30/18 PET CT        CLINICAL HISTORY:  lung cancer restaging;  Malignant neoplasm of upper lobe, left bronchus or lung    FINDINGS:  Patient was administered 11.5 millicuries of FDG intravenously.  Comparison 04/13/2018.    Left upper lung lesion SUV max 5.69, previously larger SUV max 11.13.  Mediastinal lymph nodes have returned to baseline.    There is physiologic intracranial, head and neck activity.  Heart mediastinum are normal.  There is polycystic liver and kidney disease.  There is physiologic GI and  activity.  There is diverticulosis.  No bone lesions are seen.  Right lung is clear.   Impression        See above    Improvement left upper lobe lung lesion SUV max 5.69, previously 11.13 and resolution of mediastinal metastatic lymph nodes.    Severe polycystic liver and kidney disease.       Bilateral sentinel lymph node biopsies done with  Dr. Ba Osei.  2.  Left radical vulvectomy     10/29/18  FINAL PATHOLOGIC DIAGNOSIS  [facility] FINAL DIAGNOSIS:  Skin, left sentinel node hot blue 150, right sentinel node hot blue 150, and left vulva, excisions (MS [id number],  10/29/2018):  Lymph node, left sentinel hot blue 150, excision (1.): Lymph node identified negative for metastatic melanoma.  HMB45, Mart 1, and S100 immunostains performed at the referring institution are negative.  Right sentinel node, hot blue, 150, excision (2.): Lymph node identified negative for metastatic melanoma. S100,HMB45, and Mart 1 immunostains performed at the referring institution are negative.  Skin, left vulva, excision (3.) Extensive residual malignant melanoma, invasive to a Breslow depth of 4.0 mm. The specimen shows ulceration but this may be due to prior biopsy rather than tumoral ulceration. Mitotic rate is 10 per  mm2. The tumor cells extend to the green inked margin and within 0.2 mm of the blue inked margin.  Melan A and Sox 10 immunostains report performed at [facility] on sections from blocks 3D, 3E, 3 F, 3 G, 3H,3I, 3 J, 3 M, 3 P, 3 Q, and 3 are to evaluate the extent of the lesion.        11/26/18 MRI brain w/o cont     COMPARISON:  04/13/2018    FINDINGS:  There is no restricted diffusion to suggest acute infarction.  There is a mild degree of a age-appropriate generalized cerebral volume loss.  Compensatory enlargement ventricles sulci and cisterns without hydrocephalus.  The major intracranial T2 flow voids are present.    Ill-defined mild T2 flair signal hyperintensity diffusely in the supratentorial white matter similar to prior suggestive for posttherapy change.  There is a focal area of cystic change in the left caudate/putamen anteriorly compatible with remote lacunar-type infarction.    There is a stable focus of punctate susceptibility in the right parietal subcortical white matter and a small focus in the left posterior temporal subcortical white  matter which are unchanged suggestive for prior hemorrhage from treated metastases or remote microhemorrhage.  No new parenchymal signal abnormality.  Please note evaluation for subtle metastases is limited by lack of contrast.    No abnormal extra-axial fluid collection.   Impression        No significant change from prior.    No evidence for new signal abnormality to suggest new or worsening intracranial metastatic disease.    Continued ill-defined T2 FLAIR hyperintensity supratentorial white matter suggestive for posttherapy change with small anterior caudate/putamen remote lacunar-type infarct    Stable small left posterior temporal and punctate right parietal subcortical foci susceptibility suggestive for prior hemorrhage or treated hemorrhagic metastases.    Clinical correlation and continued follow-up advised               Assessment:     1. Adenocarcinoma lung  2. Polycystic kidney disease  3. Polycystic liver disease  4. Ascites  5. Hypertension,essential  6. Cough  7. Dyspnea on exertion  8. CKD stage IV  9. Anemia, normocytic, hypochromic  10. Weight loss  11. Vulvar melanoma     Plan:     1. She has a left upper lung mass that was biopsied, as well as hyper metabolic, prevascular mediastinal lymph nodes on PET CT. No other hypermetabolic lesions on PET CT. MRI brain (non-contrast) does not show any metastatic lesion. She has polycystic kidney disease and she is certainly at higher risk for renal malignancy, colon CA as well as liver CA. Slides and block were requested from [facility] and were reviewed by pathology here. It was confirmed that she has adenocarcinoma originating in the lungs.   She started concurrent chemotherapy with Carboplatin/Paclitaxcel ( renally adjusted) as Pemetrexed was not appropriate with the reduced renal function.   Chemotherapy cycle 6 was held due to worsening renal function and leukopenia. Last treatment was on 6/4/18.  She is doing better now. PET CT done on 8/30/18  showed improvement in the left upper lobe lung lesion. SUV max was 5.69, previously 11.13 and there was resolution of mediastinal metastatic lymph nodes. I discussed these findings with her in detail. I discussed starting consolidative immunotherapy with [facility] Duravalumab on 9/11/18.  She has received 2 treatments so far. She had vulvar surgery ( for melanoma) on 10/20/18.  CKI was held prior to the surgery.  Plan to continue Durvalumab today. Next infusion will be held as she is scheduled for re-excision of vulvar melanoma.         4. Chronic, attributed to CKD/ polycystic liver        5. On multiple anti-HT medications. /77 mm Hg today      6. Possibly related to malignancy in her lungs. It is better now.      8. Serum Cr 5.3 today. She follows with nephrology. She has AVF now. She is still not on dialysis. She is still making good quantities of urine daily. Durvalumab can worsen renal function, but is unlikely to be the case here.      9. Secondary to CKD and chemotherapy. Serum iron saturation 33% on 5/21/18.         10. Now stable.      11. Discovered on biopsy done on 6/21/18. Tim's level 3, Breslow depth 1.6mm. She is being followed seen by  ([facility]) . The lesion does not appear to be PET avid, on the last PET CT done in Aug 2018. She underwent bilateral sentinel lymph node biopsies and left radical vulvectomy on 10/30/18. [place] lymph nodes were negative.  She likely has residual melanoma as the margins in the resected specimen were positive. [place] LN was negative. It was at least T4N0. No clear evidence of brain metastases on non-contrast MRI brain done on 11/26/18.  She has a follow up with  on 12/11/18.     12. Pruritius: She has extensive pruritus. She is using moisturizers. She will use Hydrocortisone 1%  cream as needed ( except on face)            Distress Screening Results: Psychosocial Distress screening score of Distress Score: 0 noted and reviewed. No intervention  indicated.

## 2018-12-31 ENCOUNTER — HOSPITAL ENCOUNTER (OUTPATIENT)
Dept: RADIOLOGY | Facility: HOSPITAL | Age: 68
Discharge: HOME | End: 2018-12-31
Attending: INTERNAL MEDICINE
Payer: MEDICARE

## 2018-12-31 DIAGNOSIS — C34.12 PRIMARY ADENOCARCINOMA OF UPPER LOBE OF LEFT LUNG: ICD-10-CM

## 2018-12-31 DIAGNOSIS — C43.59 MALIGNANT MELANOMA OF TORSO EXCLUDING BREAST: ICD-10-CM

## 2018-12-31 LAB — POCT GLUCOSE: 86 MG/DL (ref 70–110)

## 2018-12-31 PROCEDURE — 78815 PET IMAGE W/CT SKULL-THIGH: CPT | Mod: TC,PS

## 2018-12-31 PROCEDURE — 78815 PET IMAGE W/CT SKULL-THIGH: CPT | Mod: 26,PS,, | Performed by: RADIOLOGY

## 2018-12-31 PROCEDURE — A9552 F18 FDG: HCPCS

## 2018-12-31 PROCEDURE — 78815 NM PET CT ROUTINE: ICD-10-PCS | Mod: 26,PS,, | Performed by: RADIOLOGY

## 2019-01-04 ENCOUNTER — TELEPHONE (OUTPATIENT)
Dept: GYNECOLOGIC ONCOLOGY | Facility: CLINIC | Age: 69
End: 2019-01-04

## 2019-01-04 NOTE — TELEPHONE ENCOUNTER
Called patient to remind her about surgery on 1/7/2019 with arrival time of 5 am. Patient voiced understanding. TIMI

## 2019-01-07 ENCOUNTER — ANESTHESIA (OUTPATIENT)
Dept: SURGERY | Facility: HOSPITAL | Age: 69
End: 2019-01-07
Payer: MEDICARE

## 2019-01-07 ENCOUNTER — HOSPITAL ENCOUNTER (OUTPATIENT)
Facility: HOSPITAL | Age: 69
LOS: 1 days | Discharge: HOME | End: 2019-01-08
Attending: OBSTETRICS & GYNECOLOGY | Admitting: OBSTETRICS & GYNECOLOGY
Payer: MEDICARE

## 2019-01-07 DIAGNOSIS — C43.59 MALIGNANT MELANOMA OF TORSO EXCLUDING BREAST: ICD-10-CM

## 2019-01-07 LAB
ANION GAP SERPL CALC-SCNC: 13 MMOL/L
BUN SERPL-MCNC: 55 MG/DL
CALCIUM SERPL-MCNC: 8.7 MG/DL
CHLORIDE SERPL-SCNC: 110 MMOL/L
CO2 SERPL-SCNC: 16 MMOL/L
CREAT SERPL-MCNC: 4.8 MG/DL
EST. GFR  (AFRICAN AMERICAN): 10.1 ML/MIN/1.73 M^2
EST. GFR  (NON AFRICAN AMERICAN): 8.8 ML/MIN/1.73 M^2
GLUCOSE SERPL-MCNC: 105 MG/DL
MAGNESIUM SERPL-MCNC: 1.9 MG/DL
PHOSPHATE SERPL-MCNC: 5.8 MG/DL
POTASSIUM SERPL-SCNC: 4.5 MMOL/L
SODIUM SERPL-SCNC: 139 MMOL/L

## 2019-01-07 PROCEDURE — 63600175 PHARM REV CODE 636 W HCPCS: Performed by: NURSE ANESTHETIST, CERTIFIED REGISTERED

## 2019-01-07 PROCEDURE — 53210: ICD-10-PCS | Mod: 58,,, | Performed by: OBSTETRICS & GYNECOLOGY

## 2019-01-07 PROCEDURE — 36000709 HC OR TIME LEV III EA ADD 15 MIN: Performed by: OBSTETRICS & GYNECOLOGY

## 2019-01-07 PROCEDURE — 88342 TISSUE SPECIMEN TO PATHOLOGY - SURGERY: ICD-10-PCS | Mod: 26,,, | Performed by: PATHOLOGY

## 2019-01-07 PROCEDURE — 25000003 PHARM REV CODE 250: Performed by: OBSTETRICS & GYNECOLOGY

## 2019-01-07 PROCEDURE — 56625 PR COMPLETE SIMPLE REMV VULVA: ICD-10-PCS | Mod: 58,51,, | Performed by: OBSTETRICS & GYNECOLOGY

## 2019-01-07 PROCEDURE — 53210 REMOVAL OF URETHRA: CPT | Mod: 58,,, | Performed by: OBSTETRICS & GYNECOLOGY

## 2019-01-07 PROCEDURE — 71000033 HC RECOVERY, INTIAL HOUR: Performed by: OBSTETRICS & GYNECOLOGY

## 2019-01-07 PROCEDURE — 37000009 HC ANESTHESIA EA ADD 15 MINS: Performed by: OBSTETRICS & GYNECOLOGY

## 2019-01-07 PROCEDURE — 88305 TISSUE EXAM BY PATHOLOGIST: CPT | Mod: 26,,, | Performed by: PATHOLOGY

## 2019-01-07 PROCEDURE — 36000708 HC OR TIME LEV III 1ST 15 MIN: Performed by: OBSTETRICS & GYNECOLOGY

## 2019-01-07 PROCEDURE — 37000008 HC ANESTHESIA 1ST 15 MINUTES: Performed by: OBSTETRICS & GYNECOLOGY

## 2019-01-07 PROCEDURE — 63600175 PHARM REV CODE 636 W HCPCS: Performed by: ANESTHESIOLOGY

## 2019-01-07 PROCEDURE — 63600175 PHARM REV CODE 636 W HCPCS: Performed by: OBSTETRICS & GYNECOLOGY

## 2019-01-07 PROCEDURE — D9220A PRA ANESTHESIA: ICD-10-PCS | Mod: ANES,,, | Performed by: ANESTHESIOLOGY

## 2019-01-07 PROCEDURE — 63600175 PHARM REV CODE 636 W HCPCS

## 2019-01-07 PROCEDURE — 88342 IMHCHEM/IMCYTCHM 1ST ANTB: CPT | Mod: 26,,, | Performed by: PATHOLOGY

## 2019-01-07 PROCEDURE — 83735 ASSAY OF MAGNESIUM: CPT

## 2019-01-07 PROCEDURE — D9220A PRA ANESTHESIA: Mod: ANES,,, | Performed by: ANESTHESIOLOGY

## 2019-01-07 PROCEDURE — D9220A PRA ANESTHESIA: ICD-10-PCS | Mod: CRNA,,, | Performed by: NURSE ANESTHETIST, CERTIFIED REGISTERED

## 2019-01-07 PROCEDURE — 88305 TISSUE SPECIMEN TO PATHOLOGY - SURGERY: ICD-10-PCS | Mod: 26,,, | Performed by: PATHOLOGY

## 2019-01-07 PROCEDURE — 88305 TISSUE EXAM BY PATHOLOGIST: CPT | Mod: 59 | Performed by: PATHOLOGY

## 2019-01-07 PROCEDURE — 84100 ASSAY OF PHOSPHORUS: CPT

## 2019-01-07 PROCEDURE — D9220A PRA ANESTHESIA: Mod: CRNA,,, | Performed by: NURSE ANESTHETIST, CERTIFIED REGISTERED

## 2019-01-07 PROCEDURE — 71000039 HC RECOVERY, EACH ADD'L HOUR: Performed by: OBSTETRICS & GYNECOLOGY

## 2019-01-07 PROCEDURE — 25000003 PHARM REV CODE 250: Performed by: NURSE ANESTHETIST, CERTIFIED REGISTERED

## 2019-01-07 PROCEDURE — 56625 VULVECTOMY SIMPLE COMPLETE: CPT | Mod: 58,51,, | Performed by: OBSTETRICS & GYNECOLOGY

## 2019-01-07 PROCEDURE — 80048 BASIC METABOLIC PNL TOTAL CA: CPT

## 2019-01-07 RX ORDER — FENTANYL CITRATE 50 UG/ML
25 INJECTION, SOLUTION INTRAMUSCULAR; INTRAVENOUS
Status: DISCONTINUED | OUTPATIENT
Start: 2019-01-07 | End: 2019-01-07 | Stop reason: HOSPADM

## 2019-01-07 RX ORDER — OXYCODONE HYDROCHLORIDE 5 MG/1
5 TABLET ORAL EVERY 4 HOURS PRN
Status: DISCONTINUED | OUTPATIENT
Start: 2019-01-07 | End: 2019-01-08 | Stop reason: HOSPADM

## 2019-01-07 RX ORDER — CISATRACURIUM BESYLATE 10 MG/ML
INJECTION, SOLUTION INTRAVENOUS
Status: DISCONTINUED | OUTPATIENT
Start: 2019-01-07 | End: 2019-01-07

## 2019-01-07 RX ORDER — NEOSTIGMINE METHYLSULFATE 1 MG/ML
INJECTION, SOLUTION INTRAVENOUS
Status: DISCONTINUED | OUTPATIENT
Start: 2019-01-07 | End: 2019-01-07

## 2019-01-07 RX ORDER — METOPROLOL TARTRATE 1 MG/ML
INJECTION, SOLUTION INTRAVENOUS
Status: DISCONTINUED | OUTPATIENT
Start: 2019-01-07 | End: 2019-01-07

## 2019-01-07 RX ORDER — FUROSEMIDE 40 MG/1
40 TABLET ORAL 2 TIMES DAILY
Status: DISCONTINUED | OUTPATIENT
Start: 2019-01-08 | End: 2019-01-08 | Stop reason: HOSPADM

## 2019-01-07 RX ORDER — MUPIROCIN 20 MG/G
OINTMENT TOPICAL
Status: DISCONTINUED | OUTPATIENT
Start: 2019-01-07 | End: 2019-01-07

## 2019-01-07 RX ORDER — GLYCOPYRROLATE 0.2 MG/ML
INJECTION INTRAMUSCULAR; INTRAVENOUS
Status: DISCONTINUED | OUTPATIENT
Start: 2019-01-07 | End: 2019-01-07

## 2019-01-07 RX ORDER — SODIUM CHLORIDE 9 MG/ML
INJECTION, SOLUTION INTRAVENOUS CONTINUOUS PRN
Status: DISCONTINUED | OUTPATIENT
Start: 2019-01-07 | End: 2019-01-07

## 2019-01-07 RX ORDER — FENTANYL CITRATE 50 UG/ML
INJECTION, SOLUTION INTRAMUSCULAR; INTRAVENOUS
Status: DISCONTINUED | OUTPATIENT
Start: 2019-01-07 | End: 2019-01-07

## 2019-01-07 RX ORDER — SODIUM CHLORIDE 0.9 % (FLUSH) 0.9 %
5 SYRINGE (ML) INJECTION
Status: DISCONTINUED | OUTPATIENT
Start: 2019-01-07 | End: 2019-01-07

## 2019-01-07 RX ORDER — CISATRACURIUM BESYLATE 2 MG/ML
INJECTION, SOLUTION INTRAVENOUS
Status: DISPENSED
Start: 2019-01-07 | End: 2019-01-07

## 2019-01-07 RX ORDER — MIDAZOLAM HYDROCHLORIDE 1 MG/ML
INJECTION, SOLUTION INTRAMUSCULAR; INTRAVENOUS
Status: DISCONTINUED | OUTPATIENT
Start: 2019-01-07 | End: 2019-01-07

## 2019-01-07 RX ORDER — FERROUS SULFATE 325(65) MG
325 TABLET, DELAYED RELEASE (ENTERIC COATED) ORAL DAILY
Status: DISCONTINUED | OUTPATIENT
Start: 2019-01-08 | End: 2019-01-08 | Stop reason: HOSPADM

## 2019-01-07 RX ORDER — OXYCODONE HYDROCHLORIDE 10 MG/1
10 TABLET ORAL EVERY 4 HOURS PRN
Status: DISCONTINUED | OUTPATIENT
Start: 2019-01-07 | End: 2019-01-08 | Stop reason: HOSPADM

## 2019-01-07 RX ORDER — HYDROMORPHONE HYDROCHLORIDE 1 MG/ML
0.5 INJECTION, SOLUTION INTRAMUSCULAR; INTRAVENOUS; SUBCUTANEOUS
Status: DISCONTINUED | OUTPATIENT
Start: 2019-01-07 | End: 2019-01-08 | Stop reason: HOSPADM

## 2019-01-07 RX ORDER — SPIRONOLACTONE 25 MG/1
25 TABLET ORAL DAILY
Status: DISCONTINUED | OUTPATIENT
Start: 2019-01-08 | End: 2019-01-08 | Stop reason: HOSPADM

## 2019-01-07 RX ORDER — ONDANSETRON 2 MG/ML
4 INJECTION INTRAMUSCULAR; INTRAVENOUS ONCE AS NEEDED
Status: DISCONTINUED | OUTPATIENT
Start: 2019-01-07 | End: 2019-01-07

## 2019-01-07 RX ORDER — ONDANSETRON 2 MG/ML
INJECTION INTRAMUSCULAR; INTRAVENOUS
Status: DISCONTINUED | OUTPATIENT
Start: 2019-01-07 | End: 2019-01-07

## 2019-01-07 RX ORDER — AMLODIPINE BESYLATE 10 MG/1
10 TABLET ORAL DAILY
Status: DISCONTINUED | OUTPATIENT
Start: 2019-01-08 | End: 2019-01-08 | Stop reason: HOSPADM

## 2019-01-07 RX ORDER — DEXAMETHASONE SODIUM PHOSPHATE 4 MG/ML
INJECTION, SOLUTION INTRA-ARTICULAR; INTRALESIONAL; INTRAMUSCULAR; INTRAVENOUS; SOFT TISSUE
Status: DISCONTINUED | OUTPATIENT
Start: 2019-01-07 | End: 2019-01-07

## 2019-01-07 RX ORDER — LIDOCAINE HCL/PF 100 MG/5ML
SYRINGE (ML) INTRAVENOUS
Status: DISCONTINUED | OUTPATIENT
Start: 2019-01-07 | End: 2019-01-07

## 2019-01-07 RX ORDER — CALCITRIOL 0.25 UG/1
0.25 CAPSULE ORAL DAILY
Status: DISCONTINUED | OUTPATIENT
Start: 2019-01-08 | End: 2019-01-08 | Stop reason: HOSPADM

## 2019-01-07 RX ORDER — MUPIROCIN 20 MG/G
1 OINTMENT TOPICAL 2 TIMES DAILY
Status: DISCONTINUED | OUTPATIENT
Start: 2019-01-07 | End: 2019-01-08 | Stop reason: HOSPADM

## 2019-01-07 RX ORDER — LIDOCAINE HYDROCHLORIDE 10 MG/ML
1 INJECTION, SOLUTION EPIDURAL; INFILTRATION; INTRACAUDAL; PERINEURAL ONCE
Status: COMPLETED | OUTPATIENT
Start: 2019-01-07 | End: 2019-01-07

## 2019-01-07 RX ORDER — FENTANYL CITRATE 50 UG/ML
INJECTION, SOLUTION INTRAMUSCULAR; INTRAVENOUS
Status: COMPLETED
Start: 2019-01-07 | End: 2019-01-07

## 2019-01-07 RX ORDER — CEFAZOLIN SODIUM 1 G/3ML
2 INJECTION, POWDER, FOR SOLUTION INTRAMUSCULAR; INTRAVENOUS
Status: COMPLETED | OUTPATIENT
Start: 2019-01-07 | End: 2019-01-07

## 2019-01-07 RX ORDER — SODIUM CHLORIDE 9 MG/ML
INJECTION, SOLUTION INTRAVENOUS CONTINUOUS
Status: DISCONTINUED | OUTPATIENT
Start: 2019-01-07 | End: 2019-01-08

## 2019-01-07 RX ORDER — HYDROMORPHONE HYDROCHLORIDE 1 MG/ML
0.2 INJECTION, SOLUTION INTRAMUSCULAR; INTRAVENOUS; SUBCUTANEOUS EVERY 5 MIN PRN
Status: DISCONTINUED | OUTPATIENT
Start: 2019-01-07 | End: 2019-01-07

## 2019-01-07 RX ORDER — PROPOFOL 10 MG/ML
VIAL (ML) INTRAVENOUS
Status: DISCONTINUED | OUTPATIENT
Start: 2019-01-07 | End: 2019-01-07

## 2019-01-07 RX ORDER — ONDANSETRON 8 MG/1
8 TABLET, ORALLY DISINTEGRATING ORAL EVERY 8 HOURS PRN
Status: DISCONTINUED | OUTPATIENT
Start: 2019-01-07 | End: 2019-01-08 | Stop reason: HOSPADM

## 2019-01-07 RX ADMIN — SODIUM CHLORIDE: 0.9 INJECTION, SOLUTION INTRAVENOUS at 09:01

## 2019-01-07 RX ADMIN — METOPROLOL TARTRATE 1 MG: 5 INJECTION, SOLUTION INTRAVENOUS at 07:01

## 2019-01-07 RX ADMIN — GLYCOPYRROLATE 0.4 MG: 0.2 INJECTION, SOLUTION INTRAMUSCULAR; INTRAVENOUS at 08:01

## 2019-01-07 RX ADMIN — LIDOCAINE HYDROCHLORIDE 10 MG: 10 INJECTION, SOLUTION EPIDURAL; INFILTRATION; INTRACAUDAL; PERINEURAL at 06:01

## 2019-01-07 RX ADMIN — HYDROMORPHONE HYDROCHLORIDE 0.2 MG: 1 INJECTION, SOLUTION INTRAMUSCULAR; INTRAVENOUS; SUBCUTANEOUS at 08:01

## 2019-01-07 RX ADMIN — FENTANYL CITRATE 25 MCG: 50 INJECTION INTRAMUSCULAR; INTRAVENOUS at 09:01

## 2019-01-07 RX ADMIN — ONDANSETRON 4 MG: 2 INJECTION INTRAMUSCULAR; INTRAVENOUS at 08:01

## 2019-01-07 RX ADMIN — FENTANYL CITRATE 50 MCG: 50 INJECTION, SOLUTION INTRAMUSCULAR; INTRAVENOUS at 07:01

## 2019-01-07 RX ADMIN — MUPIROCIN: 20 OINTMENT TOPICAL at 06:01

## 2019-01-07 RX ADMIN — NEOSTIGMINE METHYLSULFATE 4 MG: 1 INJECTION INTRAVENOUS at 08:01

## 2019-01-07 RX ADMIN — MUPIROCIN 1 G: 20 OINTMENT TOPICAL at 08:01

## 2019-01-07 RX ADMIN — FENTANYL CITRATE 25 MCG: 50 INJECTION INTRAMUSCULAR; INTRAVENOUS at 08:01

## 2019-01-07 RX ADMIN — OXYCODONE HYDROCHLORIDE 10 MG: 10 TABLET ORAL at 01:01

## 2019-01-07 RX ADMIN — OXYCODONE HYDROCHLORIDE 5 MG: 5 TABLET ORAL at 05:01

## 2019-01-07 RX ADMIN — SODIUM CHLORIDE: 0.9 INJECTION, SOLUTION INTRAVENOUS at 06:01

## 2019-01-07 RX ADMIN — DEXAMETHASONE SODIUM PHOSPHATE 8 MG: 4 INJECTION, SOLUTION INTRAMUSCULAR; INTRAVENOUS at 07:01

## 2019-01-07 RX ADMIN — METOPROLOL TARTRATE 2 MG: 5 INJECTION, SOLUTION INTRAVENOUS at 07:01

## 2019-01-07 RX ADMIN — SODIUM CHLORIDE, SODIUM GLUCONATE, SODIUM ACETATE, POTASSIUM CHLORIDE, MAGNESIUM CHLORIDE, SODIUM PHOSPHATE, DIBASIC, AND POTASSIUM PHOSPHATE: .53; .5; .37; .037; .03; .012; .00082 INJECTION, SOLUTION INTRAVENOUS at 07:01

## 2019-01-07 RX ADMIN — OXYCODONE HYDROCHLORIDE 5 MG: 5 TABLET ORAL at 11:01

## 2019-01-07 RX ADMIN — PROPOFOL 100 MG: 10 INJECTION, EMULSION INTRAVENOUS at 07:01

## 2019-01-07 RX ADMIN — MIDAZOLAM HYDROCHLORIDE 2 MG: 1 INJECTION, SOLUTION INTRAMUSCULAR; INTRAVENOUS at 06:01

## 2019-01-07 RX ADMIN — LIDOCAINE HYDROCHLORIDE 50 MG: 20 INJECTION, SOLUTION INTRAVENOUS at 07:01

## 2019-01-07 RX ADMIN — CEFAZOLIN 2 G: 330 INJECTION, POWDER, FOR SOLUTION INTRAMUSCULAR; INTRAVENOUS at 07:01

## 2019-01-07 RX ADMIN — MUPIROCIN 1 G: 20 OINTMENT TOPICAL at 04:01

## 2019-01-07 RX ADMIN — ONDANSETRON 8 MG: 8 TABLET, ORALLY DISINTEGRATING ORAL at 05:01

## 2019-01-07 RX ADMIN — OXYCODONE HYDROCHLORIDE 10 MG: 10 TABLET ORAL at 08:01

## 2019-01-07 RX ADMIN — CISATRACURIUM BESYLATE 10 MG: 10 INJECTION INTRAVENOUS at 07:01

## 2019-01-07 NOTE — OP NOTE
DATE OF PROCEDURE:  01/07/2019.    PREOPERATIVE DIAGNOSIS:  Malignant melanoma of the vulva.    POSTOPERATIVE DIAGNOSIS:  Malignant melanoma of the vulva.    PROCEDURES:  Bilateral upper vulvectomy with distal urethrectomy.    SURGEON:  Guero Escobar M.D.    FIRST ASSISTANT:  Francisca Mason M.D. (RES).    SECOND ASSISTANT:  Aman Iniguez M.D. (RES).    ANESTHESIA:  GETA.    ESTIMATED BLOOD LOSS:  Minimal.    INTRAVENOUS FLUIDS:  300 mL.    URINE OUTPUT:  15 mL.    OPERATIVE HISTORY:  This is a 68-year-old patient who has already undergone a   radical vulvectomy with bilateral sentinel node biopsies for malignant melanoma   that was primarily on the left side.  The final pathology report showed a   positive medial margin.  She is brought to the Operating Room for reexcision of   the vulvar incision and distal urethrectomy.    OPERATIVE FINDINGS:  Present on the vulva, surrounding the urethra are several   pigmented lesions, these are only 1 to 2 mm in size.  There is also some   pigmentation of the right labia minora.  There was no obvious tumor involving   the left side of the vulva.    OPERATIVE PROCEDURE:  The patient was brought to the Operating Room after   induction of general anesthesia, [facility]  The vulva and   vagina were prepped with Betadine scrub and solution.  The patient was   sterilely draped.    After timeout, identifying the patient and procedure, I outlined the area to be   excised with a skin marker.  This included the prior incision as well as the   distal urethra.    An incision was then made.  The Bovie unit was used to excise the specimen.    Hemostasis was obtained with the Bovie unit.  The vulva was then closed with the   use of interrupted 3-0 Vicryl sutures.  This included suturing the lateral   aspect of the vulva to the urethra.  Following this, a Catherine catheter was   placed.  The patient was then awakened and taken to Recovery Room in stable   condition.   Lap, needle, sponge and instrument count was correct.      SHASHANK/LUIS ALBERTO  dd: 01/07/2019 08:13:51 (CST)  td: 01/07/2019 09:19:18 (CST)  Doc ID   #[id number]  Job ID #[id number]    CC:

## 2019-01-07 NOTE — NURSING
Pt was requesting glasses. Pt belongings were found missing; they were not brought up with pt. Called PACU, they will send pts belongings to her room.

## 2019-01-07 NOTE — TRANSFER OF CARE
"Anesthesia Transfer of Care Note    Patient: Verónica Baker    Procedure(s) Performed: Procedure(s) (LRB):  VULVECTOMY (Bilateral)  URETHRECTOMY (N/A)    Patient location: PACU    Anesthesia Type: general    Transport from OR: Transported from OR on 6-10 L/min O2 by face mask with adequate spontaneous ventilation    Post pain: adequate analgesia    Post assessment: no apparent anesthetic complications and tolerated procedure well    Post vital signs: stable    Level of consciousness: awake    Nausea/Vomiting: no nausea/vomiting    Complications: none    Transfer of care protocol was followed      Last vitals:   Visit Vitals  BP (!) 157/86 (BP Location: Left arm, Patient Position: Lying)   Pulse 88   Temp 36.4 °C (97.6 °F) (Axillary)   Resp 18   Ht 5' 5" (1.651 m)   Wt 55.8 kg (123 lb)   SpO2 100%   Breastfeeding? No   BMI 20.47 kg/m²     "

## 2019-01-07 NOTE — BRIEF OP NOTE
[facility]  Brief Operative Note    SUMMARY     Surgery Date: 1/7/2019     Surgeon(s) and Role:     * Guero Escobar MD - Primary     * Francisca Mason MD - Resident - Assisting        Pre-op Diagnosis:  Malignant neoplasm of vulva [C51.9]    Post-op Diagnosis:  Post-Op Diagnosis Codes:     * Malignant neoplasm of vulva [C51.9]    Procedure(s) (LRB):  VULVECTOMY (Bilateral)  URETHRECTOMY (N/A)    Anesthesia: General    Description of Procedure: resection of bilateral upper vulva and dist]al urethra.   Description of the findings of the procedure: pigmented right labia minoa and several pigmented periurethral lesions.     Estimated Blood Loss:mininal   Total Fluids: 300 ml   Urine Output: 15 mml            Specimens:   Specimen (12h ago, onward)    Start     Ordered    01/07/19 0741  Specimen to Pathology - Surgery  Once     Comments:  1-Right and left vulvar-stitch @ 12 [id number]-Nsuwts urethra-perm     Start Status   01/07/19 0741 Collected (01/07/19 0741)       01/07/19 0741

## 2019-01-07 NOTE — INTERVAL H&P NOTE
The patient has been examined and the H&P has been reviewed:    I concur with the findings and no changes have occurred since H&P was written.    Surgery risks, benefits and alternative options discussed and understood by patient/family.          Active Hospital Problems    Diagnosis  POA    Malignant melanoma of vulva [C43.59]  Yes      Resolved Hospital Problems   No resolved problems to display.

## 2019-01-07 NOTE — PLAN OF CARE
Problem: Adult Inpatient Plan of Care  Goal: Plan of Care Review  Outcome: Ongoing (interventions implemented as appropriate)  POC reviewed with patient; understanding verbalized. Pt AAOx4. Small amount of bloody drainage from vagina. NS @ 25. Catherine draining clear yellow urine. PRN oxycodone administered for lower abdominal pain. Pt. with nonskid footwear on, bed in lowest position, and locked with bed rails up x2.  Pt. instructed to call prior to getting OOB.  Pt. has call light and personal items within reach. Patient ambulates in room with stand by assit. VSS and afebrile this shift. All questions and concerns addressed at this time. Will continue to monitor.

## 2019-01-07 NOTE — NURSING TRANSFER
Nursing Transfer Note      1/7/2019     Transfer To: 806A    Transfer via stretcher    Transfer with n/a    Transported by pct    Medicines sent: yes, ointment from surgery    Chart send with patient: Yes    Notified: sister

## 2019-01-08 VITALS
SYSTOLIC BLOOD PRESSURE: 136 MMHG | TEMPERATURE: 99 F | DIASTOLIC BLOOD PRESSURE: 69 MMHG | HEIGHT: 65 IN | WEIGHT: 123 LBS | HEART RATE: 85 BPM | RESPIRATION RATE: 18 BRPM | OXYGEN SATURATION: 92 % | BODY MASS INDEX: 20.49 KG/M2

## 2019-01-08 DIAGNOSIS — G89.18 POST-OP PAIN: ICD-10-CM

## 2019-01-08 LAB
ANION GAP SERPL CALC-SCNC: 13 MMOL/L
BUN SERPL-MCNC: 60 MG/DL
CALCIUM SERPL-MCNC: 8.7 MG/DL
CHLORIDE SERPL-SCNC: 110 MMOL/L
CO2 SERPL-SCNC: 16 MMOL/L
CREAT SERPL-MCNC: 4.5 MG/DL
EST. GFR  (AFRICAN AMERICAN): 10.9 ML/MIN/1.73 M^2
EST. GFR  (NON AFRICAN AMERICAN): 9.5 ML/MIN/1.73 M^2
GLUCOSE SERPL-MCNC: 95 MG/DL
POTASSIUM SERPL-SCNC: 4.1 MMOL/L
SODIUM SERPL-SCNC: 139 MMOL/L

## 2019-01-08 PROCEDURE — 25000003 PHARM REV CODE 250: Performed by: OBSTETRICS & GYNECOLOGY

## 2019-01-08 PROCEDURE — 80048 BASIC METABOLIC PNL TOTAL CA: CPT

## 2019-01-08 PROCEDURE — 36415 COLL VENOUS BLD VENIPUNCTURE: CPT

## 2019-01-08 RX ORDER — HYDROCORTISONE 1 %
CREAM (GRAM) TOPICAL 2 TIMES DAILY
Status: DISCONTINUED | OUTPATIENT
Start: 2019-01-08 | End: 2019-01-08 | Stop reason: HOSPADM

## 2019-01-08 RX ORDER — OXYCODONE AND ACETAMINOPHEN 5; 325 MG/1; MG/1
1 TABLET ORAL EVERY 8 HOURS PRN
Qty: 21 TABLET | Refills: 0 | Status: ON HOLD | OUTPATIENT
Start: 2019-01-08 | End: 2019-01-23

## 2019-01-08 RX ADMIN — FERROUS SULFATE TAB EC 325 MG (65 MG FE EQUIVALENT) 325 MG: 325 (65 FE) TABLET DELAYED RESPONSE at 08:01

## 2019-01-08 RX ADMIN — SPIRONOLACTONE 25 MG: 25 TABLET, FILM COATED ORAL at 08:01

## 2019-01-08 RX ADMIN — HYDROCORTISONE: 10 CREAM TOPICAL at 07:01

## 2019-01-08 RX ADMIN — FUROSEMIDE 40 MG: 40 TABLET ORAL at 08:01

## 2019-01-08 RX ADMIN — AMLODIPINE BESYLATE 10 MG: 10 TABLET ORAL at 08:01

## 2019-01-08 RX ADMIN — CALCITRIOL 0.25 MCG: 0.25 CAPSULE ORAL at 08:01

## 2019-01-08 RX ADMIN — MUPIROCIN 1 G: 20 OINTMENT TOPICAL at 09:01

## 2019-01-08 NOTE — ASSESSMENT & PLAN NOTE
- Melanoma of vulva, s/p vulvectomy and distal urethrectomy  - Post-op advances tolerated well:  - Regular diet  - stop IVF and saline lock IV  - Pain control with PO oxycodone (no tylenol 2/2 polycystic liver)  - Encourage ambulation  - Leave greene catheter in place upon discharge  - AM labs wnl   - Plan for discharge today

## 2019-01-08 NOTE — PLAN OF CARE
Discharge and follow-up instructions to be completed by the bedside nurse.    Future Appointments   Date Time Provider Department Center   1/14/2019  9:30 AM LAB, HEMONC CANCER DG [facility] LAB [facility] [place] [facility]   1/14/2019 10:40 AM Patricia Pires MD [facility] HEM ONC [place] [facility]      01/08/19 1123   Final Note   Assessment Type Final Discharge Note   Anticipated Discharge Disposition Home   What phone number can be called within the next 1-3 days to see how you are doing after discharge? ([phone])   Hospital Follow Up  Appt(s) scheduled? Yes   Discharge plans and expectations educations in teach back method with documentation complete? Yes

## 2019-01-08 NOTE — NURSING
Discharge instructions and prescriptions given and explained to pt.  Pt. verbalized understanding with no further questions. Pain medication delivered to bedside. Peripheral IV D/C'd with catheter tip intact. Pt to go home with greene. Greene education performed, pt taught back information. Leg bag applied.  VS WDL.  Patient is awaiting ride home by sister.     ROAD TEST  O=SpO2 96% on RA  A=Ambulating around room and hallway  D=IV D/C'd  T=Tolerating regular diet  E=Voids via greene  S=Performs self care independently  T=Teaching on wounds care complete

## 2019-01-08 NOTE — SUBJECTIVE & OBJECTIVE
Interval History: POD#1 s/p vulvectomy and distal urethrectomy. Patient sitting up in bed, awake. No complaints overnight. Denies any pain. Reports she is tolerating a regular diet with N/V.  Passing flatus.  Catherine in place, draining clear yellow urine.     Scheduled Meds:   amLODIPine  10 mg Oral Daily    calcitRIOL  0.25 mcg Oral Daily    ferrous sulfate  325 mg Oral Daily    furosemide  40 mg Oral BID    hydrocortisone   Topical (Top) BID    mupirocin  1 g Nasal BID    spironolactone  25 mg Oral Daily     Continuous Infusions:  PRN Meds:HYDROmorphone, ondansetron, oxyCODONE, oxyCODONE, promethazine (PHENERGAN) IVPB    Review of patient's allergies indicates:   Allergen Reactions    Codeine Nausea Only    Sulfa (sulfonamide antibiotics) Rash       Objective:     Vital Signs (Most Recent):  Temp: 98.4 °F (36.9 °C) (01/08/19 0457)  Pulse: 83 (01/08/19 0457)  Resp: 16 (01/08/19 0457)  BP: (!) 156/79 (01/08/19 0457)  SpO2: 95 % (01/08/19 0457) Vital Signs (24h Range):  Temp:  [97.6 °F (36.4 °C)-98.4 °F (36.9 °C)] 98.4 °F (36.9 °C)  Pulse:  [58-97] 83  Resp:  [15-19] 16  SpO2:  [95 %-100 %] 95 %  BP: (140-169)/(77-96) 156/79     Weight: 55.8 kg (123 lb)  Body mass index is 20.47 kg/m².    Intake/Output - Last 3 Shifts       01/06 0700 - 01/07 0659 01/07 0700 - 01/08 0659    P.O.  530    I.V. (mL/kg)  450 (8.1)    Total Intake(mL/kg)  980 (17.6)    Urine (mL/kg/hr)  875 (0.7)    Total Output  875    Net  +105                   Physical Exam:   Constitutional: She is oriented to person, place, and time. She appears well-developed and well-nourished. No distress.    HENT:   Head: Normocephalic and atraumatic.    Eyes: Conjunctivae and EOM are normal.    Neck: Normal range of motion. Neck supple. No thyromegaly present.    Cardiovascular: Normal rate and regular rhythm.     Pulmonary/Chest: Effort normal. No respiratory distress.        Abdominal: Soft. She exhibits no distension. There is no tenderness.      Genitourinary:   Genitourinary Comments: Baseline abdomen firm, distended, with large masses palpable through the abdominal wall.Enlarged right inguinal lymph node. No change in baseline exam. No tenderness. Catherine in place. Surgical changes, s/p vulvectomy. No active bleeding/drainage.             Musculoskeletal: Normal range of motion and moves all extremeties. She exhibits no edema or tenderness.       Neurological: She is alert and oriented to person, place, and time.    Skin: Skin is warm and dry. No rash noted.    Psychiatric: She has a normal mood and affect. Her behavior is normal.       Lines/Drains/Airways     Drain                 Hemodialysis AV Fistula 11/09/18 0916 Right forearm 59 days         Urethral Catheter 01/07/19 0820 less than 1 day          Epidural Line                 Perineural Analgesia/Anesthesia Assessment (using dermatomes) Epidural 11/09/18 0725 59 days          Peripheral Intravenous Line                 Peripheral IV - Single Lumen 01/07/19 0640 Left Forearm less than 1 day                Laboratory:  BMP:   Recent Labs   Lab 01/07/19  0840 01/08/19  0519    95    139   K 4.5 4.1    110   CO2 16* 16*   BUN 55* 60*   CREATININE 4.8* 4.5*   CALCIUM 8.7 8.7   MG 1.9  --     and CBC: No results for input(s): WBC, HGB, HCT, PLT in the last 48 hours.    Diagnostic Results:  Labs: Reviewed

## 2019-01-08 NOTE — PLAN OF CARE
Problem: Adult Inpatient Plan of Care  Goal: Plan of Care Review  Outcome: Ongoing (interventions implemented as appropriate)  Patient remains free from falls and injury this shift. Bed in low, locked position with call bell in reach. Patient encouraged to call for assistance when getting out of bed. Patient verbalized understanding. All belongings within reach. VS stable. Afebrile. Complained of headache- PRN oxycodone given with full relief. Greene to gravity- plan to keep greene in for 1-2 weeks post discharge. Will continue to monitor.

## 2019-01-08 NOTE — HOSPITAL COURSE
01/08/2019 POD#1 s/p vulvectomy with distal urethrectomy. Patient doing well. No complaints. Will be discharged today. Catherine will remain in place for 1 week. Catherine Education provided prior to discharge.

## 2019-01-08 NOTE — DISCHARGE SUMMARY
[facility]  Gynecologic Oncology  Discharge Summary    Patient Name: Verónica Baker  MRN: [id number]  Admission Date: 1/7/2019  Hospital Length of Stay: 1 days  Discharge Date and Time:  01/08/2019 9:41 AM  Attending Physician: uGero Escobar MD   Discharging Provider: Aman Iniguez MD  Primary Care Provider: Primary Doctor No    HPI:   Patient comes in today for scheduled vulvectomy given recent excisional proceudre noted postive margines.  She underwent a radical vulvectomy and bilateral negative SLN biopsies on 10/29/2019 for malignant melanoma of the vulva.     Pathology:  Skin, left sentinel node hot blue 150, right sentinel node hot blue 150, and left vulva, excisions ([id number],  10/29/2018):  Lymph node, left sentinel hot blue 150, excision (1.): Lymph node identified negative for metastatic melanoma.  HMB45, Mart 1, and S100 immunostains performed at the referring institution are negative.  Right sentinel node, hot blue, 150, excision (2.): Lymph node identified negative for metastatic melanoma. S100,  HMB45, and Mart 1 immunostains performed at the referring institution are negative.  Skin, left vulva, excision (3.) Extensive residual malignant melanoma, invasive to a Breslow depth of 4.0 mm. The  specimen shows ulceration but this may be due to prior biopsy rather than tumoral ulceration. Mitotic rate is 10 per  mm2. The tumor cells extend to the green inked margin and within 0.2 mm of the blue inked margin.  Melan A and Sox 10 immunostains report performed at [facility] on sections from blocks 3D, 3E, 3 F, 3 G, 3H,  3I, 3 J, 3 M, 3 P, 3 Q, and 3 are to evaluate the extent of the lesion.     Oncology History:  Initial biopsy in June 2018 was originally read as keratinizing hyperplastic squamous epithelium with scattered juctional nests of atypical melanocytes. Pathology was read at [facility]. institional review in Sept 2018 was reported a malignant melanoma. Tim's level 3 with  Breslow depth 1.6 mm.      10/29/2018:  Breslow's level of 4 mm. Positive medial margin. Negative bilateral SL nodes.         Hospital Course:  01/08/2019 POD#1 s/p vulvectomy with distal urethrectomy. Patient doing well. No complaints. Will be discharged today. Catherine will remain in place for 1 week. Catherine Education provided prior to discharge.       Procedure(s) (LRB):  VULVECTOMY (Bilateral)  URETHRECTOMY (N/A)         Significant Diagnostic Studies: Labs: All labs within the past 24 hours have been reviewed    Pending Diagnostic Studies:     None        Final Active Diagnoses:    Diagnosis Date Noted POA    PRINCIPAL PROBLEM:  Malignant melanoma of vulva [C43.59] 09/26/2018 Yes      Problems Resolved During this Admission:       Discharged Condition: good    Disposition: Home or Self Care    Follow Up:  Follow-up Information     Guero Escobar MD. Go on 1/14/2019.    Specialty:  Gynecologic Oncology  Why:  Labs at 9:30 am and MD visit at 10:40 am  Contact information:  [facility] [address]  [phone]                 Patient Instructions:      Other restrictions (specify):   Scheduling Instructions: Pelvic Rest - Nothing in the Vagina for 6 weeks.     Notify your health care provider if you experience any of the following:   Order Comments: Vaginal Bleeding greater than a pad per hour.     Notify your health care provider if you experience any of the following:  increased confusion or weakness     Notify your health care provider if you experience any of the following:  persistent dizziness, light-headedness, or visual disturbances     Notify your health care provider if you experience any of the following:  worsening rash     Notify your health care provider if you experience any of the following:  severe persistent headache     Notify your health care provider if you experience any of the following:  difficulty breathing or increased cough     Notify your health care provider if you  experience any of the following:  redness, tenderness, or signs of infection (pain, swelling, redness, odor or green/yellow discharge around incision site)     Notify your health care provider if you experience any of the following:  severe uncontrolled pain     Notify your health care provider if you experience any of the following:  persistent nausea and vomiting or diarrhea     Notify your health care provider if you experience any of the following:  temperature >100.4     Activity as tolerated     Medications:  Reconciled Home Medications:      Medication List      START taking these medications    oxyCODONE-acetaminophen 5-325 mg per tablet  Commonly known as:  PERCOCET  Take 1 tablet by mouth every 8 (eight) hours as needed for Pain.        CONTINUE taking these medications    amLODIPine 10 MG tablet  Commonly known as:  NORVASC  Take 10 mg by mouth once daily.     calcitRIOL 0.25 MCG Cap  Commonly known as:  ROCALTROL  Take 1 capsule (0.25 mcg total) by mouth once daily.     ferrous sulfate 324 mg (65 mg iron) Tbec  Take 1 tablet (324 mg total) by mouth once daily.     furosemide 40 MG tablet  Commonly known as:  LASIX  Take 1 tablet (40 mg total) by mouth 2 (two) times daily.     gabapentin 300 MG capsule  Commonly known as:  NEURONTIN  Take 300 mg by mouth 2 (two) times daily.     spironolactone 25 MG tablet  Commonly known as:  ALDACTONE  Take 25 mg by mouth once daily. PT TAKING 1/2 TAB PER DAY            Aman Iniguez MD  Gynecologic Oncology  [facility]

## 2019-01-08 NOTE — PLAN OF CARE
Patient is POD 1 s/p vulvectomy and distal urethrectomy. 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 IVF stopped today. PRN pain meds. Patient denies nausea and vomiting. Regular diet as tolerated. Plans for greene catheter x 1 week. VSS, afebrile, labs WNL. CM to continue to follow with the team. No discharge needs identified.    Marie Freedman, RN, BSN, CM  [facility]  Nurse - Med Onc/Gyn Onc  [phone]

## 2019-01-08 NOTE — PROGRESS NOTES
[facility]  Gynecologic Oncology  Progress Note      Patient Name: Verónica Baker  MRN: [id number]  Admission Date: 1/7/2019  Hospital Length of Stay: 1 days  Attending Provider: Guero Escobar MD  Primary Care Provider: Primary Doctor No  Principal Problem: Malignant melanoma of torso excluding breast    Follow-up For: Procedure(s) (LRB):  VULVECTOMY (Bilateral)  URETHRECTOMY (N/A)  Post-Operative Day: 1 Day Post-Op  Subjective:      History of Present Illness:  No notes on file    Hospital Course:  01/08/2019 POD#1 s/p vulvectomy with distal urethrectomy. Patient doing well. No complaints. Will be discharged today. Catherine will remain in place for 1 week.       Interval History: POD#1 s/p vulvectomy and distal urethrectomy. Patient sitting up in bed, awake. No complaints overnight. Denies any pain. Reports she is tolerating a regular diet with N/V.  Passing flatus.  Catherine in place, draining clear yellow urine.     Scheduled Meds:   amLODIPine  10 mg Oral Daily    calcitRIOL  0.25 mcg Oral Daily    ferrous sulfate  325 mg Oral Daily    furosemide  40 mg Oral BID    hydrocortisone   Topical (Top) BID    mupirocin  1 g Nasal BID    spironolactone  25 mg Oral Daily     Continuous Infusions:  PRN Meds:HYDROmorphone, ondansetron, oxyCODONE, oxyCODONE, promethazine (PHENERGAN) IVPB    Review of patient's allergies indicates:   Allergen Reactions    Codeine Nausea Only    Sulfa (sulfonamide antibiotics) Rash       Objective:     Vital Signs (Most Recent):  Temp: 98.4 °F (36.9 °C) (01/08/19 0457)  Pulse: 83 (01/08/19 0457)  Resp: 16 (01/08/19 0457)  BP: (!) 156/79 (01/08/19 0457)  SpO2: 95 % (01/08/19 0457) Vital Signs (24h Range):  Temp:  [97.6 °F (36.4 °C)-98.4 °F (36.9 °C)] 98.4 °F (36.9 °C)  Pulse:  [58-97] 83  Resp:  [15-19] 16  SpO2:  [95 %-100 %] 95 %  BP: (140-169)/(77-96) 156/79     Weight: 55.8 kg (123 lb)  Body mass index is 20.47 kg/m².    Intake/Output - Last 3 Shifts       01/06  0700 - 01/07 0659 01/07 0700 - 01/08 0659    P.O.  530    I.V. (mL/kg)  450 (8.1)    Total Intake(mL/kg)  980 (17.6)    Urine (mL/kg/hr)  875 (0.7)    Total Output  875    Net  +105                   Physical Exam:   Constitutional: She is oriented to person, place, and time. She appears well-developed and well-nourished. No distress.    HENT:   Head: Normocephalic and atraumatic.    Eyes: Conjunctivae and EOM are normal.    Neck: Normal range of motion. Neck supple. No thyromegaly present.    Cardiovascular: Normal rate and regular rhythm.     Pulmonary/Chest: Effort normal. No respiratory distress.        Abdominal: Soft. She exhibits no distension. There is no tenderness.     Genitourinary:   Genitourinary Comments: Baseline abdomen firm, distended, with large masses palpable through the abdominal wall.Enlarged right inguinal lymph node. No change in baseline exam. No tenderness. Catherine in place. Surgical changes, s/p vulvectomy. No active bleeding/drainage.             Musculoskeletal: Normal range of motion and moves all extremeties. She exhibits no edema or tenderness.       Neurological: She is alert and oriented to person, place, and time.    Skin: Skin is warm and dry. No rash noted.    Psychiatric: She has a normal mood and affect. Her behavior is normal.       Lines/Drains/Airways     Drain                 Hemodialysis AV Fistula 11/09/18 0916 Right forearm 59 days         Urethral Catheter 01/07/19 0820 less than 1 day          Epidural Line                 Perineural Analgesia/Anesthesia Assessment (using dermatomes) Epidural 11/09/18 0725 59 days          Peripheral Intravenous Line                 Peripheral IV - Single Lumen 01/07/19 0640 Left Forearm less than 1 day                Laboratory:  BMP:   Recent Labs   Lab 01/07/19  0840 01/08/19  0519    95    139   K 4.5 4.1    110   CO2 16* 16*   BUN 55* 60*   CREATININE 4.8* 4.5*   CALCIUM 8.7 8.7   MG 1.9  --     and CBC: No  results for input(s): WBC, HGB, HCT, PLT in the last 48 hours.    Diagnostic Results:  Labs: Reviewed       Assessment/Plan:     * Malignant melanoma of vulva     - Melanoma of vulva, s/p vulvectomy and distal urethrectomy  - Post-op advances tolerated well:  - Regular diet  - stop IVF and saline lock IV  - Pain control with PO oxycodone (no tylenol 2/2 polycystic liver)  - Encourage ambulation  - Leave greene catheter in place upon discharge  - AM labs wnl   - Plan for discharge today              VTE Risk Mitigation (From admission, onward)    None          Was greene catheter removed? No: keep in place upon discharge    Francisca Mason MD  Gynecologic Oncology  [facility]

## 2019-01-10 ENCOUNTER — OFFICE VISIT (OUTPATIENT)
Dept: NEPHROLOGY | Facility: CLINIC | Age: 69
End: 2019-01-10
Payer: MEDICARE

## 2019-01-10 VITALS
WEIGHT: 123 LBS | OXYGEN SATURATION: 96 % | DIASTOLIC BLOOD PRESSURE: 86 MMHG | HEART RATE: 80 BPM | HEIGHT: 65 IN | BODY MASS INDEX: 20.49 KG/M2 | SYSTOLIC BLOOD PRESSURE: 143 MMHG

## 2019-01-10 DIAGNOSIS — N18.5 CKD (CHRONIC KIDNEY DISEASE), STAGE V: ICD-10-CM

## 2019-01-10 PROCEDURE — 1100F PTFALLS ASSESS-DOCD GE2>/YR: CPT | Mod: CPTII,GC,S$GLB, | Performed by: INTERNAL MEDICINE

## 2019-01-10 PROCEDURE — 99214 OFFICE O/P EST MOD 30 MIN: CPT | Mod: GC,S$GLB,, | Performed by: INTERNAL MEDICINE

## 2019-01-10 PROCEDURE — 3288F FALL RISK ASSESSMENT DOCD: CPT | Mod: CPTII,GC,S$GLB, | Performed by: INTERNAL MEDICINE

## 2019-01-10 PROCEDURE — 99214 PR OFFICE/OUTPT VISIT, EST, LEVL IV, 30-39 MIN: ICD-10-PCS | Mod: GC,S$GLB,, | Performed by: INTERNAL MEDICINE

## 2019-01-10 PROCEDURE — 99999 PR PBB SHADOW E&M-EST. PATIENT-LVL III: ICD-10-PCS | Mod: PBBFAC,GC,, | Performed by: INTERNAL MEDICINE

## 2019-01-10 PROCEDURE — 3288F PR FALLS RISK ASSESSMENT DOCUMENTED: ICD-10-PCS | Mod: CPTII,GC,S$GLB, | Performed by: INTERNAL MEDICINE

## 2019-01-10 PROCEDURE — 1100F PR PT FALLS ASSESS DOC 2+ FALLS/FALL W/INJURY/YR: ICD-10-PCS | Mod: CPTII,GC,S$GLB, | Performed by: INTERNAL MEDICINE

## 2019-01-10 PROCEDURE — 99999 PR PBB SHADOW E&M-EST. PATIENT-LVL III: CPT | Mod: PBBFAC,GC,, | Performed by: INTERNAL MEDICINE

## 2019-01-10 RX ORDER — SEVELAMER CARBONATE 800 MG/1
800 TABLET, FILM COATED ORAL
Qty: 90 TABLET | Refills: 11 | Status: SHIPPED | OUTPATIENT
Start: 2019-01-10 | End: 2020-01-10

## 2019-01-10 RX ORDER — NIFEDIPINE 30 MG/1
30 TABLET, EXTENDED RELEASE ORAL DAILY
Qty: 30 TABLET | Refills: 11 | Status: SHIPPED | OUTPATIENT
Start: 2019-01-10 | End: 2019-05-02

## 2019-01-10 NOTE — ASSESSMENT & PLAN NOTE
1. CKD: secondary ADPKD, sCr improved (as sometimes happens) after AV fistula placement.     Lab Results   Component Value Date    CREATININE 4.5 (H) 01/08/2019     Protein Creatinine Ratios: continue to monitor, will repeat for next time, also check SFE & LEYLA, degree of proteinuria on the high side for ADPKD    Prot/Creat Ratio, Ur   Date Value Ref Range Status   08/30/2018 2.35 (H) 0.00 - 0.20 Final     ·   ·   Acid-Base: currently on 1/8 of a teaspoon of baking soda daily, will increase to 1/2 tea spoon  Lab Results   Component Value Date     01/08/2019    K 4.1 01/08/2019    CO2 16 (L) 01/08/2019     2. HTN: Blood pressures elevated today, she did not take her amlodipine as she is concern about the current recall, will place on nifedipine instead    3. Renal osteodystrophy: last  from August, calcitriol started at that time, we do not have a follow up value since then, will add in for blood work she's having on 1/14, for now continue current dose of calcitriol  Lab Results   Component Value Date    .0 (H) 08/30/2018    CALCIUM 8.7 01/08/2019    PHOS 5.8 (H) 01/07/2019       4. Anemia: need to check iron studies, continue oral iron for now, although may need IV infusion  Lab Results   Component Value Date    HGB 9.3 (L) 12/24/2018        5. ESRD planing: patient has received [employer] education and had fistula placed this past November, it is ready to be used and looks great, however, no need to be started on RRT at this time    Follow up in three months    Patient seen with Dr Castorena

## 2019-01-10 NOTE — PROGRESS NOTES
Subjective:       Patient ID: Verónica Baker is a 68 y.o. Black or  female who presents for follow-up evaluation of Chronic Kidney Disease    Here to follow up on CKD, since she was seen last she has had a RUE AV fistula placed and it has been cleared for use, although her sCr since fistula placement actually improved some.  She is feeling good, denies urinary changes since last time.  Denies NSAIDs.      Review of Systems   Constitutional: Negative for chills, fatigue and fever.   Respiratory: Negative for chest tightness and shortness of breath.    Cardiovascular: Negative for chest pain and leg swelling.   Gastrointestinal: Positive for abdominal distention. Negative for abdominal pain, diarrhea and nausea.   Genitourinary: Negative for decreased urine volume, difficulty urinating, flank pain, frequency, hematuria and urgency.   Skin: Negative for rash.   Neurological: Negative for tremors, speech difficulty and weakness.       Objective:      Physical Exam   Constitutional: She is oriented to person, place, and time.   HENT:   Head: Normocephalic and atraumatic.   Eyes: EOM are normal.   Neck: No JVD present.   Cardiovascular: Normal rate and regular rhythm.   Pulmonary/Chest: Effort normal and breath sounds normal.   Abdominal: Soft. She exhibits no distension.   Musculoskeletal: She exhibits no edema or tenderness.   Neurological: She is alert and oriented to person, place, and time.   Skin: Skin is warm.   Psychiatric: She has a normal mood and affect. Her behavior is normal.       Assessment & Plan:       CKD (chronic kidney disease), stage V  1. CKD: secondary ADPKD, sCr improved (as sometimes happens) after AV fistula placement.     Lab Results   Component Value Date    CREATININE 4.5 (H) 01/08/2019     Protein Creatinine Ratios: continue to monitor, will repeat for next time, also check SFE & LEYLA, degree of proteinuria on the high side for ADPKD    Prot/Creat Ratio, Ur   Date Value Ref  Range Status   08/30/2018 2.35 (H) 0.00 - 0.20 Final     ·   ·   Acid-Base: currently on 1/8 of a teaspoon of baking soda daily, will increase to 1/2 tea spoon  Lab Results   Component Value Date     01/08/2019    K 4.1 01/08/2019    CO2 16 (L) 01/08/2019     2. HTN: Blood pressures elevated today, she did not take her amlodipine as she is concern about the current recall, will place on nifedipine instead    3. Renal osteodystrophy: last  from August, calcitriol started at that time, we do not have a follow up value since then, will add in for blood work she's having on 1/14, for now continue current dose of calcitriol  Lab Results   Component Value Date    .0 (H) 08/30/2018    CALCIUM 8.7 01/08/2019    PHOS 5.8 (H) 01/07/2019       4. Anemia: need to check iron studies, continue oral iron for now, although may need IV infusion  Lab Results   Component Value Date    HGB 9.3 (L) 12/24/2018        5. ESRD planing: patient has received TOPS education and had fistula placed this past November, it is ready to be used and looks great, however, no need to be started on RRT at this time    Follow up in three months    Patient seen with Dr Castorena

## 2019-01-10 NOTE — PATIENT INSTRUCTIONS
1) blood work on 1/14, added to current blood work  2) follow up in 2-3 months  3) other repeat blood work in 8 weeks (ordered)

## 2019-01-14 ENCOUNTER — PATIENT MESSAGE (OUTPATIENT)
Dept: ADMINISTRATIVE | Facility: OTHER | Age: 69
End: 2019-01-14

## 2019-01-14 ENCOUNTER — OFFICE VISIT (OUTPATIENT)
Dept: HEMATOLOGY/ONCOLOGY | Facility: CLINIC | Age: 69
End: 2019-01-14
Payer: MEDICARE

## 2019-01-14 ENCOUNTER — INFUSION (OUTPATIENT)
Dept: INFUSION THERAPY | Facility: HOSPITAL | Age: 69
End: 2019-01-14
Attending: INTERNAL MEDICINE
Payer: MEDICARE

## 2019-01-14 VITALS
RESPIRATION RATE: 18 BRPM | SYSTOLIC BLOOD PRESSURE: 165 MMHG | OXYGEN SATURATION: 98 % | DIASTOLIC BLOOD PRESSURE: 89 MMHG | HEART RATE: 81 BPM | HEIGHT: 65 IN | BODY MASS INDEX: 21.86 KG/M2 | WEIGHT: 131.19 LBS | TEMPERATURE: 98 F

## 2019-01-14 VITALS
DIASTOLIC BLOOD PRESSURE: 80 MMHG | RESPIRATION RATE: 18 BRPM | SYSTOLIC BLOOD PRESSURE: 157 MMHG | HEART RATE: 87 BPM | TEMPERATURE: 98 F

## 2019-01-14 DIAGNOSIS — C79.51 BONE METASTASES: ICD-10-CM

## 2019-01-14 DIAGNOSIS — Q44.6 POLYCYSTIC LIVER DISEASE: ICD-10-CM

## 2019-01-14 DIAGNOSIS — N18.5 CKD (CHRONIC KIDNEY DISEASE), STAGE V: ICD-10-CM

## 2019-01-14 DIAGNOSIS — C34.12 PRIMARY ADENOCARCINOMA OF UPPER LOBE OF LEFT LUNG: Primary | ICD-10-CM

## 2019-01-14 DIAGNOSIS — R06.09 DYSPNEA ON EXERTION: ICD-10-CM

## 2019-01-14 DIAGNOSIS — C43.59 MALIGNANT MELANOMA OF TORSO EXCLUDING BREAST: ICD-10-CM

## 2019-01-14 DIAGNOSIS — C34.12 PRIMARY ADENOCARCINOMA OF UPPER LOBE OF LEFT LUNG: ICD-10-CM

## 2019-01-14 DIAGNOSIS — R53.82 CHRONIC FATIGUE: ICD-10-CM

## 2019-01-14 DIAGNOSIS — N18.6 ESRD (END STAGE RENAL DISEASE): ICD-10-CM

## 2019-01-14 DIAGNOSIS — R53.0 NEOPLASTIC MALIGNANT RELATED FATIGUE: Primary | ICD-10-CM

## 2019-01-14 DIAGNOSIS — D63.0 ANEMIA IN NEOPLASTIC DISEASE: ICD-10-CM

## 2019-01-14 PROCEDURE — 96413 CHEMO IV INFUSION 1 HR: CPT

## 2019-01-14 PROCEDURE — 63600175 PHARM REV CODE 636 W HCPCS: Mod: TB | Performed by: INTERNAL MEDICINE

## 2019-01-14 PROCEDURE — 25000003 PHARM REV CODE 250: Performed by: INTERNAL MEDICINE

## 2019-01-14 PROCEDURE — 99999 PR PBB SHADOW E&M-EST. PATIENT-LVL IV: CPT | Mod: PBBFAC,,, | Performed by: INTERNAL MEDICINE

## 2019-01-14 PROCEDURE — 99214 PR OFFICE/OUTPT VISIT, EST, LEVL IV, 30-39 MIN: ICD-10-PCS | Mod: S$GLB,,, | Performed by: INTERNAL MEDICINE

## 2019-01-14 PROCEDURE — 99214 OFFICE O/P EST MOD 30 MIN: CPT | Mod: S$GLB,,, | Performed by: INTERNAL MEDICINE

## 2019-01-14 PROCEDURE — 1101F PT FALLS ASSESS-DOCD LE1/YR: CPT | Mod: CPTII,S$GLB,, | Performed by: INTERNAL MEDICINE

## 2019-01-14 PROCEDURE — 1101F PR PT FALLS ASSESS DOC 0-1 FALLS W/OUT INJ PAST YR: ICD-10-PCS | Mod: CPTII,S$GLB,, | Performed by: INTERNAL MEDICINE

## 2019-01-14 PROCEDURE — 99999 PR PBB SHADOW E&M-EST. PATIENT-LVL IV: ICD-10-PCS | Mod: PBBFAC,,, | Performed by: INTERNAL MEDICINE

## 2019-01-14 RX ORDER — HEPARIN 100 UNIT/ML
500 SYRINGE INTRAVENOUS
Status: CANCELLED | OUTPATIENT
Start: 2019-01-14

## 2019-01-14 RX ORDER — HEPARIN 100 UNIT/ML
500 SYRINGE INTRAVENOUS
Status: DISCONTINUED | OUTPATIENT
Start: 2019-01-14 | End: 2019-01-14 | Stop reason: HOSPADM

## 2019-01-14 RX ORDER — SODIUM CHLORIDE 0.9 % (FLUSH) 0.9 %
10 SYRINGE (ML) INJECTION
Status: DISCONTINUED | OUTPATIENT
Start: 2019-01-14 | End: 2019-01-14 | Stop reason: HOSPADM

## 2019-01-14 RX ORDER — SODIUM CHLORIDE 0.9 % (FLUSH) 0.9 %
10 SYRINGE (ML) INJECTION
Status: CANCELLED | OUTPATIENT
Start: 2019-01-14

## 2019-01-14 RX ADMIN — SODIUM CHLORIDE: 0.9 INJECTION, SOLUTION INTRAVENOUS at 02:01

## 2019-01-14 RX ADMIN — DURVALUMAB 625 MG: 120 INJECTION, SOLUTION INTRAVENOUS at 02:01

## 2019-01-14 NOTE — PLAN OF CARE
Problem: Adult Inpatient Plan of Care  Goal: Plan of Care Review  Outcome: Ongoing (interventions implemented as appropriate)  Pt received imfinzi;tolerated well. VSS and NAD. Pt instructed to call MD with any concerns. Pt discharged home independently.

## 2019-01-14 NOTE — PROGRESS NOTES
CC: Lung cancer, vulvar melanoma, follow up          HPI:Ms. Baker is a 68 yr old lady here for follow up.  She had a noncontrast CT chest (due to stage 4 CKD) for hemoptysis, which showed a 4cm spiculated medial LUPIS mass and possible mediastinal LAD.  She has hepatomegaly due to polycystic liver (and kidneys).  There were small lucent areas in the sternum and T9 vertebral bodies concerning for mets.  However, PET CT did not reveal any hypermetabolic bone lesions. She underwent biopsy of the lesion on 4/4/18 in [place] and pathology reveals adenocarcinoma. She wanted to come to [facility] for treatment as she has family she can stay with here.She was diagnosed with polycystic kidney/liver when she was 18.  She is followed by Dr. Nagel in [place].  She has chronic infrequent headaches that are not worsening in severity or frequency.  She was evaluated by radiation oncology here ( ).    She completed concurrent carboplatin/paclitaxel for stage III adenocarcinoma lung. She finished 5 out of the planned 6 weekly treatments, with last treatment in 1st week of June 2018.   PET CT done on 8/30/18 showed improvement in the left upper lobe lung lesion. SUV max was 5.69, previously 11.13 and there was resolution of mediastinal metastatic lymph nodes.  She started consolidation treatment with Durvalumab from 9/11/18.  She has been diagnosed with vulvar melanoma.      Interval history: She is here for a follow up visit. She feels better, eating better. Her cough is better. She had vulvar surgery on 10/30/18. [place] lymph nodes were negative at that time.  However, margins in the resected specimen were positive.She had AV fistula surgery on 11/9 in her right UE . She underwent vulvectomy with distal urethrectomy on 1/7/19        Review of Systems   Constitutional: Positive for malaise/fatigue and weight loss. Negative for chills, diaphoresis and fever.   HENT: Negative for congestion, ear discharge and nosebleeds.     Eyes: Negative for blurred vision, double vision, photophobia and pain.   Respiratory: Negative for cough, hemoptysis and sputum production.    Cardiovascular: Negative for chest pain, palpitations and claudication.   Gastrointestinal: Negative for abdominal pain, diarrhea, heartburn, nausea and vomiting.   Genitourinary: Negative for dysuria, frequency, hematuria and urgency.   Musculoskeletal: Negative for back pain, myalgias and neck pain.   Skin: Negative for rash.   Neurological: Negative for dizziness, tingling, tremors, sensory change, speech change and headaches.   Endo/Heme/Allergies: Does not bruise/bleed easily.   Psychiatric/Behavioral: Negative for depression, substance abuse and suicidal ideas.         Current Outpatient Medications   Medication Sig    ferrous sulfate 324 mg (65 mg iron) TbEC Take 1 tablet (324 mg total) by mouth once daily.    furosemide (LASIX) 40 MG tablet Take 1 tablet (40 mg total) by mouth 2 (two) times daily.    NIFEdipine (PROCARDIA-XL) 30 MG (OSM) 24 hr tablet Take 1 tablet (30 mg total) by mouth once daily.    oxyCODONE-acetaminophen (PERCOCET) 5-325 mg per tablet Take 1 tablet by mouth every 8 (eight) hours as needed for Pain.    sevelamer carbonate (RENVELA) 800 mg Tab Take 1 tablet (800 mg total) by mouth 3 (three) times daily with meals.    spironolactone (ALDACTONE) 25 MG tablet Take 25 mg by mouth once daily. PT TAKING 1/2 TAB PER DAY     No current facility-administered medications for this visit.        Vitals:    01/14/19 1028   BP: (!) 165/89   Pulse: 81   Resp: 18   Temp: 98.1 °F (36.7 °C)       Physical Exam   Constitutional: She is oriented to person, place, and time. She appears well-developed.   HENT:   Head: Normocephalic and atraumatic.   Mouth/Throat: No oropharyngeal exudate.   Eyes: Pupils are equal, round, and reactive to light. No scleral icterus.   Neck: Normal range of motion.   Cardiovascular: Normal rate and regular rhythm.   Pulmonary/Chest:  Effort normal and breath sounds normal. No respiratory distress. She has no wheezes. She has no rales.   Abdominal: Soft. She exhibits no distension. There is no tenderness. There is no rebound.   Musculoskeletal: She exhibits no edema.   Lymphadenopathy:     She has no cervical adenopathy.   Neurological: She is alert and oriented to person, place, and time. No cranial nerve deficit.   Skin: Skin is warm.   Psychiatric: She has a normal mood and affect.       4/13/18 PET CT        EXAMINATION:  NM PET CT ROUTINE    CLINICAL HISTORY:  Malignant neoplasm of upper lobe, left bronchus or lung.    Outside imaging demonstrated 4 cm spiculated medial left upper lobe mass (biopsy proven adenocarcinoma) with possible mediastinal lymphadenopathy as well as lucent areas involving the sternum and T9.    TECHNIQUE:  13.12 mCi of F18-FDG was administered intravenously in the right antecubital fossa.  After an approximately 60 min distribution time, PET/CT images were acquired from the skull base to the mid thigh. Transmission images were acquired to correct for attenuation using a whole body low-dose CT scan without contrast with the arms positioned above the head. Glycemia at the time of injection was 82 mg/dL.    COMPARISON:  CT abdomen pelvis 07/21/2017, MRI brain 04/13/2018    FINDINGS:  Quality of the study: Adequate.    Abnormal foci of increased uptake are present within the left upper lobe as well as a few prevascular mediastinal lymph nodes.  Lung mass measures approximately 4.6 x 2.9 cm.    No appreciable lucent lesion in the sternum or T9 vertebral body.  Mild degenerative metabolic activity is present at the sternomanubrial junction.    Index lesions:    - Left upper lobe mass: SUV max 11.1    - Lateral pre-vascular lymph node: SUV max 5.8    Physiologic uptake of the tracer is present within the brain, salivary glands, myocardium, GI and  tracts.    Incidental CT findings: Liver and kidneys are enlarged with  numerous intraparenchymal cyst and associated mass effect on the adjacent abdominal organs, unchanged.  Colonic diverticulosis without diverticulitis.  Bandlike opacification within the right lower lobe likely represents subsegmental atelectasis.  Calcific atherosclerosis involves the lower extremity vasculature bilaterally.   Impression        Known malignancy of the left upper lobe with mediastinal trini metastases.    No appreciable lucent lesion in the sternum or T9 vertebral body.  Correlation with prior outside imaging is recommended.            4/13/18 MRI brain w/o cont        FINDINGS:  Intracranial compartment:    Ventricles and sulci are normal in size for age without evidence of hydrocephalus. No extra-axial blood or fluid collections.    Nonspecific small foci of T2/FLAIR high signal intensity in the supratentorial white matter, favored to represent chronic microvascular ischemic changes.  Remote lacunar type infarct in the left putamen.  Small punctate focus of susceptibility signal artifact in the left occipital lobe, suggestive of an old microhemorrhage or calcification.  No mass lesion, acute hemorrhage, edema or acute infarct.    Normal vascular flow voids are preserved.    Skull/extracranial contents (limited evaluation): Bone marrow signal intensity is normal.   Impression        No evidence of intracranial metastases within limitation in the absence of IV contrast which decrease the sensitivity of this exam.    Nonspecific foci of T2/FLAIR high signal intensity in the supratentorial white matter, likely representing chronic microvascular ischemic changes.               8/30/18 PET CT        CLINICAL HISTORY:  lung cancer restaging;  Malignant neoplasm of upper lobe, left bronchus or lung    FINDINGS:  Patient was administered 11.5 millicuries of FDG intravenously.  Comparison 04/13/2018.    Left upper lung lesion SUV max 5.69, previously larger SUV max 11.13.  Mediastinal lymph nodes have  returned to baseline.    There is physiologic intracranial, head and neck activity.  Heart mediastinum are normal.  There is polycystic liver and kidney disease.  There is physiologic GI and  activity.  There is diverticulosis.  No bone lesions are seen.  Right lung is clear.   Impression        See above    Improvement left upper lobe lung lesion SUV max 5.69, previously 11.13 and resolution of mediastinal metastatic lymph nodes.    Severe polycystic liver and kidney disease.       Bilateral sentinel lymph node biopsies done with Dr. Ba Osei.  2.  Left radical vulvectomy     10/29/18  FINAL PATHOLOGIC DIAGNOSIS  [facility] FINAL DIAGNOSIS:  Skin, left sentinel node hot blue 150, right sentinel node hot blue 150, and left vulva, excisions (MS [id number],  10/29/2018):  Lymph node, left sentinel hot blue 150, excision (1.): Lymph node identified negative for metastatic melanoma.  HMB45, Mart 1, and S100 immunostains performed at the referring institution are negative.  Right sentinel node, hot blue, 150, excision (2.): Lymph node identified negative for metastatic melanoma. S100,HMB45, and Mart 1 immunostains performed at the referring institution are negative.  Skin, left vulva, excision (3.) Extensive residual malignant melanoma, invasive to a Breslow depth of 4.0 mm. The specimen shows ulceration but this may be due to prior biopsy rather than tumoral ulceration. Mitotic rate is 10 per  mm2. The tumor cells extend to the green inked margin and within 0.2 mm of the blue inked margin.  Melan A and Sox 10 immunostains report performed at [facility] on sections from blocks 3D, 3E, 3 F, 3 G, 3H,3I, 3 J, 3 M, 3 P, 3 Q, and 3 are to evaluate the extent of the lesion.        11/26/18 MRI brain w/o cont     COMPARISON:  04/13/2018    FINDINGS:  There is no restricted diffusion to suggest acute infarction.  There is a mild degree of a age-appropriate generalized cerebral volume loss.  Compensatory enlargement ventricles  sulci and cisterns without hydrocephalus.  The major intracranial T2 flow voids are present.    Ill-defined mild T2 flair signal hyperintensity diffusely in the supratentorial white matter similar to prior suggestive for posttherapy change.  There is a focal area of cystic change in the left caudate/putamen anteriorly compatible with remote lacunar-type infarction.    There is a stable focus of punctate susceptibility in the right parietal subcortical white matter and a small focus in the left posterior temporal subcortical white matter which are unchanged suggestive for prior hemorrhage from treated metastases or remote microhemorrhage.  No new parenchymal signal abnormality.  Please note evaluation for subtle metastases is limited by lack of contrast.    No abnormal extra-axial fluid collection.   Impression        No significant change from prior.    No evidence for new signal abnormality to suggest new or worsening intracranial metastatic disease.    Continued ill-defined T2 FLAIR hyperintensity supratentorial white matter suggestive for posttherapy change with small anterior caudate/putamen remote lacunar-type infarct    Stable small left posterior temporal and punctate right parietal subcortical foci susceptibility suggestive for prior hemorrhage or treated hemorrhagic metastases.    Clinical correlation and continued follow-up advised           12/31/18 PET CT     FINDINGS:  Patient was administered 10.51 millicuries of FDG intravenously.  Comparison is 08/30/2018.    The left upper lobe lung primary lesion has completely resolved and there is radiation change of the left lung.  There are multiple new bone metastases involving the spine and left pelvis.    Index left iliac bone metastasis SUV max 7.36.    There is physiologic intracranial, head, neck activity.  Heart mediastinum are normal.  There is polycystic liver and kidney disease.  The pelvic organs show nothing unusual.  There is physiologic pancreas  and adrenal gland activity.  No suspicious lung lesions are seen.      Impression       See above    Detrimental change with multiple bone metastases throughout the central axial red marrow skeleton as above.    Index left iliac bone lesion SUV max 7.36.    Left upper lobe primary lesion has resolved     Component      Latest Ref Rng & Units 1/14/2019   WBC      3.90 - 12.70 K/uL 3.95   RBC      4.00 - 5.40 M/uL 3.43 (L)   Hemoglobin      12.0 - 16.0 g/dL 9.2 (L)   Hematocrit      37.0 - 48.5 % 28.5 (L)   MCV      82 - 98 fL 83   MCH      27.0 - 31.0 pg 26.8 (L)   MCHC      32.0 - 36.0 g/dL 32.3   RDW      11.5 - 14.5 % 14.6 (H)   Platelets      150 - 350 K/uL 229   MPV      9.2 - 12.9 fL 11.5   Immature Granulocytes      0.0 - 0.5 % 0.3   Gran # (ANC)      1.8 - 7.7 K/uL 2.6   Immature Grans (Abs)      0.00 - 0.04 K/uL 0.01   Lymph #      1.0 - 4.8 K/uL 0.5 (L)   Mono #      0.3 - 1.0 K/uL 0.5   Eos #      0.0 - 0.5 K/uL 0.3   Baso #      0.00 - 0.20 K/uL 0.02   nRBC      0 /100 WBC 0   Gran%      38.0 - 73.0 % 66.8   Lymph%      18.0 - 48.0 % 13.4 (L)   Mono%      4.0 - 15.0 % 12.2   Eosinophil%      0.0 - 8.0 % 6.8   Basophil%      0.0 - 1.9 % 0.5   Differential Method       Automated   Sodium      136 - 145 mmol/L 139   Potassium      3.5 - 5.1 mmol/L 4.1   Chloride      95 - 110 mmol/L 108   CO2      23 - 29 mmol/L 21 (L)   Glucose      70 - 110 mg/dL 113 (H)   BUN, Bld      8 - 23 mg/dL 63 (H)   Creatinine      0.5 - 1.4 mg/dL 4.3 (H)   Calcium      8.7 - 10.5 mg/dL 9.5   Total Protein      6.0 - 8.4 g/dL 7.0   Albumin      3.5 - 5.2 g/dL 3.1 (L)   Total Bilirubin      0.1 - 1.0 mg/dL 0.4   Alkaline Phosphatase      55 - 135 U/L 149 (H)   AST      10 - 40 U/L 14   ALT      10 - 44 U/L 6 (L)   Anion Gap      8 - 16 mmol/L 10   eGFR if African American      >60 mL/min/1.73 m:2 11.5 (A)   eGFR if non African American      >60 mL/min/1.73 m:2 10.0 (A)   Magnesium      1.6 - 2.6 mg/dL 1.8     Assessment:     1.  Adenocarcinoma lung  2. Polycystic kidney disease  3. Polycystic liver disease  4. Ascites  5. Hypertension,essential  6. Cough  7. Dyspnea on exertion  8. CKD stage IV  9. Anemia, normocytic, hypochromic  10. Weight loss  11. Vulvar melanoma     Plan:     1. She has a left upper lung mass that was biopsied, as well as hyper metabolic, prevascular mediastinal lymph nodes on PET CT. No other hypermetabolic lesions on PET CT. MRI brain (non-contrast) does not show any metastatic lesion. She has polycystic kidney disease and she is certainly at higher risk for renal malignancy, colon CA as well as liver CA. Slides and block were requested from [facility] and were reviewed by pathology here. It was confirmed that she has adenocarcinoma originating in the lungs.   She started concurrent chemotherapy with Carboplatin/Paclitaxcel ( renally adjusted) as Pemetrexed was not appropriate with the reduced renal function.   Chemotherapy cycle 6 was held due to worsening renal function and leukopenia. Last treatment was on 6/4/18.  She is doing better now. PET CT done on 8/30/18 showed improvement in the left upper lobe lung lesion. SUV max was 5.69, previously 11.13 and there was resolution of mediastinal metastatic lymph nodes. I discussed these findings with her in detail. I discussed starting consolidative immunotherapy with CKI Duravalumab on 9/11/18.  She has received 7 treatments so far. She had vulvar surgery ( for melanoma) on 10/20/18.  CKI was held prior to the surgery. She had positive margins, but negative sentinel lymph nodes. She underwent vulvectomy and distal uretherctomy on 1/7/19. Pathology pending.  PET CT was repeated on 12/31/18. The left upper lobe lung primary lesion, compared to 8/30/18, had completely resolved and there was radiation change of the left lung.  There were multiple new bone metastases involving the spine and left pelvis. Images were reviewed. I explained the findings to her and told  her she most likely has metastatic disease now--either from the lung or from melanoma. She was very tearful. I explained that treatments are no longer curative and will only be palliative.  Biopsy of left iliac bone ordered. Continue Durvalumab for now.         4. Chronic, attributed to CKD/ polycystic liver        5. On multiple anti-HT medications. /89 mm Hg today      6. Possibly related to malignancy in her lungs. It is better now.       8. Serum Cr 4.3 today. She follows with nephrology. She has AVF now. She is still not on dialysis. She is still making good quantities of urine daily. Durvalumab can worsen renal function, but is unlikely to be the case here.      9. Secondary to CKD and chemotherapy. Serum iron saturation 33% on 5/21/18.         10. Now stable.      11. Discovered on biopsy done on 6/21/18. Tim's level 3, Breslow depth 1.6mm. She is being followed seen by  ([facility]) . The lesion does not appear to be PET avid, on the last PET CT done in Aug 2018. She underwent bilateral sentinel lymph node biopsies and left radical vulvectomy on 10/30/18. [place] lymph nodes were negative.  She likely has residual melanoma as the margins in the resected specimen were positive. [place] LN was negative. It was at least T4N0. No clear evidence of brain metastases on non-contrast MRI brain done on 11/26/18.  BRAF status unknown. She underwent vulvectomy and distal urethrectomy on 1/7/19. Final pathology pending.   PET CT on 12/31/18 showed multiple, new bone metastases. Biopsy will be ordered.       12. Pruritius: She has extensive pruritus. She is using moisturizers. She will use Hydrocortisone 1%  cream as needed ( except on face)           Distress Screening Results: Psychosocial Distress screening score of Distress Score: 1 noted and reviewed. No intervention indicated.

## 2019-01-15 ENCOUNTER — TELEPHONE (OUTPATIENT)
Dept: GYNECOLOGIC ONCOLOGY | Facility: CLINIC | Age: 69
End: 2019-01-15

## 2019-01-15 ENCOUNTER — TELEPHONE (OUTPATIENT)
Dept: ADMINISTRATIVE | Facility: CLINIC | Age: 69
End: 2019-01-15

## 2019-01-15 NOTE — PROGRESS NOTES
Home Health SOC with [facility] - Dr. Guero Escobar. Pt. Received SN,PT,OT services.

## 2019-01-16 ENCOUNTER — OFFICE VISIT (OUTPATIENT)
Dept: GYNECOLOGIC ONCOLOGY | Facility: CLINIC | Age: 69
End: 2019-01-16
Payer: MEDICARE

## 2019-01-16 VITALS
DIASTOLIC BLOOD PRESSURE: 84 MMHG | BODY MASS INDEX: 21.71 KG/M2 | SYSTOLIC BLOOD PRESSURE: 170 MMHG | HEIGHT: 65 IN | HEART RATE: 81 BPM | WEIGHT: 130.31 LBS

## 2019-01-16 DIAGNOSIS — C43.59 MALIGNANT MELANOMA OF TORSO EXCLUDING BREAST: Primary | ICD-10-CM

## 2019-01-16 PROCEDURE — 99024 POSTOP FOLLOW-UP VISIT: CPT | Mod: S$GLB,,, | Performed by: OBSTETRICS & GYNECOLOGY

## 2019-01-16 PROCEDURE — 99024 PR POST-OP FOLLOW-UP VISIT: ICD-10-PCS | Mod: S$GLB,,, | Performed by: OBSTETRICS & GYNECOLOGY

## 2019-01-16 PROCEDURE — 99999 PR PBB SHADOW E&M-EST. PATIENT-LVL III: CPT | Mod: PBBFAC,,, | Performed by: OBSTETRICS & GYNECOLOGY

## 2019-01-16 PROCEDURE — 99999 PR PBB SHADOW E&M-EST. PATIENT-LVL III: ICD-10-PCS | Mod: PBBFAC,,, | Performed by: OBSTETRICS & GYNECOLOGY

## 2019-01-16 NOTE — PROGRESS NOTES
"Subjective:       Patient ID: Verónica Baker is a 68 y.o. female.    Chief Complaint: Malignant neoplasm of vulva and greene removal    HPI     Patient comes in today to have greene removed and to inspect the vulva after partial vulvectomy on 1/7/2019. This was a re-excision of a vulvar mass in Oct 2018 which showed melanoma with positive margins.     Her oncologic history is:  Initial biopsy in June 2018 was originally read as keratinizing hyperplastic squamous epithelium with scattered juctional nests of atypical melanocytes. Pathology was read at [facility]. institional review in Sept 2018 was reported a malignant melanoma. Tim's level 3 with Breslow depth 1.6 mm.      10/29/2018:  Breslow's level of 4 mm. Positive medial margin. Negative bilateral SL nodes.          Review of Systems    Objective:   BP (!) 170/84   Pulse 81   Ht 5' 5" (1.651 m)   Wt 59.1 kg (130 lb 4.7 oz)   LMP  (LMP Unknown)   BMI 21.68 kg/m²      Physical Exam   Genitourinary:   Genitourinary Comments: Healing vulvar incision.   Minimal discharge.   Greene removed.        Assessment:       1. Malignant melanoma of vulva        Plan:   Malignant melanoma of vulva    RTC in 4 weeks for post op visit.   I will call her with pathology report.     "

## 2019-01-17 DIAGNOSIS — C79.51 BONE METASTASES: Primary | ICD-10-CM

## 2019-01-18 ENCOUNTER — TELEPHONE (OUTPATIENT)
Dept: ENDOSCOPY | Facility: HOSPITAL | Age: 69
End: 2019-01-18

## 2019-01-18 NOTE — PROGRESS NOTES
[facility]    The note from the fellow/resident was reviewed. I have personally interviewed and examined the patient. There were no additional findings with regards to the history or physical exam.    I agree with the assessment and plan of  Dr. Remington Harris

## 2019-01-22 ENCOUNTER — TELEPHONE (OUTPATIENT)
Dept: INTERVENTIONAL RADIOLOGY/VASCULAR | Facility: HOSPITAL | Age: 69
End: 2019-01-22

## 2019-01-22 DIAGNOSIS — C34.12 PRIMARY ADENOCARCINOMA OF UPPER LOBE OF LEFT LUNG: Primary | ICD-10-CM

## 2019-01-22 DIAGNOSIS — D49.9 NEOPLASM: ICD-10-CM

## 2019-01-22 RX ORDER — FENTANYL CITRATE 50 UG/ML
50 INJECTION, SOLUTION INTRAMUSCULAR; INTRAVENOUS
Status: CANCELLED | OUTPATIENT
Start: 2019-01-22

## 2019-01-22 RX ORDER — MIDAZOLAM HYDROCHLORIDE 1 MG/ML
1 INJECTION INTRAMUSCULAR; INTRAVENOUS
Status: CANCELLED | OUTPATIENT
Start: 2019-01-22

## 2019-01-23 ENCOUNTER — HOSPITAL ENCOUNTER (OUTPATIENT)
Facility: HOSPITAL | Age: 69
Discharge: HOME | End: 2019-01-23
Attending: INTERNAL MEDICINE | Admitting: INTERNAL MEDICINE
Payer: MEDICARE

## 2019-01-23 VITALS
HEART RATE: 87 BPM | SYSTOLIC BLOOD PRESSURE: 174 MMHG | OXYGEN SATURATION: 98 % | DIASTOLIC BLOOD PRESSURE: 92 MMHG | RESPIRATION RATE: 12 BRPM | TEMPERATURE: 98 F

## 2019-01-23 DIAGNOSIS — C43.59 MALIGNANT MELANOMA OF TORSO EXCLUDING BREAST: ICD-10-CM

## 2019-01-23 DIAGNOSIS — C79.51 BONE METASTASES: ICD-10-CM

## 2019-01-23 DIAGNOSIS — C34.12 PRIMARY ADENOCARCINOMA OF UPPER LOBE OF LEFT LUNG: ICD-10-CM

## 2019-01-23 PROCEDURE — 88172 CYTP DX EVAL FNA 1ST EA SITE: CPT | Mod: 26,,, | Performed by: PATHOLOGY

## 2019-01-23 PROCEDURE — 88305 TISSUE EXAM BY PATHOLOGIST: CPT | Performed by: PATHOLOGY

## 2019-01-23 PROCEDURE — 88305 CYTOLOGY SPECIMEN-FNA-RADIOLOGY ASSISTED WITH PATH ADEQUACY-CORE BX: ICD-10-PCS | Mod: 26,,, | Performed by: PATHOLOGY

## 2019-01-23 PROCEDURE — 88305 TISSUE EXAM BY PATHOLOGIST: CPT | Mod: 26,,, | Performed by: PATHOLOGY

## 2019-01-23 PROCEDURE — 63600175 PHARM REV CODE 636 W HCPCS: Performed by: STUDENT IN AN ORGANIZED HEALTH CARE EDUCATION/TRAINING PROGRAM

## 2019-01-23 PROCEDURE — 88342 IMHCHEM/IMCYTCHM 1ST ANTB: CPT | Mod: 26,,, | Performed by: PATHOLOGY

## 2019-01-23 PROCEDURE — 88341 CYTOLOGY SPECIMEN-FNA-RADIOLOGY ASSISTED WITH PATH ADEQUACY-CORE BX: ICD-10-PCS | Mod: 26,,, | Performed by: PATHOLOGY

## 2019-01-23 PROCEDURE — 25000003 PHARM REV CODE 250: Performed by: INTERNAL MEDICINE

## 2019-01-23 PROCEDURE — 88342 CYTOLOGY SPECIMEN-FNA-RADIOLOGY ASSISTED WITH PATH ADEQUACY-CORE BX: ICD-10-PCS | Mod: 26,,, | Performed by: PATHOLOGY

## 2019-01-23 PROCEDURE — 88341 IMHCHEM/IMCYTCHM EA ADD ANTB: CPT | Mod: 26,,, | Performed by: PATHOLOGY

## 2019-01-23 PROCEDURE — 88342 IMHCHEM/IMCYTCHM 1ST ANTB: CPT | Performed by: PATHOLOGY

## 2019-01-23 PROCEDURE — 25000003 PHARM REV CODE 250: Performed by: FAMILY MEDICINE

## 2019-01-23 PROCEDURE — 88172 CYTOLOGY SPECIMEN-FNA-RADIOLOGY ASSISTED WITH PATH ADEQUACY-CORE BX: ICD-10-PCS | Mod: 26,,, | Performed by: PATHOLOGY

## 2019-01-23 RX ORDER — FENTANYL CITRATE 50 UG/ML
INJECTION, SOLUTION INTRAMUSCULAR; INTRAVENOUS CODE/TRAUMA/SEDATION MEDICATION
Status: COMPLETED | OUTPATIENT
Start: 2019-01-23 | End: 2019-01-23

## 2019-01-23 RX ORDER — MIDAZOLAM HYDROCHLORIDE 1 MG/ML
INJECTION INTRAMUSCULAR; INTRAVENOUS CODE/TRAUMA/SEDATION MEDICATION
Status: COMPLETED | OUTPATIENT
Start: 2019-01-23 | End: 2019-01-23

## 2019-01-23 RX ORDER — LIDOCAINE HYDROCHLORIDE 10 MG/ML
1 INJECTION, SOLUTION EPIDURAL; INFILTRATION; INTRACAUDAL; PERINEURAL ONCE
Status: COMPLETED | OUTPATIENT
Start: 2019-01-23 | End: 2019-01-23

## 2019-01-23 RX ORDER — SODIUM CHLORIDE 9 MG/ML
500 INJECTION, SOLUTION INTRAVENOUS ONCE
Status: COMPLETED | OUTPATIENT
Start: 2019-01-23 | End: 2019-01-23

## 2019-01-23 RX ORDER — FERROUS SULFATE 324(65)MG
325 TABLET, DELAYED RELEASE (ENTERIC COATED) ORAL DAILY
COMMUNITY
End: 2019-04-25

## 2019-01-23 RX ADMIN — FENTANYL CITRATE 100 MCG: 50 INJECTION, SOLUTION INTRAMUSCULAR; INTRAVENOUS at 02:01

## 2019-01-23 RX ADMIN — MIDAZOLAM HYDROCHLORIDE 2 MG: 1 INJECTION, SOLUTION INTRAMUSCULAR; INTRAVENOUS at 02:01

## 2019-01-23 RX ADMIN — LIDOCAINE HYDROCHLORIDE 1 MG: 10 INJECTION, SOLUTION EPIDURAL; INFILTRATION; INTRACAUDAL; PERINEURAL at 01:01

## 2019-01-23 RX ADMIN — SODIUM CHLORIDE 250 ML: 0.9 INJECTION, SOLUTION INTRAVENOUS at 01:01

## 2019-01-23 NOTE — DISCHARGE INSTRUCTIONS
For scheduling: Call Steffanie at [phone]    For questions or concerns call: KASSIE MON-FRI 8 AM- 5PM [phone]. Radiology resident on call [phone].    For immediate concerns that are not emergent, you may call our radiology clinic at: [phone]

## 2019-01-23 NOTE — PROGRESS NOTES
Pt arrived to Replaced by [facility], report received from Marixa. Dressing to left back CDI. Will continue to monitor.

## 2019-01-23 NOTE — PROCEDURES
"Radiology Post-Procedure Note    Pre Op Diagnosis: left iliac lesion    Post Op Diagnosis:  Same    Procedure:left iliac bone biopsy    Procedure performed by: Kavon    Written Informed Consent Obtained: Yes    Specimen Removed: YES 2 cores    Estimated Blood Loss: Minimal    Findings: under successful CT guided biopsy of the left iliac bone using a 11g coaxial system   Specimen sent to pathology.   Additional specimen sent to lab: Yes  Patient tolerated procedure well.    Guero Winslow MD ([facility])  Radiology PGY-5  [phone]    "

## 2019-01-23 NOTE — PROGRESS NOTES
Patient given discharge instructions and verbalized understanding of at home care. IV discontinued with catheter intact. Bandage to left lower back clean, dry and intact. Patient transferred to [facility] via wheelchair with transport by side.

## 2019-01-23 NOTE — H&P
Radiology History & Physical      SUBJECTIVE:     Chief Complaint: bone lesion    History of Present Illness:  Verónica Baker is a 68 y.o. female who presents for CT guided biopsy of left iliac lesion.  Past Medical History:   Diagnosis Date    Broken wrist     20 years ago    Diverticulitis     Fractured hip     Left hip in     Gout     Hypertension     Lung cancer 2018    Malignant melanoma of torso excluding breast 2018    Neoplastic malignant related fatigue 2018    Polycystic kidney     Polycystic liver disease 2018    Post-op pain 2019    Stage 4 chronic kidney disease 2018    Vulvar cancer      Past Surgical History:   Procedure Laterality Date    BIOPSY, LYMPH NODE, SENTINEL Bilateral 10/29/2018    Performed by Ba Osei MD at [facility] OR [address] [place] [address]     SECTION      CREATION, AV FISTULA Right 2018    Performed by Hernesto Decker MD at [facility] OR [address] [place] [address]    Fibroids removed      2 times    HEMORRHOIDOPEXY, INTERNAL PROLAPSING, STAPLING N/A 2018    Performed by Marcos Rey MD at [facility] OR    [facility], FOR SENTINEL NODE IDENTIFICATION - bilateral groin Bilateral 10/29/2018    Performed by Ba Osei MD at [facility] OR [address] [place] [address]    TOTAL ABDOMINAL HYSTERECTOMY  1983    URETHRECTOMY N/A 2019    Performed by Guero Escobar MD at [facility] OR [address] [place] [address]    VAGINAL DELIVERY      1 time    VULVECTOMY Bilateral 2019    Performed by Guero Escobar MD at [facility] OR [address] [place] [address]    VULVECTOMY Left 10/29/2018    Performed by Guero Escobar MD at [facility] OR [address] [place] [address]       Home Meds:   Prior to Admission medications    Medication Sig Start Date End Date Taking? Authorizing Provider   ferrous sulfate 324 mg (65 mg iron) TbEC Take 325 mg by mouth once daily.   Yes Historical Provider, MD   furosemide (LASIX) 40 MG tablet Take 1 tablet (40 mg total) by mouth 2 (two) times daily. 18 Yes Remington Harris MD   NIFEdipine (PROCARDIA-XL) 30 MG (OSM)  24 hr tablet Take 1 tablet (30 mg total) by mouth once daily. 1/10/19 1/10/20 Yes Remington Harris MD   sevelamer carbonate (RENVELA) 800 mg Tab Take 1 tablet (800 mg total) by mouth 3 (three) times daily with meals. 1/10/19 1/10/20 Yes Remington Harris MD   spironolactone (ALDACTONE) 25 MG tablet Take 25 mg by mouth once daily. PT TAKING 1/2 TAB PER DAY   Yes Historical Provider, MD   oxyCODONE-acetaminophen (PERCOCET) 5-325 mg per tablet Take 1 tablet by mouth every 8 (eight) hours as needed for Pain. 1/8/19 1/23/19  Guero Escobar MD     Anticoagulants/Antiplatelets: no anticoagulation    Allergies:   Review of patient's allergies indicates:   Allergen Reactions    Codeine Nausea Only    Sulfa (sulfonamide antibiotics) Rash     Sedation History:  no adverse reactions    Review of Systems:   Hematological: no known coagulopathies  Respiratory: no shortness of breath  Cardiovascular: no chest pain  Gastrointestinal: no abdominal pain  Genito-Urinary: no dysuria  Musculoskeletal: negative  Neurological: no TIA or stroke symptoms         OBJECTIVE:     Vital Signs (Most Recent)  BP: (!) 163/91 (01/23/19 1348)    Physical Exam:  ASA: 3  Mallampati: 2    General: no acute distress  Mental Status: alert and oriented to person, place and time  HEENT: normocephalic, atraumatic  Chest: unlabored breathing  Heart: regular heart rate  Abdomen: nondistended  Extremity: moves all extremities    Laboratory  Lab Results   Component Value Date    INR 1.1 01/23/2019       Lab Results   Component Value Date    WBC 3.95 01/14/2019    HGB 9.2 (L) 01/14/2019    HCT 28.5 (L) 01/14/2019    MCV 83 01/14/2019     01/14/2019      Lab Results   Component Value Date     (H) 01/14/2019     01/14/2019    K 4.1 01/14/2019     01/14/2019    CO2 21 (L) 01/14/2019    BUN 63 (H) 01/14/2019    CREATININE 4.3 (H) 01/14/2019    CALCIUM 9.5 01/14/2019    MG 1.8 01/14/2019    ALT 6 (L) 01/14/2019    AST 14 01/14/2019     ALBUMIN 3.1 (L) 01/14/2019    BILITOT 0.4 01/14/2019       ASSESSMENT/PLAN:     Sedation Plan: moderate  Patient will undergo CT guided biopsy of left iliac bone lesion.    Alberto Quiroz  Radiology PGY-5

## 2019-01-24 ENCOUNTER — NURSE TRIAGE (OUTPATIENT)
Dept: ADMINISTRATIVE | Facility: CLINIC | Age: 69
End: 2019-01-24

## 2019-01-24 NOTE — TELEPHONE ENCOUNTER
Reason for Disposition   Information only question and nurse able to answer    Protocols used: ST NO PROTOCOL CALL: INFORMATION ONLY-A-[place]    Pt had questions about changing her dressing. Home care advice given

## 2019-01-30 NOTE — DISCHARGE SUMMARY
Radiology Discharge Summary      Hospital Course: No complications    Admit Date: 1/23/2019  Discharge Date: 01/30/2019     Instructions Given to Patient: Yes  Diet: Resume prior diet  Activity: activity as tolerated    Description of Condition on Discharge: Stable  Vital Signs (Most Recent): Temp: 98.1 °F (36.7 °C) (01/23/19 1515)  Pulse: 87 (01/23/19 1600)  Resp: 12 (01/23/19 1600)  BP: (!) 174/92 (01/23/19 1600)  SpO2: 98 % (01/23/19 1600)    Discharge Disposition: Home    Discharge Diagnosis: iliac lesion     Follow-up: with referring    .Isael MACHADO M.D. personally reviewed and agree with the above dictated note.

## 2019-02-01 ENCOUNTER — TELEPHONE (OUTPATIENT)
Dept: GYNECOLOGIC ONCOLOGY | Facility: CLINIC | Age: 69
End: 2019-02-01

## 2019-02-04 ENCOUNTER — LAB VISIT (OUTPATIENT)
Dept: LAB | Facility: HOSPITAL | Age: 69
End: 2019-02-04
Attending: INTERNAL MEDICINE
Payer: MEDICARE

## 2019-02-04 ENCOUNTER — OFFICE VISIT (OUTPATIENT)
Dept: HEMATOLOGY/ONCOLOGY | Facility: CLINIC | Age: 69
End: 2019-02-04
Payer: MEDICARE

## 2019-02-04 VITALS
SYSTOLIC BLOOD PRESSURE: 167 MMHG | RESPIRATION RATE: 18 BRPM | BODY MASS INDEX: 21.01 KG/M2 | OXYGEN SATURATION: 97 % | HEIGHT: 65 IN | DIASTOLIC BLOOD PRESSURE: 92 MMHG | TEMPERATURE: 99 F | WEIGHT: 126.13 LBS | HEART RATE: 105 BPM

## 2019-02-04 DIAGNOSIS — Q44.6 POLYCYSTIC LIVER DISEASE: ICD-10-CM

## 2019-02-04 DIAGNOSIS — C34.12 PRIMARY ADENOCARCINOMA OF UPPER LOBE OF LEFT LUNG: ICD-10-CM

## 2019-02-04 DIAGNOSIS — C79.51 BONE METASTASES: ICD-10-CM

## 2019-02-04 DIAGNOSIS — K21.9 GASTROESOPHAGEAL REFLUX DISEASE WITHOUT ESOPHAGITIS: ICD-10-CM

## 2019-02-04 DIAGNOSIS — Q61.3 POLYCYSTIC KIDNEY DISEASE: ICD-10-CM

## 2019-02-04 DIAGNOSIS — R53.82 CHRONIC FATIGUE: Primary | ICD-10-CM

## 2019-02-04 DIAGNOSIS — N18.4 STAGE 4 CHRONIC KIDNEY DISEASE: ICD-10-CM

## 2019-02-04 DIAGNOSIS — C43.59 MALIGNANT MELANOMA OF TORSO EXCLUDING BREAST: ICD-10-CM

## 2019-02-04 DIAGNOSIS — D63.0 ANEMIA IN NEOPLASTIC DISEASE: ICD-10-CM

## 2019-02-04 LAB
ALBUMIN SERPL BCP-MCNC: 3.3 G/DL
ALP SERPL-CCNC: 169 U/L
ALT SERPL W/O P-5'-P-CCNC: 12 U/L
ANION GAP SERPL CALC-SCNC: 10 MMOL/L
AST SERPL-CCNC: 17 U/L
BASOPHILS # BLD AUTO: 0.04 K/UL
BASOPHILS NFR BLD: 0.9 %
BILIRUB SERPL-MCNC: 0.5 MG/DL
BUN SERPL-MCNC: 64 MG/DL
CALCIUM SERPL-MCNC: 10 MG/DL
CHLORIDE SERPL-SCNC: 104 MMOL/L
CO2 SERPL-SCNC: 23 MMOL/L
CREAT SERPL-MCNC: 5.1 MG/DL
DIFFERENTIAL METHOD: ABNORMAL
EOSINOPHIL # BLD AUTO: 0.1 K/UL
EOSINOPHIL NFR BLD: 2.9 %
ERYTHROCYTE [DISTWIDTH] IN BLOOD BY AUTOMATED COUNT: 14 %
EST. GFR  (AFRICAN AMERICAN): 9.4 ML/MIN/1.73 M^2
EST. GFR  (NON AFRICAN AMERICAN): 8.2 ML/MIN/1.73 M^2
GLUCOSE SERPL-MCNC: 122 MG/DL
HCT VFR BLD AUTO: 30.4 %
HGB BLD-MCNC: 9.5 G/DL
IMM GRANULOCYTES # BLD AUTO: 0.01 K/UL
IMM GRANULOCYTES NFR BLD AUTO: 0.2 %
LYMPHOCYTES # BLD AUTO: 0.5 K/UL
LYMPHOCYTES NFR BLD: 11.4 %
MCH RBC QN AUTO: 26.5 PG
MCHC RBC AUTO-ENTMCNC: 31.3 G/DL
MCV RBC AUTO: 85 FL
MONOCYTES # BLD AUTO: 0.5 K/UL
MONOCYTES NFR BLD: 10.1 %
NEUTROPHILS # BLD AUTO: 3.4 K/UL
NEUTROPHILS NFR BLD: 74.5 %
NRBC BLD-RTO: 0 /100 WBC
PLATELET # BLD AUTO: 262 K/UL
PMV BLD AUTO: 11.2 FL
POTASSIUM SERPL-SCNC: 3.6 MMOL/L
PROT SERPL-MCNC: 7.5 G/DL
RBC # BLD AUTO: 3.59 M/UL
SODIUM SERPL-SCNC: 137 MMOL/L
WBC # BLD AUTO: 4.56 K/UL

## 2019-02-04 PROCEDURE — 99214 PR OFFICE/OUTPT VISIT, EST, LEVL IV, 30-39 MIN: ICD-10-PCS | Mod: S$GLB,,, | Performed by: INTERNAL MEDICINE

## 2019-02-04 PROCEDURE — 80053 COMPREHEN METABOLIC PANEL: CPT

## 2019-02-04 PROCEDURE — 99999 PR PBB SHADOW E&M-EST. PATIENT-LVL III: ICD-10-PCS | Mod: PBBFAC,,, | Performed by: INTERNAL MEDICINE

## 2019-02-04 PROCEDURE — 85025 COMPLETE CBC W/AUTO DIFF WBC: CPT

## 2019-02-04 PROCEDURE — 99999 PR PBB SHADOW E&M-EST. PATIENT-LVL III: CPT | Mod: PBBFAC,,, | Performed by: INTERNAL MEDICINE

## 2019-02-04 PROCEDURE — 1101F PT FALLS ASSESS-DOCD LE1/YR: CPT | Mod: CPTII,S$GLB,, | Performed by: INTERNAL MEDICINE

## 2019-02-04 PROCEDURE — 99214 OFFICE O/P EST MOD 30 MIN: CPT | Mod: S$GLB,,, | Performed by: INTERNAL MEDICINE

## 2019-02-04 PROCEDURE — 1101F PR PT FALLS ASSESS DOC 0-1 FALLS W/OUT INJ PAST YR: ICD-10-PCS | Mod: CPTII,S$GLB,, | Performed by: INTERNAL MEDICINE

## 2019-02-04 PROCEDURE — 36415 COLL VENOUS BLD VENIPUNCTURE: CPT

## 2019-02-04 RX ORDER — MORPHINE SULFATE 15 MG/1
15 TABLET ORAL EVERY 4 HOURS PRN
Qty: 60 TABLET | Refills: 0 | Status: SHIPPED | OUTPATIENT
Start: 2019-02-04 | End: 2019-03-12

## 2019-02-04 NOTE — PROGRESS NOTES
CC: Lung cancer, vulvar melanoma, follow up          HPI:Ms. Baker is a 68 yr old lady here for follow up.  She had a noncontrast CT chest (due to stage 4 CKD) for hemoptysis, which showed a 4cm spiculated medial LUPIS mass and possible mediastinal LAD.  She has hepatomegaly due to polycystic liver (and kidneys).  There were small lucent areas in the sternum and T9 vertebral bodies concerning for mets.  However, PET CT did not reveal any hypermetabolic bone lesions. She underwent biopsy of the lesion on 4/4/18 in [place] and pathology reveals adenocarcinoma. She wanted to come to [facility] for treatment as she has family she can stay with here.She was diagnosed with polycystic kidney/liver when she was 18.  She is followed by Dr. Nagel in [place].  She has chronic infrequent headaches that are not worsening in severity or frequency.  She was evaluated by radiation oncology here ( ).    She completed concurrent carboplatin/paclitaxel for stage III adenocarcinoma lung. She finished 5 out of the planned 6 weekly treatments, with last treatment in 1st week of June 2018.   PET CT done on 8/30/18 showed improvement in the left upper lobe lung lesion. SUV max was 5.69, previously 11.13 and there was resolution of mediastinal metastatic lymph nodes.  She started consolidation treatment with Durvalumab from 9/11/18.  She has been diagnosed with vulvar melanoma.      Interval history: She is here for a follow up visit. She feels better, eating better. Her cough is better. She had vulvar surgery on 10/30/18. [place] lymph nodes were negative at that time.  However, margins in the resected specimen were positive.She had AV fistula surgery on 11/9 in her right UE . She underwent vulvectomy with distal urethrectomy on 1/7/19. She had left hip biopsy on 1/23/19     Review of Systems   Constitutional: Positive for malaise/fatigue. Negative for chills, fever and weight loss.   HENT: Negative for ear discharge, ear pain and  nosebleeds.    Eyes: Negative for blurred vision, double vision, photophobia and pain.   Respiratory: Negative for cough, hemoptysis and sputum production.    Cardiovascular: Negative for chest pain, palpitations, orthopnea and claudication.   Gastrointestinal: Negative for abdominal pain, heartburn, nausea and vomiting.   Genitourinary: Negative for dysuria and urgency.   Musculoskeletal: Positive for back pain and joint pain. Negative for myalgias.   Skin: Negative for rash.   Neurological: Negative for dizziness, tingling, tremors, sensory change, speech change and headaches.   Endo/Heme/Allergies: Does not bruise/bleed easily.   Psychiatric/Behavioral: Negative for depression, substance abuse and suicidal ideas.           Current Outpatient Medications   Medication Sig    ferrous sulfate 324 mg (65 mg iron) TbEC Take 325 mg by mouth once daily.    furosemide (LASIX) 40 MG tablet Take 1 tablet (40 mg total) by mouth 2 (two) times daily.    NIFEdipine (PROCARDIA-XL) 30 MG (OSM) 24 hr tablet Take 1 tablet (30 mg total) by mouth once daily.    sevelamer carbonate (RENVELA) 800 mg Tab Take 1 tablet (800 mg total) by mouth 3 (three) times daily with meals.    spironolactone (ALDACTONE) 25 MG tablet Take 25 mg by mouth once daily. PT TAKING 1/2 TAB PER DAY     No current facility-administered medications for this visit.            Vitals:    02/04/19 0843   BP: (!) 167/92   Pulse: 105   Resp: 18   Temp: 99 °F (37.2 °C)     Physical Exam   Constitutional: She is oriented to person, place, and time. She appears well-developed.   HENT:   Head: Normocephalic and atraumatic.   Mouth/Throat: No oropharyngeal exudate.   Eyes: Pupils are equal, round, and reactive to light. No scleral icterus.   Neck: Normal range of motion.   Cardiovascular: Normal rate and regular rhythm.   Pulmonary/Chest: Effort normal and breath sounds normal.   Abdominal: She exhibits distension. There is no tenderness. There is no rebound and no  guarding.   Musculoskeletal: She exhibits no edema.   Lymphadenopathy:     She has no cervical adenopathy.   Neurological: She is alert and oriented to person, place, and time. No cranial nerve deficit.   Skin: Skin is warm.   Psychiatric: She has a normal mood and affect.         4/13/18 PET CT        EXAMINATION:  NM PET CT ROUTINE    CLINICAL HISTORY:  Malignant neoplasm of upper lobe, left bronchus or lung.    Outside imaging demonstrated 4 cm spiculated medial left upper lobe mass (biopsy proven adenocarcinoma) with possible mediastinal lymphadenopathy as well as lucent areas involving the sternum and T9.    TECHNIQUE:  13.12 mCi of F18-FDG was administered intravenously in the right antecubital fossa.  After an approximately 60 min distribution time, PET/CT images were acquired from the skull base to the mid thigh. Transmission images were acquired to correct for attenuation using a whole body low-dose CT scan without contrast with the arms positioned above the head. Glycemia at the time of injection was 82 mg/dL.    COMPARISON:  CT abdomen pelvis 07/21/2017, MRI brain 04/13/2018    FINDINGS:  Quality of the study: Adequate.    Abnormal foci of increased uptake are present within the left upper lobe as well as a few prevascular mediastinal lymph nodes.  Lung mass measures approximately 4.6 x 2.9 cm.    No appreciable lucent lesion in the sternum or T9 vertebral body.  Mild degenerative metabolic activity is present at the sternomanubrial junction.    Index lesions:    - Left upper lobe mass: SUV max 11.1    - Lateral pre-vascular lymph node: SUV max 5.8    Physiologic uptake of the tracer is present within the brain, salivary glands, myocardium, GI and  tracts.    Incidental CT findings: Liver and kidneys are enlarged with numerous intraparenchymal cyst and associated mass effect on the adjacent abdominal organs, unchanged.  Colonic diverticulosis without diverticulitis.  Bandlike opacification within the  right lower lobe likely represents subsegmental atelectasis.  Calcific atherosclerosis involves the lower extremity vasculature bilaterally.   Impression        Known malignancy of the left upper lobe with mediastinal trini metastases.    No appreciable lucent lesion in the sternum or T9 vertebral body.  Correlation with prior outside imaging is recommended.            4/13/18 MRI brain w/o cont        FINDINGS:  Intracranial compartment:    Ventricles and sulci are normal in size for age without evidence of hydrocephalus. No extra-axial blood or fluid collections.    Nonspecific small foci of T2/FLAIR high signal intensity in the supratentorial white matter, favored to represent chronic microvascular ischemic changes.  Remote lacunar type infarct in the left putamen.  Small punctate focus of susceptibility signal artifact in the left occipital lobe, suggestive of an old microhemorrhage or calcification.  No mass lesion, acute hemorrhage, edema or acute infarct.    Normal vascular flow voids are preserved.    Skull/extracranial contents (limited evaluation): Bone marrow signal intensity is normal.   Impression        No evidence of intracranial metastases within limitation in the absence of IV contrast which decrease the sensitivity of this exam.    Nonspecific foci of T2/FLAIR high signal intensity in the supratentorial white matter, likely representing chronic microvascular ischemic changes.               8/30/18 PET CT        CLINICAL HISTORY:  lung cancer restaging;  Malignant neoplasm of upper lobe, left bronchus or lung    FINDINGS:  Patient was administered 11.5 millicuries of FDG intravenously.  Comparison 04/13/2018.    Left upper lung lesion SUV max 5.69, previously larger SUV max 11.13.  Mediastinal lymph nodes have returned to baseline.    There is physiologic intracranial, head and neck activity.  Heart mediastinum are normal.  There is polycystic liver and kidney disease.  There is physiologic GI and   activity.  There is diverticulosis.  No bone lesions are seen.  Right lung is clear.   Impression        See above    Improvement left upper lobe lung lesion SUV max 5.69, previously 11.13 and resolution of mediastinal metastatic lymph nodes.    Severe polycystic liver and kidney disease.       Bilateral sentinel lymph node biopsies done with Dr. Ba Osei.  2.  Left radical vulvectomy     10/29/18  FINAL PATHOLOGIC DIAGNOSIS  [place] FINAL DIAGNOSIS:  Skin, left sentinel node hot blue 150, right sentinel node hot blue 150, and left vulva, excisions (MS [id number],  10/29/2018):  Lymph node, left sentinel hot blue 150, excision (1.): Lymph node identified negative for metastatic melanoma.  HMB45, Mart 1, and S100 immunostains performed at the referring institution are negative.  Right sentinel node, hot blue, 150, excision (2.): Lymph node identified negative for metastatic melanoma. S100,HMB45, and Mart 1 immunostains performed at the referring institution are negative.  Skin, left vulva, excision (3.) Extensive residual malignant melanoma, invasive to a Breslow depth of 4.0 mm. The specimen shows ulceration but this may be due to prior biopsy rather than tumoral ulceration. Mitotic rate is 10 per  mm2. The tumor cells extend to the green inked margin and within 0.2 mm of the blue inked margin.  Melan A and Sox 10 immunostains report performed at [facility] on sections from blocks 3D, 3E, 3 F, 3 G, 3H,3I, 3 J, 3 M, 3 P, 3 Q, and 3 are to evaluate the extent of the lesion.        11/26/18 MRI brain w/o cont     COMPARISON:  04/13/2018    FINDINGS:  There is no restricted diffusion to suggest acute infarction.  There is a mild degree of a age-appropriate generalized cerebral volume loss.  Compensatory enlargement ventricles sulci and cisterns without hydrocephalus.  The major intracranial T2 flow voids are present.    Ill-defined mild T2 flair signal hyperintensity diffusely in the supratentorial white matter  similar to prior suggestive for posttherapy change.  There is a focal area of cystic change in the left caudate/putamen anteriorly compatible with remote lacunar-type infarction.    There is a stable focus of punctate susceptibility in the right parietal subcortical white matter and a small focus in the left posterior temporal subcortical white matter which are unchanged suggestive for prior hemorrhage from treated metastases or remote microhemorrhage.  No new parenchymal signal abnormality.  Please note evaluation for subtle metastases is limited by lack of contrast.    No abnormal extra-axial fluid collection.   Impression        No significant change from prior.    No evidence for new signal abnormality to suggest new or worsening intracranial metastatic disease.    Continued ill-defined T2 FLAIR hyperintensity supratentorial white matter suggestive for posttherapy change with small anterior caudate/putamen remote lacunar-type infarct    Stable small left posterior temporal and punctate right parietal subcortical foci susceptibility suggestive for prior hemorrhage or treated hemorrhagic metastases.    Clinical correlation and continued follow-up advised           12/31/18 PET CT          FINDINGS:  Patient was administered 10.51 millicuries of FDG intravenously.  Comparison is 08/30/2018.    The left upper lobe lung primary lesion has completely resolved and there is radiation change of the left lung.  There are multiple new bone metastases involving the spine and left pelvis.    Index left iliac bone metastasis SUV max 7.36.    There is physiologic intracranial, head, neck activity.  Heart mediastinum are normal.  There is polycystic liver and kidney disease.  The pelvic organs show nothing unusual.  There is physiologic pancreas and adrenal gland activity.  No suspicious lung lesions are seen.       Impression         See above    Detrimental change with multiple bone metastases throughout the central axial red  marrow skeleton as above.    Index left iliac bone lesion SUV max 7.36.    Left upper lobe primary lesion has resolved       1/23/19 FINAL PATHOLOGIC DIAGNOSIS    Bone, left hip/iliac crest, biopsy:  -METASTATIC CARCINOMA, see comment  COMMENT: Given the patient's history along with the histology and immunohistochemical stains, the tumor is most consistent with a metastatic adenocarcinoma, lung primary. Clinical and radiological correlation is recommended  Microscopic Examination  Sections show fragments of bone and hematopoietic elements are with admixed tumor cells. The tumor cells show an increased NC ratio with irregular and hyperchromatic nucleus. A vague gland-like pattern is focally noted. The tumor  cells are positive for CK 7 and TTF-1 while negative for S100, Mart 1, and CK 20. Immunohistochemical stains (performed on block B) were reviewed in conjunction with adequate positive controls.  Component      Latest Ref Rng & Units 1/23/2019 1/14/2019   WBC      3.90 - 12.70 K/uL  3.95   RBC      4.00 - 5.40 M/uL  3.43 (L)   Hemoglobin      12.0 - 16.0 g/dL  9.2 (L)   Hematocrit      37.0 - 48.5 %  28.5 (L)   MCV      82 - 98 fL  83   MCH      27.0 - 31.0 pg  26.8 (L)   MCHC      32.0 - 36.0 g/dL  32.3   RDW      11.5 - 14.5 %  14.6 (H)   Platelets      150 - 350 K/uL  229   MPV      9.2 - 12.9 fL  11.5   Immature Granulocytes      0.0 - 0.5 %  0.3   Gran # (ANC)      1.8 - 7.7 K/uL  2.6   Immature Grans (Abs)      0.00 - 0.04 K/uL  0.01   Lymph #      1.0 - 4.8 K/uL  0.5 (L)   Mono #      0.3 - 1.0 K/uL  0.5   Eos #      0.0 - 0.5 K/uL  0.3   Baso #      0.00 - 0.20 K/uL  0.02   nRBC      0 /100 WBC  0   Gran%      38.0 - 73.0 %  66.8   Lymph%      18.0 - 48.0 %  13.4 (L)   Mono%      4.0 - 15.0 %  12.2   Eosinophil%      0.0 - 8.0 %  6.8   Basophil%      0.0 - 1.9 %  0.5   Differential Method        Automated   Sodium      136 - 145 mmol/L  139   Potassium      3.5 - 5.1 mmol/L  4.1   Chloride      95 - 110  mmol/L  108   CO2      23 - 29 mmol/L  21 (L)   Glucose      70 - 110 mg/dL  113 (H)   BUN, Bld      8 - 23 mg/dL  63 (H)   Creatinine      0.5 - 1.4 mg/dL  4.3 (H)   Calcium      8.7 - 10.5 mg/dL  9.5   Total Protein      6.0 - 8.4 g/dL  7.0   Albumin      3.5 - 5.2 g/dL  3.1 (L)   Total Bilirubin      0.1 - 1.0 mg/dL  0.4   Alkaline Phosphatase      55 - 135 U/L  149 (H)   AST      10 - 40 U/L  14   ALT      10 - 44 U/L  6 (L)   Anion Gap      8 - 16 mmol/L  10   eGFR if African American      >60 mL/min/1.73 m:2  11.5 (A)   eGFR if non African American      >60 mL/min/1.73 m:2  10.0 (A)   Protime      9.0 - 12.5 sec 11.4    Coumadin Monitoring INR      0.8 - 1.2 1.1          Assessment:     1. Adenocarcinoma lung  2. Polycystic kidney disease  3. Polycystic liver disease  4. Ascites  5. Hypertension,essential  6. Cough  7. Dyspnea on exertion  8. CKD stage IV  9. Anemia, normocytic, hypochromic  10. Weight loss  11. Vulvar melanoma     Plan:     1. She has a left upper lung mass that was biopsied, as well as hyper metabolic, prevascular mediastinal lymph nodes on PET CT. No other hypermetabolic lesions on PET CT. MRI brain (non-contrast) does not show any metastatic lesion. She has polycystic kidney disease and she is certainly at higher risk for renal malignancy, colon CA as well as liver CA. Slides and block were requested from [facility] and were reviewed by pathology here. It was confirmed that she has adenocarcinoma originating in the lungs.   She started concurrent chemotherapy with Carboplatin/Paclitaxcel ( renally adjusted) as Pemetrexed was not appropriate with the reduced renal function.   Chemotherapy cycle 6 was held due to worsening renal function and leukopenia. Last treatment was on 6/4/18.  She is doing better now. PET CT done on 8/30/18 showed improvement in the left upper lobe lung lesion. SUV max was 5.69, previously 11.13 and there was resolution of mediastinal metastatic lymph nodes. I  discussed these findings with her in detail. I discussed starting consolidative immunotherapy with CKI Duravalumab on 9/11/18.  She has received 7 treatments so far. She had vulvar surgery ( for melanoma) on 10/20/18.  CKI was held prior to the surgery. She had positive margins, but negative sentinel lymph nodes. She underwent vulvectomy and distal uretherctomy on 1/7/19. Pathology pending.  PET CT was repeated on 12/31/18. The left upper lobe lung primary lesion, compared to 8/30/18, had completely resolved and there was radiation change of the left lung.  There were multiple new bone metastases involving the spine and left pelvis. Images were reviewed. I explained the findings to her and told her she most likely has metastatic disease now--either from the lung or from melanoma. She was very tearful. I explained that treatments are no longer curative and will only be palliative.  Biopsy of left iliac bone showed metastatic lung cancer. Molecular studies pending. She will continue Durvalumab for now. She cannot receive Denosumab or Zometa due to her renal impairment.   Morphine IR prescribed today for bone pain.       4. Chronic, attributed to CKD/ polycystic liver        5. On multiple anti-HT medications. /92 mm Hg today      6. Possibly related to malignancy in her lungs. It is better now.       8. Serum Cr 4.3 today. She follows with nephrology. She has AVF now. She is still not on dialysis. She is still making good quantities of urine daily. Durvalumab can worsen renal function, but is unlikely to be the case here.      9. Secondary to CKD and chemotherapy. Serum iron saturation 33% on 5/21/18.         10. Now stable.      11. Discovered on biopsy done on 6/21/18. Tim's level 3, Breslow depth 1.6mm. She is being followed seen by  ([facility]) . The lesion does not appear to be PET avid, on the last PET CT done in Aug 2018. She underwent bilateral sentinel lymph node biopsies and left radical  vulvectomy on 10/30/18. [place] lymph nodes were negative.  She likely has residual melanoma as the margins in the resected specimen were positive. [place] LN was negative. It was at least T4N0. No clear evidence of brain metastases on non-contrast MRI brain done on 11/26/18.  BRAF status unknown. She underwent vulvectomy and distal urethrectomy on 1/7/19. Final pathology pending.   PET CT on 12/31/18 showed multiple, new bone metastases. Biopsy done revealed metastatic lung cancer.         12. Pruritius: She has extensive pruritus. She is using moisturizers. She will use Hydrocortisone 1%  cream as needed ( except on face)             Distress Screening Results: Psychosocial Distress screening score of Distress Score: 1 noted and reviewed. No intervention indicated.

## 2019-02-08 ENCOUNTER — INFUSION (OUTPATIENT)
Dept: INFUSION THERAPY | Facility: HOSPITAL | Age: 69
End: 2019-02-08
Attending: INTERNAL MEDICINE
Payer: MEDICARE

## 2019-02-08 VITALS
DIASTOLIC BLOOD PRESSURE: 88 MMHG | SYSTOLIC BLOOD PRESSURE: 175 MMHG | TEMPERATURE: 98 F | BODY MASS INDEX: 21.01 KG/M2 | WEIGHT: 126.13 LBS | HEART RATE: 88 BPM | HEIGHT: 65 IN | RESPIRATION RATE: 18 BRPM

## 2019-02-08 DIAGNOSIS — C34.12 PRIMARY ADENOCARCINOMA OF UPPER LOBE OF LEFT LUNG: Primary | ICD-10-CM

## 2019-02-08 PROCEDURE — 63600175 PHARM REV CODE 636 W HCPCS: Mod: TB | Performed by: INTERNAL MEDICINE

## 2019-02-08 PROCEDURE — 25000003 PHARM REV CODE 250: Performed by: INTERNAL MEDICINE

## 2019-02-08 PROCEDURE — 96413 CHEMO IV INFUSION 1 HR: CPT

## 2019-02-08 RX ORDER — HEPARIN 100 UNIT/ML
500 SYRINGE INTRAVENOUS
Status: DISCONTINUED | OUTPATIENT
Start: 2019-02-08 | End: 2019-02-08 | Stop reason: HOSPADM

## 2019-02-08 RX ORDER — SODIUM CHLORIDE 0.9 % (FLUSH) 0.9 %
10 SYRINGE (ML) INJECTION
Status: CANCELLED | OUTPATIENT
Start: 2019-02-08

## 2019-02-08 RX ORDER — HEPARIN 100 UNIT/ML
500 SYRINGE INTRAVENOUS
Status: CANCELLED | OUTPATIENT
Start: 2019-02-08

## 2019-02-08 RX ORDER — SODIUM CHLORIDE 0.9 % (FLUSH) 0.9 %
10 SYRINGE (ML) INJECTION
Status: DISCONTINUED | OUTPATIENT
Start: 2019-02-08 | End: 2019-02-08 | Stop reason: HOSPADM

## 2019-02-08 RX ADMIN — DURVALUMAB 625 MG: 120 INJECTION, SOLUTION INTRAVENOUS at 10:02

## 2019-02-08 RX ADMIN — SODIUM CHLORIDE: 9 INJECTION, SOLUTION INTRAVENOUS at 10:02

## 2019-02-08 NOTE — PLAN OF CARE
Problem: Adult Inpatient Plan of Care  Goal: Plan of Care Review  Going back to see sister in hospital who had surgery. Tolerated imfinzi well.

## 2019-02-08 NOTE — PLAN OF CARE
Problem: Adult Inpatient Plan of Care  Goal: Plan of Care Review  Outcome: Ongoing (interventions implemented as appropriate)  Did well with imfinzi today. Left ambulatory.

## 2019-02-12 ENCOUNTER — TELEPHONE (OUTPATIENT)
Dept: GYNECOLOGIC ONCOLOGY | Facility: CLINIC | Age: 69
End: 2019-02-12

## 2019-02-13 ENCOUNTER — OFFICE VISIT (OUTPATIENT)
Dept: GYNECOLOGIC ONCOLOGY | Facility: CLINIC | Age: 69
End: 2019-02-13
Payer: MEDICARE

## 2019-02-13 VITALS
DIASTOLIC BLOOD PRESSURE: 85 MMHG | BODY MASS INDEX: 21.23 KG/M2 | SYSTOLIC BLOOD PRESSURE: 165 MMHG | HEIGHT: 65 IN | HEART RATE: 98 BPM | WEIGHT: 127.44 LBS

## 2019-02-13 DIAGNOSIS — Q61.3 POLYCYSTIC KIDNEY DISEASE: ICD-10-CM

## 2019-02-13 DIAGNOSIS — C43.59 MALIGNANT MELANOMA OF TORSO EXCLUDING BREAST: Primary | ICD-10-CM

## 2019-02-13 DIAGNOSIS — C34.12 PRIMARY ADENOCARCINOMA OF UPPER LOBE OF LEFT LUNG: ICD-10-CM

## 2019-02-13 PROCEDURE — 1101F PT FALLS ASSESS-DOCD LE1/YR: CPT | Mod: CPTII,S$GLB,, | Performed by: OBSTETRICS & GYNECOLOGY

## 2019-02-13 PROCEDURE — 99214 PR OFFICE/OUTPT VISIT, EST, LEVL IV, 30-39 MIN: ICD-10-PCS | Mod: 24,S$GLB,, | Performed by: OBSTETRICS & GYNECOLOGY

## 2019-02-13 PROCEDURE — 99214 OFFICE O/P EST MOD 30 MIN: CPT | Mod: 24,S$GLB,, | Performed by: OBSTETRICS & GYNECOLOGY

## 2019-02-13 PROCEDURE — 99999 PR PBB SHADOW E&M-EST. PATIENT-LVL III: ICD-10-PCS | Mod: PBBFAC,,, | Performed by: OBSTETRICS & GYNECOLOGY

## 2019-02-13 PROCEDURE — 99999 PR PBB SHADOW E&M-EST. PATIENT-LVL III: CPT | Mod: PBBFAC,,, | Performed by: OBSTETRICS & GYNECOLOGY

## 2019-02-13 PROCEDURE — 1101F PR PT FALLS ASSESS DOC 0-1 FALLS W/OUT INJ PAST YR: ICD-10-PCS | Mod: CPTII,S$GLB,, | Performed by: OBSTETRICS & GYNECOLOGY

## 2019-02-13 NOTE — PROGRESS NOTES
Subjective:       Patient ID: Verónica Baker is a 68 y.o. female.    Chief Complaint: No chief complaint on file.    HPI   Patient comes in today for inspect of the vulva after partial vulvectomy and distal urethrectomy on 1/7/2019. This was a re-excision of a prior vulvar mass in Oct 2018 which showed melanoma with positive margins.      Her oncologic history is:  June 2018: Initial biopsy in June 2018 was originally read as keratinizing hyperplastic squamous epithelium with scattered juctional nests of atypical melanocytes. Pathology was read at [place]. institional review in Sept 2018 was reported a malignant melanoma. Tim's level 3 with Breslow depth 1.6 mm.      10/29/2018:partial vulvectomy:  Breslow's level of 4 mm. Positive medial margin. Negative bilateral SL nodes.      Jan 2019: partial vulvectomy and distal urethrectomy. This was a re-excision of a vulvar masswhich showed melanoma with positive margins. (+) margin.           Review of Systems   Gastrointestinal: Positive for abdominal distention.   Genitourinary: Negative for genital sores and vaginal bleeding.        Vulvar itching at incision site.        Objective:   LMP  (LMP Unknown)      Physical Exam   Constitutional: She appears well-developed and well-nourished.   Abdominal: She exhibits distension (due to polycystic kidneys. ).   Genitourinary:   Genitourinary Comments: Healing incision periurethral and right side of vulva.         Assessment:       1. Malignant melanoma of vulva    2. Polycystic kidney disease    3. Primary adenocarcinoma of upper lobe of left lung        Plan:   Malignant melanoma of vulva  Incomplete healing.   RTC in 1 month for reinspection and then to schedule for repeat WLE.     Polycystic kidney disease    Primary adenocarcinoma of upper lobe of left lung

## 2019-02-22 ENCOUNTER — TELEPHONE (OUTPATIENT)
Dept: HEMATOLOGY/ONCOLOGY | Facility: CLINIC | Age: 69
End: 2019-02-22

## 2019-02-22 ENCOUNTER — INFUSION (OUTPATIENT)
Dept: INFUSION THERAPY | Facility: HOSPITAL | Age: 69
End: 2019-02-22
Attending: INTERNAL MEDICINE
Payer: MEDICARE

## 2019-02-22 ENCOUNTER — OFFICE VISIT (OUTPATIENT)
Dept: HEMATOLOGY/ONCOLOGY | Facility: CLINIC | Age: 69
End: 2019-02-22
Payer: MEDICARE

## 2019-02-22 VITALS
HEIGHT: 65 IN | RESPIRATION RATE: 18 BRPM | HEART RATE: 83 BPM | OXYGEN SATURATION: 98 % | WEIGHT: 129.88 LBS | TEMPERATURE: 98 F | BODY MASS INDEX: 21.64 KG/M2 | DIASTOLIC BLOOD PRESSURE: 91 MMHG | SYSTOLIC BLOOD PRESSURE: 187 MMHG

## 2019-02-22 VITALS — RESPIRATION RATE: 18 BRPM | DIASTOLIC BLOOD PRESSURE: 88 MMHG | SYSTOLIC BLOOD PRESSURE: 173 MMHG | HEART RATE: 86 BPM

## 2019-02-22 DIAGNOSIS — C34.12 PRIMARY ADENOCARCINOMA OF UPPER LOBE OF LEFT LUNG: Primary | ICD-10-CM

## 2019-02-22 DIAGNOSIS — N18.5 CKD (CHRONIC KIDNEY DISEASE), STAGE V: ICD-10-CM

## 2019-02-22 DIAGNOSIS — E07.9 THYROID DISORDER: ICD-10-CM

## 2019-02-22 DIAGNOSIS — R11.0 NAUSEA: ICD-10-CM

## 2019-02-22 DIAGNOSIS — C43.59 MALIGNANT MELANOMA OF TORSO EXCLUDING BREAST: ICD-10-CM

## 2019-02-22 DIAGNOSIS — C79.51 BONE METASTASES: ICD-10-CM

## 2019-02-22 PROCEDURE — 1101F PR PT FALLS ASSESS DOC 0-1 FALLS W/OUT INJ PAST YR: ICD-10-PCS | Mod: CPTII,S$GLB,, | Performed by: INTERNAL MEDICINE

## 2019-02-22 PROCEDURE — A4216 STERILE WATER/SALINE, 10 ML: HCPCS | Performed by: INTERNAL MEDICINE

## 2019-02-22 PROCEDURE — 99999 PR PBB SHADOW E&M-EST. PATIENT-LVL III: ICD-10-PCS | Mod: PBBFAC,,, | Performed by: INTERNAL MEDICINE

## 2019-02-22 PROCEDURE — 96413 CHEMO IV INFUSION 1 HR: CPT

## 2019-02-22 PROCEDURE — 1101F PT FALLS ASSESS-DOCD LE1/YR: CPT | Mod: CPTII,S$GLB,, | Performed by: INTERNAL MEDICINE

## 2019-02-22 PROCEDURE — 99215 PR OFFICE/OUTPT VISIT, EST, LEVL V, 40-54 MIN: ICD-10-PCS | Mod: 24,S$GLB,, | Performed by: INTERNAL MEDICINE

## 2019-02-22 PROCEDURE — 99999 PR PBB SHADOW E&M-EST. PATIENT-LVL III: CPT | Mod: PBBFAC,,, | Performed by: INTERNAL MEDICINE

## 2019-02-22 PROCEDURE — 63600175 PHARM REV CODE 636 W HCPCS: Mod: TB | Performed by: INTERNAL MEDICINE

## 2019-02-22 PROCEDURE — 99215 OFFICE O/P EST HI 40 MIN: CPT | Mod: 24,S$GLB,, | Performed by: INTERNAL MEDICINE

## 2019-02-22 PROCEDURE — 25000003 PHARM REV CODE 250: Performed by: INTERNAL MEDICINE

## 2019-02-22 RX ORDER — HEPARIN 100 UNIT/ML
500 SYRINGE INTRAVENOUS
Status: CANCELLED | OUTPATIENT
Start: 2019-02-22

## 2019-02-22 RX ORDER — ONDANSETRON 8 MG/1
8 TABLET, ORALLY DISINTEGRATING ORAL EVERY 6 HOURS PRN
Qty: 60 TABLET | Refills: 4 | Status: SHIPPED | OUTPATIENT
Start: 2019-02-22 | End: 2019-03-20

## 2019-02-22 RX ORDER — HEPARIN 100 UNIT/ML
500 SYRINGE INTRAVENOUS
Status: DISCONTINUED | OUTPATIENT
Start: 2019-02-22 | End: 2019-02-22 | Stop reason: HOSPADM

## 2019-02-22 RX ORDER — SODIUM CHLORIDE 0.9 % (FLUSH) 0.9 %
10 SYRINGE (ML) INJECTION
Status: DISCONTINUED | OUTPATIENT
Start: 2019-02-22 | End: 2019-02-22 | Stop reason: HOSPADM

## 2019-02-22 RX ORDER — SODIUM CHLORIDE 0.9 % (FLUSH) 0.9 %
10 SYRINGE (ML) INJECTION
Status: CANCELLED | OUTPATIENT
Start: 2019-02-22

## 2019-02-22 RX ADMIN — Medication 10 ML: at 11:02

## 2019-02-22 RX ADMIN — SODIUM CHLORIDE: 9 INJECTION, SOLUTION INTRAVENOUS at 09:02

## 2019-02-22 RX ADMIN — DURVALUMAB 625 MG: 120 INJECTION, SOLUTION INTRAVENOUS at 10:02

## 2019-02-22 NOTE — PROGRESS NOTES
"Subjective:       Patient ID: Verónica Baker is a 68 y.o. female.    Chief Complaint: Neoplastic malignant related fatigue  Ms. Baker is a 68 yr old lady here for follow up.  She had a noncontrast CT chest (due to stage 4 CKD) for hemoptysis, which showed a 4cm spiculated medial LUPIS mass and possible mediastinal LAD.  She has hepatomegaly due to polycystic liver (and kidneys).  There were small lucent areas in the sternum and T9 vertebral bodies concerning for mets.  However, PET CT did not reveal any hypermetabolic bone lesions. She underwent biopsy of the lesion on 4/4/18 in [place] and pathology reveals adenocarcinoma. She wanted to come to [facility] for treatment as she has family she can stay with here.She was diagnosed with polycystic kidney/liver when she was 18.  She is followed by Dr. Nagel in [place].  She has chronic infrequent headaches that are not worsening in severity or frequency.  She was evaluated by radiation oncology here ( ).    She completed concurrent carboplatin/paclitaxel for stage III adenocarcinoma lung. She finished 5 out of the planned 6 weekly treatments, with last treatment in 1st week of June 2018.   PET CT done on 8/30/18 showed improvement in the left upper lobe lung lesion. SUV max was 5.69, previously 11.13 and there was resolution of mediastinal metastatic lymph nodes.  She started consolidation treatment with Durvalumab from 9/11/18.  She has been diagnosed with vulvar melanoma.   "Discovered on biopsy done on 6/21/18. Tim's level 3, Breslow depth 1.6mm. She is being followed seen by  ([facility]) . The lesion does not appear to be PET avid, on the last PET CT done in Aug 2018. She underwent bilateral sentinel lymph node biopsies and left radical vulvectomy on 10/30/18. [place] lymph nodes were negative.  She likely has residual melanoma as the margins in the resected specimen were positive. [place] LN was negative. It was at least T4N0:  PET scan on 12/31/19 " "revealed "Detrimental change with multiple bone metastases throughout the central axial red marrow skeleton as above. Index left iliac bone lesion SUV max 7.36. Left upper lobe primary lesion has resolved"  Pathology revealed "METASTATIC CARCINOMA, see comment COMMENT: Given the patient's history along with the histology and immunohistochemical stains, the tumor is most  consistent with a metastatic adenocarcinoma, lung primary"      Pancho continues on Imfinzi, waiting on PDL1  Her ECOG PS is 2 at best and she has CKD, stage V, making it challenging for chemo    Review of Systems   Constitutional: Positive for fatigue. Negative for appetite change and unexpected weight change.   HENT: Negative for mouth sores.    Eyes: Negative for visual disturbance.   Respiratory: Negative for cough and shortness of breath.    Cardiovascular: Negative for chest pain.   Gastrointestinal: Positive for abdominal pain. Negative for diarrhea.   Genitourinary: Negative for frequency.   Musculoskeletal: Negative for back pain.   Skin: Negative for rash.   Neurological: Negative for headaches.   Hematological: Negative for adenopathy.   Psychiatric/Behavioral: The patient is not nervous/anxious.    All other systems reviewed and are negative.      PMFSH: all information reviewed and updated as relevant to today's visit  Objective:      Physical Exam   Constitutional: She is oriented to person, place, and time. She appears well-developed and well-nourished.   HENT:   Mouth/Throat: No oropharyngeal exudate.   Cardiovascular: Normal rate and normal heart sounds.   Pulmonary/Chest: Effort normal and breath sounds normal. She has no wheezes.   Abdominal: Soft. Bowel sounds are normal. There is no tenderness.   Musculoskeletal: She exhibits no edema or tenderness.   Lymphadenopathy:     She has no cervical adenopathy.   Neurological: She is alert and oriented to person, place, and time. Coordination normal.   Skin: Skin is warm and dry. No " rash noted.   Psychiatric: She has a normal mood and affect. Judgment and thought content normal.   Vitals reviewed.        LABS:  WBC   Date Value Ref Range Status   02/22/2019 4.96 3.90 - 12.70 K/uL Final     Hemoglobin   Date Value Ref Range Status   02/22/2019 10.4 (L) 12.0 - 16.0 g/dL Final     POC Hematocrit   Date Value Ref Range Status   11/09/2018 26 (L) 36 - 54 %PCV Final     Hematocrit   Date Value Ref Range Status   02/22/2019 33.3 (L) 37.0 - 48.5 % Final     Platelets   Date Value Ref Range Status   02/22/2019 212 150 - 350 K/uL Final     Gran # (ANC)   Date Value Ref Range Status   02/22/2019 3.4 1.8 - 7.7 K/uL Final     Gran%   Date Value Ref Range Status   02/22/2019 69.0 38.0 - 73.0 % Final       Chemistry        Component Value Date/Time     02/22/2019 0734    K 3.5 02/22/2019 0734     02/22/2019 0734    CO2 21 (L) 02/22/2019 0734    BUN 56 (H) 02/22/2019 0734    CREATININE 4.5 (H) 02/22/2019 0734    GLU 92 02/22/2019 0734        Component Value Date/Time    CALCIUM 9.5 02/22/2019 0734    ALKPHOS 131 02/22/2019 0734    AST 14 02/22/2019 0734    ALT 9 (L) 02/22/2019 0734    BILITOT 0.4 02/22/2019 0734    ESTGFRAFRICA 10.9 (A) 02/22/2019 0734    EGFRNONAA 9.5 (A) 02/22/2019 0734          Assessment:       1. Primary adenocarcinoma of upper lobe of left lung    2. Bone metastases    3. Malignant melanoma of vulva    4. Nausea    5. CKD (chronic kidney disease), stage V    6. Thyroid disorder        Plan:        1,2. Spoke with Dr. Pires about recent PET scan and biopsy. He is waiting on PDL1 and in the interim would like patient to continue on Imfinzi for now. Discussed this with patient and she is agreeable. She will return in 2 weeks to see Dr. Pires.    Above care plan was discussed with patient and all questions were addressed to her  satisfaction

## 2019-02-22 NOTE — TELEPHONE ENCOUNTER
----- Message from Anjana Shin MD sent at 2/22/2019  9:19 AM CST -----  Please only schedule with Dr. Pires in 2 weeks with CBC,CMP, TSH and free T4 and for Imfinzi   Not with me

## 2019-02-22 NOTE — PLAN OF CARE
Problem: Adult Inpatient Plan of Care  Goal: Plan of Care Review  Patient tolerated infusion well. VS stable, AVS provided, discharged to home.

## 2019-03-02 ENCOUNTER — OFFICE VISIT (OUTPATIENT)
Dept: URGENT CARE | Facility: CLINIC | Age: 69
End: 2019-03-02
Payer: MEDICARE

## 2019-03-02 VITALS
TEMPERATURE: 98 F | DIASTOLIC BLOOD PRESSURE: 95 MMHG | OXYGEN SATURATION: 100 % | HEART RATE: 76 BPM | RESPIRATION RATE: 14 BRPM | SYSTOLIC BLOOD PRESSURE: 177 MMHG

## 2019-03-02 DIAGNOSIS — I10 ELEVATED BLOOD PRESSURE READING WITH DIAGNOSIS OF HYPERTENSION: Primary | ICD-10-CM

## 2019-03-02 PROCEDURE — 99214 PR OFFICE/OUTPT VISIT, EST, LEVL IV, 30-39 MIN: ICD-10-PCS | Mod: S$GLB,,, | Performed by: FAMILY MEDICINE

## 2019-03-02 PROCEDURE — 1101F PT FALLS ASSESS-DOCD LE1/YR: CPT | Mod: CPTII,S$GLB,, | Performed by: FAMILY MEDICINE

## 2019-03-02 PROCEDURE — 99214 OFFICE O/P EST MOD 30 MIN: CPT | Mod: S$GLB,,, | Performed by: FAMILY MEDICINE

## 2019-03-02 PROCEDURE — 1101F PR PT FALLS ASSESS DOC 0-1 FALLS W/OUT INJ PAST YR: ICD-10-PCS | Mod: CPTII,S$GLB,, | Performed by: FAMILY MEDICINE

## 2019-03-02 NOTE — PROGRESS NOTES
Subjective:       Patient ID: Verónica Baker is a 68 y.o. female.    Vitals:  oral temperature is 97.7 °F (36.5 °C). Her blood pressure is 177/95 (abnormal) and her pulse is 76. Her respiration is 14 and oxygen saturation is 100%.     Chief Complaint: Hypertension    Pt c/o hypertension at home, 1 week, stated her diastolic pressure has been in the 100s, headaches and neck pain, lightheaded when standing up,       Hypertension   This is a new problem. The current episode started in the past 7 days. The problem is unchanged. Associated symptoms include headaches and malaise/fatigue. Pertinent negatives include no blurred vision, chest pain or shortness of breath.       Constitution: Negative for chills, fatigue and fever.   HENT: Negative for congestion and sore throat.    Neck: Negative for painful lymph nodes.   Cardiovascular: Negative for chest pain and leg swelling.   Eyes: Negative for double vision and blurred vision.   Respiratory: Negative for cough and shortness of breath.    Gastrointestinal: Negative for nausea, vomiting and diarrhea.   Genitourinary: Negative for dysuria, frequency, urgency and history of kidney stones.   Musculoskeletal: Negative for joint pain, joint swelling, muscle cramps and muscle ache.        Neck pain   Skin: Negative for color change, pale, rash and bruising.   Allergic/Immunologic: Negative for seasonal allergies.   Neurological: Positive for headaches. Negative for dizziness, history of vertigo, light-headedness and passing out.   Hematologic/Lymphatic: Negative for swollen lymph nodes.   Psychiatric/Behavioral: Negative for nervous/anxious, sleep disturbance and depression. The patient is not nervous/anxious.        Objective:      Physical Exam   Constitutional: She is oriented to person, place, and time. She appears well-developed and well-nourished. She is cooperative.  Non-toxic appearance. She does not appear ill. No distress.   HENT:   Head: Normocephalic and  atraumatic.   Right Ear: Hearing, tympanic membrane, external ear and ear canal normal.   Left Ear: Hearing, tympanic membrane, external ear and ear canal normal.   Nose: Nose normal. No mucosal edema, rhinorrhea or nasal deformity. No epistaxis. Right sinus exhibits no maxillary sinus tenderness and no frontal sinus tenderness. Left sinus exhibits no maxillary sinus tenderness and no frontal sinus tenderness.   Mouth/Throat: Uvula is midline, oropharynx is clear and moist and mucous membranes are normal. No trismus in the jaw. Normal dentition. No uvula swelling. No posterior oropharyngeal erythema.   Eyes: Conjunctivae and lids are normal. Right eye exhibits no discharge. Left eye exhibits no discharge. No scleral icterus.   Sclera clear bilat   Neck: Trachea normal, normal range of motion, full passive range of motion without pain and phonation normal. Neck supple.   Cardiovascular: Normal rate, regular rhythm, normal heart sounds, intact distal pulses and normal pulses.   Pulmonary/Chest: Effort normal and breath sounds normal. No respiratory distress.   Abdominal: Soft. Normal appearance and bowel sounds are normal. She exhibits no distension, no pulsatile midline mass and no mass. There is no tenderness.   Musculoskeletal: Normal range of motion. She exhibits no edema or deformity.   Neurological: She is alert and oriented to person, place, and time. She exhibits normal muscle tone. Coordination normal.   Skin: Skin is warm, dry and intact. She is not diaphoretic. No pallor.   Psychiatric: She has a normal mood and affect. Her speech is normal and behavior is normal. Judgment and thought content normal. Cognition and memory are normal.   Nursing note and vitals reviewed.      Assessment:       1. Elevated blood pressure reading with diagnosis of hypertension        Plan:         Elevated blood pressure reading with diagnosis of hypertension      Hypertensive urgency without evidence of hypertensive emergency.   No evidence of acute neurologic, cardiovascular or renal dysfunction.  Patient stable for discharge with instructions for increased monitoring of blood pressure and follow-up with PCP to review  treatment regimen.

## 2019-03-02 NOTE — PATIENT INSTRUCTIONS
Keep measuring year blood pressure frequently and keep a written record so that she could review this with year kidney specialist later this month.  At this point You do not appear to be in any danger from the elevated pressure and so I think medicine adjustment should be performed by the doctors who normally managed her blood pressure.    Discharge Instructions: Taking Your Blood Pressure  Blood pressure is the force of blood as it moves from the heart through the blood vessels. You can take your own blood pressure reading using a digital monitor. Take readings as often as your healthcare provider instructs. Take your readings each time in the same way, using the same arm. Here are guidelines for taking your blood pressure.  The American Heart Association (AHA) recommends purchasing a blood pressure monitor that is validated and approved by the Association for the Advancement of Medical Instrumentation, the [place] Hypertension Society, and the International Protocol for the Validation of Automated BP Measuring Devices. If the blood pressure monitor is for a senior adult, a pregnant woman, or a child, make certain it is validated for use with such a population. For the most reliable readings, the AHA recommends an automatic, cuff-style, upper arm (bicep) monitor. The readings from finger and wrist monitors are not as reliable as the upper arm monitor.      Step 1. Relax    · Wait at least a half hour after smoking, eating, or exercising. Do not drink coffee, tea, soda, or other caffeinated beverages before checking your blood pressure.   · Sit comfortably at a table. Place the monitor near you.  · Rest for a few minutes before you begin.        Step 2. Wrap the cuff    · Place your arm on the table, palm up. Put your arm in a position that is level with your heart. Wrap the cuff around your upper arm, about an inch above your elbow. Its best to wrap the cuff on bare skin, not over clothing.  · Make sure your  cuff fits. If it doesnt wrap around your upper arm, order a larger cuff. A cuff that is too large or too small can result in an inaccurate blood pressure reading.           Step 3. Inflate the cuff    · Pump the cuff until the scale reads 200. If you have a self-inflating cuff, push the button that starts the pump.  · The cuff will tighten, then loosen.  · The numbers will change. When they stop changing, your blood pressure reading will appear.  · If you get a reading that is too high or too low for you, relax for a few minutes. Then do the test again.    Step 4. Write down the results  · Write down your blood pressure numbers. Samuel the date and time. Keep your results in one place, such as a notebook.  · Remove the cuff from your arm. Turn off the machine.  · Take the readings with you to your medical appointments.  · If you start a new blood pressure medicine, or change a blood pressure medicine dose, note the day you started the new drug or dosage on your blood pressure recording sheet. This will help your healthcare provider monitor the effect of medication changes.     Date Last Reviewed: 4/27/2016  © 8212-0608 [employer]. [address] [place] [address], [place] [address]. All rights reserved. This information is not intended as a substitute for professional medical care. Always follow your healthcare professional's instructions.

## 2019-03-12 ENCOUNTER — OFFICE VISIT (OUTPATIENT)
Dept: HEMATOLOGY/ONCOLOGY | Facility: CLINIC | Age: 69
End: 2019-03-12
Payer: MEDICARE

## 2019-03-12 ENCOUNTER — INFUSION (OUTPATIENT)
Dept: INFUSION THERAPY | Facility: HOSPITAL | Age: 69
End: 2019-03-12
Attending: INTERNAL MEDICINE
Payer: MEDICARE

## 2019-03-12 VITALS
HEIGHT: 65 IN | SYSTOLIC BLOOD PRESSURE: 176 MMHG | RESPIRATION RATE: 18 BRPM | HEART RATE: 90 BPM | WEIGHT: 129.63 LBS | OXYGEN SATURATION: 98 % | TEMPERATURE: 98 F | DIASTOLIC BLOOD PRESSURE: 99 MMHG | BODY MASS INDEX: 21.6 KG/M2

## 2019-03-12 VITALS
HEART RATE: 87 BPM | RESPIRATION RATE: 18 BRPM | TEMPERATURE: 98 F | SYSTOLIC BLOOD PRESSURE: 173 MMHG | DIASTOLIC BLOOD PRESSURE: 84 MMHG

## 2019-03-12 DIAGNOSIS — R53.0 NEOPLASTIC MALIGNANT RELATED FATIGUE: ICD-10-CM

## 2019-03-12 DIAGNOSIS — C34.12 PRIMARY ADENOCARCINOMA OF UPPER LOBE OF LEFT LUNG: Primary | ICD-10-CM

## 2019-03-12 DIAGNOSIS — N18.4 STAGE 4 CHRONIC KIDNEY DISEASE: ICD-10-CM

## 2019-03-12 DIAGNOSIS — C43.59 MALIGNANT MELANOMA OF TORSO EXCLUDING BREAST: ICD-10-CM

## 2019-03-12 DIAGNOSIS — C79.51 BONE METASTASES: ICD-10-CM

## 2019-03-12 DIAGNOSIS — C34.82 MALIGNANT NEOPLASM OF OVERLAPPING SITES OF LEFT LUNG: Primary | ICD-10-CM

## 2019-03-12 DIAGNOSIS — C34.82 MALIGNANT NEOPLASM OF OVERLAPPING SITES OF LEFT LUNG: ICD-10-CM

## 2019-03-12 DIAGNOSIS — Q44.6 POLYCYSTIC LIVER DISEASE: ICD-10-CM

## 2019-03-12 DIAGNOSIS — G62.9 PERIPHERAL POLYNEUROPATHY: Primary | ICD-10-CM

## 2019-03-12 PROBLEM — C34.81 MALIGNANT NEOPLASM OF OVERLAPPING SITES OF RIGHT LUNG: Status: ACTIVE | Noted: 2019-03-12

## 2019-03-12 PROCEDURE — 25000003 PHARM REV CODE 250: Performed by: INTERNAL MEDICINE

## 2019-03-12 PROCEDURE — 99999 PR PBB SHADOW E&M-EST. PATIENT-LVL IV: CPT | Mod: PBBFAC,,, | Performed by: INTERNAL MEDICINE

## 2019-03-12 PROCEDURE — 1101F PT FALLS ASSESS-DOCD LE1/YR: CPT | Mod: CPTII,S$GLB,, | Performed by: INTERNAL MEDICINE

## 2019-03-12 PROCEDURE — 99214 PR OFFICE/OUTPT VISIT, EST, LEVL IV, 30-39 MIN: ICD-10-PCS | Mod: 24,S$GLB,, | Performed by: INTERNAL MEDICINE

## 2019-03-12 PROCEDURE — 96413 CHEMO IV INFUSION 1 HR: CPT

## 2019-03-12 PROCEDURE — 63600175 PHARM REV CODE 636 W HCPCS: Mod: TB | Performed by: INTERNAL MEDICINE

## 2019-03-12 PROCEDURE — 99999 PR PBB SHADOW E&M-EST. PATIENT-LVL IV: ICD-10-PCS | Mod: PBBFAC,,, | Performed by: INTERNAL MEDICINE

## 2019-03-12 PROCEDURE — 1101F PR PT FALLS ASSESS DOC 0-1 FALLS W/OUT INJ PAST YR: ICD-10-PCS | Mod: CPTII,S$GLB,, | Performed by: INTERNAL MEDICINE

## 2019-03-12 PROCEDURE — 99214 OFFICE O/P EST MOD 30 MIN: CPT | Mod: 24,S$GLB,, | Performed by: INTERNAL MEDICINE

## 2019-03-12 RX ORDER — HYDROCODONE BITARTRATE AND ACETAMINOPHEN 7.5; 3 MG/1; MG/1
1 TABLET ORAL 4 TIMES DAILY PRN
Qty: 60 TABLET | Refills: 0 | Status: SHIPPED | OUTPATIENT
Start: 2019-03-12 | End: 2019-04-01 | Stop reason: SDUPTHER

## 2019-03-12 RX ORDER — GABAPENTIN 300 MG/1
300 CAPSULE ORAL 3 TIMES DAILY
Qty: 90 CAPSULE | Refills: 11 | Status: SHIPPED | OUTPATIENT
Start: 2019-03-12 | End: 2020-03-11

## 2019-03-12 RX ORDER — HEPARIN 100 UNIT/ML
500 SYRINGE INTRAVENOUS
Status: CANCELLED | OUTPATIENT
Start: 2019-03-12

## 2019-03-12 RX ORDER — SODIUM CHLORIDE 0.9 % (FLUSH) 0.9 %
10 SYRINGE (ML) INJECTION
Status: CANCELLED | OUTPATIENT
Start: 2019-03-12

## 2019-03-12 RX ORDER — HYDROCODONE BITARTRATE AND ACETAMINOPHEN 7.5; 3 MG/1; MG/1
1 TABLET ORAL 4 TIMES DAILY PRN
Qty: 60 TABLET | Refills: 0 | Status: SHIPPED | OUTPATIENT
Start: 2019-03-12 | End: 2019-03-12 | Stop reason: SDUPTHER

## 2019-03-12 RX ORDER — SODIUM CHLORIDE 0.9 % (FLUSH) 0.9 %
10 SYRINGE (ML) INJECTION
Status: DISCONTINUED | OUTPATIENT
Start: 2019-03-12 | End: 2019-03-12 | Stop reason: HOSPADM

## 2019-03-12 RX ADMIN — DURVALUMAB 625 MG: 120 INJECTION, SOLUTION INTRAVENOUS at 01:03

## 2019-03-12 RX ADMIN — SODIUM CHLORIDE: 9 INJECTION, SOLUTION INTRAVENOUS at 01:03

## 2019-03-12 NOTE — Clinical Note
Cbc, cmp, tsh, free t4 and f/u for chemo in 2 wks (if [facility] cannot schedule her by then)

## 2019-03-12 NOTE — Clinical Note
--chemo today--pt to go to lab for guardant 360--pt wants next treatment at [facility]; referral made for oncology there

## 2019-03-12 NOTE — PLAN OF CARE
Problem: Adult Inpatient Plan of Care  Goal: Plan of Care Review  Outcome: Ongoing (interventions implemented as appropriate)  Patient tolerated imfinzi well today. NAD noted upon discharge. Verbalized understanding to call MD for any questions/concerns. AVS given. PIV removed, catheter tip intact. Discharged home, ambulated independently.

## 2019-03-12 NOTE — PROGRESS NOTES
CC: Lung cancer, vulvar melanoma, follow up          HPI:Ms. Baker is a 68 yr old lady here for follow up.  She had a noncontrast CT chest (due to stage 4 CKD) for hemoptysis, which showed a 4cm spiculated medial LUPIS mass and possible mediastinal LAD.  She has hepatomegaly due to polycystic liver (and kidneys).  There were small lucent areas in the sternum and T9 vertebral bodies concerning for mets.  However, PET CT did not reveal any hypermetabolic bone lesions. She underwent biopsy of the lesion on 4/4/18 in [place] and pathology reveals adenocarcinoma. She wanted to come to [facility] for treatment as she has family she can stay with here.She was diagnosed with polycystic kidney/liver when she was 18.  She is followed by Dr. Nagel in [place].  She has chronic infrequent headaches that are not worsening in severity or frequency.  She was evaluated by radiation oncology here ( ).    She completed concurrent carboplatin/paclitaxel for stage III adenocarcinoma lung. She finished 5 out of the planned 6 weekly treatments, with last treatment in 1st week of June 2018.   PET CT done on 8/30/18 showed improvement in the left upper lobe lung lesion. SUV max was 5.69, previously 11.13 and there was resolution of mediastinal metastatic lymph nodes.  She started consolidation treatment with Durvalumab from 9/11/18.  She has been diagnosed with vulvar melanoma.      Interval history: She is here for a follow up visit. She feels better, eating better. Her cough is better. She had vulvar surgery on 10/30/18. [place] lymph nodes were negative at that time.  However, margins in the resected specimen were positive.She had AV fistula surgery on 11/9 in her right UE . She underwent vulvectomy with distal urethrectomy on 1/7/19. She had left hip biopsy on 1/23/19 which showed metastatic NSCLC.  She has continued Durvalumab which she has tolerated well. Her pain is getting worse, mostly in her low back.      Review of  Systems   Constitutional: Positive for malaise/fatigue. Negative for chills, diaphoresis, fever and weight loss.   HENT: Negative for ear discharge, ear pain, hearing loss and tinnitus.    Eyes: Negative for blurred vision, double vision, photophobia and pain.   Respiratory: Negative for cough, hemoptysis and sputum production.    Cardiovascular: Negative for chest pain, palpitations, orthopnea and claudication.   Gastrointestinal: Positive for diarrhea. Negative for heartburn, nausea and vomiting.   Genitourinary: Negative for dysuria and urgency.   Musculoskeletal: Positive for back pain, myalgias and neck pain.   Skin: Negative for rash.   Neurological: Negative for dizziness, tingling, tremors, sensory change and headaches.   Endo/Heme/Allergies: Does not bruise/bleed easily.   Psychiatric/Behavioral: Negative for depression, substance abuse and suicidal ideas.           Current Outpatient Medications   Medication Sig    ferrous sulfate 324 mg (65 mg iron) TbEC Take 325 mg by mouth once daily.    furosemide (LASIX) 40 MG tablet Take 1 tablet (40 mg total) by mouth 2 (two) times daily.    NIFEdipine (PROCARDIA-XL) 30 MG (OSM) 24 hr tablet Take 1 tablet (30 mg total) by mouth once daily.    sevelamer carbonate (RENVELA) 800 mg Tab Take 1 tablet (800 mg total) by mouth 3 (three) times daily with meals.    spironolactone (ALDACTONE) 25 MG tablet Take 25 mg by mouth once daily. PT TAKING 1/2 TAB PER DAY    morphine (MSIR) 15 MG tablet Take 1 tablet (15 mg total) by mouth every 4 (four) hours as needed for Pain.    ondansetron (ZOFRAN-ODT) 8 MG TbDL Take 1 tablet (8 mg total) by mouth every 6 (six) hours as needed.     No current facility-administered medications for this visit.            Vitals:    03/12/19 1111   BP: (!) 176/99   Pulse: 90   Resp: 18   Temp: 97.7 °F (36.5 °C)       Physical Exam   Constitutional: She is oriented to person, place, and time. No distress.   HENT:   Head: Normocephalic and  atraumatic.   Mouth/Throat: No oropharyngeal exudate.   Eyes: Pupils are equal, round, and reactive to light. No scleral icterus.   Neck: Normal range of motion.   Cardiovascular: Normal rate and regular rhythm.   Murmur heard.  Pulmonary/Chest: Effort normal. No respiratory distress. She has no wheezes. She has rales.   Abdominal: She exhibits distension.   Musculoskeletal: She exhibits edema.   Lymphadenopathy:     She has no cervical adenopathy.   Neurological: She is alert and oriented to person, place, and time. No cranial nerve deficit.   Skin: Skin is warm.   Psychiatric: She has a normal mood and affect.     4/13/18 PET CT        EXAMINATION:  NM PET CT ROUTINE    CLINICAL HISTORY:  Malignant neoplasm of upper lobe, left bronchus or lung.    Outside imaging demonstrated 4 cm spiculated medial left upper lobe mass (biopsy proven adenocarcinoma) with possible mediastinal lymphadenopathy as well as lucent areas involving the sternum and T9.    TECHNIQUE:  13.12 mCi of F18-FDG was administered intravenously in the right antecubital fossa.  After an approximately 60 min distribution time, PET/CT images were acquired from the skull base to the mid thigh. Transmission images were acquired to correct for attenuation using a whole body low-dose CT scan without contrast with the arms positioned above the head. Glycemia at the time of injection was 82 mg/dL.    COMPARISON:  CT abdomen pelvis 07/21/2017, MRI brain 04/13/2018    FINDINGS:  Quality of the study: Adequate.    Abnormal foci of increased uptake are present within the left upper lobe as well as a few prevascular mediastinal lymph nodes.  Lung mass measures approximately 4.6 x 2.9 cm.    No appreciable lucent lesion in the sternum or T9 vertebral body.  Mild degenerative metabolic activity is present at the sternomanubrial junction.    Index lesions:    - Left upper lobe mass: SUV max 11.1    - Lateral pre-vascular lymph node: SUV max 5.8    Physiologic uptake  of the tracer is present within the brain, salivary glands, myocardium, GI and  tracts.    Incidental CT findings: Liver and kidneys are enlarged with numerous intraparenchymal cyst and associated mass effect on the adjacent abdominal organs, unchanged.  Colonic diverticulosis without diverticulitis.  Bandlike opacification within the right lower lobe likely represents subsegmental atelectasis.  Calcific atherosclerosis involves the lower extremity vasculature bilaterally.   Impression        Known malignancy of the left upper lobe with mediastinal trini metastases.    No appreciable lucent lesion in the sternum or T9 vertebral body.  Correlation with prior outside imaging is recommended.            4/13/18 MRI brain w/o cont        FINDINGS:  Intracranial compartment:    Ventricles and sulci are normal in size for age without evidence of hydrocephalus. No extra-axial blood or fluid collections.    Nonspecific small foci of T2/FLAIR high signal intensity in the supratentorial white matter, favored to represent chronic microvascular ischemic changes.  Remote lacunar type infarct in the left putamen.  Small punctate focus of susceptibility signal artifact in the left occipital lobe, suggestive of an old microhemorrhage or calcification.  No mass lesion, acute hemorrhage, edema or acute infarct.    Normal vascular flow voids are preserved.    Skull/extracranial contents (limited evaluation): Bone marrow signal intensity is normal.   Impression        No evidence of intracranial metastases within limitation in the absence of IV contrast which decrease the sensitivity of this exam.    Nonspecific foci of T2/FLAIR high signal intensity in the supratentorial white matter, likely representing chronic microvascular ischemic changes.               8/30/18 PET CT        CLINICAL HISTORY:  lung cancer restaging;  Malignant neoplasm of upper lobe, left bronchus or lung    FINDINGS:  Patient was administered 11.5 millicuries of  FDG intravenously.  Comparison 04/13/2018.    Left upper lung lesion SUV max 5.69, previously larger SUV max 11.13.  Mediastinal lymph nodes have returned to baseline.    There is physiologic intracranial, head and neck activity.  Heart mediastinum are normal.  There is polycystic liver and kidney disease.  There is physiologic GI and  activity.  There is diverticulosis.  No bone lesions are seen.  Right lung is clear.   Impression        See above    Improvement left upper lobe lung lesion SUV max 5.69, previously 11.13 and resolution of mediastinal metastatic lymph nodes.    Severe polycystic liver and kidney disease.       Bilateral sentinel lymph node biopsies done with Dr. Ba Osei.  2.  Left radical vulvectomy     10/29/18  FINAL PATHOLOGIC DIAGNOSIS  [facility] FINAL DIAGNOSIS:  Skin, left sentinel node hot blue 150, right sentinel node hot blue 150, and left vulva, excisions (MS [id number],  10/29/2018):  Lymph node, left sentinel hot blue 150, excision (1.): Lymph node identified negative for metastatic melanoma.  HMB45, Mart 1, and S100 immunostains performed at the referring institution are negative.  Right sentinel node, hot blue, 150, excision (2.): Lymph node identified negative for metastatic melanoma. S100,HMB45, and Mart 1 immunostains performed at the referring institution are negative.  Skin, left vulva, excision (3.) Extensive residual malignant melanoma, invasive to a Breslow depth of 4.0 mm. The specimen shows ulceration but this may be due to prior biopsy rather than tumoral ulceration. Mitotic rate is 10 per  mm2. The tumor cells extend to the green inked margin and within 0.2 mm of the blue inked margin.  Melan A and Sox 10 immunostains report performed at [facility] on sections from blocks 3D, 3E, 3 F, 3 G, 3H,3I, 3 J, 3 M, 3 P, 3 Q, and 3 are to evaluate the extent of the lesion.        11/26/18 MRI brain w/o cont     COMPARISON:  04/13/2018    FINDINGS:  There is no restricted diffusion  to suggest acute infarction.  There is a mild degree of a age-appropriate generalized cerebral volume loss.  Compensatory enlargement ventricles sulci and cisterns without hydrocephalus.  The major intracranial T2 flow voids are present.    Ill-defined mild T2 flair signal hyperintensity diffusely in the supratentorial white matter similar to prior suggestive for posttherapy change.  There is a focal area of cystic change in the left caudate/putamen anteriorly compatible with remote lacunar-type infarction.    There is a stable focus of punctate susceptibility in the right parietal subcortical white matter and a small focus in the left posterior temporal subcortical white matter which are unchanged suggestive for prior hemorrhage from treated metastases or remote microhemorrhage.  No new parenchymal signal abnormality.  Please note evaluation for subtle metastases is limited by lack of contrast.    No abnormal extra-axial fluid collection.   Impression        No significant change from prior.    No evidence for new signal abnormality to suggest new or worsening intracranial metastatic disease.    Continued ill-defined T2 FLAIR hyperintensity supratentorial white matter suggestive for posttherapy change with small anterior caudate/putamen remote lacunar-type infarct    Stable small left posterior temporal and punctate right parietal subcortical foci susceptibility suggestive for prior hemorrhage or treated hemorrhagic metastases.    Clinical correlation and continued follow-up advised           12/31/18 PET CT             FINDINGS:  Patient was administered 10.51 millicuries of FDG intravenously.  Comparison is 08/30/2018.    The left upper lobe lung primary lesion has completely resolved and there is radiation change of the left lung.  There are multiple new bone metastases involving the spine and left pelvis.    Index left iliac bone metastasis SUV max 7.36.    There is physiologic intracranial, head, neck  activity.  Heart mediastinum are normal.  There is polycystic liver and kidney disease.  The pelvic organs show nothing unusual.  There is physiologic pancreas and adrenal gland activity.  No suspicious lung lesions are seen.       Impression         See above    Detrimental change with multiple bone metastases throughout the central axial red marrow skeleton as above.    Index left iliac bone lesion SUV max 7.36.    Left upper lobe primary lesion has resolved         1/23/19 FINAL PATHOLOGIC DIAGNOSIS     Bone, left hip/iliac crest, biopsy:  -METASTATIC CARCINOMA, see comment  COMMENT: Given the patient's history along with the histology and immunohistochemical stains, the tumor is most consistent with a metastatic adenocarcinoma, lung primary. Clinical and radiological correlation is recommended  Microscopic Examination  Sections show fragments of bone and hematopoietic elements are with admixed tumor cells. The tumor cells show an increased NC ratio with irregular and hyperchromatic nucleus. A vague gland-like pattern is focally noted. The tumor  cells are positive for CK 7 and TTF-1 while negative for S100, Mart 1, and CK 20. Immunohistochemical stains (performed on block B) were reviewed in conjunction with adequate positive controls      Component      Latest Ref Rng & Units 3/12/2019   Sodium      136 - 145 mmol/L 140   Potassium      3.5 - 5.1 mmol/L 3.7   Chloride      95 - 110 mmol/L 105   CO2      23 - 29 mmol/L 23   Glucose      70 - 110 mg/dL 93   BUN, Bld      8 - 23 mg/dL 60 (H)   Creatinine      0.5 - 1.4 mg/dL 4.8 (H)   Calcium      8.7 - 10.5 mg/dL 9.7   Total Protein      6.0 - 8.4 g/dL 7.4   Albumin      3.5 - 5.2 g/dL 3.4 (L)   Total Bilirubin      0.1 - 1.0 mg/dL 0.6   Alkaline Phosphatase      55 - 135 U/L 136 (H)   AST      10 - 40 U/L 15   ALT      10 - 44 U/L 10   Anion Gap      8 - 16 mmol/L 12   eGFR if African American      >60 mL/min/1.73 m:2 10.1 (A)   eGFR if non        >60 mL/min/1.73 m:2 8.8 (A)   WBC      3.90 - 12.70 K/uL 6.36   RBC      4.00 - 5.40 M/uL 3.96 (L)   Hemoglobin      12.0 - 16.0 g/dL 10.6 (L)   Hematocrit      37.0 - 48.5 % 33.4 (L)   MCV      82 - 98 fL 84   MCH      27.0 - 31.0 pg 26.8 (L)   MCHC      32.0 - 36.0 g/dL 31.7 (L)   RDW      11.5 - 14.5 % 14.3   Platelets      150 - 350 K/uL 203   MPV      9.2 - 12.9 fL 12.0   Gran # (ANC)      1.8 - 7.7 K/uL 5.1   Immature Grans (Abs)      0.00 - 0.04 K/uL 0.02   Magnesium      1.6 - 2.6 mg/dL 1.8   TSH      0.400 - 4.000 uIU/mL 2.394   Free T4      0.71 - 1.51 ng/dL 0.86        Assessment:     1. Adenocarcinoma lung  2. Polycystic kidney disease  3. Polycystic liver disease  4. Ascites  5. Hypertension,essential  6. Cough  7. Dyspnea on exertion  8. CKD stage IV  9. Anemia, normocytic, hypochromic  10. Weight loss  11. Vulvar melanoma  12. Cancer associated pain     Plan:     1. She has a left upper lung mass that was biopsied, as well as hyper metabolic, prevascular mediastinal lymph nodes on PET CT. No other hypermetabolic lesions on PET CT. MRI brain (non-contrast) does not show any metastatic lesion. She has polycystic kidney disease and she is certainly at higher risk for renal malignancy, colon CA as well as liver CA. Slides and block were requested from [facility] and were reviewed by pathology here. It was confirmed that she has adenocarcinoma originating in the lungs.   She started concurrent chemotherapy with Carboplatin/Paclitaxcel ( renally adjusted) as Pemetrexed was not appropriate with the reduced renal function.   Chemotherapy cycle 6 was held due to worsening renal function and leukopenia. Last treatment was on 6/4/18.  She is doing better now. PET CT done on 8/30/18 showed improvement in the left upper lobe lung lesion. SUV max was 5.69, previously 11.13 and there was resolution of mediastinal metastatic lymph nodes. I discussed these findings with her in detail. I discussed starting  consolidative immunotherapy with CKI Duravalumab on 9/11/18.  She has received 7 treatments so far. She had vulvar surgery ( for melanoma) on 10/20/18.  CKI was held prior to the surgery. She had positive margins, but negative sentinel lymph nodes. She underwent vulvectomy and distal uretherctomy on 1/7/19. Pathology pending.  PET CT was repeated on 12/31/18. The left upper lobe lung primary lesion, compared to 8/30/18, had completely resolved and there was radiation change of the left lung.  There were multiple new bone metastases involving the spine and left pelvis. Images were reviewed.  I explained that treatments are no longer curative and will only be palliative.  Biopsy of left iliac bone on 1/23/19 showed metastatic lung cancer. IHC c/w with metastatic lung cancer.   Molecular studies could not be done as it was bone  . She was continued Durvalumab as PD L1 was not available. She cannot receive Denosumab or Zometa due to her renal impairment.   Morphine IR prescribed  for bone pain. She could not tolerate Morphine. It made her very groggy and constipated. She wants to try Hydrocodone/APAP.  She will have Guardant 360 test done today to determine her PDL1 status. If high, she will be switched to single agent Pembrolizumab. If < 50%, she might have to receive combination chemotherapy, which is very difficult with her current renal function.        4. Chronic, attributed to CKD/ polycystic liver        5. On multiple anti-HT medications. /99 mm Hg today      6. Possibly related to malignancy in her lungs. It is better now.       8. Serum Cr 4.8 today. She follows with nephrology. She has AVF now. She is still not on dialysis. She is still making good quantities of urine daily. Durvalumab can worsen renal function, but is unlikely to be the case here.      9. Secondary to CKD and chemotherapy. Serum iron saturation 33% on 5/21/18.         10. Now stable.      11. Discovered on biopsy done on 6/21/18.  Tim's level 3, Breslow depth 1.6mm. She is being followed seen by  ([facility]) . The lesion does not appear to be PET avid, on the last PET CT done in Aug 2018. She underwent bilateral sentinel lymph node biopsies and left radical vulvectomy on 10/30/18. [place] lymph nodes were negative.  She likely has residual melanoma as the margins in the resected specimen were positive. [place] LN was negative. It was at least T4N0. No clear evidence of brain metastases on non-contrast MRI brain done on 11/26/18.  BRAF status unknown. She underwent vulvectomy and distal urethrectomy on 1/7/19. Final pathology pending.   PET CT on 12/31/18 showed multiple, new bone metastases. Biopsy done revealed metastatic lung cancer.         12. Pruritius: She has extensive pruritus. She is using moisturizers. She will use Hydrocortisone 1%  cream as needed ( except on face)         13. She wants to try Hydrocodone /APAP for pain. She did not tolerate morphine well ( drowsiness, constipation). I explained that Hydrocodone/APAP has similar adverse effect profile.  Palliative RT might be an option if pain continues to get worse.       Distress Screening Results: Psychosocial Distress screening score of Distress Score: 2 noted and reviewed. No intervention indicated.

## 2019-03-14 ENCOUNTER — TELEPHONE (OUTPATIENT)
Dept: HEMATOLOGY/ONCOLOGY | Facility: CLINIC | Age: 69
End: 2019-03-14

## 2019-03-19 ENCOUNTER — TELEPHONE (OUTPATIENT)
Dept: GYNECOLOGIC ONCOLOGY | Facility: CLINIC | Age: 69
End: 2019-03-19

## 2019-03-19 DIAGNOSIS — R53.0 NEOPLASTIC MALIGNANT RELATED FATIGUE: Primary | ICD-10-CM

## 2019-03-19 NOTE — PROGRESS NOTES
Subjective:       Patient ID: Verónica Baker is a 68 y.o. female.    Chief Complaint: Malignant melanoma of vulva (1mth f/u sign surgery consents)    HPI       Patient comes in today for reinspection of the vulva and to schedule for repeat WLE for invasive malignant melanoma with (+) margin.       Her oncologic history is:  2018: Initial biopsy in 2018 was originally read as keratinizing hyperplastic squamous epithelium with scattered juctional nests of atypical melanocytes. Pathology was read at [place]. institional review in 2018 was reported a malignant melanoma. Tim's level 3 with Breslow depth 1.6 mm.      10/29/2018:partial vulvectomy:  Breslow's level of 4 mm. Positive medial margin. Negative bilateral SL nodes.      2019: partial vulvectomy and distal urethrectomy. This was a re-excision of a vulvar masswhich showed melanoma with positive margins. (+) margin. Right lateral margin was positive for malignant melanoma.  The left medial margin was also positive, however, I took an additional sample of distal urethra that was negative.        Past Medical History:   Diagnosis Date    Broken wrist     20 years ago    Diverticulitis     Fractured hip     Left hip in     Gout     Hypertension     Lung cancer 2018    Malignant melanoma of torso excluding breast 2018    Neoplastic malignant related fatigue 2018    Polycystic kidney     Polycystic liver disease 2018    Post-op pain 2019    Stage 4 chronic kidney disease 2018    Vulvar cancer      Past Surgical History:   Procedure Laterality Date    BIOPSY, LYMPH NODE, SENTINEL Bilateral 10/29/2018    Performed by Ba Osei MD at [facility] OR 2ND FLR    [id number] N/A 2019    Performed by [facility] Diagnostic Provider at [facility] OR 2ND FLR     SECTION      CREATION, AV FISTULA Right 2018    Performed by Hernesto Decker MD at [facility] OR 2ND FLR    Fibroids removed      2 times     HEMORRHOIDOPEXY, INTERNAL PROLAPSING, STAPLING N/A 8/21/2018    Performed by [facility]cos [facility] at [facility] OR    INJECTION, FOR SENTINEL NODE IDENTIFICATION - bilateral groin Bilateral 10/29/2018    Performed by [facility] at [facility] 2ND FLR    TOTAL ABDOMINAL HYSTERECTOMY  1983    URETHRECTOMY N/A 1/7/2019    Performed by [facility] at [facility] OR 2ND FLR    VAGINAL DELIVERY      1 time    VULVECTOMY Bilateral 1/7/2019    Performed by [facility] at [facility] OR 2ND FLR    VULVECTOMY Left 10/29/2018    Performed by [facility] at [facility] OR 2ND FLR     Family History   Problem Relation Age of Onset    Cancer Mother     Diabetes Mother     Heart attack Father     Diabetes Father     Polycystic kidney disease Sister     Hypertension Sister     Diabetes Sister     Cancer Sister     Diabetes type II Sister     Hypertension Sister     Breast cancer Neg Hx     Colon cancer Neg Hx     Ovarian cancer Neg Hx      Social History     Tobacco Use    Smoking status: Never Smoker    Smokeless tobacco: Never Used   Substance Use Topics    Alcohol use: No    Drug use: No     Review of patient's allergies indicates:   Allergen Reactions    Sulfa (sulfonamide antibiotics) Rash and Hives    Codeine Nausea Only       Current Outpatient Medications:     ferrous sulfate 324 mg (65 mg iron) TbEC, Take 325 mg by mouth once daily., Disp: , Rfl:     furosemide (LASIX) 40 MG tablet, Take 1 tablet (40 mg total) by mouth 2 (two) times daily., Disp: 60 tablet, Rfl: 11    gabapentin (NEURONTIN) 300 MG capsule, Take 1 capsule (300 mg total) by mouth 3 (three) times daily., Disp: 90 capsule, Rfl: 11    HYDROcodone-acetaminophen 7.5-300 mg Tab, Take 1 tablet by mouth 4 (four) times daily as needed., Disp: 60 tablet, Rfl: 0    NIFEdipine (PROCARDIA-XL) 30 MG (OSM) 24 hr tablet, Take 1 tablet (30 mg total) by mouth once daily., Disp: 30 tablet, Rfl: 11    sevelamer carbonate (RENVELA) 800 mg Tab, Take  1 tablet (800 mg total) by mouth 3 (three) times daily with meals., Disp: 90 tablet, Rfl: 11    spironolactone (ALDACTONE) 25 MG tablet, Take 25 mg by mouth once daily. PT TAKING 1/2 TAB PER DAY, Disp: , Rfl:     Review of Systems   Constitutional: Negative for chills, fatigue and fever.   Respiratory: Negative for cough, shortness of breath and wheezing.    Cardiovascular: Negative for chest pain, palpitations and leg swelling.   Gastrointestinal: Positive for abdominal distention. Negative for abdominal pain, constipation, diarrhea, nausea and vomiting.   Genitourinary: Negative for difficulty urinating, dysuria, frequency, genital sores, hematuria, urgency, vaginal bleeding, vaginal discharge and vaginal pain.   Musculoskeletal: Positive for back pain.   Neurological: Negative for weakness.   Hematological: Negative for adenopathy. Does not bruise/bleed easily.   Psychiatric/Behavioral: The patient is not nervous/anxious.        Objective:   BP (!) 159/81   Pulse 98   Wt 59 kg (130 lb)   LMP  (LMP Unknown)   BMI 21.63 kg/m²      Physical Exam   Constitutional: She is oriented to person, place, and time.   Thin appearing   Cardiovascular: Normal rate and regular rhythm.   Pulmonary/Chest: Effort normal and breath sounds normal.   Abdominal: She exhibits distension and mass.   Genitourinary:   Genitourinary Comments: Vulva:  Surgical changes are present. The surgical incision on the right is visible.  No lesions were noted. Slight thickening to the distal urethra.   Neurological: She is alert and oriented to person, place, and time.   Skin: Skin is warm and dry.   Psychiatric: She has a normal mood and affect. Her behavior is normal. Judgment and thought content normal.       Assessment:       1. Malignant melanoma of vulva        Plan:   Malignant melanoma of vulva  Will plan for re-excision of the vulva given the positive margin.    Consent forms were reviewed with patient. Questions were answered. Patient  voiced understanding. Consents were signed.  Preop orders placed.   Will plan to keep overnight given her polycystic kidney disease.     -     Basic metabolic panel; Future; Expected date: 03/20/2019  -     CBC auto differential; Future; Expected date: 03/20/2019

## 2019-03-19 NOTE — H&P (VIEW-ONLY)
Subjective:       Patient ID: Verónica Baker is a 68 y.o. female.    Chief Complaint: Malignant melanoma of vulva (1mth f/u sign surgery consents)    HPI       Patient comes in today for reinspection of the vulva and to schedule for repeat WLE for invasive malignant melanoma with (+) margin.       Her oncologic history is:  2018: Initial biopsy in 2018 was originally read as keratinizing hyperplastic squamous epithelium with scattered juctional nests of atypical melanocytes. Pathology was read at [place]. institional review in 2018 was reported a malignant melanoma. Tim's level 3 with Breslow depth 1.6 mm.      10/29/2018:partial vulvectomy:  Breslow's level of 4 mm. Positive medial margin. Negative bilateral SL nodes.      2019: partial vulvectomy and distal urethrectomy. This was a re-excision of a vulvar masswhich showed melanoma with positive margins. (+) margin. Right lateral margin was positive for malignant melanoma.  The left medial margin was also positive, however, I took an additional sample of distal urethra that was negative.        Past Medical History:   Diagnosis Date    Broken wrist     20 years ago    Diverticulitis     Fractured hip     Left hip in     Gout     Hypertension     Lung cancer 2018    Malignant melanoma of torso excluding breast 2018    Neoplastic malignant related fatigue 2018    Polycystic kidney     Polycystic liver disease 2018    Post-op pain 2019    Stage 4 chronic kidney disease 2018    Vulvar cancer      Past Surgical History:   Procedure Laterality Date    BIOPSY, LYMPH NODE, SENTINEL Bilateral 10/29/2018    Performed by [facility] at [facility] OR 2ND FLR    Hqvvjt-[id number]-fw N/A 2019    Performed by [facility] Diagnostic Provider at [facility] OR 2ND FLR     SECTION      CREATION, AV FISTULA Right 2018    Performed by Hernesto Decker MD at [facility] OR 2ND FLR    Fibroids removed      2 times     HEMORRHOIDOPEXY, INTERNAL PROLAPSING, STAPLING N/A 8/21/2018    [facility] by [facility] at [facility]    INJECTION, FOR SENTINEL NODE IDENTIFICATION - bilateral groin Bilateral 10/29/2018    Performed by [facility] at [facility] OR 2ND FLR    TOTAL ABDOMINAL HYSTERECTOMY  1983    URETHRECTOMY N/A 1/7/2019    Performed by Guero Escobar MD at [facility] OR 2ND FLR    VAGINAL DELIVERY      1 time    VULVECTOMY Bilateral 1/7/2019    Performed by Guero Escobar MD at [facility] OR 2ND FLR    VULVECTOMY Left 10/29/2018    Performed by Guero Escobar MD at [facility] OR 2ND FLR     Family History   Problem Relation Age of Onset    Cancer Mother     Diabetes Mother     Heart attack Father     Diabetes Father     Polycystic kidney disease Sister     Hypertension Sister     Diabetes Sister     Cancer Sister     Diabetes type II Sister     Hypertension Sister     Breast cancer Neg Hx     Colon cancer Neg Hx     Ovarian cancer Neg Hx      Social History     Tobacco Use    Smoking status: Never Smoker    Smokeless tobacco: Never Used   Substance Use Topics    Alcohol use: No    Drug use: No     Review of patient's allergies indicates:   Allergen Reactions    Sulfa (sulfonamide antibiotics) Rash and Hives    Codeine Nausea Only       Current Outpatient Medications:     ferrous sulfate 324 mg (65 mg iron) TbEC, Take 325 mg by mouth once daily., Disp: , Rfl:     furosemide (LASIX) 40 MG tablet, Take 1 tablet (40 mg total) by mouth 2 (two) times daily., Disp: 60 tablet, Rfl: 11    gabapentin (NEURONTIN) 300 MG capsule, Take 1 capsule (300 mg total) by mouth 3 (three) times daily., Disp: 90 capsule, Rfl: 11    HYDROcodone-acetaminophen 7.5-300 mg Tab, Take 1 tablet by mouth 4 (four) times daily as needed., Disp: 60 tablet, Rfl: 0    NIFEdipine (PROCARDIA-XL) 30 MG (OSM) 24 hr tablet, Take 1 tablet (30 mg total) by mouth once daily., Disp: 30 tablet, Rfl: 11    sevelamer carbonate (RENVELA) 800 mg Tab, Take  1 tablet (800 mg total) by mouth 3 (three) times daily with meals., Disp: 90 tablet, Rfl: 11    spironolactone (ALDACTONE) 25 MG tablet, Take 25 mg by mouth once daily. PT TAKING 1/2 TAB PER DAY, Disp: , Rfl:     Review of Systems   Constitutional: Negative for chills, fatigue and fever.   Respiratory: Negative for cough, shortness of breath and wheezing.    Cardiovascular: Negative for chest pain, palpitations and leg swelling.   Gastrointestinal: Positive for abdominal distention. Negative for abdominal pain, constipation, diarrhea, nausea and vomiting.   Genitourinary: Negative for difficulty urinating, dysuria, frequency, genital sores, hematuria, urgency, vaginal bleeding, vaginal discharge and vaginal pain.   Musculoskeletal: Positive for back pain.   Neurological: Negative for weakness.   Hematological: Negative for adenopathy. Does not bruise/bleed easily.   Psychiatric/Behavioral: The patient is not nervous/anxious.        Objective:   BP (!) 159/81   Pulse 98   Wt 59 kg (130 lb)   LMP  (LMP Unknown)   BMI 21.63 kg/m²      Physical Exam   Constitutional: She is oriented to person, place, and time.   Thin appearing   Cardiovascular: Normal rate and regular rhythm.   Pulmonary/Chest: Effort normal and breath sounds normal.   Abdominal: She exhibits distension and mass.   Genitourinary:   Genitourinary Comments: Vulva:  Surgical changes are present. The surgical incision on the right is visible.  No lesions were noted. Slight thickening to the distal urethra.   Neurological: She is alert and oriented to person, place, and time.   Skin: Skin is warm and dry.   Psychiatric: She has a normal mood and affect. Her behavior is normal. Judgment and thought content normal.       Assessment:       1. Malignant melanoma of vulva        Plan:   Malignant melanoma of vulva  Will plan for re-excision of the vulva given the positive margin.    Consent forms were reviewed with patient. Questions were answered. Patient  voiced understanding. Consents were signed.  Preop orders placed.   Will plan to keep overnight given her polycystic kidney disease.     -     Basic metabolic panel; Future; Expected date: 03/20/2019  -     CBC auto differential; Future; Expected date: 03/20/2019

## 2019-03-20 ENCOUNTER — TELEPHONE (OUTPATIENT)
Dept: GYNECOLOGIC ONCOLOGY | Facility: CLINIC | Age: 69
End: 2019-03-20

## 2019-03-20 ENCOUNTER — OFFICE VISIT (OUTPATIENT)
Dept: GYNECOLOGIC ONCOLOGY | Facility: CLINIC | Age: 69
End: 2019-03-20
Payer: MEDICARE

## 2019-03-20 VITALS
WEIGHT: 130 LBS | DIASTOLIC BLOOD PRESSURE: 81 MMHG | SYSTOLIC BLOOD PRESSURE: 159 MMHG | HEART RATE: 98 BPM | BODY MASS INDEX: 21.63 KG/M2

## 2019-03-20 DIAGNOSIS — C51.9 MALIGNANT MELANOMA OF VULVA: Primary | ICD-10-CM

## 2019-03-20 DIAGNOSIS — C43.59 MALIGNANT MELANOMA OF TORSO EXCLUDING BREAST: Primary | ICD-10-CM

## 2019-03-20 PROCEDURE — 99214 OFFICE O/P EST MOD 30 MIN: CPT | Mod: 24,S$GLB,, | Performed by: OBSTETRICS & GYNECOLOGY

## 2019-03-20 PROCEDURE — 1101F PR PT FALLS ASSESS DOC 0-1 FALLS W/OUT INJ PAST YR: ICD-10-PCS | Mod: CPTII,S$GLB,, | Performed by: OBSTETRICS & GYNECOLOGY

## 2019-03-20 PROCEDURE — 1101F PT FALLS ASSESS-DOCD LE1/YR: CPT | Mod: CPTII,S$GLB,, | Performed by: OBSTETRICS & GYNECOLOGY

## 2019-03-20 PROCEDURE — 99999 PR PBB SHADOW E&M-EST. PATIENT-LVL III: ICD-10-PCS | Mod: PBBFAC,,, | Performed by: OBSTETRICS & GYNECOLOGY

## 2019-03-20 PROCEDURE — 99999 PR PBB SHADOW E&M-EST. PATIENT-LVL III: CPT | Mod: PBBFAC,,, | Performed by: OBSTETRICS & GYNECOLOGY

## 2019-03-20 PROCEDURE — 99214 PR OFFICE/OUTPT VISIT, EST, LEVL IV, 30-39 MIN: ICD-10-PCS | Mod: 24,S$GLB,, | Performed by: OBSTETRICS & GYNECOLOGY

## 2019-03-21 RX ORDER — MUPIROCIN 20 MG/G
OINTMENT TOPICAL
Status: CANCELLED | OUTPATIENT
Start: 2019-04-15

## 2019-03-21 RX ORDER — LIDOCAINE HYDROCHLORIDE 10 MG/ML
1 INJECTION, SOLUTION EPIDURAL; INFILTRATION; INTRACAUDAL; PERINEURAL ONCE
Status: CANCELLED | OUTPATIENT
Start: 2019-03-21 | End: 2019-03-21

## 2019-03-21 RX ORDER — SODIUM CHLORIDE 0.9 % (FLUSH) 0.9 %
5 SYRINGE (ML) INJECTION
Status: CANCELLED | OUTPATIENT
Start: 2019-03-21

## 2019-03-22 ENCOUNTER — TELEPHONE (OUTPATIENT)
Dept: HEMATOLOGY/ONCOLOGY | Facility: CLINIC | Age: 69
End: 2019-03-22

## 2019-03-22 NOTE — TELEPHONE ENCOUNTER
----- Message from Wanda Shepard sent at 3/22/2019  8:44 AM CDT -----  Contact: Missy Rivera   Sent documents or inform to the clinic in regards the pt.   Please call [phone]    Missy Rivera in the [facility] Dept

## 2019-03-25 ENCOUNTER — TELEPHONE (OUTPATIENT)
Dept: HEMATOLOGY/ONCOLOGY | Facility: CLINIC | Age: 69
End: 2019-03-25

## 2019-03-25 NOTE — TELEPHONE ENCOUNTER
----- Message from Yohannes Juárez RN sent at 3/25/2019  8:39 AM CDT -----  Contact: PT      ----- Message -----  From: Patti Slaughter RN  Sent: 3/25/2019   8:34 AM  To: Pranay Gomez Staff    She is scheduled with Dr. Ruggiero only because Dr. Pires is on rounds.  She wants to reschedule.   ----- Message -----  From: Paula Burgos  Sent: 3/25/2019   8:16 AM  To: Bahman Richardson Staff-[facility]    Pt called to cancel her April 1st appts due to wanting to be seen at a different location.    Thanks

## 2019-03-25 NOTE — TELEPHONE ENCOUNTER
RETURNED PT CALL AND EXPLAIN THE SCHEDULE SHE HAS SET UP.  SHE THOUGHT EVERYTHING WAS IN [place] INCLUDING DR JOHN

## 2019-03-25 NOTE — TELEPHONE ENCOUNTER
----- Message from Paula Burgos sent at 3/25/2019  8:16 AM CDT -----  Contact: PT  Pt called to cancel her April 1st appts due to wanting to be seen at a different location.    Thanks

## 2019-04-01 ENCOUNTER — OFFICE VISIT (OUTPATIENT)
Dept: HEMATOLOGY/ONCOLOGY | Facility: CLINIC | Age: 69
End: 2019-04-01
Payer: MEDICARE

## 2019-04-01 ENCOUNTER — LAB VISIT (OUTPATIENT)
Dept: LAB | Facility: HOSPITAL | Age: 69
End: 2019-04-01
Attending: INTERNAL MEDICINE
Payer: MEDICARE

## 2019-04-01 ENCOUNTER — ANESTHESIA EVENT (OUTPATIENT)
Dept: SURGERY | Facility: HOSPITAL | Age: 69
End: 2019-04-01
Payer: MEDICARE

## 2019-04-01 VITALS
HEIGHT: 65 IN | HEART RATE: 96 BPM | TEMPERATURE: 98 F | DIASTOLIC BLOOD PRESSURE: 97 MMHG | BODY MASS INDEX: 21.99 KG/M2 | RESPIRATION RATE: 18 BRPM | OXYGEN SATURATION: 100 % | SYSTOLIC BLOOD PRESSURE: 169 MMHG | WEIGHT: 132 LBS

## 2019-04-01 DIAGNOSIS — N18.4 STAGE 4 CHRONIC KIDNEY DISEASE: ICD-10-CM

## 2019-04-01 DIAGNOSIS — R53.0 NEOPLASTIC MALIGNANT RELATED FATIGUE: ICD-10-CM

## 2019-04-01 DIAGNOSIS — Q44.6 POLYCYSTIC LIVER DISEASE: ICD-10-CM

## 2019-04-01 DIAGNOSIS — D63.0 ANEMIA IN NEOPLASTIC DISEASE: ICD-10-CM

## 2019-04-01 DIAGNOSIS — C43.59 MALIGNANT MELANOMA OF TORSO EXCLUDING BREAST: ICD-10-CM

## 2019-04-01 DIAGNOSIS — C34.12 PRIMARY ADENOCARCINOMA OF UPPER LOBE OF LEFT LUNG: ICD-10-CM

## 2019-04-01 DIAGNOSIS — C79.51 BONE METASTASES: ICD-10-CM

## 2019-04-01 DIAGNOSIS — C34.81 MALIGNANT NEOPLASM OF OVERLAPPING SITES OF RIGHT LUNG: ICD-10-CM

## 2019-04-01 DIAGNOSIS — R06.09 DYSPNEA ON EXERTION: ICD-10-CM

## 2019-04-01 DIAGNOSIS — C34.82 MALIGNANT NEOPLASM OF OVERLAPPING SITES OF LEFT LUNG: ICD-10-CM

## 2019-04-01 DIAGNOSIS — R53.82 CHRONIC FATIGUE: Primary | ICD-10-CM

## 2019-04-01 LAB
ALBUMIN SERPL BCP-MCNC: 3.5 G/DL (ref 3.5–5.2)
ALP SERPL-CCNC: 129 U/L (ref 55–135)
ALT SERPL W/O P-5'-P-CCNC: 10 U/L (ref 10–44)
ANION GAP SERPL CALC-SCNC: 14 MMOL/L (ref 8–16)
AST SERPL-CCNC: 15 U/L (ref 10–40)
BASOPHILS # BLD AUTO: 0.04 K/UL (ref 0–0.2)
BASOPHILS NFR BLD: 0.7 % (ref 0–1.9)
BILIRUB SERPL-MCNC: 0.5 MG/DL (ref 0.1–1)
BUN SERPL-MCNC: 72 MG/DL (ref 8–23)
CALCIUM SERPL-MCNC: 9.8 MG/DL (ref 8.7–10.5)
CHLORIDE SERPL-SCNC: 111 MMOL/L (ref 95–110)
CO2 SERPL-SCNC: 19 MMOL/L (ref 23–29)
CREAT SERPL-MCNC: 5.1 MG/DL (ref 0.5–1.4)
DIFFERENTIAL METHOD: ABNORMAL
EOSINOPHIL # BLD AUTO: 0.1 K/UL (ref 0–0.5)
EOSINOPHIL NFR BLD: 2.5 % (ref 0–8)
ERYTHROCYTE [DISTWIDTH] IN BLOOD BY AUTOMATED COUNT: 15.4 % (ref 11.5–14.5)
EST. GFR  (AFRICAN AMERICAN): 9.4 ML/MIN/1.73 M^2
EST. GFR  (NON AFRICAN AMERICAN): 8.2 ML/MIN/1.73 M^2
GLUCOSE SERPL-MCNC: 59 MG/DL (ref 70–110)
HCT VFR BLD AUTO: 33.1 % (ref 37–48.5)
HGB BLD-MCNC: 10.2 G/DL (ref 12–16)
IMM GRANULOCYTES # BLD AUTO: 0.02 K/UL (ref 0–0.04)
IMM GRANULOCYTES NFR BLD AUTO: 0.4 % (ref 0–0.5)
LYMPHOCYTES # BLD AUTO: 0.7 K/UL (ref 1–4.8)
LYMPHOCYTES NFR BLD: 13 % (ref 18–48)
MCH RBC QN AUTO: 26.2 PG (ref 27–31)
MCHC RBC AUTO-ENTMCNC: 30.8 G/DL (ref 32–36)
MCV RBC AUTO: 85 FL (ref 82–98)
MONOCYTES # BLD AUTO: 0.5 K/UL (ref 0.3–1)
MONOCYTES NFR BLD: 9.1 % (ref 4–15)
NEUTROPHILS # BLD AUTO: 4.3 K/UL (ref 1.8–7.7)
NEUTROPHILS NFR BLD: 74.3 % (ref 38–73)
NRBC BLD-RTO: 0 /100 WBC
PLATELET # BLD AUTO: 252 K/UL (ref 150–350)
PMV BLD AUTO: 11.5 FL (ref 9.2–12.9)
POTASSIUM SERPL-SCNC: 3.9 MMOL/L (ref 3.5–5.1)
PROT SERPL-MCNC: 8.1 G/DL (ref 6–8.4)
RBC # BLD AUTO: 3.89 M/UL (ref 4–5.4)
SODIUM SERPL-SCNC: 144 MMOL/L (ref 136–145)
T4 FREE SERPL-MCNC: 0.81 NG/DL (ref 0.71–1.51)
TSH SERPL DL<=0.005 MIU/L-ACNC: 1.9 UIU/ML (ref 0.4–4)
WBC # BLD AUTO: 5.71 K/UL (ref 3.9–12.7)

## 2019-04-01 PROCEDURE — 99214 PR OFFICE/OUTPT VISIT, EST, LEVL IV, 30-39 MIN: ICD-10-PCS | Mod: 24,S$GLB,, | Performed by: INTERNAL MEDICINE

## 2019-04-01 PROCEDURE — 84439 ASSAY OF FREE THYROXINE: CPT

## 2019-04-01 PROCEDURE — 85025 COMPLETE CBC W/AUTO DIFF WBC: CPT

## 2019-04-01 PROCEDURE — 99214 OFFICE O/P EST MOD 30 MIN: CPT | Mod: 24,S$GLB,, | Performed by: INTERNAL MEDICINE

## 2019-04-01 PROCEDURE — 99999 PR PBB SHADOW E&M-EST. PATIENT-LVL III: CPT | Mod: PBBFAC,,, | Performed by: INTERNAL MEDICINE

## 2019-04-01 PROCEDURE — 1101F PT FALLS ASSESS-DOCD LE1/YR: CPT | Mod: CPTII,S$GLB,, | Performed by: INTERNAL MEDICINE

## 2019-04-01 PROCEDURE — 80053 COMPREHEN METABOLIC PANEL: CPT

## 2019-04-01 PROCEDURE — 99999 PR PBB SHADOW E&M-EST. PATIENT-LVL III: ICD-10-PCS | Mod: PBBFAC,,, | Performed by: INTERNAL MEDICINE

## 2019-04-01 PROCEDURE — 84443 ASSAY THYROID STIM HORMONE: CPT

## 2019-04-01 PROCEDURE — 36415 COLL VENOUS BLD VENIPUNCTURE: CPT

## 2019-04-01 PROCEDURE — 1101F PR PT FALLS ASSESS DOC 0-1 FALLS W/OUT INJ PAST YR: ICD-10-PCS | Mod: CPTII,S$GLB,, | Performed by: INTERNAL MEDICINE

## 2019-04-01 RX ORDER — SODIUM CHLORIDE 0.9 % (FLUSH) 0.9 %
10 SYRINGE (ML) INJECTION
Status: CANCELLED | OUTPATIENT
Start: 2019-05-24

## 2019-04-01 RX ORDER — DIPHENHYDRAMINE HYDROCHLORIDE 50 MG/ML
50 INJECTION INTRAMUSCULAR; INTRAVENOUS ONCE AS NEEDED
Status: CANCELLED | OUTPATIENT
Start: 2019-05-24

## 2019-04-01 RX ORDER — EPINEPHRINE 0.3 MG/.3ML
0.3 INJECTION SUBCUTANEOUS ONCE AS NEEDED
Status: CANCELLED | OUTPATIENT
Start: 2019-05-24

## 2019-04-01 RX ORDER — FAMOTIDINE 10 MG/ML
20 INJECTION INTRAVENOUS
Status: CANCELLED | OUTPATIENT
Start: 2019-05-24

## 2019-04-01 RX ORDER — HEPARIN 100 UNIT/ML
500 SYRINGE INTRAVENOUS
Status: CANCELLED | OUTPATIENT
Start: 2019-05-24

## 2019-04-01 RX ORDER — HYDROCODONE BITARTRATE AND ACETAMINOPHEN 7.5; 3 MG/1; MG/1
1 TABLET ORAL 4 TIMES DAILY PRN
Qty: 90 TABLET | Refills: 0 | Status: SHIPPED | OUTPATIENT
Start: 2019-04-01 | End: 2019-04-03

## 2019-04-01 RX ORDER — DEXAMETHASONE 4 MG/1
20 TABLET ORAL SEE ADMIN INSTRUCTIONS
Qty: 120 TABLET | Refills: 0 | Status: SHIPPED | OUTPATIENT
Start: 2019-04-01 | End: 2019-05-01

## 2019-04-01 NOTE — ANESTHESIA PREPROCEDURE EVALUATION
Aixa Meraz, RN   Registered Nurse      Pre Admission Screening   Signed                             []Hide copied text    []Hover for details      Anesthesia Assessment: Preoperative EQUATION     Planned Procedure: Procedure(s) (LRB):  EXCISION-WIDE LOCAL (N/A)  Requested Anesthesia Type:General  Surgeon: Guero Escobar MD  Service: General  Known or anticipated Date of Surgery:4/15/2019     Surgeon notes: reviewed     Electronic QUestionnaire Assessment completed via nurse interview with patient.         No AQ        Triage considerations:      The patient has no apparent active cardiac condition (No unstable coronary Syndrome such as severe unstable angina or recent [<1 month] myocardial infarction, decompensated CHF, severe valvular   disease or significant arrhythmia)     Previous anesthesia records:GETA, MAC and No problems 01/07/19 Placement Time: 0714 Method of Intubation: Direct laryngoscopy Inserted by: CRNA Airway Device: Endotracheal Tube Mask Ventilation: Easy Intubated: Postinduction Blade: Luke #2 Airway Device Size: 7.5 Style: Cuffed Cuff Inflation: Minimal occlusive pressure Inflation Amount (mL): 5 Placement Verified By: Auscultation;Capnometry;ETT Condensation Grade: Grade I Complicating Factors: None Findings Post-Intubation: Positive EtCO2;Bilateral breath sounds;Atraumatic/Condition of teeth unchanged Depth of Insertion (cm): 21 Secured at: Lips Complications: None Breath Sounds: Equal Bilateral Insertion attempts (enter comment if more than 2 attempts): 1   Airway/Jaw/Neck:  Airway Findings: Mouth Opening: Normal Tongue: Normal  General Airway Assessment: Good  Mallampati: I  TM Distance: Normal, at least 6 cm  Jaw/Neck Findings:  Neck ROM: Normal ROM            Last [facility]CP note[facility] 3[facility]6 months [facility]   Subspecialty notes: Hematology/Oncology, Vascular Surgery     Other important co-morbidities:  HTN, HX lung cancer/bone mets, CKD stage 4-5, vulva cancer, anemia      Tests already available:  Available tests,  within 1 month , within [facility] . 4/1/19 TSH, CBC, CMP. 8/2018 EKG.                            Instructions given. (See in Nurse's note)     Optimization:  Anesthesia Preop Clinic Assessment  Indicated-not required for this procedure                Plan:    Testing:  none                           Patient  has previously scheduled Medical Appointment: none     Navigation:                          Straight Line to surgery.                          No tests, anesthesia preop clinic visit, or consult required.                                                        Electronically signed by Aixa Meraz RN at 4/1/2019  1:29 PM       Pre-admit on 4/15/2019            Detailed Report                                                                                                                 04/01/2019  Verónica Baker is a 68 y.o., female.    Anesthesia Evaluation      I have reviewed the Medications.   Steroids Taken In Past Year: Decadron    Review of Systems  Anesthesia Hx:  No problems with previous Anesthesia History of prior surgery of interest to airway management or planning: Previous anesthesia: MAC, General 1/7/19 vulvectomy with general anesthesia.  Procedure performed at an [facility] Facility.  11/9/18 av fistula with MAC.  Procedure performed at an [facility] Facility. Denies Family Hx of Anesthesia complications.   Denies Personal Hx of Anesthesia complications.   Social:  Non-Smoker, No Alcohol Use    Hematology/Oncology:         -- Anemia (H/H 33.1 & 10.2 on 4/1/19.): Vulvar s/p surgery and has confirmed metastises  Metastises: lung   Current/Recent Cancer. Oncology Comments: Vulva melanoma. Left upper lobe-continues with immunotherapy infusions, bone mets     EENT/Dental:EENT/Dental Normal   Cardiovascular:   Hypertension  Denies Angina.  Functional Capacity 3 METS  Hypertension (pt did not like side effects of amlodipine-stopped it 2 months  ago on her own-BP  controlled with 2 diuretics- /80s) , Fairly Controlled on RX    Pulmonary:   Shortness of breath  Pulmonary Symptoms:  are shortness of breath with activity.  Chest Tumor/Mass:  left, upper lobe.    Renal/:   Chronic Renal Disease, ESRD  Kidney Function/Disease, Chronic Kidney Disease (CKD) , CKD Stage IV (GFR 15-29) , contributing etiologies of Polycystic Kidney Disease. AV fistula inserted 11/9-has not started dialysis yet.    Hepatic/GI:   GERD, well controlled Liver Disease,  Liver Disease Polycystic liver disease   Musculoskeletal:  Musculoskeletal Normal    Neurological:  Neurology Normal    Endocrine:  Endocrine Normal    Psych:  Psychiatric Normal           Physical Exam  General:  Well nourished    Airway/Jaw/Neck:  Airway Findings: Mouth Opening: Normal Tongue: Normal  General Airway Assessment: Adult  Mallampati: II  Improves to II with phonation.  TM Distance: Normal, at least 6 cm      Dental:  Dental Findings: In tact   Chest/Lungs:  Chest/Lungs Findings: Clear to auscultation     Heart/Vascular:  Heart Findings: Rate: Normal  Rhythm: Regular Rhythm     Abdomen:  Abdomen Findings:  Normal, Soft, Nontender       Mental Status:  Mental Status Findings:  Cooperative, Alert and Oriented         Anesthesia Plan  Type of Anesthesia, risks & benefits discussed:  Anesthesia Type:  general  Patient's Preference:   Intra-op Monitoring Plan: standard ASA monitors  Intra-op Monitoring Plan Comments:   Post Op Pain Control Plan: multimodal analgesia  Post Op Pain Control Plan Comments:   Induction:   IV  Beta Blocker:  Patient is not currently on a Beta-Blocker (No further documentation required).       Informed Consent: Patient understands risks and agrees with Anesthesia plan.  Questions answered. Anesthesia consent signed with patient.  ASA Score: 3     Day of Surgery Review of History & Physical:    H&P update referred to the surgeon.         Ready For Surgery From Anesthesia Perspective.

## 2019-04-01 NOTE — PROGRESS NOTES
CC: Lung cancer, vulvar melanoma, follow up          HPI:Ms. Baker is a 68 yr old lady here for follow up.  She had a noncontrast CT chest (due to stage 4 CKD) for hemoptysis, which showed a 4cm spiculated medial LUPIS mass and possible mediastinal LAD.  She has hepatomegaly due to polycystic liver (and kidneys).  There were small lucent areas in the sternum and T9 vertebral bodies concerning for mets.  However, PET CT did not reveal any hypermetabolic bone lesions. She underwent biopsy of the lesion on 4/4/18 in [place] and pathology reveals adenocarcinoma. She wanted to come to [facility] for treatment as she has family she can stay with here.She was diagnosed with polycystic kidney/liver when she was 18.  She is followed by Dr. Nagel in [place].  She has chronic infrequent headaches that are not worsening in severity or frequency.  She was evaluated by radiation oncology here ( ).    She completed concurrent carboplatin/paclitaxel for stage III adenocarcinoma lung. She finished 5 out of the planned 6 weekly treatments, with last treatment in 1st week of June 2018.   PET CT done on 8/30/18 showed improvement in the left upper lobe lung lesion. SUV max was 5.69, previously 11.13 and there was resolution of mediastinal metastatic lymph nodes.  She started consolidation treatment with Durvalumab from 9/11/18.  She has been diagnosed with vulvar melanoma.      Interval history: She is here for a follow up visit. She feels better, eating better. Her cough is better. She had vulvar surgery on 10/30/18. [place] lymph nodes were negative at that time.  However, margins in the resected specimen were positive.She had AV fistula surgery on 11/9 in her right UE . She underwent vulvectomy with distal urethrectomy on 1/7/19. She had left hip biopsy on 1/23/19 which showed metastatic NSCLC.  She has continued Durvalumab which she has tolerated well. Her pain is getting worse, mostly in her low back.       Review of  Systems   Constitutional: Positive for malaise/fatigue and weight loss. Negative for chills and fever.   HENT: Negative for congestion, ear discharge and nosebleeds.    Eyes: Negative for blurred vision, double vision and photophobia.   Respiratory: Negative for cough and hemoptysis.    Cardiovascular: Negative for chest pain, palpitations, orthopnea and claudication.   Gastrointestinal: Negative for diarrhea, heartburn, nausea and vomiting.   Genitourinary: Negative for dysuria and urgency.   Musculoskeletal: Positive for joint pain and myalgias.   Neurological: Negative for dizziness, tingling, tremors and headaches.   Endo/Heme/Allergies: Does not bruise/bleed easily.   Psychiatric/Behavioral: Negative for depression, hallucinations, substance abuse and suicidal ideas.     Current Outpatient Medications   Medication Sig    ferrous sulfate 324 mg (65 mg iron) TbEC Take 325 mg by mouth once daily.    furosemide (LASIX) 40 MG tablet Take 1 tablet (40 mg total) by mouth 2 (two) times daily.    gabapentin (NEURONTIN) 300 MG capsule Take 1 capsule (300 mg total) by mouth 3 (three) times daily.    HYDROcodone-acetaminophen 7.5-300 mg Tab Take 1 tablet by mouth 4 (four) times daily as needed.    NIFEdipine (PROCARDIA-XL) 30 MG (OSM) 24 hr tablet Take 1 tablet (30 mg total) by mouth once daily.    sevelamer carbonate (RENVELA) 800 mg Tab Take 1 tablet (800 mg total) by mouth 3 (three) times daily with meals.    spironolactone (ALDACTONE) 25 MG tablet Take 25 mg by mouth once daily. PT TAKING 1/2 TAB PER DAY     No current facility-administered medications for this visit.        Vitals:    04/01/19 1153   BP: (!) 169/97   Pulse: 96   Resp: 18   Temp: 98 °F (36.7 °C)       PS: ECOG 1    Physical Exam   Constitutional: She is oriented to person, place, and time. No distress.   HENT:   Head: Normocephalic and atraumatic.   Mouth/Throat: No oropharyngeal exudate.   Eyes: Pupils are equal, round, and reactive to light.  No scleral icterus.   Neck: Normal range of motion.   Cardiovascular: Normal rate and regular rhythm.   Murmur heard.  Pulmonary/Chest: Effort normal and breath sounds normal. No respiratory distress. She has no rales.   Abdominal: She exhibits distension. There is no tenderness. There is no rebound.   Musculoskeletal: She exhibits edema.   Lymphadenopathy:     She has no cervical adenopathy.   Neurological: She is alert and oriented to person, place, and time. No cranial nerve deficit.   Skin: Skin is warm.   Psychiatric: She has a normal mood and affect.       4/13/18 PET CT        EXAMINATION:  NM PET CT ROUTINE    CLINICAL HISTORY:  Malignant neoplasm of upper lobe, left bronchus or lung.    Outside imaging demonstrated 4 cm spiculated medial left upper lobe mass (biopsy proven adenocarcinoma) with possible mediastinal lymphadenopathy as well as lucent areas involving the sternum and T9.    TECHNIQUE:  13.12 mCi of F18-FDG was administered intravenously in the right antecubital fossa.  After an approximately 60 min distribution time, PET/CT images were acquired from the skull base to the mid thigh. Transmission images were acquired to correct for attenuation using a whole body low-dose CT scan without contrast with the arms positioned above the head. Glycemia at the time of injection was 82 mg/dL.    COMPARISON:  CT abdomen pelvis 07/21/2017, MRI brain 04/13/2018    FINDINGS:  Quality of the study: Adequate.    Abnormal foci of increased uptake are present within the left upper lobe as well as a few prevascular mediastinal lymph nodes.  Lung mass measures approximately 4.6 x 2.9 cm.    No appreciable lucent lesion in the sternum or T9 vertebral body.  Mild degenerative metabolic activity is present at the sternomanubrial junction.    Index lesions:    - Left upper lobe mass: SUV max 11.1    - Lateral pre-vascular lymph node: SUV max 5.8    Physiologic uptake of the tracer is present within the brain, salivary  glands, myocardium, GI and  tracts.    Incidental CT findings: Liver and kidneys are enlarged with numerous intraparenchymal cyst and associated mass effect on the adjacent abdominal organs, unchanged.  Colonic diverticulosis without diverticulitis.  Bandlike opacification within the right lower lobe likely represents subsegmental atelectasis.  Calcific atherosclerosis involves the lower extremity vasculature bilaterally.   Impression        Known malignancy of the left upper lobe with mediastinal trini metastases.    No appreciable lucent lesion in the sternum or T9 vertebral body.  Correlation with prior outside imaging is recommended.            4/13/18 MRI brain w/o cont        FINDINGS:  Intracranial compartment:    Ventricles and sulci are normal in size for age without evidence of hydrocephalus. No extra-axial blood or fluid collections.    Nonspecific small foci of T2/FLAIR high signal intensity in the supratentorial white matter, favored to represent chronic microvascular ischemic changes.  Remote lacunar type infarct in the left putamen.  Small punctate focus of susceptibility signal artifact in the left occipital lobe, suggestive of an old microhemorrhage or calcification.  No mass lesion, acute hemorrhage, edema or acute infarct.    Normal vascular flow voids are preserved.    Skull/extracranial contents (limited evaluation): Bone marrow signal intensity is normal.   Impression        No evidence of intracranial metastases within limitation in the absence of IV contrast which decrease the sensitivity of this exam.    Nonspecific foci of T2/FLAIR high signal intensity in the supratentorial white matter, likely representing chronic microvascular ischemic changes.               8/30/18 PET CT        CLINICAL HISTORY:  lung cancer restaging;  Malignant neoplasm of upper lobe, left bronchus or lung    FINDINGS:  Patient was administered 11.5 millicuries of FDG intravenously.  Comparison 04/13/2018.    Left  upper lung lesion SUV max 5.69, previously larger SUV max 11.13.  Mediastinal lymph nodes have returned to baseline.    There is physiologic intracranial, head and neck activity.  Heart mediastinum are normal.  There is polycystic liver and kidney disease.  There is physiologic GI and  activity.  There is diverticulosis.  No bone lesions are seen.  Right lung is clear.   Impression        See above    Improvement left upper lobe lung lesion SUV max 5.69, previously 11.13 and resolution of mediastinal metastatic lymph nodes.    Severe polycystic liver and kidney disease.       Bilateral sentinel lymph node biopsies done with Dr. Ba Osei.  2.  Left radical vulvectomy     10/29/18  FINAL PATHOLOGIC DIAGNOSIS  Erin FINAL DIAGNOSIS:  Skin, left sentinel node hot blue 150, right sentinel node hot blue 150, and left vulva, excisions (MS [id number],  10/29/2018):  Lymph node, left sentinel hot blue 150, excision (1.): Lymph node identified negative for metastatic melanoma.  HMB45, Mart 1, and S100 immunostains performed at the referring institution are negative.  Right sentinel node, hot blue, 150, excision (2.): Lymph node identified negative for metastatic melanoma. S100,HMB45, and Mart 1 immunostains performed at the referring institution are negative.  Skin, left vulva, excision (3.) Extensive residual malignant melanoma, invasive to a Breslow depth of 4.0 mm. The specimen shows ulceration but this may be due to prior biopsy rather than tumoral ulceration. Mitotic rate is 10 per  mm2. The tumor cells extend to the green inked margin and within 0.2 mm of the blue inked margin.  Melan A and Sox 10 immunostains report performed at [facility] on sections from blocks 3D, 3E, 3 F, 3 G, 3H,3I, 3 J, 3 M, 3 P, 3 Q, and 3 are to evaluate the extent of the lesion.        11/26/18 MRI brain w/o cont     COMPARISON:  04/13/2018    FINDINGS:  There is no restricted diffusion to suggest acute infarction.  There is a mild  degree of a age-appropriate generalized cerebral volume loss.  Compensatory enlargement ventricles sulci and cisterns without hydrocephalus.  The major intracranial T2 flow voids are present.    Ill-defined mild T2 flair signal hyperintensity diffusely in the supratentorial white matter similar to prior suggestive for posttherapy change.  There is a focal area of cystic change in the left caudate/putamen anteriorly compatible with remote lacunar-type infarction.    There is a stable focus of punctate susceptibility in the right parietal subcortical white matter and a small focus in the left posterior temporal subcortical white matter which are unchanged suggestive for prior hemorrhage from treated metastases or remote microhemorrhage.  No new parenchymal signal abnormality.  Please note evaluation for subtle metastases is limited by lack of contrast.    No abnormal extra-axial fluid collection.   Impression        No significant change from prior.    No evidence for new signal abnormality to suggest new or worsening intracranial metastatic disease.    Continued ill-defined T2 FLAIR hyperintensity supratentorial white matter suggestive for posttherapy change with small anterior caudate/putamen remote lacunar-type infarct    Stable small left posterior temporal and punctate right parietal subcortical foci susceptibility suggestive for prior hemorrhage or treated hemorrhagic metastases.    Clinical correlation and continued follow-up advised           12/31/18 PET CT             FINDINGS:  Patient was administered 10.51 millicuries of FDG intravenously.  Comparison is 08/30/2018.    The left upper lobe lung primary lesion has completely resolved and there is radiation change of the left lung.  There are multiple new bone metastases involving the spine and left pelvis.    Index left iliac bone metastasis SUV max 7.36.    There is physiologic intracranial, head, neck activity.  Heart mediastinum are normal.  There is  polycystic liver and kidney disease.  The pelvic organs show nothing unusual.  There is physiologic pancreas and adrenal gland activity.  No suspicious lung lesions are seen.       Impression         See above    Detrimental change with multiple bone metastases throughout the central axial red marrow skeleton as above.    Index left iliac bone lesion SUV max 7.36.    Left upper lobe primary lesion has resolved         1/23/19 FINAL PATHOLOGIC DIAGNOSIS     Bone, left hip/iliac crest, biopsy:  -METASTATIC CARCINOMA, see comment  COMMENT: Given the patient's history along with the histology and immunohistochemical stains, the tumor is most consistent with a metastatic adenocarcinoma, lung primary. Clinical and radiological correlation is recommended  Microscopic Examination  Sections show fragments of bone and hematopoietic elements are with admixed tumor cells. The tumor cells show an increased NC ratio with irregular and hyperchromatic nucleus. A vague gland-like pattern is focally noted. The tumor  cells are positive for CK 7 and TTF-1 while negative for S100, Mart 1, and CK 20. Immunohistochemical stains (performed on block B) were reviewed in conjunction with adequate positive controls     Component      Latest Ref Rng & Units 4/1/2019   WBC      3.90 - 12.70 K/uL 5.71   RBC      4.00 - 5.40 M/uL 3.89 (L)   Hemoglobin      12.0 - 16.0 g/dL 10.2 (L)   Hematocrit      37.0 - 48.5 % 33.1 (L)   MCV      82 - 98 fL 85   MCH      27.0 - 31.0 pg 26.2 (L)   MCHC      32.0 - 36.0 g/dL 30.8 (L)   RDW      11.5 - 14.5 % 15.4 (H)   Platelets      150 - 350 K/uL 252   MPV      9.2 - 12.9 fL 11.5   Immature Granulocytes      0.0 - 0.5 % 0.4   Gran # (ANC)      1.8 - 7.7 K/uL 4.3   Immature Grans (Abs)      0.00 - 0.04 K/uL 0.02   Lymph #      1.0 - 4.8 K/uL 0.7 (L)   Mono #      0.3 - 1.0 K/uL 0.5   Eos #      0.0 - 0.5 K/uL 0.1   Baso #      0.00 - 0.20 K/uL 0.04   nRBC      0 /100 WBC 0   Gran%      38.0 - 73.0 % 74.3 (H)    Lymph%      18.0 - 48.0 % 13.0 (L)   Mono%      4.0 - 15.0 % 9.1   Eosinophil%      0.0 - 8.0 % 2.5   Basophil%      0.0 - 1.9 % 0.7   Differential Method       Automated     Component      Latest Ref Rng & Units 4/1/2019   Sodium      136 - 145 mmol/L 144   Potassium      3.5 - 5.1 mmol/L 3.9   Chloride      95 - 110 mmol/L 111 (H)   CO2      23 - 29 mmol/L 19 (L)   Glucose      70 - 110 mg/dL 59 (L)   BUN, Bld      8 - 23 mg/dL 72 (H)   Creatinine      0.5 - 1.4 mg/dL 5.1 (H)   Calcium      8.7 - 10.5 mg/dL 9.8   Total Protein      6.0 - 8.4 g/dL 8.1   Albumin      3.5 - 5.2 g/dL 3.5   Total Bilirubin      0.1 - 1.0 mg/dL 0.5   Alkaline Phosphatase      55 - 135 U/L 129   AST      10 - 40 U/L 15   ALT      10 - 44 U/L 10   Anion Gap      8 - 16 mmol/L 14   eGFR if African American      >60 mL/min/1.73 m:2 9.4 (A)   eGFR if non African American      >60 mL/min/1.73 m:2 8.2 (A)     Assessment:     1. Adenocarcinoma lung  2. Polycystic kidney disease  3. Polycystic liver disease  4. Ascites  5. Hypertension,essential  6. Cough  7. Dyspnea on exertion  8. CKD stage IV  9. Anemia, normocytic, hypochromic  10. Weight loss  11. Vulvar melanoma  12. Cancer associated pain     Plan:     1. She has a left upper lung mass that was biopsied, as well as hyper metabolic, prevascular mediastinal lymph nodes on PET CT. No other hypermetabolic lesions on PET CT. MRI brain (non-contrast) does not show any metastatic lesion. She has polycystic kidney disease and she is certainly at higher risk for renal malignancy, colon CA as well as liver CA. Slides and block were requested from [facility] and were reviewed by pathology here. It was confirmed that she has adenocarcinoma originating in the lungs.   She started concurrent chemotherapy with Carboplatin/Paclitaxcel ( renally adjusted) as Pemetrexed was not appropriate with the reduced renal function.   Chemotherapy cycle 6 was held due to worsening renal function and  leukopenia. Last treatment was on 6/4/18.  She is doing better now. PET CT done on 8/30/18 showed improvement in the left upper lobe lung lesion. SUV max was 5.69, previously 11.13 and there was resolution of mediastinal metastatic lymph nodes. I discussed these findings with her in detail. I discussed starting consolidative immunotherapy with [facility] Duravalumab on 9/11/18.  She has received 7 treatments so far. She had vulvar surgery ( for melanoma) on 10/20/18.  CKI was held prior to the surgery. She had positive margins, but negative sentinel lymph nodes. She underwent vulvectomy and distal uretherctomy on 1/7/19. Pathology pending.  PET CT was repeated on 12/31/18. The left upper lobe lung primary lesion, compared to 8/30/18, had completely resolved and there was radiation change of the left lung.  There were multiple new bone metastases involving the spine and left pelvis. Images were reviewed.  I explained that treatments are no longer curative and will only be palliative.  Biopsy of left iliac bone on 1/23/19 showed metastatic lung cancer. IHC c/w with metastatic lung cancer.   Molecular studies could not be done as it was bone  . She was continued Durvalumab as PD L1 was not available. She cannot receive Denosumab or Zometa due to her renal impairment.   Morphine IR prescribed  for bone pain. She could not tolerate Morphine. It made her very groggy and constipated. She wants to try Hydrocodone/APAP.  She had Guardant 360 test done. No actionable mutations identified. PD L1 status not available.  Renal function is worsening. Discussed hospice care, but she wants to try palliative chemotherapy.  She will start dose reduced Carboplatin/ Paclitaxel /Bevacizumab and Atezolizumab.   I discussed Impower 150 study ([facility], June 14 2018). It was an international, open-label, phase 3 study, where patients were randomly assigned, in a 1:1:1 ratio, to receive atezolizumab plus carboplatin plus paclitaxel (ACP group),  atezolizumab plus bevacizumab plus carboplatin plus paclitaxel (ABCP group), or bevacizumab plus carboplatin plus paclitaxel (BCP group)  The minimum duration of follow-up was 9.5 months (median duration of follow-up in the WT population, 15.4 months in the ABCP group and 15.5 months in the BCP group).   In the WT population, among the 692 patients in the ABCP and BCP groups combined, 517 (74.7%) had disease progression or . Progression-free survival was significantly longer in the ABCP group than in the BCP group (median, 8.3 months vs. 6.8 months; stratified hazard ratio for disease progression or death, 0.62; 95% confidence interval [CI], 0.52 to 0.74; P<0.001); 241 of 356 patients (67.7%) in the ABCP group had disease progression or  as compared with 276 of 336 patients (82.1%) in the BCP group . At 6 months, the rate of progression-free survival was higher in the ABCP group than in the BCP group (66.9% vs. 56.1%); the corresponding rates at 12 months were 36.5% and 18.0  Among patients in the atezolizumab-bevacizumab-chemotherapy arm, the most common severe side effects were decreased neutrophil count, either alone or with a fever, and high blood pressure. Compared with those who received the bevacizumab-chemotherapy regimen, more patients who received the regimen with atezolizumab had rash, stomatitis, febrile neutropenia, and hemoptysis.  Risks and benefits explained in detail and consent obtained. She wants to have her chemotherapy at [facility].       4. Chronic, attributed to CKD/ polycystic liver        5. On multiple anti-HT medications. /97 mm Hg mm Hg today      6. Possibly related to malignancy in her lungs. It is better now.       8. Serum Cr 5.1 today. She follows with nephrology. She has AVF now. She is still not on dialysis. She is still making good quantities of urine daily. She is aware that chemotherapy , especially Carboplatin, even adjusted for her renal function, can  worsen her renal function further.      9. Secondary to CKD and chemotherapy. Serum iron saturation 33% on 5/21/18.         10. Now stable.      11. Discovered on biopsy done on 6/21/18. Tim's level 3, Breslow depth 1.6mm. She is being followed seen by  ([facility]) . The lesion does not appear to be PET avid, on the last PET CT done in Aug 2018. She underwent bilateral sentinel lymph node biopsies and left radical vulvectomy on 10/30/18. [place]amer lymph nodes were negative.  She likely has residual melanoma as the margins in the resected specimen were positive. Ramer LN was negative. It was at least T4N0. No clear evidence of brain metastases on non-contrast MRI brain done on 11/26/18.BRAF status unknown. She underwent vulvectomy and distal urethrectomy on 1/7/19.    PET CT on 12/31/18 showed multiple, new bone metastases. Biopsy done revealed metastatic lung cancer.    Plan is for re-excision in May 2019.           12. She is on Hydrocodone /APAP for pain. She did not tolerate morphine well ( drowsiness, constipation). I explained that Hydrocodone/APAP has similar adverse effect profile.  Palliative RT might be an option if pain continues to get worse.                  Distress Screening Results: Psychosocial Distress screening score of Distress Score: 3 noted and reviewed. No intervention indicated.

## 2019-04-01 NOTE — PRE ADMISSION SCREENING
Anesthesia Assessment: Preoperative EQUATION    Planned Procedure: Procedure(s) (LRB):  EXCISION-WIDE LOCAL (N/A)  Requested Anesthesia Type:General  Surgeon: Guero Escobar MD  Service: General  Known or anticipated Date of Surgery:4/15/2019    Surgeon notes: reviewed    Electronic QUestionnaire Assessment completed via nurse interview with patient.        No AQ      Triage considerations:     The patient has no apparent active cardiac condition (No unstable coronary Syndrome such as severe unstable angina or recent [<1 month] myocardial infarction, decompensated CHF, severe valvular   disease or significant arrhythmia)    Previous anesthesia records:GETA, MAC and No problems 01/07/19 Placement Time: 0714 Method of Intubation: Direct laryngoscopy Inserted by: CRNA Airway Device: Endotracheal Tube Mask Ventilation: Easy Intubated: Postinduction Blade: Luke #2 Airway Device Size: 7.5 Style: Cuffed Cuff Inflation: Minimal occlusive pressure Inflation Amount (mL): 5 Placement Verified By: Auscultation;Capnometry;ETT Condensation Grade: Grade I Complicating Factors: None Findings Post-Intubation: Positive EtCO2;Bilateral breath sounds;Atraumatic/Condition of teeth unchanged Depth of Insertion (cm): 21 Secured at: Lips Complications: None Breath Sounds: Equal Bilateral Insertion attempts (enter comment if more than 2 attempts): 1   Airway/Jaw/Neck:  Airway Findings: Mouth Opening: Normal Tongue: Normal  General Airway Assessment: Good  Mallampati: I  TM Distance: Normal, at least 6 cm  Jaw/Neck Findings:  Neck ROM: Normal ROM          Last PCP note: 3-6 months ago , within [facility]   Subspecialty notes: Hematology/Oncology, Vascular Surgery    Other important co-morbidities:  HTN, HX lung cancer/bone mets, CKD stage 4-5, vulva cancer, anemia     Tests already available:  Available tests,  within 1 month , within [facility] . 4/1/19 TSH, CBC, CMP. 8/2018 EKG.            Instructions given. (See in Nurse's  note)    Optimization:  Anesthesia Preop Clinic Assessment  Indicated-not required for this procedure       Plan:    Testing:  none     Patient  has previously scheduled Medical Appointment: none    Navigation:                Straight Line to surgery.               No tests, anesthesia preop clinic visit, or consult required.

## 2019-04-01 NOTE — Clinical Note
--pt to star carbo, avastin, paclitaxel, atezolizumab and neulasta obi at [facility] next wk--she needs cbc, cmp, mag, f/u in 4 wks ( before cycle 2 ) with oncology at [facility], or if not available by then, with me--referral for oncology made at [place]

## 2019-04-03 ENCOUNTER — TELEPHONE (OUTPATIENT)
Dept: PHARMACY | Facility: CLINIC | Age: 69
End: 2019-04-03

## 2019-04-03 DIAGNOSIS — C34.81 MALIGNANT NEOPLASM OF OVERLAPPING SITES OF RIGHT LUNG: ICD-10-CM

## 2019-04-03 DIAGNOSIS — C34.12 PRIMARY ADENOCARCINOMA OF UPPER LOBE OF LEFT LUNG: Primary | ICD-10-CM

## 2019-04-03 RX ORDER — HYDROCODONE BITARTRATE AND ACETAMINOPHEN 7.5; 325 MG/1; MG/1
1 TABLET ORAL EVERY 6 HOURS PRN
Qty: 90 TABLET | Refills: 0 | Status: SHIPPED | OUTPATIENT
Start: 2019-04-03 | End: 2019-05-17 | Stop reason: SDUPTHER

## 2019-04-03 NOTE — TELEPHONE ENCOUNTER
Spoke w/ Pt notifying her Pa approved on generic Norco 7.5/325mg.    Pt will  on Thursday.    Thanks,   Kylie Gilliam CPhT, B.A  Patient Care Advocate   [facility]   Phone: [phone] Ext [phone]  Fax: [phone]

## 2019-04-12 ENCOUNTER — TELEPHONE (OUTPATIENT)
Dept: GYNECOLOGIC ONCOLOGY | Facility: CLINIC | Age: 69
End: 2019-04-12

## 2019-04-12 NOTE — TELEPHONE ENCOUNTER
Left voice mail message on patient daughter phone reminding her of surgery 4/15 with arrival time of 5:30 am. TIMI

## 2019-04-12 NOTE — TELEPHONE ENCOUNTER
Tried calling patient on hr phone. No answer left voice mail message on patient daughter phone reminding her of surgery 4/15 with arrival time of 5:30 am. TIMI

## 2019-04-15 ENCOUNTER — HOSPITAL ENCOUNTER (OUTPATIENT)
Facility: HOSPITAL | Age: 69
Discharge: HOME | End: 2019-04-15
Attending: OBSTETRICS & GYNECOLOGY | Admitting: OBSTETRICS & GYNECOLOGY
Payer: MEDICARE

## 2019-04-15 ENCOUNTER — ANESTHESIA (OUTPATIENT)
Dept: SURGERY | Facility: HOSPITAL | Age: 69
End: 2019-04-15
Payer: MEDICARE

## 2019-04-15 ENCOUNTER — TELEPHONE (OUTPATIENT)
Dept: GYNECOLOGIC ONCOLOGY | Facility: CLINIC | Age: 69
End: 2019-04-15

## 2019-04-15 VITALS
BODY MASS INDEX: 21.33 KG/M2 | TEMPERATURE: 98 F | RESPIRATION RATE: 20 BRPM | HEIGHT: 65 IN | DIASTOLIC BLOOD PRESSURE: 97 MMHG | HEART RATE: 80 BPM | OXYGEN SATURATION: 98 % | WEIGHT: 128 LBS | SYSTOLIC BLOOD PRESSURE: 176 MMHG

## 2019-04-15 DIAGNOSIS — C43.59 MALIGNANT MELANOMA OF TORSO EXCLUDING BREAST: ICD-10-CM

## 2019-04-15 DIAGNOSIS — C43.59 MALIGNANT MELANOMA OF TORSO EXCLUDING BREAST: Primary | ICD-10-CM

## 2019-04-15 PROBLEM — G89.18 POST-OP PAIN: Status: RESOLVED | Noted: 2019-01-08 | Resolved: 2019-04-15

## 2019-04-15 PROCEDURE — 27000221 HC OXYGEN, UP TO 24 HOURS

## 2019-04-15 PROCEDURE — 71000015 HC POSTOP RECOV 1ST HR: Performed by: OBSTETRICS & GYNECOLOGY

## 2019-04-15 PROCEDURE — 36000706: Performed by: OBSTETRICS & GYNECOLOGY

## 2019-04-15 PROCEDURE — 37000008 HC ANESTHESIA 1ST 15 MINUTES: Performed by: OBSTETRICS & GYNECOLOGY

## 2019-04-15 PROCEDURE — 71000016 HC POSTOP RECOV ADDL HR: Performed by: OBSTETRICS & GYNECOLOGY

## 2019-04-15 PROCEDURE — 56620 VULVECTOMY SIMPLE PARTIAL: CPT | Mod: ,,, | Performed by: OBSTETRICS & GYNECOLOGY

## 2019-04-15 PROCEDURE — D9220A PRA ANESTHESIA: ICD-10-PCS | Mod: ,,, | Performed by: ANESTHESIOLOGY

## 2019-04-15 PROCEDURE — 25000003 PHARM REV CODE 250: Performed by: ANESTHESIOLOGY

## 2019-04-15 PROCEDURE — 37000009 HC ANESTHESIA EA ADD 15 MINS: Performed by: OBSTETRICS & GYNECOLOGY

## 2019-04-15 PROCEDURE — 71000033 HC RECOVERY, INTIAL HOUR: Performed by: OBSTETRICS & GYNECOLOGY

## 2019-04-15 PROCEDURE — 88342 TISSUE SPECIMEN TO PATHOLOGY - SURGERY: ICD-10-PCS | Mod: 26,,, | Performed by: PATHOLOGY

## 2019-04-15 PROCEDURE — 94761 N-INVAS EAR/PLS OXIMETRY MLT: CPT

## 2019-04-15 PROCEDURE — 88305 TISSUE SPECIMEN TO PATHOLOGY - SURGERY: ICD-10-PCS | Mod: 26,,, | Performed by: PATHOLOGY

## 2019-04-15 PROCEDURE — 56620 PR PART SIMPLE REMV VULVA: ICD-10-PCS | Mod: ,,, | Performed by: OBSTETRICS & GYNECOLOGY

## 2019-04-15 PROCEDURE — 25000003 PHARM REV CODE 250: Performed by: OBSTETRICS & GYNECOLOGY

## 2019-04-15 PROCEDURE — 88305 TISSUE EXAM BY PATHOLOGIST: CPT | Performed by: PATHOLOGY

## 2019-04-15 PROCEDURE — D9220A PRA ANESTHESIA: Mod: ,,, | Performed by: ANESTHESIOLOGY

## 2019-04-15 PROCEDURE — 88342 IMHCHEM/IMCYTCHM 1ST ANTB: CPT | Mod: 26,,, | Performed by: PATHOLOGY

## 2019-04-15 PROCEDURE — 63600175 PHARM REV CODE 636 W HCPCS: Performed by: ANESTHESIOLOGY

## 2019-04-15 PROCEDURE — 25000003 PHARM REV CODE 250

## 2019-04-15 PROCEDURE — 36000707: Performed by: OBSTETRICS & GYNECOLOGY

## 2019-04-15 RX ORDER — DIPHENHYDRAMINE HYDROCHLORIDE 50 MG/ML
25 INJECTION INTRAMUSCULAR; INTRAVENOUS EVERY 4 HOURS PRN
Status: DISCONTINUED | OUTPATIENT
Start: 2019-04-15 | End: 2019-04-15 | Stop reason: HOSPADM

## 2019-04-15 RX ORDER — NEOSTIGMINE METHYLSULFATE 0.5 MG/ML
INJECTION, SOLUTION INTRAVENOUS
Status: DISCONTINUED | OUTPATIENT
Start: 2019-04-15 | End: 2019-04-15

## 2019-04-15 RX ORDER — MUPIROCIN 20 MG/G
OINTMENT TOPICAL
Status: DISCONTINUED | OUTPATIENT
Start: 2019-04-15 | End: 2019-04-15 | Stop reason: HOSPADM

## 2019-04-15 RX ORDER — HYDROCODONE BITARTRATE AND ACETAMINOPHEN 5; 325 MG/1; MG/1
TABLET ORAL
Status: COMPLETED
Start: 2019-04-15 | End: 2019-04-15

## 2019-04-15 RX ORDER — DEXAMETHASONE SODIUM PHOSPHATE 4 MG/ML
INJECTION, SOLUTION INTRA-ARTICULAR; INTRALESIONAL; INTRAMUSCULAR; INTRAVENOUS; SOFT TISSUE
Status: DISCONTINUED | OUTPATIENT
Start: 2019-04-15 | End: 2019-04-15

## 2019-04-15 RX ORDER — FENTANYL CITRATE 50 UG/ML
INJECTION, SOLUTION INTRAMUSCULAR; INTRAVENOUS
Status: DISCONTINUED | OUTPATIENT
Start: 2019-04-15 | End: 2019-04-15

## 2019-04-15 RX ORDER — ONDANSETRON 2 MG/ML
4 INJECTION INTRAMUSCULAR; INTRAVENOUS ONCE AS NEEDED
Status: DISCONTINUED | OUTPATIENT
Start: 2019-04-15 | End: 2019-04-15 | Stop reason: HOSPADM

## 2019-04-15 RX ORDER — ONDANSETRON 2 MG/ML
INJECTION INTRAMUSCULAR; INTRAVENOUS
Status: DISCONTINUED | OUTPATIENT
Start: 2019-04-15 | End: 2019-04-15

## 2019-04-15 RX ORDER — CISATRACURIUM BESYLATE 10 MG/ML
INJECTION, SOLUTION INTRAVENOUS
Status: DISCONTINUED | OUTPATIENT
Start: 2019-04-15 | End: 2019-04-15

## 2019-04-15 RX ORDER — MIDAZOLAM HYDROCHLORIDE 1 MG/ML
INJECTION, SOLUTION INTRAMUSCULAR; INTRAVENOUS
Status: DISCONTINUED | OUTPATIENT
Start: 2019-04-15 | End: 2019-04-15

## 2019-04-15 RX ORDER — LIDOCAINE HCL/PF 100 MG/5ML
SYRINGE (ML) INTRAVENOUS
Status: DISCONTINUED | OUTPATIENT
Start: 2019-04-15 | End: 2019-04-15

## 2019-04-15 RX ORDER — PHENYLEPHRINE HYDROCHLORIDE 10 MG/ML
INJECTION INTRAVENOUS
Status: DISCONTINUED | OUTPATIENT
Start: 2019-04-15 | End: 2019-04-15

## 2019-04-15 RX ORDER — GLYCOPYRROLATE 0.2 MG/ML
INJECTION INTRAMUSCULAR; INTRAVENOUS
Status: DISCONTINUED | OUTPATIENT
Start: 2019-04-15 | End: 2019-04-15

## 2019-04-15 RX ORDER — LIDOCAINE HYDROCHLORIDE 10 MG/ML
1 INJECTION, SOLUTION EPIDURAL; INFILTRATION; INTRACAUDAL; PERINEURAL ONCE
Status: COMPLETED | OUTPATIENT
Start: 2019-04-15 | End: 2019-04-15

## 2019-04-15 RX ORDER — ONDANSETRON 8 MG/1
8 TABLET, ORALLY DISINTEGRATING ORAL EVERY 8 HOURS PRN
Status: DISCONTINUED | OUTPATIENT
Start: 2019-04-15 | End: 2019-04-15 | Stop reason: HOSPADM

## 2019-04-15 RX ORDER — FENTANYL CITRATE 50 UG/ML
25 INJECTION, SOLUTION INTRAMUSCULAR; INTRAVENOUS EVERY 5 MIN PRN
Status: DISCONTINUED | OUTPATIENT
Start: 2019-04-15 | End: 2019-04-15 | Stop reason: HOSPADM

## 2019-04-15 RX ORDER — HYDROCODONE BITARTRATE AND ACETAMINOPHEN 5; 325 MG/1; MG/1
1 TABLET ORAL EVERY 4 HOURS PRN
Status: DISCONTINUED | OUTPATIENT
Start: 2019-04-15 | End: 2019-04-15 | Stop reason: HOSPADM

## 2019-04-15 RX ORDER — SODIUM CHLORIDE 0.9 % (FLUSH) 0.9 %
5 SYRINGE (ML) INJECTION
Status: DISCONTINUED | OUTPATIENT
Start: 2019-04-15 | End: 2019-04-15 | Stop reason: HOSPADM

## 2019-04-15 RX ORDER — SODIUM CHLORIDE 9 MG/ML
INJECTION, SOLUTION INTRAVENOUS CONTINUOUS PRN
Status: DISCONTINUED | OUTPATIENT
Start: 2019-04-15 | End: 2019-04-15

## 2019-04-15 RX ORDER — SODIUM CHLORIDE 0.9 % (FLUSH) 0.9 %
10 SYRINGE (ML) INJECTION
Status: DISCONTINUED | OUTPATIENT
Start: 2019-04-15 | End: 2019-04-15 | Stop reason: HOSPADM

## 2019-04-15 RX ORDER — HYDROCODONE BITARTRATE AND ACETAMINOPHEN 5; 325 MG/1; MG/1
1 TABLET ORAL EVERY 6 HOURS PRN
Qty: 10 TABLET | Refills: 0 | Status: SHIPPED | OUTPATIENT
Start: 2019-04-15 | End: 2019-05-17

## 2019-04-15 RX ORDER — DIPHENHYDRAMINE HCL 25 MG
25 CAPSULE ORAL EVERY 4 HOURS PRN
Status: DISCONTINUED | OUTPATIENT
Start: 2019-04-15 | End: 2019-04-15 | Stop reason: HOSPADM

## 2019-04-15 RX ADMIN — ONDANSETRON 4 MG: 2 INJECTION INTRAMUSCULAR; INTRAVENOUS at 07:04

## 2019-04-15 RX ADMIN — PHENYLEPHRINE HYDROCHLORIDE 100 MCG: 10 INJECTION INTRAVENOUS at 07:04

## 2019-04-15 RX ADMIN — FENTANYL CITRATE 50 MCG: 50 INJECTION, SOLUTION INTRAMUSCULAR; INTRAVENOUS at 07:04

## 2019-04-15 RX ADMIN — NEOSTIGMINE METHYLSULFATE 3.5 MG: 0.5 INJECTION INTRAVENOUS at 07:04

## 2019-04-15 RX ADMIN — SODIUM CHLORIDE: 0.9 INJECTION, SOLUTION INTRAVENOUS at 06:04

## 2019-04-15 RX ADMIN — GLYCOPYRROLATE 0.4 MG: 0.2 INJECTION, SOLUTION INTRAMUSCULAR; INTRAVENOUS at 07:04

## 2019-04-15 RX ADMIN — LIDOCAINE HYDROCHLORIDE 10 MG: 10 INJECTION, SOLUTION EPIDURAL; INFILTRATION; INTRACAUDAL; PERINEURAL at 06:04

## 2019-04-15 RX ADMIN — DEXAMETHASONE SODIUM PHOSPHATE 4 MG: 4 INJECTION, SOLUTION INTRAMUSCULAR; INTRAVENOUS at 07:04

## 2019-04-15 RX ADMIN — LIDOCAINE HYDROCHLORIDE 50 MG: 20 INJECTION, SOLUTION INTRAVENOUS at 07:04

## 2019-04-15 RX ADMIN — HYDROCODONE BITARTRATE AND ACETAMINOPHEN 1 TABLET: 5; 325 TABLET ORAL at 08:04

## 2019-04-15 RX ADMIN — MUPIROCIN: 20 OINTMENT TOPICAL at 06:04

## 2019-04-15 RX ADMIN — MIDAZOLAM HYDROCHLORIDE 2 MG: 1 INJECTION, SOLUTION INTRAMUSCULAR; INTRAVENOUS at 06:04

## 2019-04-15 RX ADMIN — CISATRACURIUM BESYLATE 4 MG: 10 INJECTION INTRAVENOUS at 07:04

## 2019-04-15 NOTE — DISCHARGE INSTRUCTIONS
PATIENT INSTRUCTIONS  POST-ANESTHESIA    IMMEDIATELY FOLLOWING SURGERY:  Do not drive or operate machinery for the first twenty four hours after surgery.  Do not make any important decisions for twenty four hours after surgery or while taking narcotic pain medications or sedatives.  If you develop intractable nausea and vomiting or a severe headache please notify your doctor immediately.    FOLLOW-UP:  Please make an appointment with your surgeon as instructed. You do not need to follow up with anesthesia unless specifically instructed to do so.    WOUND CARE INSTRUCTIONS (if applicable):  Keep a dry clean dressing on the anesthesia/puncture wound site if there is drainage.  Once the wound has quit draining you may leave it open to air.  Generally you should leave the bandage intact for twenty four hours unless there is drainage.  If the epidural site drains for more than 36-48 hours please call the anesthesia department.    QUESTIONS?:  Please feel free to call your physician or the hospital  if you have any questions, and they will be happy to assist you.       [facility] Anesthesia Department  [address] [facility]  [phone]        Discharge Instructions: Caring for Your Indwelling Urinary Catheter  You have been discharged with an indwelling urinary catheter (also called a Catherine catheter). A catheter is a thin, flexible tube. An indwelling urinary catheter has two parts. The first part is a tube that drains urine from your bladder. The second part is a bag or other device that collects the urine.  The most important thing to remember is that you want to prevent infection. Always wash your hands before handling your catheter bag or tubing.  Draining the bedside bag  · Wash your hands with soap and clean, running water or use an alcohol-based hand  that contains at least 60% alcohol.  · Hold the drainage tube over a toilet or measuring container.  · Unclamp the tube and let the  bag drain.  · Dont touch the tip of the drainage tube or let it touch the toilet or container.  Cleaning the drainage tube  · When the bag is empty, clean the tip of the drainage tube with an alcohol wipe.  · Clamp the tube.  · Reinsert the tube into the pocket on the drainage bag.  Cleaning your skin and tubing  · Clean the skin near the catheter with soap and water.  · Wash your genital area from front to back.  · Wash the catheter tubing. Always wash the catheter in the direction away from your body.  · You will be told when and how to change your bag and tubing.  · Dont try to remove the catheter by yourself.  · You may shower with the catheter in place.  Emptying a leg bag  · Wash your hands.  · Remove the stopper on the bag.  · Drain the bag into the toilet or a measuring container. Dont let the tip of the drainage tube touch anything, including your fingers.  · Clean the tip of the drainage tube with alcohol.  · Replace the stopper.  Follow-up care  Make a follow-up appointment as directed by your healthcare provider  When to call your healthcare provider  Call your healthcare provider right away if you have any of the following:  · Chills or fever above 100.4°F (38°C)  · Leakage around the catheter insertion site  · Increased spasms (uncontrollable twitching) in your legs, abdomen, or bladder. Occasional mild spasms are normal.  · Burning in the urinary tract, penis, or genital area  · Nausea and vomiting  · Aching in the lower back  · Cloudy or bloody (pink or red) urine, sediment or mucus in the urine, or foul-smelling urine   Date Last Reviewed: 1/1/2017 © 2000-2017 [employer]. [address] [place] [address]. All rights reserved. This information is not intended as a substitute for professional medical care. Always follow your healthcare professional's instructions.

## 2019-04-15 NOTE — BRIEF OP NOTE
[facility]  Brief Operative Note    SUMMARY     Surgery Date: 4/15/2019     Surgeon(s) and Role:     * Guero Escobar MD - Primary    Assisting Surgeon: None    Pre-op Diagnosis:  Malignant melanoma of vulva [C51.9]    Post-op Diagnosis:  Post-Op Diagnosis Codes:     * Malignant melanoma of vulva [C51.9]    Procedure(s) (LRB):  EXCISION-WIDE LOCAL (N/A)    Anesthesia: General    Description of Procedure: resection of right superior and lateral vulva    Description of the findings of the procedure: no visible tumor present.     Estimated Blood Loss: minima;l  Total Fluids: 550 ml       Specimens:   Specimen (12h ago, onward)    Start     Ordered    04/15/19 0740  Specimen to Pathology - Surgery  Once     Comments:  1. Right superior and lateral vulva, stitch at 12 o'clock - permanent     Start Status     04/15/19 0740 Collected (04/15/19 0742) Order ID: [id number]       04/15/19 0742

## 2019-04-15 NOTE — PROGRESS NOTES
Spoke with Dr. Escobar's resident who spoke with their staff to confirm pt able to go home today.  MD confirmed pt OK to go home today with greene in place.

## 2019-04-15 NOTE — OP NOTE
DATE OF PROCEDURE:  04/15/2019    PREOPERATIVE DIAGNOSIS:  Malignant melanoma of the vulva.    POSTOPERATIVE DIAGNOSIS:  Malignant melanoma of the vulva.    PROCEDURE PERFORMED:  Partial vulvectomy.    SURGEON:  Guero Escobar M.D.    FIRST ASSISTANT:  Aman Iniguez M.D. (RES)    ANESTHESIA:  GETA.    ESTIMATED BLOOD LOSS:  Minimal.    IV FLUIDS:  550 mL.    OPERATIVE HISTORY:  This is a patient known to me with malignant melanoma of the   vulva.  She has undergone a prior resection with positive margin.  This   included distal urethrectomy.  She was brought to the Operating Room for   reexcision of the scar and additional vulva.    There is no visible lesion on the vulva.    OPERATIVE PROCEDURE:  The patient was brought to the Operating Room after   induction of general anesthesia.  The vulva and vagina were prepped with   Betadine scrub and solution.  The patient was sterilely draped.    At this point, I could identify the prior incision and scar, but there was no   obvious melanoma or pigmented skin, other than her normal -American skin   color.    At this point, I now incised the skin including a portion of the lateral urethra   on the right.  This was carried down in the subcutaneous tissue and the mass   was excised.    A 3-0 Vicryl sutures were then used to reapproximate subcutaneous tissues.  The   skin was then closed with a running 4-0 Monocryl suture in a subcuticular   closure.    At this point, the patient's legs were then brought back down to a straight   position.  She was awakened and taken to the Recovery Room in stable condition.    Lap, needle, sponge and instrument count was correct.      SHASHANK/LUIS ALBERTO  dd: 04/15/2019 18:37:05 (CDT)  td: 04/15/2019 18:46:10 (CDT)  Doc ID   #[id number]  Job ID #[id number]    CC:

## 2019-04-15 NOTE — DISCHARGE SUMMARY
[facility]  Brief Op Note/Discharge Note  Short Stay    Admit Date: 4/15/2019    Discharge Date: 04/15/2019    Attending Physician: Guero Escobar MD     Surgery Date: 4/15/2019     Surgeon(s) and Role:     * Guero Escobar MD - Primary    Assisting Surgeon: Aman Iniguez    Pre-op Diagnosis:  Malignant melanoma of vulva [C51.9]    Post-op Diagnosis:  Post-Op Diagnosis Codes:     * Malignant melanoma of vulva [C51.9]    Procedure(s) (LRB):  EXCISION-WIDE LOCAL (N/A)    Anesthesia: General    Findings/Key Components:   1. Evidence of prior Wide Local Excision Noted   2. Uncomplicated and successful Wide Local Excision of vulva    Estimated Blood Loss: Minimal        Specimens:   Specimen (12h ago, onward)    Start     Ordered    04/15/19 0740  Specimen to Pathology - Surgery  Once     Comments:  1. Right superior and lateral vulva, stitch at 12 o'clock - permanent     Start Status     04/15/19 0740 Collected (04/15/19 0742) Order ID: [id number]       04/15/19 0742          Discharge Provider: Aman Iniguez    Diagnoses:  Active Hospital Problems    Diagnosis  POA    Malignant melanoma of vulva - S/P WLE  [C43.59]  Yes      Resolved Hospital Problems   No resolved problems to display.       Discharged Condition: good    Hospital Course:   Patient was admitted for outpatient procedure as above, and tolerated the procedure well with no complications. Please see operative report for further details. Following the procedure, the patient was awakened from anesthesia and transferred to the recovery area in stable condition. She was discharged to home once ambulating, voiding, tolerating PO intake, and pain was well-controlled. Patient was given routine post-op instructions and prescriptions for pain medication to take as needed. Patient instructed to follow up with Dr. Escobar in 2 weeks.    Final Diagnoses: Same as principal problem.    Disposition: Home or Self Care    Follow up/Patient Instructions:     Medications:  Reconciled Home Medications:      Medication List      CHANGE how you take these medications    * HYDROcodone-acetaminophen 7.5-325 mg per tablet  Commonly known as:  NORCO  Take 1 tablet by mouth every 6 (six) hours as needed for Pain.  What changed:  Another medication with the same name was added. Make sure you understand how and when to take each.     * HYDROcodone-acetaminophen 5-325 mg per tablet  Commonly known as:  NORCO  Take 1 tablet by mouth every 6 (six) hours as needed for Pain.  What changed:  You were already taking a medication with the same name, and this prescription was added. Make sure you understand how and when to take each.         * This list has 2 medication(s) that are the same as other medications prescribed for you. Read the directions carefully, and ask your doctor or other care provider to review them with you.            CONTINUE taking these medications    dexamethasone 4 MG Tab  Commonly known as:  DECADRON  Take 5 tablets (20 mg total) by mouth As instructed. Take 5 tablets by mouth 12 hrs before and 5 tablets 6 hrs before chemo infusion     ferrous sulfate 324 mg (65 mg iron) Tbec  Take 325 mg by mouth once daily.     furosemide 40 MG tablet  Commonly known as:  LASIX  Take 1 tablet (40 mg total) by mouth 2 (two) times daily.     gabapentin 300 MG capsule  Commonly known as:  NEURONTIN  Take 1 capsule (300 mg total) by mouth 3 (three) times daily.     NIFEdipine 30 MG (OSM) 24 hr tablet  Commonly known as:  PROCARDIA-XL  Take 1 tablet (30 mg total) by mouth once daily.     sevelamer carbonate 800 mg Tab  Commonly known as:  RENVELA  Take 1 tablet (800 mg total) by mouth 3 (three) times daily with meals.     spironolactone 25 MG tablet  Commonly known as:  ALDACTONE  Take 25 mg by mouth once daily. PT TAKING 1/2 TAB PER DAY          Discharge Procedure Orders   Diet general     Follow-up Information     Guero Escobar MD.    Specialty:  Gynecologic Oncology  Why:   As scheduled by the clinic  Contact information:  [address]  [facility] [address]  [phone]

## 2019-04-15 NOTE — PROGRESS NOTES
Spoke to clinic nurse to schedule appointment.  Dr. Escobar will be out of the office in 2 weeks.  Nurse spoke to Dr. Aguirre to clarify timing of appointment. Dr. Aguirre asked that I speak to Dr. Iniguez and Dr. Escobar to confirm that this patient is supposed to be discharged with greene and that post-op appointment does not need to be in 1 week.    Asked the above through OR circulator.  Dr. Iniguez said yes, send patient home with greene and follow-up in 2 weeks.  Informed circulator that there is no appointment available with Dr. Escobar in 2 weeks, would he prefer the appointment to be before he is out of the office or after.  Dr. Escobar said to schedule the appointment for after he returns.

## 2019-04-15 NOTE — TELEPHONE ENCOUNTER
----- Message from Maria Ines Avalos sent at 4/15/2019 10:15 AM CDT -----  Contact: Recovery Nurse Jenny  Name of Who is Calling: LORA GREY [[id number]]    What is the request in detail: Jenny calling per Dr Escobar to get a sooner post op appt.. Please advise      Can the clinic reply by [facility]: no      [facility] ext [phone] or call pt [phone]

## 2019-04-15 NOTE — INTERVAL H&P NOTE
The patient has been examined and the H&P has been reviewed:    I concur with the findings and changes have been noted since the H&P was written: Will plan to discharge patient after the procedure.     Anesthesia/Surgery risks, benefits and alternative options discussed and understood by patient/family.          Active Hospital Problems    Diagnosis  POA    Malignant melanoma of vulva [C43.59]  Yes      Resolved Hospital Problems   No resolved problems to display.       Patient seen and examined. I discussed planned operation with patient. Questions answered.   ZULMA RHOADES MD

## 2019-04-15 NOTE — TRANSFER OF CARE
"Anesthesia Transfer of Care Note    Patient: Verónica Baker    Procedure(s) Performed: Procedure(s) (LRB):  EXCISION-WIDE LOCAL (N/A)    Patient location: PACU    Anesthesia Type: general    Transport from OR: Transported from OR on 6-10 L/min O2 by face mask with adequate spontaneous ventilation    Post pain: adequate analgesia    Post assessment: no apparent anesthetic complications    Post vital signs: stable    Level of consciousness: awake    Nausea/Vomiting: no nausea/vomiting    Complications: none    Transfer of care protocol was followed      Last vitals:   Visit Vitals  BP (!) 152/94 (BP Location: Left arm, Patient Position: Lying)   Pulse 92   Temp 35.9 °C (96.6 °F) (Temporal)   Resp 19   Ht 5' 5" (1.651 m)   Wt 58.1 kg (128 lb)   LMP  (LMP Unknown)   SpO2 100%   Breastfeeding? No   BMI 21.30 kg/m²     "

## 2019-04-15 NOTE — ANESTHESIA POSTPROCEDURE EVALUATION
Anesthesia Post Evaluation    Patient: Verónica Baker    Procedure(s) Performed: Procedure(s) (LRB):  EXCISION-WIDE LOCAL (N/A)    Final Anesthesia Type: general  Patient location during evaluation: PACU  Patient participation: Yes- Able to Participate  Level of consciousness: awake and alert and oriented  Post-procedure vital signs: reviewed and stable  Pain management: adequate  Airway patency: patent  PONV status at discharge: No PONV  Anesthetic complications: no      Cardiovascular status: blood pressure returned to baseline and hemodynamically stable  Respiratory status: unassisted  Hydration status: euvolemic  Follow-up not needed.          Vitals Value Taken Time   /97 4/15/2019 11:13 AM   Temp 36.5 °C (97.7 °F) 4/15/2019 11:00 AM   Pulse 73 4/15/2019 11:10 AM   Resp 20 4/15/2019 11:00 AM   SpO2 99 % 4/15/2019 11:10 AM   Vitals shown include unvalidated device data.      Event Time     Out of Recovery 08:50:00          Pain/Billy Score: Pain Rating Prior to Med Admin: 4 (4/15/2019  8:44 AM)  Billy Score: 10 (4/15/2019 11:00 AM)

## 2019-04-15 NOTE — PLAN OF CARE
Discharge instructions given and explained to patient and family with verbalization of understanding all instructions. Prescription given and explained next time and doses of each medication. Patients v/s stable, denies n/v and tolerating po, rates pain level tolerable, IV removed, greene in place and family at bedside for patient discharge home.

## 2019-04-15 NOTE — PROGRESS NOTES
Per MD, through OR circulator, okay to shower now. No bath for 2 weeks.  F/u needs to be in 2 weeks and greene will be removed at that time.    Spoke to scheduling center to inform them that appointment needs to be scheduled for 2 weeks from now.  She will message nurse and nurse will call patient with new appointment.  Verified phone number on file with patient.

## 2019-04-16 ENCOUNTER — PATIENT OUTREACH (OUTPATIENT)
Dept: ADMINISTRATIVE | Facility: HOSPITAL | Age: 69
End: 2019-04-16

## 2019-04-16 NOTE — LETTER
April 16, 2019    Grant Roque MD             [facility]  [address]  [facility] [address]  Phone: [phone] April 16, 2019     Patient: Verónica Baker    YOB: 1950   Date of Visit: 4/16/2019       To Whom It May Concern:      [facility]    We are seeing Verónica Baker in the clinic today at [facility].  Matilde Trinh MD is their PCP.  She/He has an outstanding lab/procedure at this time when reviewing their chart.  To help with our Health Maintenance records will you please supply the following:                                                 [x]  Colonoscopy                                          Please Fax to [facility] at [phone]    Thank you for your help, Maranda Mosquera LPN-WILVER.  If I can be of any assistance you can call at [phone]    Sincerely,    Maranda Mosquera, Care Coordinator  [facility]  Phone: [phone]  Fax: [phone]

## 2019-04-16 NOTE — PROGRESS NOTES
Health Maintenance Due   Topic Date Due    Lipid Panel  1950    TETANUS VACCINE  06/25/1969    Zoster Vaccine  06/25/2011    Pneumococcal Vaccine (65+ High/Highest Risk) (2 of 2 - PPSV23) 05/25/2018    Mammogram  06/14/2019     Requested cscope

## 2019-04-25 ENCOUNTER — OFFICE VISIT (OUTPATIENT)
Dept: GYNECOLOGIC ONCOLOGY | Facility: CLINIC | Age: 69
End: 2019-04-25
Payer: MEDICARE

## 2019-04-25 VITALS
SYSTOLIC BLOOD PRESSURE: 156 MMHG | BODY MASS INDEX: 22.26 KG/M2 | HEART RATE: 107 BPM | WEIGHT: 133.63 LBS | DIASTOLIC BLOOD PRESSURE: 77 MMHG | HEIGHT: 65 IN

## 2019-04-25 DIAGNOSIS — C43.59 MALIGNANT MELANOMA OF TORSO EXCLUDING BREAST: Primary | ICD-10-CM

## 2019-04-25 DIAGNOSIS — C34.81 MALIGNANT NEOPLASM OF OVERLAPPING SITES OF RIGHT LUNG: ICD-10-CM

## 2019-04-25 DIAGNOSIS — C34.12 PRIMARY ADENOCARCINOMA OF UPPER LOBE OF LEFT LUNG: ICD-10-CM

## 2019-04-25 PROCEDURE — 99999 PR PBB SHADOW E&M-EST. PATIENT-LVL III: ICD-10-PCS | Mod: PBBFAC,,, | Performed by: OBSTETRICS & GYNECOLOGY

## 2019-04-25 PROCEDURE — 99024 PR POST-OP FOLLOW-UP VISIT: ICD-10-PCS | Mod: S$GLB,,, | Performed by: OBSTETRICS & GYNECOLOGY

## 2019-04-25 PROCEDURE — 99024 POSTOP FOLLOW-UP VISIT: CPT | Mod: S$GLB,,, | Performed by: OBSTETRICS & GYNECOLOGY

## 2019-04-25 PROCEDURE — 99999 PR PBB SHADOW E&M-EST. PATIENT-LVL III: CPT | Mod: PBBFAC,,, | Performed by: OBSTETRICS & GYNECOLOGY

## 2019-04-25 NOTE — PROGRESS NOTES
"Subjective:       Patient ID: Verónica Baker is a 68 y.o. female.    Chief Complaint: Mlaignant neoplasm of vulva; Catheterization (Cath problems ); and Pain (pain when urinating )    HPI   Patient comes in today with complaints related to her Catherine catheter that was placed after a 2nd wide local excision of the vulva for persistent melanoma.    Final pathology report shows no residual tumor.  Review of Systems    Objective:   BP (!) 156/77   Pulse 107   Ht 5' 5" (1.651 m)   Wt 60.6 kg (133 lb 9.6 oz)   LMP  (LMP Unknown)   BMI 22.23 kg/m²      Physical Exam   Genitourinary:   Genitourinary Comments: Area of excision on the right vulva .  Catherine catheter was removed.  Patient had relief of pain.       Assessment:       1. Malignant melanoma of vulva - S/P WLE     2. Primary adenocarcinoma of upper lobe of left lung    3. Malignant neoplasm of overlapping sites of right lung        Plan:   Malignant melanoma of vulva - S/P WLE   Negative specimen on re-excision.  Return to clinic for routine postop visit.  Primary adenocarcinoma of upper lobe of left lung    Malignant neoplasm of overlapping sites of right lung        "

## 2019-05-01 ENCOUNTER — OFFICE VISIT (OUTPATIENT)
Dept: NEPHROLOGY | Facility: CLINIC | Age: 69
End: 2019-05-01
Payer: MEDICARE

## 2019-05-01 ENCOUNTER — OFFICE VISIT (OUTPATIENT)
Dept: FAMILY MEDICINE | Facility: CLINIC | Age: 69
End: 2019-05-01
Payer: MEDICARE

## 2019-05-01 ENCOUNTER — TELEPHONE (OUTPATIENT)
Dept: NEPHROLOGY | Facility: CLINIC | Age: 69
End: 2019-05-01

## 2019-05-01 ENCOUNTER — TELEPHONE (OUTPATIENT)
Dept: OPTOMETRY | Facility: CLINIC | Age: 69
End: 2019-05-01

## 2019-05-01 VITALS
SYSTOLIC BLOOD PRESSURE: 160 MMHG | HEART RATE: 106 BPM | DIASTOLIC BLOOD PRESSURE: 88 MMHG | BODY MASS INDEX: 22 KG/M2 | WEIGHT: 132.06 LBS | OXYGEN SATURATION: 99 % | HEIGHT: 65 IN

## 2019-05-01 VITALS
OXYGEN SATURATION: 99 % | WEIGHT: 130 LBS | HEIGHT: 65 IN | BODY MASS INDEX: 21.66 KG/M2 | DIASTOLIC BLOOD PRESSURE: 94 MMHG | SYSTOLIC BLOOD PRESSURE: 162 MMHG | HEART RATE: 98 BPM

## 2019-05-01 DIAGNOSIS — Q44.6 POLYCYSTIC LIVER DISEASE: ICD-10-CM

## 2019-05-01 DIAGNOSIS — I15.1 HYPERTENSION SECONDARY TO OTHER RENAL DISORDERS: Primary | ICD-10-CM

## 2019-05-01 DIAGNOSIS — T45.1X5A CHEMOTHERAPY-INDUCED NEUTROPENIA: ICD-10-CM

## 2019-05-01 DIAGNOSIS — D64.9 ANEMIA, UNSPECIFIED TYPE: ICD-10-CM

## 2019-05-01 DIAGNOSIS — I15.1 HYPERTENSION SECONDARY TO OTHER RENAL DISORDERS: ICD-10-CM

## 2019-05-01 DIAGNOSIS — C43.59 MALIGNANT MELANOMA OF TORSO EXCLUDING BREAST: ICD-10-CM

## 2019-05-01 DIAGNOSIS — Q61.3 POLYCYSTIC KIDNEY DISEASE: ICD-10-CM

## 2019-05-01 DIAGNOSIS — D70.1 CHEMOTHERAPY-INDUCED NEUTROPENIA: ICD-10-CM

## 2019-05-01 DIAGNOSIS — C34.81 MALIGNANT NEOPLASM OF OVERLAPPING SITES OF RIGHT LUNG: ICD-10-CM

## 2019-05-01 DIAGNOSIS — T82.858S ARTERIOVENOUS FISTULA STENOSIS, SEQUELA: ICD-10-CM

## 2019-05-01 DIAGNOSIS — N18.5 CKD (CHRONIC KIDNEY DISEASE), STAGE V: ICD-10-CM

## 2019-05-01 DIAGNOSIS — N18.4 STAGE 4 CHRONIC KIDNEY DISEASE: Primary | ICD-10-CM

## 2019-05-01 PROCEDURE — 1100F PTFALLS ASSESS-DOCD GE2>/YR: CPT | Mod: CPTII,S$GLB,, | Performed by: INTERNAL MEDICINE

## 2019-05-01 PROCEDURE — 99999 PR PBB SHADOW E&M-EST. PATIENT-LVL III: CPT | Mod: PBBFAC,,, | Performed by: INTERNAL MEDICINE

## 2019-05-01 PROCEDURE — 99214 PR OFFICE/OUTPT VISIT, EST, LEVL IV, 30-39 MIN: ICD-10-PCS | Mod: S$GLB,,, | Performed by: INTERNAL MEDICINE

## 2019-05-01 PROCEDURE — 99214 OFFICE O/P EST MOD 30 MIN: CPT | Mod: S$GLB,,, | Performed by: INTERNAL MEDICINE

## 2019-05-01 PROCEDURE — 1100F PR PT FALLS ASSESS DOC 2+ FALLS/FALL W/INJURY/YR: ICD-10-PCS | Mod: CPTII,S$GLB,, | Performed by: INTERNAL MEDICINE

## 2019-05-01 PROCEDURE — 99999 PR PBB SHADOW E&M-EST. PATIENT-LVL III: ICD-10-PCS | Mod: PBBFAC,,, | Performed by: INTERNAL MEDICINE

## 2019-05-01 PROCEDURE — 3288F PR FALLS RISK ASSESSMENT DOCUMENTED: ICD-10-PCS | Mod: CPTII,S$GLB,, | Performed by: INTERNAL MEDICINE

## 2019-05-01 PROCEDURE — 99213 OFFICE O/P EST LOW 20 MIN: CPT | Mod: S$GLB,,, | Performed by: INTERNAL MEDICINE

## 2019-05-01 PROCEDURE — 3288F FALL RISK ASSESSMENT DOCD: CPT | Mod: CPTII,S$GLB,, | Performed by: INTERNAL MEDICINE

## 2019-05-01 PROCEDURE — 99213 PR OFFICE/OUTPT VISIT, EST, LEVL III, 20-29 MIN: ICD-10-PCS | Mod: S$GLB,,, | Performed by: INTERNAL MEDICINE

## 2019-05-01 RX ORDER — FERROUS SULFATE 325(65) MG
325 TABLET, DELAYED RELEASE (ENTERIC COATED) ORAL DAILY
COMMUNITY

## 2019-05-01 NOTE — PROGRESS NOTES
Patient ID: Verónica Baker is a 68 y.o. female.    Chief Complaint: Establish Care    HPI    PMH:  Hypertension, polycystic kidney disease, stage 5 CKD anemia of chronic disease, malignant melanoma of vulva status post surgery    Patient is here to establish care.    HTN:   Furosemide 40 mg BID  Nifedipine 30 mg qd  Spironolactone 12.5 mg qd  Has swelling with amlodipine  /88 on repeat  Will discuss with nephro about starting carvedilol 6.25 mg BID  Other options include hydralazine 25 mg TID    CKD V:  2/2 polycystic kidney without dialysis:  Has RUE graft. Never used before. Has good bruit and thrill.  sevelamer 800 mg TID with meals  Supposed to be taking 1/8 tsp baking soda TID  Denies NSAIDs. Urinates frequently.   Labs utd (metabolic acidosis,  potassium normal)  -follow-up Nephrology.    Adenocarcinoma of the left lung with bone mets.   Will be seeing heme/onc [facility].   Saw Dr. Pires in [place].  Lung cancer improved s/p concurrent carboplatin/paclitaxel 5/6 tx completed. Had consolidation treatment with Duravalumab. Bones mets discovered. She was to have palliative chemo but decided to come to [place] for Tx.     She has chronic pain:  Back, shoulders, knees  Is taking norco 7.5-325 1 pill at night.   Gabapentin 300 mg qd.   -Will consider dilaudid po for better sfx profile in CKD  -continue current management  -discussed bowel regimen while on narcotics    Melanoma of vulva:   S/p removal. Sees Dr. Escobar.       Social History     Socioeconomic History    Marital status: Single     Spouse name: Not on file    Number of children: Not on file    Years of education: Not on file    Highest education level: Not on file   Occupational History    Not on file   Social Needs    Financial resource strain: Not on file    Food insecurity:     Worry: Not on file     Inability: Not on file    Transportation needs:     Medical: Not on file     Non-medical: Not on file   Tobacco Use    Smoking  status: Never Smoker    Smokeless tobacco: Never Used   Substance and Sexual Activity    Alcohol use: No    Drug use: No    Sexual activity: Not Currently   Lifestyle    Physical activity:     Days per week: Not on file     Minutes per session: Not on file    Stress: Not on file   Relationships    Social connections:     Talks on phone: Not on file     Gets together: Not on file     Attends [employer] service: Not on file     Active member of club or organization: Not on file     Attends meetings of clubs or organizations: Not on file     Relationship status: Not on file   Other Topics Concern    Not on file   Social History Narrative    Not on file     Family History   Problem Relation Age of Onset    Cancer Mother     Diabetes Mother     Heart attack Father     Diabetes Father     Polycystic kidney disease Sister     Hypertension Sister     Diabetes Sister     Cancer Sister     Diabetes type II Sister     Hypertension Sister     Breast cancer Neg Hx     Colon cancer Neg Hx     Ovarian cancer Neg Hx      Current Outpatient Medications on File Prior to Visit   Medication Sig Dispense Refill    furosemide (LASIX) 40 MG tablet Take 1 tablet (40 mg total) by mouth 2 (two) times daily. 60 tablet 11    gabapentin (NEURONTIN) 300 MG capsule Take 1 capsule (300 mg total) by mouth 3 (three) times daily. (Patient taking differently: Take 300 mg by mouth 3 (three) times daily. ) 90 capsule 11    HYDROcodone-acetaminophen (NORCO) 5-325 mg per tablet Take 1 tablet by mouth every 6 (six) hours as needed for Pain. 10 tablet 0    HYDROcodone-acetaminophen (NORCO) 7.5-325 mg per tablet Take 1 tablet by mouth every 6 (six) hours as needed for Pain. (Patient taking differently: Take 1 tablet by mouth every 6 (six) hours as needed for Pain. ) 90 tablet 0    NIFEdipine (PROCARDIA-XL) 30 MG (OSM) 24 hr tablet Take 1 tablet (30 mg total) by mouth once daily. 30 tablet 11    sevelamer carbonate (RENVELA) 800 mg  Tab Take 1 tablet (800 mg total) by mouth 3 (three) times daily with meals. 90 tablet 11    spironolactone (ALDACTONE) 25 MG tablet Take 25 mg by mouth once daily. PT TAKING 1/2 TAB PER DAY      ferrous sulfate 325 (65 FE) MG EC tablet Take 325 mg by mouth once daily.      SODIUM BICARBONATE ORAL Take by mouth.      [DISCONTINUED] dexamethasone (DECADRON) 4 MG Tab Take 5 tablets (20 mg total) by mouth As instructed. Take 5 tablets by mouth 12 hrs before and 5 tablets 6 hrs before chemo infusion 120 tablet 0     No current facility-administered medications on file prior to visit.      I personally reviewed past medical, family and social history.  Review of Systems   Respiratory: Positive for cough.    Gastrointestinal: Positive for abdominal distention.       Objective:     Vitals:    05/01/19 1115   BP: (!) 160/88   Pulse:         Physical Exam   Constitutional: She is oriented to person, place, and time. She appears well-developed and well-nourished. No distress.   Severe diffuse sarcopenia   HENT:   Head: Normocephalic and atraumatic.   Eyes: Pupils are equal, round, and reactive to light. EOM are normal. Right eye exhibits no discharge. Left eye exhibits no discharge. No scleral icterus.   Neck: Normal range of motion. Neck supple. No JVD present. No thyromegaly present.   Cardiovascular: Normal rate, regular rhythm and normal heart sounds. Exam reveals no gallop and no friction rub.   No murmur heard.  Right upper extremity dialysis graft with good thrill and bruit   Pulmonary/Chest: Effort normal and breath sounds normal. No respiratory distress. She has no wheezes.   Abdominal: Soft. Bowel sounds are normal. She exhibits no distension and no mass. There is no tenderness.   Markedly swollen abdomen with multiple masses.    Musculoskeletal: Normal range of motion. She exhibits no edema.   Lymphadenopathy:     She has no cervical adenopathy.   Neurological: She is alert and oriented to person, place, and  time. No cranial nerve deficit. Coordination normal.   Skin: Skin is warm and dry. Capillary refill takes less than 2 seconds. No rash noted. She is not diaphoretic.   Psychiatric: She has a normal mood and affect. Her behavior is normal.       Assessment/Plan   Forrest was seen today for establish care.    Diagnoses and all orders for this visit:    Hypertension secondary to other renal disorders

## 2019-05-01 NOTE — TELEPHONE ENCOUNTER
Pt lives an hour away and has PCP follow up 5/31 on a Friday. She is also due to see me for follow up around that time. Can we schedule pt on same day please? I will get labs same day. Thank you

## 2019-05-01 NOTE — TELEPHONE ENCOUNTER
L/M on pt's voicemail that Dr. Larkin is out sick today and her appointment has been cancelled.  Pt can call back to reschedule appointment per convenience

## 2019-05-02 ENCOUNTER — TELEPHONE (OUTPATIENT)
Dept: FAMILY MEDICINE | Facility: CLINIC | Age: 69
End: 2019-05-02

## 2019-05-02 DIAGNOSIS — I15.1 HYPERTENSION SECONDARY TO OTHER RENAL DISORDERS: Primary | ICD-10-CM

## 2019-05-02 RX ORDER — NIFEDIPINE 60 MG/1
60 TABLET, EXTENDED RELEASE ORAL DAILY
Qty: 30 TABLET | Refills: 1 | Status: SHIPPED | OUTPATIENT
Start: 2019-05-02 | End: 2019-07-20 | Stop reason: SDUPTHER

## 2019-05-11 PROBLEM — N18.4 STAGE 4 CHRONIC KIDNEY DISEASE: Status: RESOLVED | Noted: 2018-08-31 | Resolved: 2019-05-11

## 2019-05-11 NOTE — PROGRESS NOTES
Subjective:       Patient ID: Verónica Baker is a 68 y.o. Black or  female who presents for follow-up evaluation of Chronic Kidney Disease    Here to follow up on CKD, since she was seen last she has had a RUE AV fistula placed and it has been cleared for use, although her sCr since fistula placement actually improved some.  She is feeling good, denies urinary changes since last time.  Denies NSAIDs.    Interval history May 2019: She would now like to be seen in [place] as the trip is easier. She denies nausea, dysgeusia and no pruritus. Her appetite is fine but she doesn't like to cook much and she never was a 'big eater' No LE currently but she can develop some leg puffiness, uses Lasix daily and with further questioning doesn't quite follow a low sodium diet.     Review of Systems   Constitutional: Negative for fatigue and fever.   Eyes: Negative for visual disturbance.   Respiratory: Negative for chest tightness and shortness of breath.    Cardiovascular: Negative for chest pain and leg swelling.   Gastrointestinal: Positive for abdominal distention. Negative for abdominal pain, diarrhea and nausea.   Endocrine: Negative for polydipsia and polyuria.   Genitourinary: Negative for difficulty urinating and hematuria.   Musculoskeletal: Positive for arthralgias and back pain.   Skin: Negative for rash.   Allergic/Immunologic: Negative for immunocompromised state.   Neurological: Negative for tremors, speech difficulty and weakness.   Hematological: Does not bruise/bleed easily.   Psychiatric/Behavioral: Positive for confusion.       Objective:      Physical Exam   Constitutional: She is oriented to person, place, and time. No distress.   HENT:   Mouth/Throat: Oropharynx is clear and moist.   Eyes: No scleral icterus.   Neck: No JVD present.   Cardiovascular: Normal rate and regular rhythm. Exam reveals no friction rub.   Pulmonary/Chest: Effort normal and breath sounds normal.   Abdominal: Soft.  She exhibits no distension.   Musculoskeletal: She exhibits no edema or tenderness.   Neurological: She is alert and oriented to person, place, and time.   Skin: Skin is warm and dry. No erythema.   Psychiatric: She has a normal mood and affect. Her behavior is normal.   Nursing note and vitals reviewed.      Assessment & Plan:       CKD stage 5  - she has a functional AVF  - no indications for initiation     HTN  - home SBP high as well, 140's-155  - easiest would be to increase Procardia although she may develop worsening LE edema     Mineral and Bone Disease  - continue phos binders  - continue bicarb in the form of baking soda (1/8 tsp QD)    Anemia  - check Tsat and ferritin    Oncology  - has an appt with [facility] soon. Will follow their recommendations for prognosis which will help me guide pt regarding dialysis

## 2019-05-13 ENCOUNTER — PATIENT MESSAGE (OUTPATIENT)
Dept: HEMATOLOGY/ONCOLOGY | Facility: CLINIC | Age: 69
End: 2019-05-13

## 2019-05-14 ENCOUNTER — TELEPHONE (OUTPATIENT)
Dept: FAMILY MEDICINE | Facility: CLINIC | Age: 69
End: 2019-05-14

## 2019-05-14 NOTE — TELEPHONE ENCOUNTER
----- Message from Ijeoma Luke sent at 5/14/2019 12:00 PM CDT -----  Contact: patient  Type: Needs Medical Advice    Who Called:  patient  Best Call Back Number: [phone]  Additional Information: calling to cancel nurse visit schedule for 2:40 today. Please give call back to reschedule

## 2019-05-14 NOTE — TELEPHONE ENCOUNTER
Spoke to pt, pt states she cancelled her appt for today and will come in on Friday to her regular appt, wants to have BP checked at that visit.

## 2019-05-15 NOTE — TELEPHONE ENCOUNTER
Could we check with Dr. Hansen's staff to see if we can have her scheduled the following week when we are both in clinic?

## 2019-05-16 NOTE — TELEPHONE ENCOUNTER
Is Dr Hansen ok for her to come the next week?  If so can we coordinate appointments?    Dr Frye can see her at 1100 and 340p on June 4 or 5.

## 2019-05-17 ENCOUNTER — INITIAL CONSULT (OUTPATIENT)
Dept: HEMATOLOGY/ONCOLOGY | Facility: CLINIC | Age: 69
End: 2019-05-17
Payer: MEDICARE

## 2019-05-17 ENCOUNTER — LAB VISIT (OUTPATIENT)
Dept: LAB | Facility: HOSPITAL | Age: 69
End: 2019-05-17
Attending: INTERNAL MEDICINE
Payer: MEDICARE

## 2019-05-17 VITALS
HEART RATE: 103 BPM | RESPIRATION RATE: 22 BRPM | TEMPERATURE: 98 F | OXYGEN SATURATION: 98 % | BODY MASS INDEX: 22.51 KG/M2 | SYSTOLIC BLOOD PRESSURE: 179 MMHG | HEIGHT: 65 IN | WEIGHT: 135.13 LBS | DIASTOLIC BLOOD PRESSURE: 104 MMHG

## 2019-05-17 DIAGNOSIS — C43.59 MALIGNANT MELANOMA OF TORSO EXCLUDING BREAST: Primary | ICD-10-CM

## 2019-05-17 DIAGNOSIS — C34.81 MALIGNANT NEOPLASM OF OVERLAPPING SITES OF RIGHT LUNG: ICD-10-CM

## 2019-05-17 DIAGNOSIS — Q44.6 POLYCYSTIC LIVER DISEASE: ICD-10-CM

## 2019-05-17 DIAGNOSIS — G89.3 NEOPLASM RELATED PAIN: ICD-10-CM

## 2019-05-17 DIAGNOSIS — N18.5 STAGE 5 CHRONIC KIDNEY DISEASE NOT ON CHRONIC DIALYSIS: ICD-10-CM

## 2019-05-17 DIAGNOSIS — C34.82 MALIGNANT NEOPLASM OF OVERLAPPING SITES OF LEFT LUNG: ICD-10-CM

## 2019-05-17 DIAGNOSIS — C51.9 MELANOMA OF VULVA: ICD-10-CM

## 2019-05-17 DIAGNOSIS — C79.51 BONE METASTASES: ICD-10-CM

## 2019-05-17 DIAGNOSIS — C43.59 MALIGNANT MELANOMA OF TORSO EXCLUDING BREAST: ICD-10-CM

## 2019-05-17 DIAGNOSIS — E61.1 IRON DEFICIENCY: Primary | ICD-10-CM

## 2019-05-17 DIAGNOSIS — C34.12 PRIMARY ADENOCARCINOMA OF UPPER LOBE OF LEFT LUNG: ICD-10-CM

## 2019-05-17 DIAGNOSIS — N18.4 STAGE 4 CHRONIC KIDNEY DISEASE: ICD-10-CM

## 2019-05-17 LAB
ALBUMIN SERPL BCP-MCNC: 3.8 G/DL (ref 3.5–5.2)
ALP SERPL-CCNC: 151 U/L (ref 38–145)
ALT SERPL W/O P-5'-P-CCNC: 8 U/L (ref 10–44)
ANION GAP SERPL CALC-SCNC: 12 MMOL/L (ref 8–16)
AST SERPL-CCNC: 15 U/L (ref 14–36)
BASOPHILS # BLD AUTO: 0.03 K/UL (ref 0–0.2)
BASOPHILS NFR BLD: 0.4 % (ref 0–1.9)
BILIRUB SERPL-MCNC: 0.4 MG/DL (ref 0.2–1.3)
BUN SERPL-MCNC: 70 MG/DL (ref 7–18)
CALCIUM SERPL-MCNC: 9.1 MG/DL (ref 8.4–10.2)
CHLORIDE SERPL-SCNC: 114 MMOL/L (ref 95–110)
CO2 SERPL-SCNC: 15 MMOL/L (ref 22–31)
CREAT SERPL-MCNC: 5.38 MG/DL (ref 0.5–1.4)
DIFFERENTIAL METHOD: ABNORMAL
EOSINOPHIL # BLD AUTO: 0 K/UL (ref 0–0.5)
EOSINOPHIL NFR BLD: 0 % (ref 0–8)
ERYTHROCYTE [DISTWIDTH] IN BLOOD BY AUTOMATED COUNT: 15.9 % (ref 11.5–14.5)
EST. GFR  (AFRICAN AMERICAN): 9 ML/MIN/1.73 M^2
EST. GFR  (NON AFRICAN AMERICAN): 8 ML/MIN/1.73 M^2
FERRITIN SERPL-MCNC: 743 NG/ML (ref 12–264)
GLUCOSE SERPL-MCNC: 101 MG/DL (ref 70–110)
HCT VFR BLD AUTO: 31.3 % (ref 37–48.5)
HGB BLD-MCNC: 9.8 G/DL (ref 12–16)
IMM GRANULOCYTES # BLD AUTO: 0.04 K/UL (ref 0–0.04)
IMM GRANULOCYTES NFR BLD AUTO: 0.5 % (ref 0–0.5)
IRON SATN MFR SERPL: 17 % (ref 20–50)
IRON SERPL-MCNC: 35 UG/DL (ref 30–160)
LDH SERPL L TO P-CCNC: 448 U/L (ref 313–618)
LYMPHOCYTES # BLD AUTO: 0.6 K/UL (ref 1–4.8)
LYMPHOCYTES NFR BLD: 7.8 % (ref 18–48)
MCH RBC QN AUTO: 26.8 PG (ref 27–31)
MCHC RBC AUTO-ENTMCNC: 31.3 G/DL (ref 32–36)
MCV RBC AUTO: 86 FL (ref 82–98)
MONOCYTES # BLD AUTO: 0.5 K/UL (ref 0.3–1)
MONOCYTES NFR BLD: 6.7 % (ref 4–15)
NEUTROPHILS # BLD AUTO: 6.6 K/UL (ref 1.8–7.7)
NEUTROPHILS NFR BLD: 84.6 % (ref 38–73)
NRBC BLD-RTO: 0 /100 WBC
PLATELET # BLD AUTO: 282 K/UL (ref 150–350)
PMV BLD AUTO: 11.6 FL (ref 9.2–12.9)
POTASSIUM SERPL-SCNC: 4.6 MMOL/L (ref 3.5–5.1)
PROT SERPL-MCNC: 7.4 G/DL (ref 6–8.4)
RBC # BLD AUTO: 3.66 M/UL (ref 4–5.4)
SODIUM SERPL-SCNC: 141 MMOL/L (ref 136–145)
T4 FREE SP9 P CHAL SERPL-SCNC: 1.54 NG/DL (ref 0.78–2.19)
TOTAL IRON BINDING CAPACITY: 204 UG/DL (ref 265–497)
TSH SERPL DL<=0.005 MIU/L-ACNC: 3.43 UIU/ML (ref 0.4–4)
WBC # BLD AUTO: 7.78 K/UL (ref 3.9–12.7)

## 2019-05-17 PROCEDURE — 99999 PR PBB SHADOW E&M-EST. PATIENT-LVL III: CPT | Mod: PBBFAC,,, | Performed by: INTERNAL MEDICINE

## 2019-05-17 PROCEDURE — 80053 COMPREHEN METABOLIC PANEL: CPT

## 2019-05-17 PROCEDURE — 99214 OFFICE O/P EST MOD 30 MIN: CPT | Mod: S$GLB,,, | Performed by: INTERNAL MEDICINE

## 2019-05-17 PROCEDURE — 83615 LACTATE (LD) (LDH) ENZYME: CPT | Mod: PN

## 2019-05-17 PROCEDURE — 99497 PR ADVNCD CARE PLAN 30 MIN: ICD-10-PCS | Mod: S$GLB,,, | Performed by: INTERNAL MEDICINE

## 2019-05-17 PROCEDURE — 84443 ASSAY THYROID STIM HORMONE: CPT

## 2019-05-17 PROCEDURE — 36415 COLL VENOUS BLD VENIPUNCTURE: CPT | Mod: PN

## 2019-05-17 PROCEDURE — 84439 ASSAY OF FREE THYROXINE: CPT

## 2019-05-17 PROCEDURE — 83540 ASSAY OF IRON: CPT | Mod: PN

## 2019-05-17 PROCEDURE — 99497 ADVNCD CARE PLAN 30 MIN: CPT | Mod: S$GLB,,, | Performed by: INTERNAL MEDICINE

## 2019-05-17 PROCEDURE — 83615 LACTATE (LD) (LDH) ENZYME: CPT

## 2019-05-17 PROCEDURE — 84443 ASSAY THYROID STIM HORMONE: CPT | Mod: PN

## 2019-05-17 PROCEDURE — 85025 COMPLETE CBC W/AUTO DIFF WBC: CPT | Mod: PN

## 2019-05-17 PROCEDURE — 1101F PT FALLS ASSESS-DOCD LE1/YR: CPT | Mod: CPTII,S$GLB,, | Performed by: INTERNAL MEDICINE

## 2019-05-17 PROCEDURE — 85025 COMPLETE CBC W/AUTO DIFF WBC: CPT

## 2019-05-17 PROCEDURE — 82728 ASSAY OF FERRITIN: CPT

## 2019-05-17 PROCEDURE — 99214 PR OFFICE/OUTPT VISIT, EST, LEVL IV, 30-39 MIN: ICD-10-PCS | Mod: S$GLB,,, | Performed by: INTERNAL MEDICINE

## 2019-05-17 PROCEDURE — 83540 ASSAY OF IRON: CPT

## 2019-05-17 PROCEDURE — 99999 PR PBB SHADOW E&M-EST. PATIENT-LVL III: ICD-10-PCS | Mod: PBBFAC,,, | Performed by: INTERNAL MEDICINE

## 2019-05-17 PROCEDURE — 84439 ASSAY OF FREE THYROXINE: CPT | Mod: PN

## 2019-05-17 PROCEDURE — 82728 ASSAY OF FERRITIN: CPT | Mod: PN

## 2019-05-17 PROCEDURE — 80053 COMPREHEN METABOLIC PANEL: CPT | Mod: PN

## 2019-05-17 PROCEDURE — 1101F PR PT FALLS ASSESS DOC 0-1 FALLS W/OUT INJ PAST YR: ICD-10-PCS | Mod: CPTII,S$GLB,, | Performed by: INTERNAL MEDICINE

## 2019-05-17 RX ORDER — HYDROCODONE BITARTRATE AND ACETAMINOPHEN 7.5; 325 MG/1; MG/1
1 TABLET ORAL EVERY 4 HOURS PRN
Qty: 120 TABLET | Refills: 0 | Status: SHIPPED | OUTPATIENT
Start: 2019-05-17

## 2019-05-17 RX ORDER — ACETAMINOPHEN 500 MG
1000 TABLET ORAL
Status: CANCELLED
Start: 2019-05-24

## 2019-05-17 NOTE — PROGRESS NOTES
Chief complaint:  Malignant melanoma of the vulva/adenocarcinoma of the lung    History of present illness:  The patient is a 68-year-old black female who presents to establish care with [facility].  She has previously been cared for by my colleagues, Bird Pires and Shawn.  Patient had undergone partial vulvectomy and been managed with Imfimzi when she developed new left upper lobe lesion which was biopsy-proven represent adenocarcinoma of the lung.  Patient suffers from chronic kidney disease, polycystic liver/kidney disease, and has a generally poor performance status.  She has been offered hospice on 2 occasions but has declined.  She wishes to establish care here in the [place] and has been offered a trial of chemo/immunotherapy consisting of dosed attenuated carboplatin/Taxol/Tecentriq.  Patient's review of systems is remarkable for profound shortness of breath which is exacerbated by minimal ambulation.  Pain is well controlled with hydrocodone 7.5-325 mg tablets 2 times daily.  Patient denies difficulties with narcotic associated constipation.  She reports stable weight.  No other complaints pertinent findings on a 14 point review of systems.    Past medical history:  1.  Vulvar melanoma.  2.  Adenocarcinoma of the lung.  3.  Polycystic liver disease.  4.  Polycystic kidney disease.  5.  Stage 5 chronic kidney disease without hemodialysis  6.  Hypertension  7.  GERD  8.  Hemorrhoids  9.  Bony metastases  10.  Chemotherapy associated anemia  11.  Chemotherapy associated neutropenia  12.  History of colon polyps  13.  History of peptic ulcer disease  14.  Status post hysterectomy  15.  History of gout    Allergies:  1.  Sulfa  2.  Codeine    Medications:  1.  Ferrous sulfate 325 mg daily  2.  Lasix 40 mg p.o. daily  3.  Neurontin 300 mg 3 times daily  4.  Hydrocodone/acetaminophen 7.5-325 mg p.o. every 6 hr as needed for pain  5.  Procardia XL 60 mg p.o. daily  6.  Renvela 800 mg p.o. 3 times  daily  7.  Sodium bicarbonate p.o. 4 times daily.  8.  Aldactone 25 mg p.o. daily.    Family/social history:  Patient is a never smoker.  Patient does not use alcohol.  Mother is  due to unknown malignancy.  Father is  due to myocardial infarction.    Physical examination:  Well-developed, ill-appearing, elderly, black female, in no acute distress, who has a weight of 135 lb.  VITAL SIGNS: Documented  and reviewed this visit.  HEENT: Normocephalic, atraumatic. Oral mucosa pink and moist. Lips without   lesions. Tongue midline. Oropharynx clear. Nonicteric sclerae.   NECK: Supple, no adenopathy. No carotid bruits, thyromegaly or thyroid nodule.   HEART: Regular rate and rhythm without murmur, gallop or rub.   LUNGS:  Absent breath sounds at the bases bilaterally. Normal respiratory effort.  Symmetric expansion of the chest is noted.  ABDOMEN: Soft, nontender, distended, with positive normoactive bowel sounds,   no hepatosplenomegaly.   EXTREMITIES:  Bilateral 2+ pedal edema. Distal pulses are intact. Increased bony prominences consistent with weight loss.  AXILLAE AND GROIN: No palpable pathologic lymphadenopathy is appreciated.   SKIN: Intact/turgor normal   NEUROLOGIC: Cranial nerves II-XII grossly intact. Motor:  Loss of muscle bulk and   tone. Strength/sensory 5/5 throughout. Gait unstable.     Laboratory:  White count 7.8, hemoglobin 9.8, hematocrit 31.3, platelets 282, absolute neutrophil count 6600.  Sodium 141, potassium 4.6, chloride 114, CO2 15, BUN 70, creatinine 5.4, glucose 101, calcium 9.1, alkaline phosphatase 151, liver function tests are within normal limits, , GFR 9.    Pulse oximetry:  O2 sat at rest on room air is 99%.  Saturation drops to 91% with ambulation.    Chest x-ray:  Linear density at the right lung base.  Increasing consolidation in the left suprahilar region consistent with known primary lung cancer.    Impression:  1.  Adenocarcinoma lung.  2.  Melanoma of the  vulva.  3.  Bony metastasis.  4.  Neoplasm associated pain secondary to numbers 1-3.  5.  Anemia.  6.  Stage 5 CKD.    Plan:  1.  Proceed with palliative therapy as planned by my colleagues to consist of carboplatin AUC of 5 (170 mg) IV day 1, Taxol 200 milligram/meter squared (335 mg) IV day 1, Tecentriq 1200 mg IV day 1.  2.  Patient will receive DP 10/0.25, Cinvanti, as needed Compazine for control of acute and delayed emesis.  3.  Patient will receive typical Tylenol and Benadryl premedications prior to administration of monoclonal antibody and will receive typical Pepcid premedication prior to Taxol administration.  4.  Patient will receive prophylactic Neulasta 6 mg subcu via on body injector for prevention morgan associated sepsis and/or treatment delay.  5.  Patient will receive Xgeva 120 mg subcu quarterly during chemotherapy for prevention of skeletal adverse event.  She is also to continue calcium supplementation with vitamin-D.  6.  Review iron studies and supplement as needed.  7.  Review thyroid function test and supplement as needed.  8.  Obtain repeat CT-PET scan for restaging.  9.  Return to clinic in earliest convenience to initiate cycle 1 of therapy.  Patient will be re-evaluated by myself 3 weeks later with interval CBC, CMP, LDH, and magnesium prior to cycle 2.  10.  Consult Dr. balderas for placement of MediPort catheter in order to facilitate the administration of systemic therapy.  11.  40 min contact time spent counseling regarding plan of care, and I neoplastic, supportive care meds to be employed in therapy, aspects there administration, potential side effects associated with therapy, interventions for these, etc.  Patient voices understanding and wishes to proceed as above.    This note was created using voice recognition software and may contain grammatical errors.       Advance Care Planning     Living Will  During this visit, I engaged the patient in the advance care planning process.  The  patient and I reviewed the role for advance directives and their purpose in directing future healthcare if the patient's unable to speak for him/herself.  At this point in time, the patient does have full decision-making capacity.  We discussed different extreme health states that she could experience, and reviewed what kind of medical care she would want in those situations.  The patient communicated that if she were comatose and had little chance of a meaningful recovery, she would  want machines/life-sustaining treatments used.  In addition to the above preference, other important end-of-life issues for the patient include none.  The patient has not already designated a healthcare power of  to make decisions on her behalf.   I spent a total of 20 minutes engaging the patient in this advance care planning discussion.

## 2019-05-20 ENCOUNTER — TELEPHONE (OUTPATIENT)
Dept: GYNECOLOGIC ONCOLOGY | Facility: CLINIC | Age: 69
End: 2019-05-20

## 2019-05-20 NOTE — PROGRESS NOTES
Subjective:       Patient ID: Verónica Baker is a 68 y.o. female.    Chief Complaint: Post-op Evaluation    HPI   Patient comes in today for her post op visit after re-excision of vulvar melanoma with (+) margin. Repeat excision was negative. No residual melanoma.     She reports itching to WLE site and can't hold her urine. Tearful reviewing her conversation with Dr. Booth on 5/17.    Her oncologic history is:  June 2018: Initial biopsy in June 2018 was originally read as keratinizing hyperplastic squamous epithelium with scattered juctional nests of atypical melanocytes. Pathology was read at [facility]. institional review in Sept 2018 was reported a malignant melanoma. Tim's level 3 with Breslow depth 1.6 mm.      10/29/2018:partial vulvectomy:  Breslow's level of 4 mm. Positive medial margin. Negative bilateral SL nodes.      Jan 2019: partial vulvectomy and distal urethrectomy. This was a re-excision of a vulvar masswhich showed melanoma with positive margins. (+) margin. Right lateral margin was positive for malignant melanoma.  The left medial margin was also positive, however, I took an additional sample of distal urethra that was negative.    April 2019: Re-excision of vulva was negative.      Review of Systems   Constitutional: Positive for activity change (CURIEL) and fatigue. Negative for appetite change, chills, diaphoresis, fever and unexpected weight change.   HENT: Negative for mouth sores and tinnitus.    Respiratory: Negative for cough, chest tightness, shortness of breath and wheezing.    Cardiovascular: Negative for chest pain, palpitations and leg swelling.   Gastrointestinal: Positive for abdominal distention and constipation. Negative for abdominal pain, blood in stool, diarrhea, nausea and vomiting.   Genitourinary: Positive for genital sores and urgency. Negative for difficulty urinating, dysuria, flank pain, frequency, hematuria, pelvic pain, vaginal bleeding, vaginal discharge and vaginal pain.    Musculoskeletal: Positive for gait problem. Negative for arthralgias and back pain.   Skin: Negative for color change and rash.   Neurological: Positive for weakness. Negative for dizziness, numbness and headaches.   Hematological: Negative for adenopathy.   Psychiatric/Behavioral: Positive for dysphoric mood. Negative for confusion and sleep disturbance. The patient is not nervous/anxious.        Objective:   BP (!) 173/97   Pulse (!) 112   Wt 59 kg (130 lb)   LMP  (LMP Unknown)   BMI 21.63 kg/m²      Physical Exam   Constitutional: She is oriented to person, place, and time. She appears well-developed. No distress.   HENT:   Head: Normocephalic and atraumatic.   Eyes: Scleral icterus is present.   Neck: Normal range of motion.   Pulmonary/Chest: Effort normal. No respiratory distress.   Abdominal: Soft. She exhibits distension.   Genitourinary:         Genitourinary Comments: Bimanual exam:  Vulva: no lesions. Normal appearance  Urethra: Normal size and location. No lesions  Bladder: No masses or tenderness.  Vagina: healing WLE site  Rectovaginal: external hemorrhoids/excess tissue     Musculoskeletal: Normal range of motion. She exhibits no edema.   Neurological: She is alert and oriented to person, place, and time.   Skin: Skin is warm and dry. No rash noted. She is not diaphoretic. No pallor.   Psychiatric: She has a normal mood and affect. Her behavior is normal.   Nursing note and vitals reviewed.      Assessment:       1. Malignant melanoma of vulva - S/P WLE         Plan:   Malignant melanoma of vulva - S/P WLE     I discussed with her patient her poor performance status. She spends essentially the day in bed sleeping. She has not discussed her diagnosis of terminal lung cancer with her daughters. Doesn't want to bother them. I told her she should talk with them. I discussed palliative care with her and she is in agreement with this. Message sent to Dr. Erik Booth.     RTC in 3 months for follow up  of vulvar melanoma.

## 2019-05-21 ENCOUNTER — OFFICE VISIT (OUTPATIENT)
Dept: GYNECOLOGIC ONCOLOGY | Facility: CLINIC | Age: 69
End: 2019-05-21
Payer: MEDICARE

## 2019-05-21 VITALS
WEIGHT: 130 LBS | BODY MASS INDEX: 21.63 KG/M2 | HEART RATE: 112 BPM | SYSTOLIC BLOOD PRESSURE: 173 MMHG | DIASTOLIC BLOOD PRESSURE: 97 MMHG

## 2019-05-21 DIAGNOSIS — C43.59 MALIGNANT MELANOMA OF TORSO EXCLUDING BREAST: Primary | ICD-10-CM

## 2019-05-21 PROCEDURE — 99999 PR PBB SHADOW E&M-EST. PATIENT-LVL II: CPT | Mod: PBBFAC,,, | Performed by: OBSTETRICS & GYNECOLOGY

## 2019-05-21 PROCEDURE — 99024 PR POST-OP FOLLOW-UP VISIT: ICD-10-PCS | Mod: S$GLB,,, | Performed by: OBSTETRICS & GYNECOLOGY

## 2019-05-21 PROCEDURE — 99999 PR PBB SHADOW E&M-EST. PATIENT-LVL II: ICD-10-PCS | Mod: PBBFAC,,, | Performed by: OBSTETRICS & GYNECOLOGY

## 2019-05-21 PROCEDURE — 99024 POSTOP FOLLOW-UP VISIT: CPT | Mod: S$GLB,,, | Performed by: OBSTETRICS & GYNECOLOGY

## 2019-05-22 DIAGNOSIS — N18.6 ESRD (END STAGE RENAL DISEASE): Primary | ICD-10-CM

## 2019-05-22 DIAGNOSIS — C34.12 PRIMARY ADENOCARCINOMA OF UPPER LOBE OF LEFT LUNG: ICD-10-CM

## 2019-05-22 DIAGNOSIS — N18.4 STAGE 4 CHRONIC KIDNEY DISEASE: ICD-10-CM

## 2019-05-22 DIAGNOSIS — G89.3 NEOPLASM RELATED PAIN: ICD-10-CM

## 2019-05-22 DIAGNOSIS — C34.82 MALIGNANT NEOPLASM OF OVERLAPPING SITES OF LEFT LUNG: ICD-10-CM

## 2019-05-22 DIAGNOSIS — N18.5 CKD (CHRONIC KIDNEY DISEASE), STAGE V: ICD-10-CM

## 2019-05-22 DIAGNOSIS — N18.5 STAGE 5 CHRONIC KIDNEY DISEASE NOT ON CHRONIC DIALYSIS: ICD-10-CM

## 2019-05-22 DIAGNOSIS — C51.9 MELANOMA OF VULVA: ICD-10-CM

## 2019-05-22 DIAGNOSIS — R18.8 OTHER ASCITES: ICD-10-CM

## 2019-05-22 DIAGNOSIS — C79.51 BONE METASTASES: ICD-10-CM

## 2019-05-22 DIAGNOSIS — C43.59 MALIGNANT MELANOMA OF TORSO EXCLUDING BREAST: ICD-10-CM

## 2019-05-22 DIAGNOSIS — C34.81 MALIGNANT NEOPLASM OF OVERLAPPING SITES OF RIGHT LUNG: ICD-10-CM

## 2019-05-23 ENCOUNTER — DOCUMENTATION ONLY (OUTPATIENT)
Dept: HEMATOLOGY/ONCOLOGY | Facility: CLINIC | Age: 69
End: 2019-05-23

## 2019-05-23 NOTE — PROGRESS NOTES
Hospice referral has been sent to [facility].  Fax #[phone], mel Haskins.

## 2019-06-11 ENCOUNTER — TELEPHONE (OUTPATIENT)
Dept: FAMILY MEDICINE | Facility: CLINIC | Age: 69
End: 2019-06-11

## 2019-06-11 DIAGNOSIS — Q61.3 POLYCYSTIC KIDNEY DISEASE: ICD-10-CM

## 2019-06-11 DIAGNOSIS — N18.5 CKD (CHRONIC KIDNEY DISEASE), STAGE V: Primary | ICD-10-CM

## 2019-06-11 DIAGNOSIS — C34.82 MALIGNANT NEOPLASM OF OVERLAPPING SITES OF LEFT LUNG: ICD-10-CM

## 2019-06-11 NOTE — TELEPHONE ENCOUNTER
----- Message from Zev Larson sent at 6/11/2019  8:42 AM CDT -----  Type: Needs Medical Advice    Who Called:  Daughter   Best Call Back Number: [phone]  Additional Information: Daughter would like to speak to the office regarding the patient's hospice care - please call to advise

## 2019-06-11 NOTE — TELEPHONE ENCOUNTER
Patient is now residing with her daughter in [place], [place]. Daughter is requesting orders for Hospice care for mother in her home. Previous referral send by Dr Booth's office to [facility]. I contacted the office and they do not serve the [place], [place] area. I have pended an external referral for [facility] in the [facility] area. Are you to sign off on this or should I forward to Dr Booth. Please advise.

## 2019-07-20 DIAGNOSIS — I15.1 HYPERTENSION SECONDARY TO OTHER RENAL DISORDERS: ICD-10-CM

## 2019-07-21 RX ORDER — NIFEDIPINE 60 MG/1
60 TABLET, EXTENDED RELEASE ORAL DAILY
Qty: 30 TABLET | Refills: 5 | Status: SHIPPED | OUTPATIENT
Start: 2019-07-21 | End: 2020-07-20

## 2019-10-21 ENCOUNTER — TELEPHONE (OUTPATIENT)
Dept: FAMILY MEDICINE | Facility: CLINIC | Age: 69
End: 2019-10-21

## 2020-05-05 ENCOUNTER — PATIENT MESSAGE (OUTPATIENT)
Dept: ADMINISTRATIVE | Facility: HOSPITAL | Age: 70
End: 2020-05-05

## 2020-06-19 ENCOUNTER — PATIENT OUTREACH (OUTPATIENT)
Dept: ADMINISTRATIVE | Facility: HOSPITAL | Age: 70
End: 2020-06-19

## 2020-06-19 NOTE — PROGRESS NOTES
Non-compliant report chart audits. Chart review completed for HM test overdue (mammograms, Colonoscopies, pap smears, DM labs, and/or EYE EXAMs)  06/19/2020    Care Everywhere and media, updates requested and reviewed.        DIS reviewed for test needed.  Mammogram

## 2020-08-29 NOTE — TELEPHONE ENCOUNTER
----- Message from Arturo Cantu sent at 8/20/2018  8:39 AM CDT -----  Type: Needs Medical Advice    Who Called: self   Symptoms (please be specific):  INGRID  How long has patient had these symptoms:  INGRID  Pharmacy name and phone #:  [facility]  Best Call Back Number: [phone]  Additional Information: patient states she have questions about schedule appointment tomorrow.    
Patient states that she does not have a  for tomorrow.  She is coming from a distance away so asked if a later surgery arrival time would help and she said it would.  Will move her surgery to a later time.  Advised to call the pre-admit nurse to register.  Also she needs to do a fleets enema prior to leaving home tomorrow.  Patient voiced understanding.  
Abnormal Lactate

## 2020-10-05 ENCOUNTER — PATIENT MESSAGE (OUTPATIENT)
Dept: ADMINISTRATIVE | Facility: HOSPITAL | Age: 70
End: 2020-10-05

## 2020-10-22 ENCOUNTER — PATIENT OUTREACH (OUTPATIENT)
Dept: ADMINISTRATIVE | Facility: HOSPITAL | Age: 70
End: 2020-10-22

## 2020-10-22 NOTE — PROGRESS NOTES
Dr. Roque's office states no colonoscopy found.  First OV with them was in 2018 which stated had a cscope in the past 2-3 years ago.

## 2021-01-04 ENCOUNTER — PATIENT MESSAGE (OUTPATIENT)
Dept: ADMINISTRATIVE | Facility: HOSPITAL | Age: 71
End: 2021-01-04

## 2021-01-28 NOTE — TELEPHONE ENCOUNTER
Spoke with Jenny she states patient needs a 2 week post op states patient is being discharged home with a Cath. Spoke with Dr. Aguirre due to Dr. Escobar being in surgery Dr. Aguirre reviewed patient records and stated no notes states patient is being sent home with a Cath and she also stated for Jenny to ask the resident and Dr. Escobar if patient is being sent home with a Cath per Dr. Aguirre patients normally don't go home with a Cath and Cath don't stay in for 2 weeks and to call ext [phone]. Jenny voiced understanding. KJ    Attending Statement: I have personally performed a face to face diagnostic evaluation on this patient. I have reviewed the ACP note and agree with the history, exam and plan of care, except as noted.     Attending Contribution to Care:  35F with a h/o syncope (w/ monitor placed two years ago), obesity (s/p gastric sleeve in 2016), MIKE, and COVID19 infection (01/2021) presents to [facility] ED w/ Chest and left arm pain for the past two weeks, worse with movement, twisting, palpation, deep breath, or increased use of her left arm such as carrying groceries. Pt states tylenol not helping, No f/c, no sob, no n/v, no abd pain, dizziness or syncope, no n/t/w in ext. No PE risk factors, denies hx of cad or VTE.  VSS, EKG no ischemia, SB with 1st deg AVB, CXR neg for infiltrate effusion or PTX, pt perc neg, low suspicion for ACS, dissection or other life threatening etiology. Pain appears musculoskeletal, tramadol and lido patch given with some relief. Pt anxious for DC, recommend follow up with PMD, PT and pain management, return precautions discussed.

## 2021-02-10 NOTE — Clinical Note
She is going to stop chemotherapy. Will need hospice. She would like to come in and talk with you about this.  suicidal attempt

## 2021-04-06 ENCOUNTER — PATIENT MESSAGE (OUTPATIENT)
Dept: ADMINISTRATIVE | Facility: HOSPITAL | Age: 71
End: 2021-04-06

## 2021-06-19 NOTE — TELEPHONE ENCOUNTER
Code Status:  UNKNOWN  Visit Type: Problem Visit     Facility:  [facility] [[id number]]         Facility Type: SNF (Skilled Nursing Facility, TCU)    History of Present Illness: Brandi Patiño is a 89 y.o. female who I am seeing today for follow up on the TCU. Patient originally admitted to the hospital for elective transcatheter aortic valve replacement on 6/12/2018.  Patient was recently rehospitalized on 7/2/2018 with increasing shortness of breath, lower extremity edema and anemia.  She was treated for fluid overload, acute on chronic renal failure and anemia.  She did undergo transfusion.  She has underlying CLL.  She did undergo treatment of prednisone which has concluded as well as removal, which will be increased.  She has underlying severe aortic stenosis.  She also has a history of CHF.  Status post TAVR procedure with  postoperative anemia.  She did undergo a total of 3 units of packed red blood cells.  Her hemoglobin initially rebounded to 9.6 but has been drifting downward.  Previously was 7.8 and today 6.5.  She has underlying CLL.  She is followed by Dr. Lockhart for oncology and was requested to be sent to hospital. Her WBC was 222.  She did recently readmit to hospital on 6/19/18 with low hemoglobin. Upon admit she did have melenic stools with hemoglobin of 6.5. She did see her oncologist while hospitalized and she was transfused with 3 units of PRBCs. Her hemoglobin rebounded to 7.9. It was recommended she continue aspirin and Plavix. She continues on PPI. She did resume her CLL immunosuppressive therapy.  Patient also with recent C. difficile.   she has completed her treatment.    Today she is sitting up in a bedside chair.  She denies any shortness of breath.  She only has trace lower extremity edema.  She does have bruising to her arms and legs.  She does continue on Imbruvica.  She is followed by [place] for her CLL.  She is somewhat discouraged secondary to her multiple  Spoke to company and gave ICD 10 code.   hospitalizations.  She her lower extremities.  Overall deconditioned.          Active Ambulatory Problems     Diagnosis Date Noted     CLL (chronic lymphocytic leukemia) (H)      Essential hypertension      Severe aortic stenosis 11/19/2014     Hypothyroidism      Normocytic anemia      DNAR (do not attempt resuscitation)      Pulmonary nodules - Bilateral 04/05/2018     Lung mass      Anemia due to CLL      Persistent atrial fibrillation (H)      S/P TAVR (transcatheter aortic valve replacement) 06/12/2018     Melena      TACO (transfusion associated circulatory overload)      Anemia 07/02/2018     Diarrhea 07/02/2018     Hypokalemia      Hyponatremia      Anemia due to blood loss, acute      S/P AVR      Generalized muscle weakness      Severe malnutrition (H)      Acute kidney injury (H)      Resolved Ambulatory Problems     Diagnosis Date Noted     Anemia 06/08/2015     HOLLEY (acute kidney injury) (H) 06/08/2015     Diastolic heart failure (H) 06/08/2015     Dyspnea 06/08/2015     Acute respiratory failure (H) 06/08/2015     Pericardial effusion 06/10/2015     Symptomatic anemia 02/21/2016     Tinnitus, bilateral      Weakness generalized      Acute respiratory failure with hypoxia (H)      Accelerated hypertension      Weakness      Acute on chronic diastolic congestive heart failure (H)      Dysuria      Urinary tract infection without hematuria, site unspecified      Paroxysmal atrial fibrillation (H)      Acute on chronic systolic and diastolic heart failure, NYHA class 3      Troponin level elevated 04/02/2018     S/p aortic valvuloplasty      Pulmonary edema 05/25/2018     Pulmonary infiltrate      Prosthetic aortic valve stenosis      Past Medical History:   Diagnosis Date     Acute non-ST elevation myocardial infarction (NSTEMI) (H) 03/29/2018     Anemia 6/8/2015     Chest pain with normal coronary angiography 04/02/2018     Chronic diastolic heart failure due to valvular disease (H)      CLL (chronic  lymphocytic leukemia) (H)      DNAR (do not attempt resuscitation)      Essential hypertension      Fracture dislocation of ankle joint 11/16/2014     Hypothyroidism      Pneumonia of right lung due to infectious organism, unspecified part of lung      Severe aortic stenosis        Reviewed at the facility.     Allergies   Allergen Reactions     Blood-Group Specific Substance Unknown     Anti-E and Warm autoantibodies present. Expect delays in blood for transfusion up to 24 hours.   Draw 2 lavender and 1 red tube for type and screen orders.     Oxycodone Other (See Comments)     Hallucinations     Vasotec [Enalaprilat] Cough         Review of Systems   No fevers or chills. No headache, lightheadedness or dizziness.  She does report feelings of tiredness and weakness.  No shortness of breath or chest pains or palpitations. Appetite is poor.  No nausea, vomiting, constipation or diarrhea. She does c/o hemorrhoids and blood in her stools. Bright red. No dysuria, frequency, burning or pain with urination.    Physical Exam   PHYSICAL EXAMINATION:  Vital signs: /61, heart rate 62, respirations 16, temperature 97.7, O2 sat 93% on room air, current weight 122.8 pounds.  Blood: Will make finished up with this  General: Awake, Alert, oriented x3, appropriately, follows simple commands, conversant.  Appears overall fatigue.   HEENT:PERRLA, Pink conjunctiva, anicteric sclerae, moist oral mucosa  NECK: Supple, without any lymphadenopathy, or masses  CVS:  S1  S2, without murmur or gallop.   LUNG: No shortness of breath at rest.  No wheezes rales or rhonchi.  No cough.  BACK: No kyphosis of the thoracic spine  ABDOMEN: Soft, nontender to palpation, with positive bowel sounds  EXTREMITIES: Good range of motion on both upper and lower extremities, trace pedal edema, no calf tenderness  SKIN: Multiple bruises to upper and lower extremities.  Scabs along the shins.  NEUROLOGIC: Intact, pulses palpable  PSYCHIATRIC: Cognition  intact.  Flat affect.            Labs:    Recent Results (from the past 240 hour(s))   Basic Metabolic Panel   Result Value Ref Range    Sodium 128 (L) 136 - 145 mmol/L    Potassium 3.3 (L) 3.5 - 5.0 mmol/L    Chloride 90 (L) 98 - 107 mmol/L    CO2 26 22 - 31 mmol/L    Anion Gap, Calculation 12 5 - 18 mmol/L    Glucose 77 70 - 125 mg/dL    Calcium 7.7 (L) 8.5 - 10.5 mg/dL    BUN 88 (H) 8 - 28 mg/dL    Creatinine 1.86 (H) 0.60 - 1.10 mg/dL    GFR MDRD Af Amer 31 (L) >60 mL/min/1.73m2    GFR MDRD Non Af Amer 26 (L) >60 mL/min/1.73m2   BNP(B-type Natriuretic Peptide)   Result Value Ref Range     (H) 0 - 167 pg/mL   HM2(CBC w/o Differential)   Result Value Ref Range    .5 (HH) 4.0 - 11.0 thou/uL    RBC 2.56 (L) 3.80 - 5.40 mill/uL    Hemoglobin 7.3 (L) 12.0 - 16.0 g/dL    Hematocrit 23.3 (L) 35.0 - 47.0 %    MCV 91 80 - 100 fL    MCH 28.5 27.0 - 34.0 pg    MCHC 31.3 (L) 32.0 - 36.0 g/dL    RDW 17.2 (H) 11.0 - 14.5 %    Platelets 262 140 - 440 thou/uL    MPV 10.9 8.5 - 12.5 fL   HM2 (CBC W/O DIFF)   Result Value Ref Range    .2 (HH) 4.0 - 11.0 thou/uL    RBC 2.65 (L) 3.80 - 5.40 mill/uL    Hemoglobin 7.4 (L) 12.0 - 16.0 g/dL    Hematocrit 23.4 (L) 35.0 - 47.0 %    MCV 88 80 - 100 fL    MCH 27.9 27.0 - 34.0 pg    MCHC 31.6 (L) 32.0 - 36.0 g/dL    RDW 17.2 (H) 11.0 - 14.5 %    Platelets 314 140 - 440 thou/uL    MPV 11.3 8.5 - 12.5 fL   INR   Result Value Ref Range    INR 1.13 (H) 0.90 - 1.10   Lactic Acid   Result Value Ref Range    Lactic Acid 0.8 0.5 - 2.2 mmol/L   Type and Screen   Result Value Ref Range    ABORh O POS     Antibody Screen Negative Negative   POCT occult blood stool   Result Value Ref Range    POC Fecal Occult Bld Positive (!) Negative    Lot Number [id number]     Expiration Date 7-20     Diluent/Developer Lot Number [id number]     Diluent/Developer Expiration Date 2021-11     Pos Control Valid Control Valid Control    Neg Control Valid Control Valid Control   Comprehensive Metabolic Panel    Result Value Ref Range    Sodium 130 (L) 136 - 145 mmol/L    Potassium 2.9 (L) 3.5 - 5.0 mmol/L    Chloride 91 (L) 98 - 107 mmol/L    CO2 22 22 - 31 mmol/L    Anion Gap, Calculation 17 5 - 18 mmol/L    Glucose 76 70 - 125 mg/dL    BUN 86 (H) 8 - 28 mg/dL    Creatinine 1.92 (H) 0.60 - 1.10 mg/dL    GFR MDRD Af Amer 30 (L) >60 mL/min/1.73m2    GFR MDRD Non Af Amer 25 (L) >60 mL/min/1.73m2    Bilirubin, Total 0.4 0.0 - 1.0 mg/dL    Calcium 7.5 (L) 8.5 - 10.5 mg/dL    Protein, Total 4.4 (L) 6.0 - 8.0 g/dL    Albumin 2.3 (L) 3.5 - 5.0 g/dL    Alkaline Phosphatase 168 (H) 45 - 120 U/L    AST 29 0 - 40 U/L    ALT 12 0 - 45 U/L   Troponin I   Result Value Ref Range    Troponin I 0.08 0.00 - 0.29 ng/mL   Magnesium   Result Value Ref Range    Magnesium 2.5 1.8 - 2.6 mg/dL   ECG 12 lead with MUSE   Result Value Ref Range    SYSTOLIC BLOOD PRESSURE  mmHg    DIASTOLIC BLOOD PRESSURE  mmHg    VENTRICULAR RATE 56 BPM    ATRIAL RATE 56 BPM    P-R INTERVAL 194 ms    QRS DURATION 174 ms    Q-T INTERVAL 522 ms    QTC CALCULATION (BEZET) 503 ms    P Axis 59 degrees    R AXIS -30 degrees    T AXIS 131 degrees    MUSE DIAGNOSIS       Sinus bradycardia  Possible Left atrial enlargement  Left axis deviation  Left bundle branch block  Abnormal ECG  When compared with ECG of 14-JUN-2018 03:32,  No significant change was found  Confirmed by ABIEL BE MD LOC:JN ([id number]) on 7/3/2018 9:01:49 AM     Hemogram with PLT   Result Value Ref Range    .5 (HH) 4.0 - 11.0 thou/uL    RBC 2.41 (L) 3.80 - 5.40 mill/uL    Hemoglobin 6.6 (LL) 12.0 - 16.0 g/dL    Hematocrit 21.5 (L) 35.0 - 47.0 %    MCV 89 80 - 100 fL    MCH 27.4 27.0 - 34.0 pg    MCHC 30.7 (L) 32.0 - 36.0 g/dL    RDW 17.1 (H) 11.0 - 14.5 %    Platelets 254 140 - 440 thou/uL    MPV 11.1 8.5 - 12.5 fL   Basic metabolic panel   Result Value Ref Range    Sodium 131 (L) 136 - 145 mmol/L    Potassium 4.2 3.5 - 5.0 mmol/L    Chloride 97 (L) 98 - 107 mmol/L    CO2 23 22 - 31 mmol/L    Anion  Gap, Calculation 11 5 - 18 mmol/L    Glucose 83 70 - 125 mg/dL    Calcium 7.5 (L) 8.5 - 10.5 mg/dL    BUN 82 (H) 8 - 28 mg/dL    Creatinine 1.66 (H) 0.60 - 1.10 mg/dL    GFR MDRD Af Amer 35 (L) >60 mL/min/1.73m2    GFR MDRD Non Af Amer 29 (L) >60 mL/min/1.73m2   Magnesium   Result Value Ref Range    Magnesium 2.5 1.8 - 2.6 mg/dL   Crossmatch   Result Value Ref Range    Crossmatch COMPATIBLE     Blood Expiration Date 18157459492509     Unit Type O Pos     Unit Number [id number]     Status Transfused     Component Red Blood Cells     PRODUCT CODE X6202G68     Issue Date and Time [id number]     Blood Type 5100     CODING SYSTEM [id number]    Comprehensive Metabolic Panel   Result Value Ref Range    Sodium 129 (L) 136 - 145 mmol/L    Potassium 3.6 3.5 - 5.0 mmol/L    Chloride 100 98 - 107 mmol/L    CO2 22 22 - 31 mmol/L    Anion Gap, Calculation 7 5 - 18 mmol/L    Glucose 80 70 - 125 mg/dL    BUN 70 (H) 8 - 28 mg/dL    Creatinine 1.27 (H) 0.60 - 1.10 mg/dL    GFR MDRD Af Amer 48 (L) >60 mL/min/1.73m2    GFR MDRD Non Af Amer 40 (L) >60 mL/min/1.73m2    Bilirubin, Total 0.5 0.0 - 1.0 mg/dL    Calcium 7.5 (L) 8.5 - 10.5 mg/dL    Protein, Total 4.4 (L) 6.0 - 8.0 g/dL    Albumin 2.3 (L) 3.5 - 5.0 g/dL    Alkaline Phosphatase 166 (H) 45 - 120 U/L    AST 25 0 - 40 U/L    ALT 11 0 - 45 U/L   HM2(CBC w/o Differential)   Result Value Ref Range    .4 (HH) 4.0 - 11.0 thou/uL    RBC 3.50 (L) 3.80 - 5.40 mill/uL    Hemoglobin 9.3 (L) 12.0 - 16.0 g/dL    Hematocrit 30.4 (L) 35.0 - 47.0 %    MCV 87 80 - 100 fL    MCH 26.6 (L) 27.0 - 34.0 pg    MCHC 30.6 (L) 32.0 - 36.0 g/dL    RDW 17.4 (H) 11.0 - 14.5 %    Platelets 225 140 - 440 thou/uL    MPV 10.9 8.5 - 12.5 fL   Magnesium   Result Value Ref Range    Magnesium 2.5 1.8 - 2.6 mg/dL   Crossmatch   Result Value Ref Range    Crossmatch COMPATIBLE     Blood Expiration Date [id number]     Unit Type O Pos     Unit Number [id number]     Status Transfused     Component Red  Blood Cells     PRODUCT CODE [id number]     Issue Date and Time [id number]     Blood Type 5100     CODING SYSTEM [id number]    Magnesium   Result Value Ref Range    Magnesium 2.6 1.8 - 2.6 mg/dL   Comprehensive Metabolic Panel   Result Value Ref Range    Sodium 135 (L) 136 - 145 mmol/L    Potassium 3.8 3.5 - 5.0 mmol/L    Chloride 102 98 - 107 mmol/L    CO2 25 22 - 31 mmol/L    Anion Gap, Calculation 8 5 - 18 mmol/L    Glucose 80 70 - 125 mg/dL    BUN 59 (H) 8 - 28 mg/dL    Creatinine 1.12 (H) 0.60 - 1.10 mg/dL    GFR MDRD Af Amer 56 (L) >60 mL/min/1.73m2    GFR MDRD Non Af Amer 46 (L) >60 mL/min/1.73m2    Bilirubin, Total 0.5 0.0 - 1.0 mg/dL    Calcium 7.7 (L) 8.5 - 10.5 mg/dL    Protein, Total 4.2 (L) 6.0 - 8.0 g/dL    Albumin 2.3 (L) 3.5 - 5.0 g/dL    Alkaline Phosphatase 182 (H) 45 - 120 U/L    AST 22 0 - 40 U/L    ALT 10 0 - 45 U/L   HM1 (CBC with Diff)   Result Value Ref Range    .9 (HH) 4.0 - 11.0 thou/uL    RBC 3.60 (L) 3.80 - 5.40 mill/uL    Hemoglobin 10.3 (L) 12.0 - 16.0 g/dL    Hematocrit 32.0 (L) 35.0 - 47.0 %    MCV 89 80 - 100 fL    MCH 28.6 27.0 - 34.0 pg    MCHC 32.2 32.0 - 36.0 g/dL    RDW 17.9 (H) 11.0 - 14.5 %    Platelets 235 140 - 440 thou/uL    MPV 10.8 8.5 - 12.5 fL   Manual Differential   Result Value Ref Range    Total Neutrophils % 6 (L) 50 - 70 %    Lymphocytes % 90 (H) 20 - 40 %    Monocytes % 4 2 - 10 %    Eosinophils %  0 0 - 6 %    Basophils % 0 0 - 2 %    Total Neutrophils Absolute 16.7 (H) 2.0 - 7.7 thou/ul    Lymphocytes Absolute 250.1 (H) 0.8 - 4.4 thou/uL    Monocytes Absolute 11.1 (H) 0.0 - 0.9 thou/uL    Eosinophils Absolute 0.0 0.0 - 0.4 thou/uL    Basophils Absolute 0.0 0.0 - 0.2 thou/uL    Smudge Cells Present (!) None Seen    Platelet Estimate Normal Normal    Polychromasia 1+ (!) Negative       Assesemnt /Plan:   1. S/P TAVR (transcatheter aortic valve replacement)     2. Chronic lymphoid leukemia (H)     3. Anemia     4. Leukocytosis     5. Atrial fibrillation (H)      6. Aortic stenosis     7. Diastolic congestive heart failure (H)       Patient recently rehospitalized with anemia.  She was transfused with 3 units packed red blood cells.  Hemoglobin has rebounded to 10.  She has underlying CLL with leukocytosis.  White blood cells now 77.  She was on steroids.  This is completed.  She is followed by Hem/Onc  She was started on improvement, and will increase with 2 doses with follow-up in 2 weeks.  Physical deconditioning secondary to underlying chronic condition as well as multiple hospitalizations.  Atrial fib.  Rate controlled.  Plavix and aspirin was stopped during hospitalization however has resumed.  Status post Soham secondary to aortic stenosis.  Denies any shortness of breath or chest pain.  Heart failure.  Appears compensated currently.  We will follow-up with laboratory on Thursday.            Electronically signed by: Susie Walsh CNP

## 2021-07-07 ENCOUNTER — PATIENT MESSAGE (OUTPATIENT)
Dept: ADMINISTRATIVE | Facility: HOSPITAL | Age: 71
End: 2021-07-07

## 2023-04-22 NOTE — Clinical Note
Please only schedule with Dr. Pires in 2 weeks with CBC,CMP, TSH and free T4 and for Imfinzi Not with me
93.4

## 2023-08-29 NOTE — PLAN OF CARE
Patient medicated and had EKG performed. Sitting comfortably with family member at side.     Patient is requesting to have work note before discharge     Adi Torres  87/35/37 [id number] Problem: Patient Care Overview  Goal: Plan of Care Review  Outcome: Ongoing (interventions implemented as appropriate)  Day 9 of radiation to the lung wgt down 3 lbs denies nausea

## 2024-03-04 NOTE — ANESTHESIA POSTPROCEDURE EVALUATION
"Anesthesia Post Evaluation    Patient: Verónica Baker    Procedure(s) Performed: Procedure(s) (LRB):  VULVECTOMY (Bilateral)  URETHRECTOMY (N/A)    Final Anesthesia Type: general  Patient location during evaluation: PACU  Patient participation: Yes- Able to Participate  Level of consciousness: awake and alert and oriented  Post-procedure vital signs: reviewed and stable  Pain management: adequate  Airway patency: patent  PONV status at discharge: No PONV  Anesthetic complications: no      Cardiovascular status: hemodynamically stable  Respiratory status: unassisted, spontaneous ventilation and room air  Hydration status: euvolemic  Follow-up not needed.        Visit Vitals  BP (!) 150/83 (BP Location: Left arm, Patient Position: Lying)   Pulse (!) 58   Temp 36.6 °C (97.8 °F) (Axillary)   Resp 18   Ht 5' 5" (1.651 m)   Wt 55.8 kg (123 lb)   LMP  (LMP Unknown)   SpO2 100%   Breastfeeding? No   BMI 20.47 kg/m²       Pain/Billy Score: Pain Rating Prior to Med Admin: 6 (1/7/2019  9:12 AM)  Billy Score: 10 (1/7/2019  9:00 AM)        " [FreeTextEntry5] : See HPI [FreeTextEntry4] : See HPI [de-identified] : See HPI

## (undated) DEVICE — PACK LAPSCP/PELVSCPY III TIBRN

## (undated) DEVICE — PACK UNIVERSAL SPLIT II

## (undated) DEVICE — SEE MEDLINE ITEM 146417

## (undated) DEVICE — SLEEVE SCD EXPRESS CALF MEDIUM

## (undated) DEVICE — SEE MEDLINE ITEM 157128

## (undated) DEVICE — SOL 9P NACL IRR PIC IL

## (undated) DEVICE — DRAPE PLASTIC U 60X72

## (undated) DEVICE — NEOGUARD COVER 4X30CM STERILE

## (undated) DEVICE — DRESSING TELFA STRL 4X3 LF

## (undated) DEVICE — SUT 3-0 12-18IN SILK

## (undated) DEVICE — ELECTRODE REM PLYHSV RETURN 9

## (undated) DEVICE — DRAPE THYROID WITH ARMBOARD

## (undated) DEVICE — SYS LABEL CORRECT MED

## (undated) DEVICE — SUT 3/0 30IN ETHILON BLK M

## (undated) DEVICE — SUT 3-0 VICRYL SH CR/8 18

## (undated) DEVICE — DRESSING TRANS 4X4 TEGADERM

## (undated) DEVICE — DRAIN CHANNEL ROUND 19FR

## (undated) DEVICE — COVER LIGHT HANDLE 80/CA

## (undated) DEVICE — PACK PERI/GYN OPTIMA

## (undated) DEVICE — STAPLER INT CIR HEMRHOD 15IN

## (undated) DEVICE — SYR DISP LL 5CC

## (undated) DEVICE — HEMOSTAT SURGICEL 4X8IN

## (undated) DEVICE — TRAY MINOR GEN SURG

## (undated) DEVICE — CLIPPER BLADE MOD 4406 (CAREF)

## (undated) DEVICE — NDL 18GA X1 1/2 REG BEVEL

## (undated) DEVICE — LOOP VESSEL BLUE MAXI

## (undated) DEVICE — LUBRICANT SURGILUBE 2 OZ

## (undated) DEVICE — SUT VICRYL 3-0 27 SH

## (undated) DEVICE — PAD ABD 8X10 STERILE

## (undated) DEVICE — SEE MEDLINE ITEM 157194

## (undated) DEVICE — DRESSING ADH FILM TELFA 2X3IN

## (undated) DEVICE — SUT ETHILON 2-0 PSLX 30IN

## (undated) DEVICE — SEE MEDLINE ITEM 157117

## (undated) DEVICE — Device

## (undated) DEVICE — SEE MEDLINE ITEM 154981

## (undated) DEVICE — SEE MEDLINE ITEM 152622

## (undated) DEVICE — SET DECANTER MEDICHOICE

## (undated) DEVICE — SYR 10CC LUER LOCK

## (undated) DEVICE — STOCKINET 4INX48

## (undated) DEVICE — SUT SILK 2-0 SH 18IN BLACK

## (undated) DEVICE — SUT VICRYL PLUS 2-0 CT1 18

## (undated) DEVICE — GLOVE SURG ULTRA TOUCH 8

## (undated) DEVICE — SUT MCRYL PLUS 4-0 PS2 27IN

## (undated) DEVICE — SPONGE DERMACEA GAUZE 4X4

## (undated) DEVICE — CLIP SPRING 6MM

## (undated) DEVICE — SUT 2-0 VICRYL / SH (J417)

## (undated) DEVICE — SEE MEDLINE ITEM 153688

## (undated) DEVICE — SUT VICRYL PLUS 3-0 SH 18IN

## (undated) DEVICE — DRAPE MINOR PROCEDURE

## (undated) DEVICE — SUT LIGACLIP SMALL XTRA

## (undated) DEVICE — SPONGE DERMACEA 4X4IN 12PLY

## (undated) DEVICE — SEE MEDLINE ITEM 157181

## (undated) DEVICE — SUT VICRYL 2-0 36 CT-1

## (undated) DEVICE — AV VASCULAR ACCESS PACK

## (undated) DEVICE — DRESSING ABSRBNT ISLAND 3.6X8

## (undated) DEVICE — SUT 2-0 12-18IN SILK

## (undated) DEVICE — PACK CUSTOM UNIV BASIN SLI

## (undated) DEVICE — GAUZE SPONGE 4X4 12PLY

## (undated) DEVICE — EVACUATOR WOUND BULB 100CC

## (undated) DEVICE — SCRUB 10% POVIDONE IODINE 4OZ

## (undated) DEVICE — GAUZE FLUFF XXLG 36X36 2 PLY

## (undated) DEVICE — CLOSURE SKIN STERI STRIP 1/4X4

## (undated) DEVICE — BOOT SUTURE AID

## (undated) DEVICE — TRAY FOLEY 16FR INFECTION CONT

## (undated) DEVICE — SYR ONLY LUER LOCK 20CC

## (undated) DEVICE — STAPLER SKIN PROXIMATE WIDE

## (undated) DEVICE — TAPE SILK 3IN

## (undated) DEVICE — CANNULA VESSEL

## (undated) DEVICE — GOWN SURGICAL X-LARGE

## (undated) DEVICE — DRAPE STERI INSTRUMENT 1018

## (undated) DEVICE — SUT CTD VICRYL BR CR/SH VIL

## (undated) DEVICE — CLIP MED TICALL

## (undated) DEVICE — SUT CTD VICRYL 2-0 VIL BR

## (undated) DEVICE — SUT 0 30IN PROLENE BL MONO

## (undated) DEVICE — SUT 0 18IN SILK BLK BRAIDE

## (undated) DEVICE — SEE MEDLINE ITEM 157173

## (undated) DEVICE — COVERS PROBE NR-48 STERILE

## (undated) DEVICE — APPLIER CLIP LIAGCLIP 9.375IN

## (undated) DEVICE — DRESSING TRANS 2X2 TEGADERM

## (undated) DEVICE — ELECTRODE BLADE INSULATED 1 IN

## (undated) DEVICE — TEGADERM IV

## (undated) DEVICE — SUT PROLENE 6-0 BV-1 30IN

## (undated) DEVICE — LINER SUCTION 3000CC

## (undated) DEVICE — SEE MEDLINE ITEM 146292

## (undated) DEVICE — BLADE SURG CARBON STEEL SZ11

## (undated) DEVICE — NDL SAFETY 25G X 1.5 ECLIPSE

## (undated) DEVICE — SEE L#133928

## (undated) DEVICE — UNDERGLOVES BIOGEL PI SIZE 8